# Patient Record
Sex: FEMALE | Race: WHITE | NOT HISPANIC OR LATINO | Employment: OTHER | URBAN - METROPOLITAN AREA
[De-identification: names, ages, dates, MRNs, and addresses within clinical notes are randomized per-mention and may not be internally consistent; named-entity substitution may affect disease eponyms.]

---

## 2017-02-20 ENCOUNTER — GENERIC CONVERSION - ENCOUNTER (OUTPATIENT)
Dept: OTHER | Facility: OTHER | Age: 77
End: 2017-02-20

## 2017-03-21 ENCOUNTER — ALLSCRIPTS OFFICE VISIT (OUTPATIENT)
Dept: OTHER | Facility: OTHER | Age: 77
End: 2017-03-21

## 2017-03-30 ENCOUNTER — GENERIC CONVERSION - ENCOUNTER (OUTPATIENT)
Dept: OTHER | Facility: OTHER | Age: 77
End: 2017-03-30

## 2018-01-11 NOTE — RESULT NOTES
Message   stress testing looks ok     Verified Results  NM MYOCARDIAL PERFUSION SPECT [facility] STRESS AND/OR REST) [id number] 07:58AM Catherine Aj Order Number: [id number]   Performing Comments: 541 [facility]   Dec 6 at 9:30am   - Patient Instructions: To schedule this appointment, please contact Central Scheduling at [phone]  Test Name Result Flag Reference   NM MYOCARDIAL PERFUSION SPECT (RX STRESS AND/OR REST) (Report)     [address] [facility] [address]  [place], [address]   [phone]     Rest/Stress Gated SPECT Myocardial Perfusion Imaging After Regadenoson     Patient: Sana Dee   MR number: [id number]   Account number: [de-identified]   : 1940   Age: 64 years   Gender: Female   Status: Outpatient   Location: [address] [place] [address] [facility] [place]   Height: 63 in   Weight: 178 lb   BP: 142/ 88 mmHg     Allergies: PENICILLINS     Diagnosis: R07 9 - Chest pain, unspecified     Primary Physician: Wendy Reeder DO   Technician: Aamir Heaton   RN: Rasta Cummins RN BSN   Referring Physician: Wendy Reeder DO   Group: [facility]   Report Prepared By[de-identified] Rasta Cummins RN BSN   Interpreting Physician: Sara Short DO     INDICATIONS: Detection of coronary artery disease  HISTORY: hx DVT/breast cancer left mastectomy/osteo The patient is a 64 year   old  female  Chest pain status: chest pain, consistent with atypical   angina  Other symptoms: decreased effort tolerance  Coronary artery disease   risk factors: dyslipidemia, hypertension, diabetes mellitus, and   post-menopausal state  Co-morbidity: obesity  Medications: a beta blocker  PHYSICAL EXAM: Baseline physical exam screening: no wheezes audible  REST ECG: Normal sinus rhythm  Normal baseline ECG  PROCEDURE: The study was performed at the [address] [place] [address]  A   regadenoson infusion pharmacologic stress test was performed  Gated SPECT   myocardial perfusion imaging was performed after stress  Systolic blood   pressure was 142 mmHg, at the start of the study  Diastolic blood pressure was   88 mmHg, at the start of the study  The heart rate was 60 bpm, at the start of   the study  IV double checked  Regadenoson protocol:   HR bpm SBP mmHg DBP mmHg Symptoms   Baseline 60 142 88 none   Immediate -- -- -- mild dyspnea   1 min -- -- -- same as above   2 min 113 138 80 subsiding   5 min 77 120 70 none   walked three minutes with Regadenoson The patient also performed low level   exercise  STRESS SUMMARY: Duration of pharmacologic stress was Maximal heart rate during   stress was 113 bpm  There was normal resting blood pressure with an appropriate   response to stress  The rate-pressure product for the peak heart rate and blood   pressure was 88672  There was no chest pain during stress  The stress test was   terminated due to protocol completion  Pre oxygen saturation: 96 %  Peak oxygen   saturation: 98 %  The stress ECG was negative for ischemia  There were no   stress arrhythmias or conduction abnormalities  ISOTOPE ADMINISTRATION:   Resting isotope administration Stress isotope administration   Agent Tetrofosmin Tetrofosmin   Dose 10 mCi 32 49 mCi   Date 12/07/2016 12/07/2016   Injection time 08:10 09:50   Imaging time 08:52 10:50   Injection-image interval -- 60 min     The radiopharmaceutical was injected at the peak effect of pharmacologic   stress  MYOCARDIAL PERFUSION IMAGING:   The image quality was fair  Rotating projection images reveal mild breast   attenuation  Left ventricular size was normal  The TID ratio was 1 1  PERFUSION DEFECTS:   - There was a small, mildly severe, paradoxical (reverse redistribution)   myocardial perfusion defect of the mid-apical anterior wall segments,   associated with normal function, suggestive of breast attenuation artifact       GATED SPECT:   The calculated left ventricular ejection fraction was 65 %  Left ventricular   ejection fraction was within normal limits by visual estimate  There was no   left ventricular regional abnormality  SUMMARY:   - Stress results: There was no chest pain during stress  - ECG conclusions: The stress ECG was negative for ischemia  - Perfusion imaging: There was a small, mildly severe, paradoxical (reverse   redistribution) myocardial perfusion defect of the mid-apical anterior wall   segments, associated with normal function, suggestive of breast attenuation   artifact  - Gated SPECT: The calculated left ventricular ejection fraction was   65 %  Left ventricular ejection fraction was within normal limits by visual   estimate  There was no left ventricular regional abnormality  IMPRESSIONS: Normal study after pharmacologic vasodilation  There was image   artifact, without diagnostic evidence for perfusion abnormality   Left   ventricular systolic function was normal      Prepared and signed by     Amy Busby DO   Signed 12/07/2016 13:22:19

## 2018-01-13 NOTE — RESULT NOTES
Message   Blood testing is consistent with diabetes  Recommend office evaluation to discuss treatment options  Verified Results  (1) HEMOGLOBIN A1C [id number] 09:46AM Beba Moffetter Order Number: [id number]     Test Name Result Flag Reference   HEMOGLOBIN A1C 6 7 % H 4 2-6 3   EST  AVG  GLUCOSE 146 mg/dl       (1) TSH WITH FT4 REFLEX [id number] 09:46AM Beba Curtis Order Number: [id number]     Test Name Result Flag Reference   TSH 2 270 uIU/mL  0 358-3 740   Patients undergoing fluorescein dye angiography may retain small amounts of fluorescein in the body for 48-72 hours post procedure  Samples containing fluorescein can produce falsely depressed TSH values  If the patient had this procedure,a specimen should be resubmitted post fluorescein clearance            The recommended reference ranges for TSH during pregnancy are as follows:  First trimester 0 1 to 2 5 uIU/mL  Second trimester  0 2 to 3 0 uIU/mL  Third trimester 0 3 to 3 0 uIU/m

## 2018-01-14 VITALS
BODY MASS INDEX: 30.3 KG/M2 | WEIGHT: 171 LBS | DIASTOLIC BLOOD PRESSURE: 60 MMHG | HEIGHT: 63 IN | RESPIRATION RATE: 20 BRPM | SYSTOLIC BLOOD PRESSURE: 108 MMHG | TEMPERATURE: 97.7 F | HEART RATE: 76 BPM

## 2018-01-17 NOTE — RESULT NOTES
Message   Blood work looks okay  Verified Results  (1) CBC/PLT/DIFF [id number] 09:00AM Alverna Layer Order Number: [id number]     Test Name Result Flag Reference   WBC COUNT 5 87 Thousand/uL  4 31-10 16   RBC COUNT 4 53 Million/uL  3 81-5 12   HEMOGLOBIN 13 7 g/dL  11 5-15 4   HEMATOCRIT 41 5 %  34 8-46  1   MCV 92 fL  82-98   MCH 30 2 pg  26 8-34 3   MCHC 33 0 g/dL  31 4-37 4   RDW 12 7 %  11 6-15 1   MPV 11 3 fL  8 9-12 7   PLATELET COUNT 474 Thousands/uL  149-390   nRBC AUTOMATED 0 /100 WBCs     NEUTROPHILS RELATIVE PERCENT 53 %  43-75   LYMPHOCYTES RELATIVE PERCENT 35 %  14-44   MONOCYTES RELATIVE PERCENT 10 %  4-12   EOSINOPHILS RELATIVE PERCENT 2 %  0-6   BASOPHILS RELATIVE PERCENT 0 %  0-1   NEUTROPHILS ABSOLUTE COUNT 3 08 Thousands/?L  1 85-7 62   LYMPHOCYTES ABSOLUTE COUNT 2 05 Thousands/?L  0 60-4 47   MONOCYTES ABSOLUTE COUNT 0 59 Thousand/?L  0 17-1 22   EOSINOPHILS ABSOLUTE COUNT 0 12 Thousand/?L  0 00-0 61   BASOPHILS ABSOLUTE COUNT 0 02 Thousands/?L  0 00-0 10   - Patient Instructions: This bloodwork is non-fasting  Please drink two glasses of water morning of bloodwork  (1) COMPREHENSIVE METABOLIC PANEL [id number] 27:19ZU Alverna Layer Order Number: [id number]     Test Name Result Flag Reference   GLUCOSE,RANDM 129 mg/dL     If the patient is fasting, the ADA then defines impaired fasting glucose as > 100 mg/dL and diabetes as > or equal to 123 mg/dL     SODIUM 144 mmol/L  136-145   POTASSIUM 4 1 mmol/L  3 5-5 3   CHLORIDE 107 mmol/L  100-108   CARBON DIOXIDE 28 mmol/L  21-32   ANION GAP (CALC) 9 mmol/L  4-13   BLOOD UREA NITROGEN 17 mg/dL  5-25   CREATININE 0 78 mg/dL  0 60-1 30   Standardized to IDMS reference method   CALCIUM 9 1 mg/dL  8 3-10 1   BILI, TOTAL 0 54 mg/dL  0 20-1 00   ALK PHOSPHATAS 65 U/L     ALT (SGPT) 28 U/L  12-78   AST(SGOT) 23 U/L  5-45   ALBUMIN 4 0 g/dL  3 5-5 0   TOTAL PROTEIN 7 3 g/dL  6 4-8 2   eGFR Non- >60 0 ml/min/1 73sq m   [facility] [employer] Disease Education Program recommendations are as follows:  GFR calculation is accurate only with a steady state creatinine  Chronic Kidney disease less than 60 ml/min/1 73 sq  meters  Kidney failure less than 15 ml/min/1 73 sq  meters  (1) HEMOGLOBIN A1C [id number] 09:00AM Alverna Layer Order Number: [id number]     Test Name Result Flag Reference   HEMOGLOBIN A1C 6 5 % H 4 2-6 3   EST  AVG  GLUCOSE 140 mg/dl       (1) LIPID PANEL, FASTING [id number] 09:00AM Alverna Layer Order Number: [id number]     Test Name Result Flag Reference   CHOLESTEROL 213 mg/dL H    HDL,DIRECT 50 mg/dL  40-60   Specimen collection should occur prior to Metamizole administration due to the potential for falsely depressed results  LDL CHOLESTEROL CALCULATED 125 mg/dL H 0-100   - Patient Instructions: This is a fasting blood test  Water,black tea or black  coffee only after 9:00pm the night before test   Drink 2 glasses of water the morning of test       Triglyceride:         Normal              <150 mg/dl       Borderline High    150-199 mg/dl       High               200-499 mg/dl       Very High          >499 mg/dl  Cholesterol:         Desirable        <200 mg/dl      Borderline High  200-239 mg/dl      High             >239 mg/dl  HDL Cholesterol:        High    >59 mg/dL      Low     <41 mg/dL  LDL CALCULATED:    This screening LDL is a calculated result  It does not have the accuracy of the Direct Measured LDL in the monitoring of patients with hyperlipidemia and/or statin therapy  Direct Measure LDL (EVN583) must be ordered separately in these patients  TRIGLYCERIDES 189 mg/dL H <=150   Specimen collection should occur prior to N-Acetylcysteine or Metamizole administration due to the potential for falsely depressed results       (1) MICROALBUMIN CREATININE RATIO, RANDOM URINE [id number] 09:00AM Alverna Layer Order Number: [id number]     Test Name Result Flag Reference   MICROALBUMIN/ CREAT R 24 mg/g creatinine  0-30   MICROALBUMIN,URINE 31 9 mg/L H 0 0-20 0   CREATININE URINE 131 0 mg/dL

## 2018-02-05 DIAGNOSIS — I48.91 ATRIAL FIBRILLATION, UNSPECIFIED TYPE (HCC): Primary | ICD-10-CM

## 2018-02-07 RX ORDER — APIXABAN 5 MG/1
TABLET, FILM COATED ORAL
Qty: 180 TABLET | Refills: 3 | Status: ON HOLD | OUTPATIENT
Start: 2018-02-07 | End: 2020-06-15

## 2020-03-30 ENCOUNTER — TRANSCRIBE ORDERS (OUTPATIENT)
Dept: ADMINISTRATIVE | Facility: HOSPITAL | Age: 80
End: 2020-03-30

## 2020-03-30 DIAGNOSIS — Z01.818 OTHER SPECIFIED PRE-OPERATIVE EXAMINATION: ICD-10-CM

## 2020-03-30 DIAGNOSIS — M48.061 SPINAL STENOSIS, LUMBAR REGION, WITHOUT NEUROGENIC CLAUDICATION: Primary | ICD-10-CM

## 2020-06-10 DIAGNOSIS — M48.062 SPINAL STENOSIS OF LUMBAR REGION WITH NEUROGENIC CLAUDICATION: ICD-10-CM

## 2020-06-10 PROCEDURE — U0003 INFECTIOUS AGENT DETECTION BY NUCLEIC ACID (DNA OR RNA); SEVERE ACUTE RESPIRATORY SYNDROME CORONAVIRUS 2 (SARS-COV-2) (CORONAVIRUS DISEASE [COVID-19]), AMPLIFIED PROBE TECHNIQUE, MAKING USE OF HIGH THROUGHPUT TECHNOLOGIES AS DESCRIBED BY CMS-2020-01-R: HCPCS

## 2020-06-11 LAB — SARS-COV-2 RNA SPEC QL NAA+PROBE: NOT DETECTED

## 2020-06-12 ENCOUNTER — APPOINTMENT (OUTPATIENT)
Dept: RADIOLOGY | Facility: MEDICAL CENTER | Age: 80
End: 2020-06-12
Payer: MEDICARE

## 2020-06-12 ENCOUNTER — ANESTHESIA EVENT (OUTPATIENT)
Dept: PERIOP | Facility: HOSPITAL | Age: 80
DRG: 460 | End: 2020-06-12
Payer: MEDICARE

## 2020-06-12 DIAGNOSIS — M48.061 SPINAL STENOSIS, LUMBAR REGION, WITHOUT NEUROGENIC CLAUDICATION: ICD-10-CM

## 2020-06-12 DIAGNOSIS — Z01.818 OTHER SPECIFIED PRE-OPERATIVE EXAMINATION: ICD-10-CM

## 2020-06-12 PROCEDURE — 71046 X-RAY EXAM CHEST 2 VIEWS: CPT

## 2020-06-15 ENCOUNTER — HOSPITAL ENCOUNTER (OUTPATIENT)
Dept: RADIOLOGY | Facility: HOSPITAL | Age: 80
Setting detail: OUTPATIENT SURGERY
Discharge: HOME | DRG: 460 | End: 2020-06-15
Payer: MEDICARE

## 2020-06-15 ENCOUNTER — HOSPITAL ENCOUNTER (INPATIENT)
Facility: HOSPITAL | Age: 80
LOS: 5 days | DRG: 460 | End: 2020-06-21
Attending: ORTHOPAEDIC SURGERY | Admitting: ORTHOPAEDIC SURGERY
Payer: MEDICARE

## 2020-06-15 ENCOUNTER — ANESTHESIA (OUTPATIENT)
Dept: PERIOP | Facility: HOSPITAL | Age: 80
DRG: 460 | End: 2020-06-15
Payer: MEDICARE

## 2020-06-15 DIAGNOSIS — M48.061 SPINAL STENOSIS, LUMBAR REGION WITHOUT NEUROGENIC CLAUDICATION: ICD-10-CM

## 2020-06-15 DIAGNOSIS — M48.061 SPINAL STENOSIS OF LUMBAR REGION, UNSPECIFIED WHETHER NEUROGENIC CLAUDICATION PRESENT: Primary | ICD-10-CM

## 2020-06-15 DIAGNOSIS — R91.8 ABNORMAL FINDING ON LUNG IMAGING: ICD-10-CM

## 2020-06-15 LAB
ABO GROUP BLD: NORMAL
ABO GROUP BLD: NORMAL
BLD GP AB SCN SERPL QL: NEGATIVE
GLUCOSE SERPL-MCNC: 158 MG/DL (ref 65–140)
GLUCOSE SERPL-MCNC: 96 MG/DL (ref 65–140)
RH BLD: POSITIVE
RH BLD: POSITIVE
SPECIMEN EXPIRATION DATE: NORMAL

## 2020-06-15 PROCEDURE — 01NB0ZZ RELEASE LUMBAR NERVE, OPEN APPROACH: ICD-10-PCS | Performed by: ORTHOPAEDIC SURGERY

## 2020-06-15 PROCEDURE — C1713 ANCHOR/SCREW BN/BN,TIS/BN: HCPCS | Performed by: ORTHOPAEDIC SURGERY

## 2020-06-15 PROCEDURE — 86901 BLOOD TYPING SEROLOGIC RH(D): CPT | Performed by: ORTHOPAEDIC SURGERY

## 2020-06-15 PROCEDURE — 86850 RBC ANTIBODY SCREEN: CPT | Performed by: ORTHOPAEDIC SURGERY

## 2020-06-15 PROCEDURE — 82948 REAGENT STRIP/BLOOD GLUCOSE: CPT

## 2020-06-15 PROCEDURE — 72100 X-RAY EXAM L-S SPINE 2/3 VWS: CPT

## 2020-06-15 PROCEDURE — 01NR0ZZ RELEASE SACRAL NERVE, OPEN APPROACH: ICD-10-PCS | Performed by: ORTHOPAEDIC SURGERY

## 2020-06-15 PROCEDURE — 0SG0071 FUSION OF LUMBAR VERTEBRAL JOINT WITH AUTOLOGOUS TISSUE SUBSTITUTE, POSTERIOR APPROACH, POSTERIOR COLUMN, OPEN APPROACH: ICD-10-PCS | Performed by: ORTHOPAEDIC SURGERY

## 2020-06-15 PROCEDURE — 86900 BLOOD TYPING SEROLOGIC ABO: CPT | Performed by: ORTHOPAEDIC SURGERY

## 2020-06-15 PROCEDURE — 0SB20ZZ EXCISION OF LUMBAR VERTEBRAL DISC, OPEN APPROACH: ICD-10-PCS | Performed by: ORTHOPAEDIC SURGERY

## 2020-06-15 DEVICE — SCREW 5.5MM SET SOLID: Type: IMPLANTABLE DEVICE | Site: SPINE LUMBAR | Status: FUNCTIONAL

## 2020-06-15 DEVICE — GRAFT BONE CANCELLOUS CHIPS 15ML: Type: IMPLANTABLE DEVICE | Site: SPINE LUMBAR | Status: FUNCTIONAL

## 2020-06-15 DEVICE — GRAFT BONE PUTTY 10ML: Type: IMPLANTABLE DEVICE | Site: SPINE LUMBAR | Status: FUNCTIONAL

## 2020-06-15 DEVICE — SCREW CORT CANC 5.5MM MDLR TULIP LOW-TOP: Type: IMPLANTABLE DEVICE | Site: SPINE LUMBAR | Status: FUNCTIONAL

## 2020-06-15 DEVICE — IMPLANTABLE DEVICE: Type: IMPLANTABLE DEVICE | Site: SPINE LUMBAR | Status: FUNCTIONAL

## 2020-06-15 DEVICE — TI OPEN CURVED ROD, 5.5 DIA X 35 MM
Type: IMPLANTABLE DEVICE | Site: SPINE LUMBAR | Status: FUNCTIONAL
Brand: WHISTLER MODULAR PEDICLE SCREW SYSTEM

## 2020-06-15 RX ORDER — METHOCARBAMOL 750 MG/1
750 TABLET, FILM COATED ORAL 4 TIMES DAILY PRN
Status: DISCONTINUED | OUTPATIENT
Start: 2020-06-15 | End: 2020-06-21 | Stop reason: HOSPADM

## 2020-06-15 RX ORDER — HYDROCODONE BITARTRATE AND ACETAMINOPHEN 5; 325 MG/1; MG/1
2 TABLET ORAL EVERY 4 HOURS PRN
Status: DISCONTINUED | OUTPATIENT
Start: 2020-06-15 | End: 2020-06-21 | Stop reason: HOSPADM

## 2020-06-15 RX ORDER — LISINOPRIL 5 MG/1
2.5 TABLET ORAL DAILY
Status: DISCONTINUED | OUTPATIENT
Start: 2020-06-16 | End: 2020-06-21 | Stop reason: HOSPADM

## 2020-06-15 RX ORDER — ONDANSETRON 2 MG/ML
4 INJECTION INTRAMUSCULAR; INTRAVENOUS ONCE AS NEEDED
Status: DISCONTINUED | OUTPATIENT
Start: 2020-06-15 | End: 2020-06-15 | Stop reason: HOSPADM

## 2020-06-15 RX ORDER — CELECOXIB 200 MG/1
200 CAPSULE ORAL ONCE
Status: COMPLETED | OUTPATIENT
Start: 2020-06-15 | End: 2020-06-15

## 2020-06-15 RX ORDER — ACETAMINOPHEN 325 MG/1
975 TABLET ORAL ONCE
Status: COMPLETED | OUTPATIENT
Start: 2020-06-15 | End: 2020-06-15

## 2020-06-15 RX ORDER — SODIUM CHLORIDE, SODIUM LACTATE, POTASSIUM CHLORIDE, CALCIUM CHLORIDE 600; 310; 30; 20 MG/100ML; MG/100ML; MG/100ML; MG/100ML
100 INJECTION, SOLUTION INTRAVENOUS CONTINUOUS
Status: DISCONTINUED | OUTPATIENT
Start: 2020-06-15 | End: 2020-06-15

## 2020-06-15 RX ORDER — VANCOMYCIN HYDROCHLORIDE 1 G/200ML
15 INJECTION, SOLUTION INTRAVENOUS ONCE
Status: COMPLETED | OUTPATIENT
Start: 2020-06-15 | End: 2020-06-15

## 2020-06-15 RX ORDER — DIPHENHYDRAMINE HYDROCHLORIDE 50 MG/ML
25 INJECTION INTRAMUSCULAR; INTRAVENOUS EVERY 6 HOURS PRN
Status: DISCONTINUED | OUTPATIENT
Start: 2020-06-15 | End: 2020-06-17

## 2020-06-15 RX ORDER — ACETAMINOPHEN 325 MG/1
975 TABLET ORAL EVERY 6 HOURS PRN
Status: DISCONTINUED | OUTPATIENT
Start: 2020-06-15 | End: 2020-06-18

## 2020-06-15 RX ORDER — HYDROMORPHONE HCL/PF 1 MG/ML
SYRINGE (ML) INJECTION AS NEEDED
Status: DISCONTINUED | OUTPATIENT
Start: 2020-06-15 | End: 2020-06-15 | Stop reason: SURG

## 2020-06-15 RX ORDER — EPHEDRINE SULFATE 50 MG/ML
INJECTION INTRAVENOUS AS NEEDED
Status: DISCONTINUED | OUTPATIENT
Start: 2020-06-15 | End: 2020-06-15 | Stop reason: SURG

## 2020-06-15 RX ORDER — MIDAZOLAM HYDROCHLORIDE 2 MG/2ML
INJECTION, SOLUTION INTRAMUSCULAR; INTRAVENOUS AS NEEDED
Status: DISCONTINUED | OUTPATIENT
Start: 2020-06-15 | End: 2020-06-15 | Stop reason: SURG

## 2020-06-15 RX ORDER — SIMETHICONE 80 MG
80 TABLET,CHEWABLE ORAL 4 TIMES DAILY PRN
Status: DISCONTINUED | OUTPATIENT
Start: 2020-06-15 | End: 2020-06-21 | Stop reason: HOSPADM

## 2020-06-15 RX ORDER — SODIUM CHLORIDE 9 MG/ML
INJECTION, SOLUTION INTRAVENOUS CONTINUOUS PRN
Status: DISCONTINUED | OUTPATIENT
Start: 2020-06-15 | End: 2020-06-15 | Stop reason: SURG

## 2020-06-15 RX ORDER — DICYCLOMINE HCL 20 MG
20 TABLET ORAL EVERY 6 HOURS
Status: DISCONTINUED | OUTPATIENT
Start: 2020-06-15 | End: 2020-06-21 | Stop reason: HOSPADM

## 2020-06-15 RX ORDER — SODIUM CHLORIDE 9 MG/ML
125 INJECTION, SOLUTION INTRAVENOUS CONTINUOUS
Status: DISCONTINUED | OUTPATIENT
Start: 2020-06-15 | End: 2020-06-17

## 2020-06-15 RX ORDER — ONDANSETRON 2 MG/ML
INJECTION INTRAMUSCULAR; INTRAVENOUS AS NEEDED
Status: DISCONTINUED | OUTPATIENT
Start: 2020-06-15 | End: 2020-06-15 | Stop reason: SURG

## 2020-06-15 RX ORDER — VANCOMYCIN HYDROCHLORIDE 1 G/200ML
1000 INJECTION, SOLUTION INTRAVENOUS EVERY 12 HOURS
Status: COMPLETED | OUTPATIENT
Start: 2020-06-16 | End: 2020-06-16

## 2020-06-15 RX ORDER — CALCIUM CARBONATE 200(500)MG
1000 TABLET,CHEWABLE ORAL DAILY PRN
Status: DISCONTINUED | OUTPATIENT
Start: 2020-06-15 | End: 2020-06-21 | Stop reason: HOSPADM

## 2020-06-15 RX ORDER — MAGNESIUM HYDROXIDE/ALUMINUM HYDROXICE/SIMETHICONE 120; 1200; 1200 MG/30ML; MG/30ML; MG/30ML
30 SUSPENSION ORAL EVERY 6 HOURS PRN
Status: DISCONTINUED | OUTPATIENT
Start: 2020-06-15 | End: 2020-06-19

## 2020-06-15 RX ORDER — FENTANYL CITRATE/PF 50 MCG/ML
25 SYRINGE (ML) INJECTION
Status: COMPLETED | OUTPATIENT
Start: 2020-06-15 | End: 2020-06-15

## 2020-06-15 RX ORDER — LIDOCAINE HYDROCHLORIDE 20 MG/ML
INJECTION, SOLUTION EPIDURAL; INFILTRATION; INTRACAUDAL; PERINEURAL AS NEEDED
Status: DISCONTINUED | OUTPATIENT
Start: 2020-06-15 | End: 2020-06-15 | Stop reason: SURG

## 2020-06-15 RX ORDER — FENTANYL CITRATE 50 UG/ML
INJECTION, SOLUTION INTRAMUSCULAR; INTRAVENOUS AS NEEDED
Status: DISCONTINUED | OUTPATIENT
Start: 2020-06-15 | End: 2020-06-15 | Stop reason: SURG

## 2020-06-15 RX ORDER — ATORVASTATIN CALCIUM 40 MG/1
40 TABLET, FILM COATED ORAL
Status: DISCONTINUED | OUTPATIENT
Start: 2020-06-16 | End: 2020-06-21 | Stop reason: HOSPADM

## 2020-06-15 RX ORDER — DOCUSATE SODIUM 100 MG/1
100 CAPSULE, LIQUID FILLED ORAL 2 TIMES DAILY
Status: DISCONTINUED | OUTPATIENT
Start: 2020-06-15 | End: 2020-06-21 | Stop reason: HOSPADM

## 2020-06-15 RX ORDER — PROPOFOL 10 MG/ML
INJECTION, EMULSION INTRAVENOUS AS NEEDED
Status: DISCONTINUED | OUTPATIENT
Start: 2020-06-15 | End: 2020-06-15 | Stop reason: SURG

## 2020-06-15 RX ORDER — AMLODIPINE BESYLATE 5 MG/1
5 TABLET ORAL DAILY
Status: DISCONTINUED | OUTPATIENT
Start: 2020-06-16 | End: 2020-06-21 | Stop reason: HOSPADM

## 2020-06-15 RX ORDER — ONDANSETRON 2 MG/ML
4 INJECTION INTRAMUSCULAR; INTRAVENOUS EVERY 6 HOURS PRN
Status: DISCONTINUED | OUTPATIENT
Start: 2020-06-15 | End: 2020-06-19

## 2020-06-15 RX ORDER — PROPOFOL 10 MG/ML
INJECTION, EMULSION INTRAVENOUS CONTINUOUS PRN
Status: DISCONTINUED | OUTPATIENT
Start: 2020-06-15 | End: 2020-06-15 | Stop reason: SURG

## 2020-06-15 RX ORDER — SENNOSIDES 8.6 MG
1 TABLET ORAL DAILY
Status: DISCONTINUED | OUTPATIENT
Start: 2020-06-16 | End: 2020-06-21 | Stop reason: HOSPADM

## 2020-06-15 RX ORDER — BUPIVACAINE HYDROCHLORIDE AND EPINEPHRINE 5; 5 MG/ML; UG/ML
INJECTION, SOLUTION EPIDURAL; INTRACAUDAL; PERINEURAL AS NEEDED
Status: DISCONTINUED | OUTPATIENT
Start: 2020-06-15 | End: 2020-06-15 | Stop reason: HOSPADM

## 2020-06-15 RX ORDER — DEXAMETHASONE SODIUM PHOSPHATE 4 MG/ML
INJECTION, SOLUTION INTRA-ARTICULAR; INTRALESIONAL; INTRAMUSCULAR; INTRAVENOUS; SOFT TISSUE AS NEEDED
Status: DISCONTINUED | OUTPATIENT
Start: 2020-06-15 | End: 2020-06-15 | Stop reason: SURG

## 2020-06-15 RX ORDER — CHLORHEXIDINE GLUCONATE 0.12 MG/ML
15 RINSE ORAL ONCE
Status: COMPLETED | OUTPATIENT
Start: 2020-06-15 | End: 2020-06-15

## 2020-06-15 RX ORDER — GABAPENTIN 100 MG/1
100 CAPSULE ORAL 3 TIMES DAILY
Status: DISCONTINUED | OUTPATIENT
Start: 2020-06-15 | End: 2020-06-21 | Stop reason: HOSPADM

## 2020-06-15 RX ADMIN — HYDROMORPHONE HYDROCHLORIDE 1 MG: 1 INJECTION, SOLUTION INTRAMUSCULAR; INTRAVENOUS; SUBCUTANEOUS at 14:08

## 2020-06-15 RX ADMIN — DOCUSATE SODIUM 100 MG: 100 CAPSULE, LIQUID FILLED ORAL at 18:49

## 2020-06-15 RX ADMIN — DICYCLOMINE HYDROCHLORIDE 20 MG: 20 TABLET ORAL at 20:39

## 2020-06-15 RX ADMIN — FENTANYL CITRATE 25 MCG: 50 INJECTION, SOLUTION INTRAMUSCULAR; INTRAVENOUS at 16:32

## 2020-06-15 RX ADMIN — SODIUM CHLORIDE 125 ML/HR: 0.9 INJECTION, SOLUTION INTRAVENOUS at 12:46

## 2020-06-15 RX ADMIN — SODIUM CHLORIDE 125 ML/HR: 0.9 INJECTION, SOLUTION INTRAVENOUS at 17:23

## 2020-06-15 RX ADMIN — VANCOMYCIN HYDROCHLORIDE 1000 MG: 1 INJECTION, SOLUTION INTRAVENOUS at 12:35

## 2020-06-15 RX ADMIN — PHENYLEPHRINE HYDROCHLORIDE 100 MCG: 10 INJECTION INTRAVENOUS at 15:13

## 2020-06-15 RX ADMIN — GABAPENTIN 100 MG: 100 CAPSULE ORAL at 20:39

## 2020-06-15 RX ADMIN — SODIUM CHLORIDE, SODIUM LACTATE, POTASSIUM CHLORIDE, AND CALCIUM CHLORIDE 100 ML/HR: .6; .31; .03; .02 INJECTION, SOLUTION INTRAVENOUS at 10:31

## 2020-06-15 RX ADMIN — CHLORHEXIDINE GLUCONATE 0.12% ORAL RINSE 15 ML: 1.2 LIQUID ORAL at 10:30

## 2020-06-15 RX ADMIN — EPHEDRINE SULFATE 5 MG: 50 INJECTION, SOLUTION INTRAVENOUS at 13:37

## 2020-06-15 RX ADMIN — FENTANYL CITRATE 100 MCG: 50 INJECTION, SOLUTION INTRAMUSCULAR; INTRAVENOUS at 13:07

## 2020-06-15 RX ADMIN — PHENYLEPHRINE HYDROCHLORIDE 100 MCG: 10 INJECTION INTRAVENOUS at 13:37

## 2020-06-15 RX ADMIN — FENTANYL CITRATE 25 MCG: 50 INJECTION, SOLUTION INTRAMUSCULAR; INTRAVENOUS at 16:26

## 2020-06-15 RX ADMIN — ACETAMINOPHEN 975 MG: 325 TABLET ORAL at 10:28

## 2020-06-15 RX ADMIN — PHENYLEPHRINE HYDROCHLORIDE 100 MCG: 10 INJECTION INTRAVENOUS at 13:45

## 2020-06-15 RX ADMIN — MIDAZOLAM 2 MG: 1 INJECTION INTRAMUSCULAR; INTRAVENOUS at 13:04

## 2020-06-15 RX ADMIN — PROPOFOL 80 MCG/KG/MIN: 10 INJECTION, EMULSION INTRAVENOUS at 13:10

## 2020-06-15 RX ADMIN — Medication: at 16:45

## 2020-06-15 RX ADMIN — DEXAMETHASONE SODIUM PHOSPHATE 4 MG: 4 INJECTION, SOLUTION INTRAMUSCULAR; INTRAVENOUS at 13:10

## 2020-06-15 RX ADMIN — PHENYLEPHRINE HYDROCHLORIDE 100 MCG: 10 INJECTION INTRAVENOUS at 14:34

## 2020-06-15 RX ADMIN — PROPOFOL 150 MG: 10 INJECTION, EMULSION INTRAVENOUS at 13:07

## 2020-06-15 RX ADMIN — PHENYLEPHRINE HYDROCHLORIDE 100 MCG: 10 INJECTION INTRAVENOUS at 15:32

## 2020-06-15 RX ADMIN — PHENYLEPHRINE HYDROCHLORIDE 100 MCG: 10 INJECTION INTRAVENOUS at 15:08

## 2020-06-15 RX ADMIN — ONDANSETRON 4 MG: 2 INJECTION INTRAMUSCULAR; INTRAVENOUS at 13:10

## 2020-06-15 RX ADMIN — FENTANYL CITRATE 100 MCG: 50 INJECTION, SOLUTION INTRAMUSCULAR; INTRAVENOUS at 13:20

## 2020-06-15 RX ADMIN — SODIUM CHLORIDE: 0.9 INJECTION, SOLUTION INTRAVENOUS at 15:47

## 2020-06-15 RX ADMIN — HYDROMORPHONE HYDROCHLORIDE 1 MG: 1 INJECTION, SOLUTION INTRAMUSCULAR; INTRAVENOUS; SUBCUTANEOUS at 14:59

## 2020-06-15 RX ADMIN — VANCOMYCIN HYDROCHLORIDE 1000 MG: 1 INJECTION, SOLUTION INTRAVENOUS at 23:50

## 2020-06-15 RX ADMIN — CELECOXIB 200 MG: 200 CAPSULE ORAL at 10:28

## 2020-06-15 RX ADMIN — LIDOCAINE HYDROCHLORIDE 100 MG: 20 INJECTION, SOLUTION EPIDURAL; INFILTRATION; INTRACAUDAL; PERINEURAL at 13:07

## 2020-06-15 RX ADMIN — PHENYLEPHRINE HYDROCHLORIDE 200 MCG: 10 INJECTION INTRAVENOUS at 14:02

## 2020-06-15 RX ADMIN — PHENYLEPHRINE HYDROCHLORIDE 100 MCG: 10 INJECTION INTRAVENOUS at 13:58

## 2020-06-15 RX ADMIN — FENTANYL CITRATE 25 MCG: 50 INJECTION, SOLUTION INTRAMUSCULAR; INTRAVENOUS at 16:37

## 2020-06-15 RX ADMIN — REMIFENTANIL HYDROCHLORIDE 0.1 MCG/KG/MIN: 1 INJECTION, POWDER, LYOPHILIZED, FOR SOLUTION INTRAVENOUS at 13:10

## 2020-06-15 RX ADMIN — PHENYLEPHRINE HYDROCHLORIDE 200 MCG: 10 INJECTION INTRAVENOUS at 14:03

## 2020-06-15 RX ADMIN — METFORMIN HYDROCHLORIDE 500 MG: 500 TABLET ORAL at 18:48

## 2020-06-15 RX ADMIN — SODIUM CHLORIDE: 0.9 INJECTION, SOLUTION INTRAVENOUS at 13:10

## 2020-06-15 RX ADMIN — SODIUM CHLORIDE: 0.9 INJECTION, SOLUTION INTRAVENOUS at 15:30

## 2020-06-15 RX ADMIN — FENTANYL CITRATE 25 MCG: 50 INJECTION, SOLUTION INTRAMUSCULAR; INTRAVENOUS at 16:54

## 2020-06-16 ENCOUNTER — APPOINTMENT (OUTPATIENT)
Dept: RADIOLOGY | Facility: HOSPITAL | Age: 80
DRG: 460 | End: 2020-06-16
Payer: MEDICARE

## 2020-06-16 ENCOUNTER — APPOINTMENT (INPATIENT)
Dept: NON INVASIVE DIAGNOSTICS | Facility: HOSPITAL | Age: 80
DRG: 460 | End: 2020-06-16
Payer: MEDICARE

## 2020-06-16 PROBLEM — M48.061 SPINAL STENOSIS OF LUMBAR REGION: Status: ACTIVE | Noted: 2019-10-01

## 2020-06-16 LAB
ANION GAP SERPL CALCULATED.3IONS-SCNC: 6 MMOL/L (ref 4–13)
BUN SERPL-MCNC: 13 MG/DL (ref 5–25)
CALCIUM SERPL-MCNC: 8 MG/DL (ref 8.3–10.1)
CHLORIDE SERPL-SCNC: 107 MMOL/L (ref 100–108)
CO2 SERPL-SCNC: 28 MMOL/L (ref 21–32)
CREAT SERPL-MCNC: 0.82 MG/DL (ref 0.6–1.3)
ERYTHROCYTE [DISTWIDTH] IN BLOOD BY AUTOMATED COUNT: 13.3 % (ref 11.6–15.1)
GFR SERPL CREATININE-BSD FRML MDRD: 67 ML/MIN/1.73SQ M
GLUCOSE SERPL-MCNC: 192 MG/DL (ref 65–140)
HCT VFR BLD AUTO: 36.5 % (ref 34.8–46.1)
HGB BLD-MCNC: 11.5 G/DL (ref 11.5–15.4)
MCH RBC QN AUTO: 30.7 PG (ref 26.8–34.3)
MCHC RBC AUTO-ENTMCNC: 31.5 G/DL (ref 31.4–37.4)
MCV RBC AUTO: 97 FL (ref 82–98)
PLATELET # BLD AUTO: 210 THOUSANDS/UL (ref 149–390)
PMV BLD AUTO: 11.6 FL (ref 8.9–12.7)
POTASSIUM SERPL-SCNC: 4.2 MMOL/L (ref 3.5–5.3)
RBC # BLD AUTO: 3.75 MILLION/UL (ref 3.81–5.12)
SODIUM SERPL-SCNC: 141 MMOL/L (ref 136–145)
WBC # BLD AUTO: 10.97 THOUSAND/UL (ref 4.31–10.16)

## 2020-06-16 PROCEDURE — 93970 EXTREMITY STUDY: CPT | Performed by: SURGERY

## 2020-06-16 PROCEDURE — 93970 EXTREMITY STUDY: CPT

## 2020-06-16 PROCEDURE — 97167 OT EVAL HIGH COMPLEX 60 MIN: CPT

## 2020-06-16 PROCEDURE — 85027 COMPLETE CBC AUTOMATED: CPT | Performed by: PHYSICIAN ASSISTANT

## 2020-06-16 PROCEDURE — 80048 BASIC METABOLIC PNL TOTAL CA: CPT | Performed by: PHYSICIAN ASSISTANT

## 2020-06-16 PROCEDURE — 97760 ORTHOTIC MGMT&TRAING 1ST ENC: CPT

## 2020-06-16 PROCEDURE — 97163 PT EVAL HIGH COMPLEX 45 MIN: CPT

## 2020-06-16 RX ORDER — ASPIRIN 325 MG
325 TABLET ORAL DAILY
Status: DISCONTINUED | OUTPATIENT
Start: 2020-06-16 | End: 2020-06-20

## 2020-06-16 RX ADMIN — METFORMIN HYDROCHLORIDE 500 MG: 500 TABLET ORAL at 09:56

## 2020-06-16 RX ADMIN — GABAPENTIN 100 MG: 100 CAPSULE ORAL at 09:58

## 2020-06-16 RX ADMIN — GABAPENTIN 100 MG: 100 CAPSULE ORAL at 16:38

## 2020-06-16 RX ADMIN — LEVOTHYROXINE SODIUM 125 MCG: 100 TABLET ORAL at 05:27

## 2020-06-16 RX ADMIN — AMLODIPINE BESYLATE 5 MG: 5 TABLET ORAL at 09:57

## 2020-06-16 RX ADMIN — METFORMIN HYDROCHLORIDE 500 MG: 500 TABLET ORAL at 16:38

## 2020-06-16 RX ADMIN — HYDROCODONE BITARTRATE AND ACETAMINOPHEN 2 TABLET: 5; 325 TABLET ORAL at 20:43

## 2020-06-16 RX ADMIN — SODIUM CHLORIDE 125 ML/HR: 0.9 INJECTION, SOLUTION INTRAVENOUS at 18:08

## 2020-06-16 RX ADMIN — SODIUM CHLORIDE 125 ML/HR: 0.9 INJECTION, SOLUTION INTRAVENOUS at 09:47

## 2020-06-16 RX ADMIN — GABAPENTIN 100 MG: 100 CAPSULE ORAL at 20:38

## 2020-06-16 RX ADMIN — DICYCLOMINE HYDROCHLORIDE 20 MG: 20 TABLET ORAL at 23:08

## 2020-06-16 RX ADMIN — METHOCARBAMOL 750 MG: 750 TABLET, FILM COATED ORAL at 12:32

## 2020-06-16 RX ADMIN — ATORVASTATIN CALCIUM 40 MG: 40 TABLET, FILM COATED ORAL at 16:38

## 2020-06-16 RX ADMIN — DOCUSATE SODIUM 100 MG: 100 CAPSULE, LIQUID FILLED ORAL at 09:58

## 2020-06-16 RX ADMIN — DICYCLOMINE HYDROCHLORIDE 20 MG: 20 TABLET ORAL at 05:28

## 2020-06-16 RX ADMIN — HYDROCODONE BITARTRATE AND ACETAMINOPHEN 2 TABLET: 5; 325 TABLET ORAL at 12:32

## 2020-06-16 RX ADMIN — SERTRALINE HYDROCHLORIDE 25 MG: 50 TABLET ORAL at 09:57

## 2020-06-16 RX ADMIN — ASPIRIN 325 MG ORAL TABLET 325 MG: 325 PILL ORAL at 16:38

## 2020-06-16 RX ADMIN — SENNOSIDES 8.6 MG: 8.6 TABLET ORAL at 09:56

## 2020-06-16 RX ADMIN — SODIUM CHLORIDE 125 ML/HR: 0.9 INJECTION, SOLUTION INTRAVENOUS at 01:22

## 2020-06-16 RX ADMIN — DICYCLOMINE HYDROCHLORIDE 20 MG: 20 TABLET ORAL at 12:27

## 2020-06-16 RX ADMIN — DOCUSATE SODIUM 100 MG: 100 CAPSULE, LIQUID FILLED ORAL at 19:30

## 2020-06-16 RX ADMIN — DICYCLOMINE HYDROCHLORIDE 20 MG: 20 TABLET ORAL at 19:30

## 2020-06-16 RX ADMIN — LISINOPRIL 2.5 MG: 5 TABLET ORAL at 09:58

## 2020-06-17 PROCEDURE — 97110 THERAPEUTIC EXERCISES: CPT

## 2020-06-17 PROCEDURE — 97116 GAIT TRAINING THERAPY: CPT

## 2020-06-17 PROCEDURE — 97530 THERAPEUTIC ACTIVITIES: CPT

## 2020-06-17 RX ORDER — DIPHENHYDRAMINE HCL 25 MG
25 TABLET ORAL EVERY 6 HOURS PRN
Status: DISCONTINUED | OUTPATIENT
Start: 2020-06-17 | End: 2020-06-17

## 2020-06-17 RX ADMIN — HYDROCODONE BITARTRATE AND ACETAMINOPHEN 2 TABLET: 5; 325 TABLET ORAL at 20:29

## 2020-06-17 RX ADMIN — DOCUSATE SODIUM 100 MG: 100 CAPSULE, LIQUID FILLED ORAL at 09:27

## 2020-06-17 RX ADMIN — GABAPENTIN 100 MG: 100 CAPSULE ORAL at 09:57

## 2020-06-17 RX ADMIN — SENNOSIDES 8.6 MG: 8.6 TABLET ORAL at 09:28

## 2020-06-17 RX ADMIN — HYDROCODONE BITARTRATE AND ACETAMINOPHEN 2 TABLET: 5; 325 TABLET ORAL at 07:44

## 2020-06-17 RX ADMIN — DICYCLOMINE HYDROCHLORIDE 20 MG: 20 TABLET ORAL at 12:05

## 2020-06-17 RX ADMIN — AMLODIPINE BESYLATE 5 MG: 5 TABLET ORAL at 09:58

## 2020-06-17 RX ADMIN — DICYCLOMINE HYDROCHLORIDE 20 MG: 20 TABLET ORAL at 05:05

## 2020-06-17 RX ADMIN — ASPIRIN 325 MG ORAL TABLET 325 MG: 325 PILL ORAL at 09:27

## 2020-06-17 RX ADMIN — SODIUM CHLORIDE 125 ML/HR: 0.9 INJECTION, SOLUTION INTRAVENOUS at 01:43

## 2020-06-17 RX ADMIN — GABAPENTIN 100 MG: 100 CAPSULE ORAL at 16:54

## 2020-06-17 RX ADMIN — LEVOTHYROXINE SODIUM 125 MCG: 100 TABLET ORAL at 05:03

## 2020-06-17 RX ADMIN — HYDROCODONE BITARTRATE AND ACETAMINOPHEN 2 TABLET: 5; 325 TABLET ORAL at 03:02

## 2020-06-17 RX ADMIN — METFORMIN HYDROCHLORIDE 500 MG: 500 TABLET ORAL at 16:55

## 2020-06-17 RX ADMIN — GABAPENTIN 100 MG: 100 CAPSULE ORAL at 20:29

## 2020-06-17 RX ADMIN — DOCUSATE SODIUM 100 MG: 100 CAPSULE, LIQUID FILLED ORAL at 16:55

## 2020-06-17 RX ADMIN — METFORMIN HYDROCHLORIDE 500 MG: 500 TABLET ORAL at 07:44

## 2020-06-17 RX ADMIN — HYDROCODONE BITARTRATE AND ACETAMINOPHEN 2 TABLET: 5; 325 TABLET ORAL at 15:04

## 2020-06-17 RX ADMIN — DICYCLOMINE HYDROCHLORIDE 20 MG: 20 TABLET ORAL at 18:00

## 2020-06-17 RX ADMIN — ONDANSETRON 4 MG: 2 INJECTION INTRAMUSCULAR; INTRAVENOUS at 10:22

## 2020-06-17 RX ADMIN — SERTRALINE HYDROCHLORIDE 25 MG: 50 TABLET ORAL at 09:27

## 2020-06-17 RX ADMIN — SODIUM CHLORIDE 125 ML/HR: 0.9 INJECTION, SOLUTION INTRAVENOUS at 10:02

## 2020-06-17 RX ADMIN — ATORVASTATIN CALCIUM 40 MG: 40 TABLET, FILM COATED ORAL at 16:54

## 2020-06-17 RX ADMIN — LISINOPRIL 2.5 MG: 5 TABLET ORAL at 09:27

## 2020-06-18 ENCOUNTER — APPOINTMENT (INPATIENT)
Dept: CT IMAGING | Facility: HOSPITAL | Age: 80
DRG: 460 | End: 2020-06-18
Payer: MEDICARE

## 2020-06-18 LAB
ALBUMIN SERPL BCP-MCNC: 2.8 G/DL (ref 3.5–5)
ALP SERPL-CCNC: 62 U/L (ref 46–116)
ALT SERPL W P-5'-P-CCNC: 30 U/L (ref 12–78)
ANION GAP SERPL CALCULATED.3IONS-SCNC: 10 MMOL/L (ref 4–13)
AST SERPL W P-5'-P-CCNC: 28 U/L (ref 5–45)
BASOPHILS # BLD AUTO: 0.02 THOUSANDS/ΜL (ref 0–0.1)
BASOPHILS NFR BLD AUTO: 0 % (ref 0–1)
BILIRUB SERPL-MCNC: 0.52 MG/DL (ref 0.2–1)
BUN SERPL-MCNC: 9 MG/DL (ref 5–25)
CALCIUM SERPL-MCNC: 9.2 MG/DL (ref 8.3–10.1)
CHLORIDE SERPL-SCNC: 101 MMOL/L (ref 100–108)
CO2 SERPL-SCNC: 28 MMOL/L (ref 21–32)
CREAT SERPL-MCNC: 0.73 MG/DL (ref 0.6–1.3)
EOSINOPHIL # BLD AUTO: 0.03 THOUSAND/ΜL (ref 0–0.61)
EOSINOPHIL NFR BLD AUTO: 0 % (ref 0–6)
ERYTHROCYTE [DISTWIDTH] IN BLOOD BY AUTOMATED COUNT: 13 % (ref 11.6–15.1)
GFR SERPL CREATININE-BSD FRML MDRD: 77 ML/MIN/1.73SQ M
GLUCOSE SERPL-MCNC: 132 MG/DL (ref 65–140)
GLUCOSE SERPL-MCNC: 153 MG/DL (ref 65–140)
GLUCOSE SERPL-MCNC: 157 MG/DL (ref 65–140)
GLUCOSE SERPL-MCNC: 169 MG/DL (ref 65–140)
GLUCOSE SERPL-MCNC: 240 MG/DL (ref 65–140)
HCT VFR BLD AUTO: 36.3 % (ref 34.8–46.1)
HGB BLD-MCNC: 11.9 G/DL (ref 11.5–15.4)
IMM GRANULOCYTES # BLD AUTO: 0.13 THOUSAND/UL (ref 0–0.2)
IMM GRANULOCYTES NFR BLD AUTO: 1 % (ref 0–2)
LYMPHOCYTES # BLD AUTO: 1.08 THOUSANDS/ΜL (ref 0.6–4.47)
LYMPHOCYTES NFR BLD AUTO: 7 % (ref 14–44)
MCH RBC QN AUTO: 31.1 PG (ref 26.8–34.3)
MCHC RBC AUTO-ENTMCNC: 32.8 G/DL (ref 31.4–37.4)
MCV RBC AUTO: 95 FL (ref 82–98)
MONOCYTES # BLD AUTO: 1.22 THOUSAND/ΜL (ref 0.17–1.22)
MONOCYTES NFR BLD AUTO: 8 % (ref 4–12)
NEUTROPHILS # BLD AUTO: 12.19 THOUSANDS/ΜL (ref 1.85–7.62)
NEUTS SEG NFR BLD AUTO: 84 % (ref 43–75)
NRBC BLD AUTO-RTO: 0 /100 WBCS
PLATELET # BLD AUTO: 195 THOUSANDS/UL (ref 149–390)
PMV BLD AUTO: 11 FL (ref 8.9–12.7)
POTASSIUM SERPL-SCNC: 2.7 MMOL/L (ref 3.5–5.3)
PROT SERPL-MCNC: 7.4 G/DL (ref 6.4–8.2)
RBC # BLD AUTO: 3.83 MILLION/UL (ref 3.81–5.12)
SARS-COV-2 RNA RESP QL NAA+PROBE: NEGATIVE
SODIUM SERPL-SCNC: 139 MMOL/L (ref 136–145)
WBC # BLD AUTO: 14.67 THOUSAND/UL (ref 4.31–10.16)

## 2020-06-18 PROCEDURE — 80053 COMPREHEN METABOLIC PANEL: CPT | Performed by: PHYSICIAN ASSISTANT

## 2020-06-18 PROCEDURE — 82948 REAGENT STRIP/BLOOD GLUCOSE: CPT

## 2020-06-18 PROCEDURE — 97110 THERAPEUTIC EXERCISES: CPT

## 2020-06-18 PROCEDURE — 97530 THERAPEUTIC ACTIVITIES: CPT

## 2020-06-18 PROCEDURE — 97116 GAIT TRAINING THERAPY: CPT

## 2020-06-18 PROCEDURE — 71275 CT ANGIOGRAPHY CHEST: CPT

## 2020-06-18 PROCEDURE — 87635 SARS-COV-2 COVID-19 AMP PRB: CPT | Performed by: PHYSICIAN ASSISTANT

## 2020-06-18 PROCEDURE — 97535 SELF CARE MNGMENT TRAINING: CPT

## 2020-06-18 PROCEDURE — 85025 COMPLETE CBC W/AUTO DIFF WBC: CPT | Performed by: PHYSICIAN ASSISTANT

## 2020-06-18 RX ORDER — HEPARIN SODIUM 5000 [USP'U]/ML
5000 INJECTION, SOLUTION INTRAVENOUS; SUBCUTANEOUS EVERY 8 HOURS SCHEDULED
Status: DISCONTINUED | OUTPATIENT
Start: 2020-06-18 | End: 2020-06-21 | Stop reason: HOSPADM

## 2020-06-18 RX ORDER — POTASSIUM CHLORIDE 20 MEQ/1
40 TABLET, EXTENDED RELEASE ORAL ONCE
Status: COMPLETED | OUTPATIENT
Start: 2020-06-18 | End: 2020-06-18

## 2020-06-18 RX ORDER — ACETAMINOPHEN 325 MG/1
650 TABLET ORAL EVERY 8 HOURS SCHEDULED
Status: DISCONTINUED | OUTPATIENT
Start: 2020-06-18 | End: 2020-06-21 | Stop reason: HOSPADM

## 2020-06-18 RX ADMIN — IOHEXOL 85 ML: 350 INJECTION, SOLUTION INTRAVENOUS at 09:17

## 2020-06-18 RX ADMIN — HYDROCODONE BITARTRATE AND ACETAMINOPHEN 1 TABLET: 5; 325 TABLET ORAL at 12:15

## 2020-06-18 RX ADMIN — HYDROCODONE BITARTRATE AND ACETAMINOPHEN 2 TABLET: 5; 325 TABLET ORAL at 01:35

## 2020-06-18 RX ADMIN — METFORMIN HYDROCHLORIDE 500 MG: 500 TABLET ORAL at 07:51

## 2020-06-18 RX ADMIN — INSULIN LISPRO 1 UNITS: 100 INJECTION, SOLUTION INTRAVENOUS; SUBCUTANEOUS at 16:24

## 2020-06-18 RX ADMIN — DICYCLOMINE HYDROCHLORIDE 20 MG: 20 TABLET ORAL at 05:36

## 2020-06-18 RX ADMIN — SENNOSIDES 8.6 MG: 8.6 TABLET ORAL at 08:26

## 2020-06-18 RX ADMIN — ACETAMINOPHEN 650 MG: 325 TABLET ORAL at 22:12

## 2020-06-18 RX ADMIN — ATORVASTATIN CALCIUM 40 MG: 40 TABLET, FILM COATED ORAL at 16:14

## 2020-06-18 RX ADMIN — DOCUSATE SODIUM 100 MG: 100 CAPSULE, LIQUID FILLED ORAL at 08:30

## 2020-06-18 RX ADMIN — LISINOPRIL 2.5 MG: 5 TABLET ORAL at 08:27

## 2020-06-18 RX ADMIN — GABAPENTIN 100 MG: 100 CAPSULE ORAL at 22:11

## 2020-06-18 RX ADMIN — HEPARIN SODIUM 5000 UNITS: 5000 INJECTION INTRAVENOUS; SUBCUTANEOUS at 14:23

## 2020-06-18 RX ADMIN — AMLODIPINE BESYLATE 5 MG: 5 TABLET ORAL at 08:27

## 2020-06-18 RX ADMIN — DICYCLOMINE HYDROCHLORIDE 20 MG: 20 TABLET ORAL at 17:10

## 2020-06-18 RX ADMIN — INSULIN LISPRO 2 UNITS: 100 INJECTION, SOLUTION INTRAVENOUS; SUBCUTANEOUS at 07:55

## 2020-06-18 RX ADMIN — METHOCARBAMOL 750 MG: 750 TABLET, FILM COATED ORAL at 12:18

## 2020-06-18 RX ADMIN — ASPIRIN 325 MG ORAL TABLET 325 MG: 325 PILL ORAL at 08:26

## 2020-06-18 RX ADMIN — GABAPENTIN 100 MG: 100 CAPSULE ORAL at 08:27

## 2020-06-18 RX ADMIN — DOCUSATE SODIUM 100 MG: 100 CAPSULE, LIQUID FILLED ORAL at 17:10

## 2020-06-18 RX ADMIN — SERTRALINE HYDROCHLORIDE 25 MG: 50 TABLET ORAL at 08:26

## 2020-06-18 RX ADMIN — INSULIN LISPRO 1 UNITS: 100 INJECTION, SOLUTION INTRAVENOUS; SUBCUTANEOUS at 11:14

## 2020-06-18 RX ADMIN — HEPARIN SODIUM 5000 UNITS: 5000 INJECTION INTRAVENOUS; SUBCUTANEOUS at 22:12

## 2020-06-18 RX ADMIN — ACETAMINOPHEN 975 MG: 325 TABLET ORAL at 16:14

## 2020-06-18 RX ADMIN — DICYCLOMINE HYDROCHLORIDE 20 MG: 20 TABLET ORAL at 00:10

## 2020-06-18 RX ADMIN — POTASSIUM CHLORIDE 40 MEQ: 1500 TABLET, EXTENDED RELEASE ORAL at 16:14

## 2020-06-18 RX ADMIN — METFORMIN HYDROCHLORIDE 500 MG: 500 TABLET ORAL at 16:14

## 2020-06-18 RX ADMIN — GABAPENTIN 100 MG: 100 CAPSULE ORAL at 16:14

## 2020-06-18 RX ADMIN — METHOCARBAMOL 750 MG: 750 TABLET, FILM COATED ORAL at 01:35

## 2020-06-18 RX ADMIN — DICYCLOMINE HYDROCHLORIDE 20 MG: 20 TABLET ORAL at 11:14

## 2020-06-18 RX ADMIN — LEVOTHYROXINE SODIUM 125 MCG: 100 TABLET ORAL at 05:34

## 2020-06-18 RX ADMIN — ONDANSETRON 4 MG: 2 INJECTION INTRAMUSCULAR; INTRAVENOUS at 04:15

## 2020-06-19 ENCOUNTER — APPOINTMENT (INPATIENT)
Dept: NON INVASIVE DIAGNOSTICS | Facility: HOSPITAL | Age: 80
DRG: 460 | End: 2020-06-19
Payer: MEDICARE

## 2020-06-19 ENCOUNTER — APPOINTMENT (INPATIENT)
Dept: RADIOLOGY | Facility: HOSPITAL | Age: 80
DRG: 460 | End: 2020-06-19
Payer: MEDICARE

## 2020-06-19 LAB
ANION GAP SERPL CALCULATED.3IONS-SCNC: 10 MMOL/L (ref 4–13)
BASOPHILS # BLD AUTO: 0.01 THOUSANDS/ΜL (ref 0–0.1)
BASOPHILS NFR BLD AUTO: 0 % (ref 0–1)
BUN SERPL-MCNC: 16 MG/DL (ref 5–25)
CALCIUM SERPL-MCNC: 9.4 MG/DL (ref 8.3–10.1)
CHLORIDE SERPL-SCNC: 97 MMOL/L (ref 100–108)
CO2 SERPL-SCNC: 30 MMOL/L (ref 21–32)
CREAT SERPL-MCNC: 1.04 MG/DL (ref 0.6–1.3)
EOSINOPHIL # BLD AUTO: 0.11 THOUSAND/ΜL (ref 0–0.61)
EOSINOPHIL NFR BLD AUTO: 1 % (ref 0–6)
ERYTHROCYTE [DISTWIDTH] IN BLOOD BY AUTOMATED COUNT: 12.8 % (ref 11.6–15.1)
GFR SERPL CREATININE-BSD FRML MDRD: 51 ML/MIN/1.73SQ M
GLUCOSE SERPL-MCNC: 126 MG/DL (ref 65–140)
GLUCOSE SERPL-MCNC: 155 MG/DL (ref 65–140)
GLUCOSE SERPL-MCNC: 160 MG/DL (ref 65–140)
GLUCOSE SERPL-MCNC: 182 MG/DL (ref 65–140)
GLUCOSE SERPL-MCNC: 194 MG/DL (ref 65–140)
HCT VFR BLD AUTO: 38.2 % (ref 34.8–46.1)
HGB BLD-MCNC: 12.6 G/DL (ref 11.5–15.4)
IMM GRANULOCYTES # BLD AUTO: 0.07 THOUSAND/UL (ref 0–0.2)
IMM GRANULOCYTES NFR BLD AUTO: 1 % (ref 0–2)
LYMPHOCYTES # BLD AUTO: 1.41 THOUSANDS/ΜL (ref 0.6–4.47)
LYMPHOCYTES NFR BLD AUTO: 13 % (ref 14–44)
MCH RBC QN AUTO: 30.5 PG (ref 26.8–34.3)
MCHC RBC AUTO-ENTMCNC: 33 G/DL (ref 31.4–37.4)
MCV RBC AUTO: 93 FL (ref 82–98)
MONOCYTES # BLD AUTO: 0.96 THOUSAND/ΜL (ref 0.17–1.22)
MONOCYTES NFR BLD AUTO: 9 % (ref 4–12)
NEUTROPHILS # BLD AUTO: 8.01 THOUSANDS/ΜL (ref 1.85–7.62)
NEUTS SEG NFR BLD AUTO: 76 % (ref 43–75)
NRBC BLD AUTO-RTO: 0 /100 WBCS
PLATELET # BLD AUTO: 272 THOUSANDS/UL (ref 149–390)
PMV BLD AUTO: 10.5 FL (ref 8.9–12.7)
POTASSIUM SERPL-SCNC: 2.7 MMOL/L (ref 3.5–5.3)
POTASSIUM SERPL-SCNC: 3.2 MMOL/L (ref 3.5–5.3)
RBC # BLD AUTO: 4.13 MILLION/UL (ref 3.81–5.12)
SODIUM SERPL-SCNC: 137 MMOL/L (ref 136–145)
WBC # BLD AUTO: 10.57 THOUSAND/UL (ref 4.31–10.16)

## 2020-06-19 PROCEDURE — 97530 THERAPEUTIC ACTIVITIES: CPT

## 2020-06-19 PROCEDURE — 97535 SELF CARE MNGMENT TRAINING: CPT

## 2020-06-19 PROCEDURE — 82948 REAGENT STRIP/BLOOD GLUCOSE: CPT

## 2020-06-19 PROCEDURE — NC001 PR NO CHARGE

## 2020-06-19 PROCEDURE — 84132 ASSAY OF SERUM POTASSIUM: CPT | Performed by: INTERNAL MEDICINE

## 2020-06-19 PROCEDURE — 85025 COMPLETE CBC W/AUTO DIFF WBC: CPT | Performed by: PHYSICIAN ASSISTANT

## 2020-06-19 PROCEDURE — 97116 GAIT TRAINING THERAPY: CPT

## 2020-06-19 PROCEDURE — 97110 THERAPEUTIC EXERCISES: CPT

## 2020-06-19 PROCEDURE — 99222 1ST HOSP IP/OBS MODERATE 55: CPT | Performed by: SURGERY

## 2020-06-19 PROCEDURE — 72100 X-RAY EXAM L-S SPINE 2/3 VWS: CPT

## 2020-06-19 PROCEDURE — 80048 BASIC METABOLIC PNL TOTAL CA: CPT | Performed by: PHYSICIAN ASSISTANT

## 2020-06-19 RX ORDER — METHOCARBAMOL 750 MG/1
750 TABLET, FILM COATED ORAL 4 TIMES DAILY PRN
Qty: 60 TABLET | Refills: 0 | Status: SHIPPED | OUTPATIENT
Start: 2020-06-19 | End: 2021-09-10

## 2020-06-19 RX ORDER — POTASSIUM CHLORIDE 20 MEQ/1
40 TABLET, EXTENDED RELEASE ORAL ONCE
Status: COMPLETED | OUTPATIENT
Start: 2020-06-19 | End: 2020-06-19

## 2020-06-19 RX ORDER — POTASSIUM CHLORIDE 20 MEQ/1
20 TABLET, EXTENDED RELEASE ORAL ONCE
Status: COMPLETED | OUTPATIENT
Start: 2020-06-19 | End: 2020-06-20

## 2020-06-19 RX ORDER — HEPARIN SODIUM 5000 [USP'U]/ML
5000 INJECTION, SOLUTION INTRAVENOUS; SUBCUTANEOUS EVERY 8 HOURS SCHEDULED
Qty: 1 ML | Refills: 0 | Status: SHIPPED | OUTPATIENT
Start: 2020-06-19 | End: 2020-07-06 | Stop reason: HOSPADM

## 2020-06-19 RX ORDER — HYDROCODONE BITARTRATE AND ACETAMINOPHEN 5; 325 MG/1; MG/1
1 TABLET ORAL EVERY 6 HOURS PRN
Qty: 60 TABLET | Refills: 0 | Status: SHIPPED | OUTPATIENT
Start: 2020-06-19 | End: 2020-07-06 | Stop reason: HOSPADM

## 2020-06-19 RX ADMIN — METFORMIN HYDROCHLORIDE 500 MG: 500 TABLET ORAL at 16:30

## 2020-06-19 RX ADMIN — ACETAMINOPHEN 650 MG: 325 TABLET ORAL at 14:38

## 2020-06-19 RX ADMIN — POTASSIUM CHLORIDE 40 MEQ: 1500 TABLET, EXTENDED RELEASE ORAL at 14:47

## 2020-06-19 RX ADMIN — DICYCLOMINE HYDROCHLORIDE 20 MG: 20 TABLET ORAL at 01:17

## 2020-06-19 RX ADMIN — ATORVASTATIN CALCIUM 40 MG: 40 TABLET, FILM COATED ORAL at 17:25

## 2020-06-19 RX ADMIN — SENNOSIDES 8.6 MG: 8.6 TABLET ORAL at 09:12

## 2020-06-19 RX ADMIN — GABAPENTIN 100 MG: 100 CAPSULE ORAL at 09:12

## 2020-06-19 RX ADMIN — GABAPENTIN 100 MG: 100 CAPSULE ORAL at 16:00

## 2020-06-19 RX ADMIN — HYDROCODONE BITARTRATE AND ACETAMINOPHEN 1 TABLET: 5; 325 TABLET ORAL at 14:37

## 2020-06-19 RX ADMIN — METFORMIN HYDROCHLORIDE 500 MG: 500 TABLET ORAL at 07:35

## 2020-06-19 RX ADMIN — HEPARIN SODIUM 5000 UNITS: 5000 INJECTION INTRAVENOUS; SUBCUTANEOUS at 06:29

## 2020-06-19 RX ADMIN — LEVOTHYROXINE SODIUM 125 MCG: 100 TABLET ORAL at 06:28

## 2020-06-19 RX ADMIN — HEPARIN SODIUM 5000 UNITS: 5000 INJECTION INTRAVENOUS; SUBCUTANEOUS at 14:37

## 2020-06-19 RX ADMIN — INSULIN LISPRO 1 UNITS: 100 INJECTION, SOLUTION INTRAVENOUS; SUBCUTANEOUS at 17:26

## 2020-06-19 RX ADMIN — DICYCLOMINE HYDROCHLORIDE 20 MG: 20 TABLET ORAL at 06:28

## 2020-06-19 RX ADMIN — INSULIN LISPRO 1 UNITS: 100 INJECTION, SOLUTION INTRAVENOUS; SUBCUTANEOUS at 07:35

## 2020-06-19 RX ADMIN — LISINOPRIL 2.5 MG: 5 TABLET ORAL at 09:13

## 2020-06-19 RX ADMIN — DOCUSATE SODIUM 100 MG: 100 CAPSULE, LIQUID FILLED ORAL at 17:26

## 2020-06-19 RX ADMIN — DICYCLOMINE HYDROCHLORIDE 20 MG: 20 TABLET ORAL at 17:27

## 2020-06-19 RX ADMIN — DOCUSATE SODIUM 100 MG: 100 CAPSULE, LIQUID FILLED ORAL at 09:15

## 2020-06-19 RX ADMIN — HYDROCODONE BITARTRATE AND ACETAMINOPHEN 1 TABLET: 5; 325 TABLET ORAL at 01:16

## 2020-06-19 RX ADMIN — ASPIRIN 325 MG ORAL TABLET 325 MG: 325 PILL ORAL at 09:12

## 2020-06-19 RX ADMIN — DICYCLOMINE HYDROCHLORIDE 20 MG: 20 TABLET ORAL at 12:08

## 2020-06-19 RX ADMIN — HYDROCODONE BITARTRATE AND ACETAMINOPHEN 1 TABLET: 5; 325 TABLET ORAL at 06:28

## 2020-06-19 RX ADMIN — METHOCARBAMOL 750 MG: 750 TABLET, FILM COATED ORAL at 14:37

## 2020-06-19 RX ADMIN — AMLODIPINE BESYLATE 5 MG: 5 TABLET ORAL at 09:14

## 2020-06-19 RX ADMIN — SERTRALINE HYDROCHLORIDE 25 MG: 50 TABLET ORAL at 09:14

## 2020-06-19 RX ADMIN — ACETAMINOPHEN 650 MG: 325 TABLET ORAL at 06:28

## 2020-06-20 LAB
ANION GAP SERPL CALCULATED.3IONS-SCNC: 7 MMOL/L (ref 4–13)
BACTERIA UR QL AUTO: ABNORMAL /HPF
BASOPHILS # BLD AUTO: 0.02 THOUSANDS/ΜL (ref 0–0.1)
BASOPHILS NFR BLD AUTO: 0 % (ref 0–1)
BILIRUB UR QL STRIP: NEGATIVE
BUN SERPL-MCNC: 27 MG/DL (ref 5–25)
CALCIUM SERPL-MCNC: 8.6 MG/DL (ref 8.3–10.1)
CHLORIDE SERPL-SCNC: 101 MMOL/L (ref 100–108)
CLARITY UR: CLEAR
CO2 SERPL-SCNC: 30 MMOL/L (ref 21–32)
COLOR UR: YELLOW
CREAT SERPL-MCNC: 1.01 MG/DL (ref 0.6–1.3)
EOSINOPHIL # BLD AUTO: 0.12 THOUSAND/ΜL (ref 0–0.61)
EOSINOPHIL NFR BLD AUTO: 1 % (ref 0–6)
ERYTHROCYTE [DISTWIDTH] IN BLOOD BY AUTOMATED COUNT: 13.1 % (ref 11.6–15.1)
GFR SERPL CREATININE-BSD FRML MDRD: 52 ML/MIN/1.73SQ M
GLUCOSE SERPL-MCNC: 112 MG/DL (ref 65–140)
GLUCOSE SERPL-MCNC: 132 MG/DL (ref 65–140)
GLUCOSE SERPL-MCNC: 133 MG/DL (ref 65–140)
GLUCOSE SERPL-MCNC: 134 MG/DL (ref 65–140)
GLUCOSE SERPL-MCNC: 138 MG/DL (ref 65–140)
GLUCOSE SERPL-MCNC: 181 MG/DL (ref 65–140)
GLUCOSE UR STRIP-MCNC: NEGATIVE MG/DL
HCT VFR BLD AUTO: 36.3 % (ref 34.8–46.1)
HGB BLD-MCNC: 11.8 G/DL (ref 11.5–15.4)
HGB UR QL STRIP.AUTO: ABNORMAL
IMM GRANULOCYTES # BLD AUTO: 0.08 THOUSAND/UL (ref 0–0.2)
IMM GRANULOCYTES NFR BLD AUTO: 1 % (ref 0–2)
INR PPP: 0.95 (ref 0.84–1.19)
KETONES UR STRIP-MCNC: NEGATIVE MG/DL
LEUKOCYTE ESTERASE UR QL STRIP: NEGATIVE
LYMPHOCYTES # BLD AUTO: 1.83 THOUSANDS/ΜL (ref 0.6–4.47)
LYMPHOCYTES NFR BLD AUTO: 19 % (ref 14–44)
MCH RBC QN AUTO: 30.6 PG (ref 26.8–34.3)
MCHC RBC AUTO-ENTMCNC: 32.5 G/DL (ref 31.4–37.4)
MCV RBC AUTO: 94 FL (ref 82–98)
MONOCYTES # BLD AUTO: 1.33 THOUSAND/ΜL (ref 0.17–1.22)
MONOCYTES NFR BLD AUTO: 14 % (ref 4–12)
NEUTROPHILS # BLD AUTO: 6.26 THOUSANDS/ΜL (ref 1.85–7.62)
NEUTS SEG NFR BLD AUTO: 65 % (ref 43–75)
NITRITE UR QL STRIP: NEGATIVE
NON-SQ EPI CELLS URNS QL MICRO: ABNORMAL /HPF
NRBC BLD AUTO-RTO: 0 /100 WBCS
PH UR STRIP.AUTO: 6 [PH]
PLATELET # BLD AUTO: 258 THOUSANDS/UL (ref 149–390)
PMV BLD AUTO: 10.1 FL (ref 8.9–12.7)
POTASSIUM SERPL-SCNC: 3.5 MMOL/L (ref 3.5–5.3)
PROT UR STRIP-MCNC: NEGATIVE MG/DL
PROTHROMBIN TIME: 12.8 SECONDS (ref 11.6–14.5)
RBC # BLD AUTO: 3.86 MILLION/UL (ref 3.81–5.12)
RBC #/AREA URNS AUTO: ABNORMAL /HPF
SARS-COV-2 RNA RESP QL NAA+PROBE: NEGATIVE
SODIUM SERPL-SCNC: 138 MMOL/L (ref 136–145)
SP GR UR STRIP.AUTO: <=1.005 (ref 1–1.03)
UROBILINOGEN UR QL STRIP.AUTO: 0.2 E.U./DL
WBC # BLD AUTO: 9.64 THOUSAND/UL (ref 4.31–10.16)
WBC #/AREA URNS AUTO: ABNORMAL /HPF

## 2020-06-20 PROCEDURE — 81001 URINALYSIS AUTO W/SCOPE: CPT | Performed by: PHYSICIAN ASSISTANT

## 2020-06-20 PROCEDURE — 85610 PROTHROMBIN TIME: CPT | Performed by: INTERNAL MEDICINE

## 2020-06-20 PROCEDURE — 87635 SARS-COV-2 COVID-19 AMP PRB: CPT | Performed by: INTERNAL MEDICINE

## 2020-06-20 PROCEDURE — 82948 REAGENT STRIP/BLOOD GLUCOSE: CPT

## 2020-06-20 PROCEDURE — 85025 COMPLETE CBC W/AUTO DIFF WBC: CPT | Performed by: INTERNAL MEDICINE

## 2020-06-20 PROCEDURE — 80048 BASIC METABOLIC PNL TOTAL CA: CPT | Performed by: INTERNAL MEDICINE

## 2020-06-20 RX ORDER — POTASSIUM CHLORIDE 20 MEQ/1
20 TABLET, EXTENDED RELEASE ORAL ONCE
Status: COMPLETED | OUTPATIENT
Start: 2020-06-20 | End: 2020-06-20

## 2020-06-20 RX ORDER — WARFARIN SODIUM 5 MG/1
5 TABLET ORAL
Status: COMPLETED | OUTPATIENT
Start: 2020-06-20 | End: 2020-06-20

## 2020-06-20 RX ADMIN — METFORMIN HYDROCHLORIDE 500 MG: 500 TABLET ORAL at 09:12

## 2020-06-20 RX ADMIN — METFORMIN HYDROCHLORIDE 500 MG: 500 TABLET ORAL at 16:20

## 2020-06-20 RX ADMIN — GABAPENTIN 100 MG: 100 CAPSULE ORAL at 16:20

## 2020-06-20 RX ADMIN — GABAPENTIN 100 MG: 100 CAPSULE ORAL at 00:14

## 2020-06-20 RX ADMIN — ACETAMINOPHEN 650 MG: 325 TABLET ORAL at 06:09

## 2020-06-20 RX ADMIN — HEPARIN SODIUM 5000 UNITS: 5000 INJECTION INTRAVENOUS; SUBCUTANEOUS at 21:06

## 2020-06-20 RX ADMIN — GABAPENTIN 100 MG: 100 CAPSULE ORAL at 09:12

## 2020-06-20 RX ADMIN — ACETAMINOPHEN 650 MG: 325 TABLET ORAL at 00:14

## 2020-06-20 RX ADMIN — POTASSIUM CHLORIDE 20 MEQ: 1500 TABLET, EXTENDED RELEASE ORAL at 00:16

## 2020-06-20 RX ADMIN — LISINOPRIL 2.5 MG: 5 TABLET ORAL at 09:12

## 2020-06-20 RX ADMIN — DICYCLOMINE HYDROCHLORIDE 20 MG: 20 TABLET ORAL at 11:29

## 2020-06-20 RX ADMIN — DICYCLOMINE HYDROCHLORIDE 20 MG: 20 TABLET ORAL at 17:14

## 2020-06-20 RX ADMIN — DICYCLOMINE HYDROCHLORIDE 20 MG: 20 TABLET ORAL at 06:10

## 2020-06-20 RX ADMIN — SENNOSIDES 8.6 MG: 8.6 TABLET ORAL at 09:11

## 2020-06-20 RX ADMIN — GABAPENTIN 100 MG: 100 CAPSULE ORAL at 21:06

## 2020-06-20 RX ADMIN — DICYCLOMINE HYDROCHLORIDE 20 MG: 20 TABLET ORAL at 00:16

## 2020-06-20 RX ADMIN — ACETAMINOPHEN 650 MG: 325 TABLET ORAL at 21:06

## 2020-06-20 RX ADMIN — SERTRALINE HYDROCHLORIDE 25 MG: 50 TABLET ORAL at 09:12

## 2020-06-20 RX ADMIN — ATORVASTATIN CALCIUM 40 MG: 40 TABLET, FILM COATED ORAL at 16:20

## 2020-06-20 RX ADMIN — HEPARIN SODIUM 5000 UNITS: 5000 INJECTION INTRAVENOUS; SUBCUTANEOUS at 00:14

## 2020-06-20 RX ADMIN — WARFARIN SODIUM 5 MG: 5 TABLET ORAL at 17:14

## 2020-06-20 RX ADMIN — DOCUSATE SODIUM 100 MG: 100 CAPSULE, LIQUID FILLED ORAL at 09:11

## 2020-06-20 RX ADMIN — ASPIRIN 325 MG ORAL TABLET 325 MG: 325 PILL ORAL at 09:11

## 2020-06-20 RX ADMIN — INSULIN LISPRO 1 UNITS: 100 INJECTION, SOLUTION INTRAVENOUS; SUBCUTANEOUS at 11:29

## 2020-06-20 RX ADMIN — POTASSIUM CHLORIDE 20 MEQ: 1500 TABLET, EXTENDED RELEASE ORAL at 13:07

## 2020-06-20 RX ADMIN — HEPARIN SODIUM 5000 UNITS: 5000 INJECTION INTRAVENOUS; SUBCUTANEOUS at 13:08

## 2020-06-20 RX ADMIN — HEPARIN SODIUM 5000 UNITS: 5000 INJECTION INTRAVENOUS; SUBCUTANEOUS at 06:09

## 2020-06-20 RX ADMIN — AMLODIPINE BESYLATE 5 MG: 5 TABLET ORAL at 09:12

## 2020-06-20 RX ADMIN — HYDROCODONE BITARTRATE AND ACETAMINOPHEN 2 TABLET: 5; 325 TABLET ORAL at 19:28

## 2020-06-20 RX ADMIN — LEVOTHYROXINE SODIUM 125 MCG: 100 TABLET ORAL at 06:09

## 2020-06-20 RX ADMIN — ACETAMINOPHEN 650 MG: 325 TABLET ORAL at 13:07

## 2020-06-21 ENCOUNTER — HOSPITAL ENCOUNTER (INPATIENT)
Facility: HOSPITAL | Age: 80
LOS: 15 days | Discharge: HOME HEALTH | DRG: 561 | End: 2020-07-06
Attending: PHYSICAL MEDICINE & REHABILITATION
Payer: MEDICARE

## 2020-06-21 VITALS
BODY MASS INDEX: 33.59 KG/M2 | DIASTOLIC BLOOD PRESSURE: 58 MMHG | OXYGEN SATURATION: 95 % | HEIGHT: 61 IN | TEMPERATURE: 98.2 F | HEART RATE: 74 BPM | RESPIRATION RATE: 18 BRPM | WEIGHT: 177.91 LBS | SYSTOLIC BLOOD PRESSURE: 132 MMHG

## 2020-06-21 DIAGNOSIS — I10 HTN (HYPERTENSION): ICD-10-CM

## 2020-06-21 DIAGNOSIS — Z79.01 ANTICOAGULATED ON WARFARIN: ICD-10-CM

## 2020-06-21 DIAGNOSIS — E87.6 HYPOKALEMIA: Primary | ICD-10-CM

## 2020-06-21 DIAGNOSIS — R52 PAIN: ICD-10-CM

## 2020-06-21 DIAGNOSIS — Z98.1 S/P LUMBAR SPINAL FUSION: ICD-10-CM

## 2020-06-21 DIAGNOSIS — E11.9 DM (DIABETES MELLITUS) (HCC): ICD-10-CM

## 2020-06-21 PROBLEM — E03.9 ACQUIRED HYPOTHYROIDISM: Status: ACTIVE | Noted: 2020-06-21

## 2020-06-21 PROBLEM — E78.5 HYPERLIPIDEMIA: Status: ACTIVE | Noted: 2020-06-21

## 2020-06-21 PROBLEM — E11.29 TYPE 2 DIABETES MELLITUS WITH KIDNEY COMPLICATION, WITHOUT LONG-TERM CURRENT USE OF INSULIN (HCC): Status: ACTIVE | Noted: 2020-06-21

## 2020-06-21 PROBLEM — N18.30 CKD STAGE 3 DUE TO TYPE 2 DIABETES MELLITUS (HCC): Status: ACTIVE | Noted: 2020-06-21

## 2020-06-21 PROBLEM — Z85.3 HISTORY OF BREAST CANCER: Status: ACTIVE | Noted: 2020-06-21

## 2020-06-21 PROBLEM — I49.9 ARRHYTHMIA: Status: ACTIVE | Noted: 2020-06-21

## 2020-06-21 PROBLEM — I82.532 CHRONIC DEEP VEIN THROMBOSIS (DVT) OF LEFT POPLITEAL VEIN (HCC): Status: ACTIVE | Noted: 2020-06-21

## 2020-06-21 PROBLEM — Z86.79 PERSONAL HISTORY OF SUPRAVENTRICULAR TACHYCARDIA: Status: ACTIVE | Noted: 2020-06-21

## 2020-06-21 PROBLEM — K58.9 IBS (IRRITABLE BOWEL SYNDROME): Status: ACTIVE | Noted: 2020-06-21

## 2020-06-21 PROBLEM — K21.9 HIATAL HERNIA WITH GASTROESOPHAGEAL REFLUX: Status: ACTIVE | Noted: 2020-06-21

## 2020-06-21 PROBLEM — N18.9 CKD (CHRONIC KIDNEY DISEASE): Status: ACTIVE | Noted: 2020-06-21

## 2020-06-21 PROBLEM — F39 MOOD DISORDER (HCC): Status: ACTIVE | Noted: 2020-06-21

## 2020-06-21 PROBLEM — M79.2 NEUROPATHIC PAIN: Status: ACTIVE | Noted: 2020-06-21

## 2020-06-21 PROBLEM — R93.89 ABNORMAL CAT SCAN: Status: ACTIVE | Noted: 2020-06-21

## 2020-06-21 PROBLEM — E11.22 CKD STAGE 3 DUE TO TYPE 2 DIABETES MELLITUS (HCC): Status: ACTIVE | Noted: 2020-06-21

## 2020-06-21 PROBLEM — K44.9 HIATAL HERNIA WITH GASTROESOPHAGEAL REFLUX: Status: ACTIVE | Noted: 2020-06-21

## 2020-06-21 PROBLEM — I89.9 LYMPH NODE DISORDER: Status: ACTIVE | Noted: 2020-06-21

## 2020-06-21 LAB
GLUCOSE SERPL-MCNC: 146 MG/DL (ref 65–140)
GLUCOSE SERPL-MCNC: 147 MG/DL (ref 65–140)
GLUCOSE SERPL-MCNC: 153 MG/DL (ref 65–140)
INR PPP: 0.95 (ref 0.84–1.19)
PROTHROMBIN TIME: 12.8 SECONDS (ref 11.6–14.5)

## 2020-06-21 PROCEDURE — 99223 1ST HOSP IP/OBS HIGH 75: CPT | Performed by: PHYSICAL MEDICINE & REHABILITATION

## 2020-06-21 PROCEDURE — 82948 REAGENT STRIP/BLOOD GLUCOSE: CPT

## 2020-06-21 PROCEDURE — 85610 PROTHROMBIN TIME: CPT | Performed by: INTERNAL MEDICINE

## 2020-06-21 PROCEDURE — 94760 N-INVAS EAR/PLS OXIMETRY 1: CPT

## 2020-06-21 RX ORDER — AMLODIPINE BESYLATE 5 MG/1
5 TABLET ORAL DAILY
Status: DISCONTINUED | OUTPATIENT
Start: 2020-06-22 | End: 2020-06-26

## 2020-06-21 RX ORDER — DICYCLOMINE HCL 20 MG
20 TABLET ORAL EVERY 6 HOURS
Status: DISCONTINUED | OUTPATIENT
Start: 2020-06-21 | End: 2020-07-06 | Stop reason: HOSPADM

## 2020-06-21 RX ORDER — POLYETHYLENE GLYCOL 3350 17 G/17G
17 POWDER, FOR SOLUTION ORAL DAILY PRN
Status: DISCONTINUED | OUTPATIENT
Start: 2020-06-21 | End: 2020-07-06 | Stop reason: HOSPADM

## 2020-06-21 RX ORDER — LEVOTHYROXINE SODIUM 0.12 MG/1
125 TABLET ORAL
Status: DISCONTINUED | OUTPATIENT
Start: 2020-06-22 | End: 2020-07-06 | Stop reason: HOSPADM

## 2020-06-21 RX ORDER — OXYCODONE HYDROCHLORIDE 5 MG/1
2.5 TABLET ORAL EVERY 4 HOURS PRN
Status: DISCONTINUED | OUTPATIENT
Start: 2020-06-21 | End: 2020-07-06 | Stop reason: HOSPADM

## 2020-06-21 RX ORDER — SERTRALINE HYDROCHLORIDE 25 MG/1
25 TABLET, FILM COATED ORAL DAILY
Status: DISCONTINUED | OUTPATIENT
Start: 2020-06-22 | End: 2020-07-06 | Stop reason: HOSPADM

## 2020-06-21 RX ORDER — PANTOPRAZOLE SODIUM 40 MG/1
40 TABLET, DELAYED RELEASE ORAL
Status: DISCONTINUED | OUTPATIENT
Start: 2020-06-22 | End: 2020-07-06 | Stop reason: HOSPADM

## 2020-06-21 RX ORDER — ACETAMINOPHEN 325 MG/1
975 TABLET ORAL EVERY 8 HOURS SCHEDULED
Status: DISCONTINUED | OUTPATIENT
Start: 2020-06-21 | End: 2020-06-25

## 2020-06-21 RX ORDER — LEVOTHYROXINE SODIUM 0.12 MG/1
125 TABLET ORAL
Status: DISCONTINUED | OUTPATIENT
Start: 2020-06-21 | End: 2020-06-21

## 2020-06-21 RX ORDER — LISINOPRIL 5 MG/1
2.5 TABLET ORAL DAILY
Status: DISCONTINUED | OUTPATIENT
Start: 2020-06-22 | End: 2020-06-23

## 2020-06-21 RX ORDER — GABAPENTIN 100 MG/1
100 CAPSULE ORAL 3 TIMES DAILY
Status: DISCONTINUED | OUTPATIENT
Start: 2020-06-21 | End: 2020-06-25

## 2020-06-21 RX ORDER — WARFARIN SODIUM 5 MG/1
5 TABLET ORAL
Status: COMPLETED | OUTPATIENT
Start: 2020-06-21 | End: 2020-06-21

## 2020-06-21 RX ORDER — ATORVASTATIN CALCIUM 40 MG/1
40 TABLET, FILM COATED ORAL
Status: DISCONTINUED | OUTPATIENT
Start: 2020-06-21 | End: 2020-07-06 | Stop reason: HOSPADM

## 2020-06-21 RX ORDER — OXYCODONE HYDROCHLORIDE 5 MG/1
5 TABLET ORAL EVERY 4 HOURS PRN
Status: DISCONTINUED | OUTPATIENT
Start: 2020-06-21 | End: 2020-07-06 | Stop reason: HOSPADM

## 2020-06-21 RX ORDER — METHOCARBAMOL 750 MG/1
750 TABLET, FILM COATED ORAL EVERY 6 HOURS PRN
Status: DISCONTINUED | OUTPATIENT
Start: 2020-06-21 | End: 2020-07-06 | Stop reason: HOSPADM

## 2020-06-21 RX ORDER — HEPARIN SODIUM 5000 [USP'U]/ML
5000 INJECTION, SOLUTION INTRAVENOUS; SUBCUTANEOUS EVERY 8 HOURS SCHEDULED
Status: DISCONTINUED | OUTPATIENT
Start: 2020-06-21 | End: 2020-06-28

## 2020-06-21 RX ORDER — WARFARIN SODIUM 2 MG/1
TABLET ORAL
Qty: 2 TABLET | Refills: 0 | Status: SHIPPED | OUTPATIENT
Start: 2020-06-21 | End: 2020-07-06 | Stop reason: HOSPADM

## 2020-06-21 RX ADMIN — GABAPENTIN 100 MG: 100 CAPSULE ORAL at 17:09

## 2020-06-21 RX ADMIN — HEPARIN SODIUM 5000 UNITS: 5000 INJECTION INTRAVENOUS; SUBCUTANEOUS at 21:35

## 2020-06-21 RX ADMIN — METFORMIN HYDROCHLORIDE 500 MG: 500 TABLET ORAL at 17:09

## 2020-06-21 RX ADMIN — METHOCARBAMOL 750 MG: 750 TABLET, FILM COATED ORAL at 13:19

## 2020-06-21 RX ADMIN — HYDROCODONE BITARTRATE AND ACETAMINOPHEN 2 TABLET: 5; 325 TABLET ORAL at 03:07

## 2020-06-21 RX ADMIN — WARFARIN SODIUM 5 MG: 5 TABLET ORAL at 17:09

## 2020-06-21 RX ADMIN — AMLODIPINE BESYLATE 5 MG: 5 TABLET ORAL at 08:20

## 2020-06-21 RX ADMIN — ACETAMINOPHEN 650 MG: 325 TABLET ORAL at 05:36

## 2020-06-21 RX ADMIN — DICYCLOMINE HYDROCHLORIDE 20 MG: 20 TABLET ORAL at 05:36

## 2020-06-21 RX ADMIN — METFORMIN HYDROCHLORIDE 500 MG: 500 TABLET ORAL at 08:20

## 2020-06-21 RX ADMIN — DICYCLOMINE HYDROCHLORIDE 20 MG: 20 TABLET ORAL at 13:19

## 2020-06-21 RX ADMIN — INSULIN LISPRO 1 UNITS: 100 INJECTION, SOLUTION INTRAVENOUS; SUBCUTANEOUS at 17:08

## 2020-06-21 RX ADMIN — SERTRALINE HYDROCHLORIDE 25 MG: 50 TABLET ORAL at 08:20

## 2020-06-21 RX ADMIN — ACETAMINOPHEN 975 MG: 325 TABLET ORAL at 13:18

## 2020-06-21 RX ADMIN — ATORVASTATIN CALCIUM 40 MG: 40 TABLET, FILM COATED ORAL at 17:09

## 2020-06-21 RX ADMIN — DOCUSATE SODIUM 100 MG: 100 CAPSULE, LIQUID FILLED ORAL at 08:20

## 2020-06-21 RX ADMIN — GABAPENTIN 100 MG: 100 CAPSULE ORAL at 08:20

## 2020-06-21 RX ADMIN — OXYCODONE HYDROCHLORIDE 2.5 MG: 5 TABLET ORAL at 21:37

## 2020-06-21 RX ADMIN — LISINOPRIL 2.5 MG: 5 TABLET ORAL at 08:21

## 2020-06-21 RX ADMIN — METHOCARBAMOL 750 MG: 750 TABLET, FILM COATED ORAL at 06:28

## 2020-06-21 RX ADMIN — SENNOSIDES 8.6 MG: 8.6 TABLET ORAL at 08:20

## 2020-06-21 RX ADMIN — HEPARIN SODIUM 5000 UNITS: 5000 INJECTION INTRAVENOUS; SUBCUTANEOUS at 13:19

## 2020-06-21 RX ADMIN — ACETAMINOPHEN 975 MG: 325 TABLET ORAL at 21:36

## 2020-06-21 RX ADMIN — GABAPENTIN 100 MG: 100 CAPSULE ORAL at 21:36

## 2020-06-21 RX ADMIN — DICYCLOMINE HYDROCHLORIDE 20 MG: 20 TABLET ORAL at 21:36

## 2020-06-21 RX ADMIN — HEPARIN SODIUM 5000 UNITS: 5000 INJECTION INTRAVENOUS; SUBCUTANEOUS at 05:36

## 2020-06-21 RX ADMIN — LEVOTHYROXINE SODIUM 125 MCG: 100 TABLET ORAL at 05:36

## 2020-06-21 NOTE — ASSESSMENT & PLAN NOTE
Lab Results   Component Value Date    HGBA1C 6 4 (H) 05/12/2020       Recent Labs     06/20/20  1114 06/20/20  1626 06/20/20 2035 06/21/20  0711   POCGLU 181* 112 133 147*       Blood Sugar Average: Last 72 hrs:    Continue Metformin 500 mg BID with SSI  Monitor kidney function   BMP tomorrow

## 2020-06-21 NOTE — ASSESSMENT & PLAN NOTE
Non-occlusive LLE DVT (popliteal vein) on 6/16 imaging  Patient had been on Eliquis prior to recent spinal surgery  Per surgery, patient not cleared to resume Eliquis  Dr Austin Vásquez does not want patient on Eliquis for at least 3 weeks post-op  Decision made prior to recent hospital discharge to start Coumadin with INR goal of 2 0-2 5  (when goal reached, heparin may be discontinued  INR 6/22) 1 35 Will give Coumadin 5 mg this evening with INR tomorrow  Patient will need clearance by spine surgery to be switched back to Eliquis

## 2020-06-21 NOTE — ASSESSMENT & PLAN NOTE
Abnormalities on CT of 11/2019 including but not limited to:    - diverticulosis  - L renal cyst  - B/L non-obstructing renal stones     - OP FU with PCP - discussed with pt/pt's family

## 2020-06-21 NOTE — PLAN OF CARE
Problem: Prexisting or High Potential for Compromised Skin Integrity  Goal: Skin integrity is maintained or improved  Description  INTERVENTIONS:  - Identify patients at risk for skin breakdown  - Assess and monitor skin integrity  - Assess and monitor nutrition and hydration status  - Monitor labs   - Assess for incontinence   - Turn and reposition patient  - Assist with mobility/ambulation  - Relieve pressure over bony prominences  - Avoid friction and shearing  - Provide appropriate hygiene as needed including keeping skin clean and dry  - Evaluate need for skin moisturizer/barrier cream  - Collaborate with interdisciplinary team   - Patient/family teaching  - Consider wound care consult   Outcome: Progressing     Problem: Potential for Falls  Goal: Patient will remain free of falls  Description  INTERVENTIONS:  - Assess patient frequently for physical needs  -  Identify cognitive and physical deficits and behaviors that affect risk of falls    -  [place] fall precautions as indicated by assessment   - Educate patient/family on patient safety including physical limitations  - Instruct patient to call for assistance with activity based on assessment  - Modify environment to reduce risk of injury  - Consider OT/PT consult to assist with strengthening/mobility  Outcome: Progressing     Problem: PAIN - ADULT  Goal: Verbalizes/displays adequate comfort level or baseline comfort level  Description  Interventions:  - Encourage patient to monitor pain and request assistance  - Assess pain using appropriate pain scale  - Administer analgesics based on type and severity of pain and evaluate response  - Implement non-pharmacological measures as appropriate and evaluate response  - Consider cultural and social influences on pain and pain management  - Notify physician/advanced practitioner if interventions unsuccessful or patient reports new pain  Outcome: Progressing     Problem: INFECTION - ADULT  Goal: Absence or prevention of progression during hospitalization  Description  INTERVENTIONS:  - Assess and monitor for signs and symptoms of infection  - Monitor lab/diagnostic results  - Monitor all insertion sites, i e  indwelling lines, tubes, and drains  - Monitor endotracheal if appropriate and nasal secretions for changes in amount and color  - [place] appropriate cooling/warming therapies per order  - Administer medications as ordered  - Instruct and encourage patient and family to use good hand hygiene technique  - Identify and instruct in appropriate isolation precautions for identified infection/condition  Outcome: Progressing     Problem: SAFETY ADULT  Goal: Maintain or return to baseline ADL function  Description  INTERVENTIONS:  -  Assess patient's ability to carry out ADLs; assess patient's baseline for ADL function and identify physical deficits which impact ability to perform ADLs (bathing, care of mouth/teeth, toileting, grooming, dressing, etc )  - Assess/evaluate cause of self-care deficits   - Assess range of motion  - Assess patient's mobility; develop plan if impaired  - Assess patient's need for assistive devices and provide as appropriate  - Encourage maximum independence but intervene and supervise when necessary  - Involve family in performance of ADLs  - Assess for home care needs following discharge   - Consider OT consult to assist with ADL evaluation and planning for discharge  - Provide patient education as appropriate  Outcome: Progressing  Goal: Maintain or return mobility status to optimal level  Description  INTERVENTIONS:  - Assess patient's baseline mobility status (ambulation, transfers, stairs, etc )    - Identify cognitive and physical deficits and behaviors that affect mobility  - Identify mobility aids required to assist with transfers and/or ambulation (gait belt, sit-to-stand, lift, walker, cane, etc )  - [place] fall precautions as indicated by assessment  - Record patient progress and toleration of activity level on Mobility SBAR; progress patient to next Phase/Stage  - Instruct patient to call for assistance with activity based on assessment  - Consider rehabilitation consult to assist with strengthening/weightbearing, etc   Outcome: Progressing     Problem: DISCHARGE PLANNING  Goal: Discharge to home or other facility with appropriate resources  Description  INTERVENTIONS:  - Identify barriers to discharge w/patient and caregiver  - Arrange for needed discharge resources and transportation as appropriate  - Identify discharge learning needs (meds, wound care, etc )  - Arrange for interpretive services to assist at discharge as needed  - Refer to Case Management Department for coordinating discharge planning if the patient needs post-hospital services based on physician/advanced practitioner order or complex needs related to functional status, cognitive ability, or social support system  Outcome: Progressing     Problem: SKIN/TISSUE INTEGRITY - ADULT  Goal: Skin integrity remains intact  Description  INTERVENTIONS  - Identify patients at risk for skin breakdown  - Assess and monitor skin integrity  - Assess and monitor nutrition and hydration status  - Monitor labs (i e  albumin)  - Assess for incontinence   - Turn and reposition patient  - Assist with mobility/ambulation  - Relieve pressure over bony prominences  - Avoid friction and shearing  - Provide appropriate hygiene as needed including keeping skin clean and dry  - Evaluate need for skin moisturizer/barrier cream  - Collaborate with interdisciplinary team (i e  Nutrition, Rehabilitation, etc )   - Patient/family teaching  Outcome: Progressing  Goal: Incision(s), wounds(s) or drain site(s) healing without S/S of infection  Description  INTERVENTIONS  - Assess and document risk factors for skin impairment   - Assess and document dressing, incision, wound bed, drain sites and surrounding tissue  - Consider nutrition services referral as needed  - Oral mucous membranes remain intact  - Provide patient/ family education  Outcome: Progressing  Goal: Oral mucous membranes remain intact  Description  INTERVENTIONS  - Assess oral mucosa and hygiene practices  - Implement preventative oral hygiene regimen  - Implement oral medicated treatments as ordered  - Initiate Nutrition services referral as needed  Outcome: Progressing     Problem: MUSCULOSKELETAL - ADULT  Goal: Maintain or return mobility to safest level of function  Description  INTERVENTIONS:  - Assess patient's ability to carry out ADLs; assess patient's baseline for ADL function and identify physical deficits which impact ability to perform ADLs (bathing, care of mouth/teeth, toileting, grooming, dressing, etc )  - Assess/evaluate cause of self-care deficits   - Assess range of motion  - Assess patient's mobility  - Assess patient's need for assistive devices and provide as appropriate  - Encourage maximum independence but intervene and supervise when necessary  - Involve family in performance of ADLs  - Assess for home care needs following discharge   - Consider OT consult to assist with ADL evaluation and planning for discharge  - Provide patient education as appropriate  Outcome: Progressing  Goal: Maintain proper alignment of affected body part  Description  INTERVENTIONS:  - Support, maintain and protect limb and body alignment  - Provide patient/ family with appropriate education  Outcome: Progressing

## 2020-06-21 NOTE — ASSESSMENT & PLAN NOTE
- non-occlusive LLE DVT of the popliteal vein per imaging on 6/16/20, however similar finding on imaging going back to at least 6/2007  - patient was previously on eliquis, but per Dr Lex Al would hold eliquis for 3 weeks post-op > now at 3 weeks with patient recently on warfarin per medicine dosing  - INR supratx at 3 42 on day of discharge - Monday   - Medicine recommends holding anticoagulation and repeating INR Wednesday - order placed for [facility] RN to draw  - If INR <2 00 pt ok to resume Eliquis Wednesday otherwise keep monitoring INR thru PCP and resume Eliquis when INR<2  - Spoke with PCP office nurse to follow-up prior to d/c   - Spoke with patient/family by phone and they understand plan

## 2020-06-21 NOTE — ASSESSMENT & PLAN NOTE
Most recent lipid profile (5/12/2020) - total cholesterol 209, triglycerides 145, HDL 64,   Continue atorvastatin 40 mg daily

## 2020-06-21 NOTE — ASSESSMENT & PLAN NOTE
- noted to have subcentimeter lymph node in the rt axilla with fat stranding on imaging in the setting of h/o breast CA therefore per radiology report clinical correlation was recommended and patient was evaluated by Dr Mervat Thompson in acute care who noted this to be an asymptomatic finding and recommended no further SMITH at this time other than routine FU with PCP & continued regular mammograms for her h/o breast CA

## 2020-06-21 NOTE — ASSESSMENT & PLAN NOTE
- h/o AVNRT s/p ablation in 11/2019  - h/o a-flutter (chronically on [facility] due to h/o DVT)  - follows with Dr Delbert Huerta of [facility] cards

## 2020-06-21 NOTE — ASSESSMENT & PLAN NOTE
- after review of old labs from [facility] baseline Cr appears to be between 0 8-1 0  - periodic BMP  - IM following

## 2020-06-21 NOTE — H&P
PHYSICAL MEDICINE AND REHABILITATION H&P/ADMISSION NOTE  Pedro Alford 78 y o  female MRN: [id number]  Unit/Bed#: [facility] 226-83 Encounter: [id number]     Rehab Diagnosis: 08 9    CC: lumbar stenosis     History of Present Illness:   Pedro Alford is a 78 y o  female who presented to the [address] with lumbar stenosis s/p L4-S1 lami-fusion by Dr Salazar Rangel on 6/15  Please see below for further details  Subjective: patient without complaint currently     Review of Systems: A 10-point review of systems was performed  Negative except as listed above      Plan:     Spinal stenosis of lumbar region  Assessment & Plan  - s/p L4-S1 lami-fusion by Dr Salazar Rangel on 6/15  - spine precautions/LSO brace  - OP FU with Dr Salazar Rangel     Abnormal CAT scan  Assessment & Plan  Abnormalities on CT of 11/2019 including but not limited to:    - diverticulosis  - L renal cyst  - B/L non-obstructing renal stones     - OP FU     Lymph node disorder  Assessment & Plan  - noted to have subcentimeter lymph node in the rt axilla with fat stranding on imaging in the setting of h/o breast CA therefore per radiology report clinical correlation was recommended and patient was evaluated by Dr Tyshawn Garza in acute care who noted this to be an asymptomatic finding and recommended no further SMITH at this time other than routine FU with PCP & continued regular mammograms for her h/o breast CA     IBS (irritable bowel syndrome)  Assessment & Plan  - on home bentyl 20 mg q6     Mood disorder (HCC)  Assessment & Plan  - on home zoloft 25 mg qd     Neuropathic pain  Assessment & Plan  - h/o DM  - on home neurontin 100 mg TID     HTN (hypertension)  Assessment & Plan  - at home on lopressor 25 mg qd, norvasc 5 mg qd, & lisinopril 2 5 mg qd  - IM managing     DM (diabetes mellitus) ([facility] [address] )  Assessment & Plan  - on home metformin 500 mg BID with meals & ISS  - IM managing       CKD (chronic kidney disease)  Assessment & Plan  - after review of old labs from [facility] baseline Cr appears to be between 0 8-1 0  - Cr currently 1 01  - IM monitoring     Hypokalemia  Assessment & Plan  - received several doses of K+ supplementation while in acute care and is currently low normal at 3 5  - recheck in AM   - IM managing     Chronic deep vein thrombosis (DVT) of left popliteal vein (HCC)  Assessment & Plan  - non-occlusive LLE DVT of the popliteal vein per imaging of 6/16/20 however similar finding on imaging going back to at least 6/2007 and per patient was been on coumadin for over 20 years therefore even older than that, per patient 2-3 years ago was switched from coumadin to eliquis for convenience of use however after recent lumbar surgery on 6/15 the surgeon Dr Gela Hernandez did not want patient to be on eliquis for at least 3 weeks post-op and it was decided by Dr Gela Hernandez and Dr Joseph Glaser that patient should be on coumadin with HSQ 5000 q8 until INR is between 2-2 5 due to high DVT risk, will continue this for now however will defer to IM consultant to rehab unit to adjust this plan if they deem that appropriate (INR currently 0 95, recheck in AM)     Arrhythmia  Assessment & Plan  - h/o AVNRT s/p ablation in 11/2019  - h/o a-flutter (chronically on [facility] due to h/o DVT)  - follows with Dr Daniele Rodriguez of [facility] cards     Hiatal hernia with gastroesophageal reflux  Assessment & Plan  - therapeutic substitution for home omeprazole 40 mg qd     Hyperlipidemia  Assessment & Plan  - on home lipitor 40 mg qpm     Hypothyroidism  Assessment & Plan  - on home levothyroxine 125 mcg qd   - TSH on 5/12/20 WNL        Drug regimen reviewed, all potential adverse effects identified and addressed:    Scheduled Meds:  Current Facility-Administered Medications:  acetaminophen 975 mg Oral Q8H MD Citlalli Covington ON 6/22/2020] amLODIPine 5 mg Oral Daily Bony Julian MD   atorvastatin 40 mg Oral Daily With Valeria Peña MD   dicyclomine 20 mg Oral Q6H Bony Julian MD   gabapentin 100 mg Oral TID Sylvester Aschoff, MD   heparin (porcine) 5,000 Units Subcutaneous [facility] Sylvester Aschoff, MD   insulin lispro 1-5 Units Subcutaneous TID AC Sylvester Aschoff, MD   insulin lispro 1-5 Units Subcutaneous HS Sylvester Aschoff, MD Jenni Grumet ON 6/22/2020] levothyroxine 125 mcg Oral Early Morning Sylvester Aschoff, MD Jenni Grumet ON 6/22/2020] lisinopril 2 5 mg Oral Daily Sylvester Aschoff, MD   metFORMIN 500 mg Oral BID With Meals Sylvester Aschoff, MD   methocarbamol 750 mg Oral Q6H PRN Sylvester Aschoff, MD   oxyCODONE 2 5 mg Oral Q4H PRN Sylvester Aschoff, MD   oxyCODONE 5 mg Oral Q4H PRN Sylvester Aschoff, MD Jenni Grumet ON 6/22/2020] pantoprazole 40 mg Oral Early Morning Sylvester Aschoff, MD   polyethylene glycol 17 g Oral Daily PRN Sylvester Aschoff, MD Jenni Grumet ON 6/22/2020] sertraline 25 mg Oral Daily Sylvester Aschoff, MD   warfarin 5 mg Oral Once (warfarin) Sylvester Aschoff, MD          Code: d/w patient and confirmed with patient that she wishes to be full code and patient verbalized her understanding of what this means      Functional History - Prior to Admission:      I PTA     Functional Status Upon Admission to [facility]C:  Mobility: mod  Transfers: mod   ADLs: mod-max      Physical Exam:  Vitals:    06/21/20 1405   BP: 106/57   Pulse: 62   Resp: 18   Temp: 97 8 °F (36 6 °C)   SpO2: 100%         General: alert, no apparent distress, cooperative and comfortable  HEENT:  Head: Normal, normocephalic, atraumatic    CARDIAC:  +S1/2  LUNGS:  no abnormal respiratory pattern, no retractions noted, non-labored breathing   ABDOMEN:  soft NT   EXTREMITIES:  volume status currently stable   NEURO:  AO X 4, CN 2-12 grossly intact, 4/5 throughout on MMT, lt touch grossly intact, no gross dysmetria on F-N testing B/L   PSYCH:  mood/affect currently stable       Laboratory:    Results from last 7 days   Lab Units 06/20/20  0607 06/19/20  1244 06/18/20  1422   HEMOGLOBIN g/dL 11 8 12 6 11 9   HEMATOCRIT % 36 3 38 2 36 3   WBC Thousand/uL 9 64 10 57* 14 67*     Results from last 7 days   Lab Units 06/20/20  0607 06/19/20  2108 06/19/20  1244 06/18/20  1422   BUN mg/dL 27*  --  16 9   SODIUM mmol/L 138  --  137 139   POTASSIUM mmol/L 3 5 3 2* 2 7* 2 7*   CHLORIDE mmol/L 101  --  97* 101   CREATININE mg/dL 1 01  --  1 04 0 73   AST U/L  --   --   --  28   ALT U/L  --   --   --  30     Results from last 7 days   Lab Units 06/21/20  0427 06/20/20  1315   PROTIME seconds 12 8 12 8   INR  0 95 0 95              Past Medical History:   Past Surgical History:   Family History:   Social history:   Past Medical History:   Diagnosis Date    Ambulates with cane     Anxiety     Arthritis     Back pain     Bowel trouble     Cancer ([address] )     left breast- left mastectomy- chemo    Chronic pain disorder     Coronary artery disease     2 stents    Depression     Diabetes mellitus (HCC)     NIDDM    Disease of thyroid gland     hypo    DVT (deep venous thrombosis) (HCC)     left leg    Full dentures     H/O breast implant     left    Headache     History of transfusion     no adverse reaction    Hyperlipidemia     Hypertension     Irregular heart beat     Afib    Localized swelling of both lower legs     Multiple falls     Neuropathy     Right knee pain     Shortness of breath     on exertion    Spinal stenosis, lumbar     Uses walker     Wears glasses     Wears glasses     Past Surgical History:   Procedure Laterality Date    APPENDECTOMY      BREAST IMPLANT Left     CATARACT EXTRACTION Bilateral     CHOLECYSTECTOMY      COLONOSCOPY      CORONARY ANGIOPLASTY WITH STENT PLACEMENT      HYSTERECTOMY      JOINT REPLACEMENT Right     partial    LUMBAR FUSION N/A 6/15/2020    Procedure: L4/5, L5/S1 LAMINECTOMY; L4/5 PSF; Surgeon: Ruben Wilson MD;  Location: [facility];  Service: Orthopedics    MASTECTOMY Left     ROTATOR CUFF REPAIR Left     TONSILLECTOMY       History reviewed  No pertinent family history     Social History     Socioeconomic History    Marital status:      Spouse name: None    Number of children: None    Years of education: None    Highest education level: None   Occupational History    None   Social Needs    Financial resource strain: None    Food insecurity:     Worry: None     Inability: None    Transportation needs:     Medical: None     Non-medical: None   Tobacco Use    Smoking status: Never Smoker    Smokeless tobacco: Never Used   Substance and Sexual Activity    Alcohol use: Yes     Frequency: Monthly or less     Drinks per session: 1 or 2     Binge frequency: Never     Comment: wine    Drug use: Never    Sexual activity: Not Currently   Lifestyle    Physical activity:     Days per week: None     Minutes per session: None    Stress: None   Relationships    Social connections:     Talks on phone: None     Gets together: None     Attends Yarsanism service: None     Active member of club or organization: None     Attends meetings of clubs or organizations: None     Relationship status: None    Intimate partner violence:     Fear of current or ex partner: None     Emotionally abused: None     Physically abused: None     Forced sexual activity: None   Other Topics Concern    None   Social History Narrative    None          Current Medical Diagnosis Allergies   Patient Active Problem List   Diagnosis    Spinal stenosis of lumbar region    Hypothyroidism    Hyperlipidemia    Hiatal hernia with gastroesophageal reflux    Arrhythmia    Chronic deep vein thrombosis (DVT) of left popliteal vein (HCC)    Hypokalemia    CKD (chronic kidney disease)    [facility]    HTN (hypertension)    Neuropathic pain    Mood disorder (HCC)    IBS (irritable bowel syndrome)    Lymph node disorder    Abnormal CAT scan    Allergies   Allergen Reactions    Penicillins Hives           Medical Necessity Criteria for [facility] Admission: CKD   In addition, the preadmission screen, post-admission physical evaluation, overall plan of care and admissions order demonstrate a reasonable expectation that the following criteria were met at the time of admission to the [facility]  1  The patient requires active and ongoing therapeutic intervention of multiple therapy disciplines (physical therapy, occupational therapy, speech-language pathology, or prosthetics/orthotics), one of which is physical or occupational therapy  2  Patient requires an intensive rehabilitation therapy program, as defined in Chapter 1, section 110 2 2 of the CMS Medicare Policy Manual  This intensive rehabilitation therapy program will consist of at least 3 hours of therapy per day at least 5 days per week or at least 15 hours of intensive rehabilitation therapy within a 7 consecutive day period, beginning with the date of admission to the [facility]  3  The patient is reasonably expected to actively participate in, and benefit significantly from, the intensive rehabilitation therapy program as defined in Chapter 1, section 110 2 2 of the CMS Medicare Policy Manual at this time of admission to the [facility]  She can reasonably be expected to make measurable improvement (that will be of practical value to improve the patients functional capacity or adaptation to impairments) as a result of the rehabilitation treatment, as defined in section 110 3, and such improvement can be expected to be made within the prescribed period of time  As noted in the CMS Medicare Policy Manual, the patient need not be expected to achieve complete independence in the domain of self-care nor be expected to return to his or her prior level of functioning in order to meet this standard  4  The patient must require physician supervision by a rehabilitation physician   As such, a rehabilitation physician will conduct face-to-face visits with the patient at least 3 days per week throughout the patients stay in the [facility] to assess the patient both medically and functionally, as well as to modify the course of treatment as needed to maximize the patients capacity to benefit from the rehabilitation process  5  The patient requires an intensive and coordinated interdisciplinary approach to providing rehabilitation, as defined in Chapter 1, section 110 2 5 of the CMS Medicare Policy Manual  This will be achieved through periodic team conferences, conducted at least once in a 7-day period, and comprising of an interdisciplinary team of medical professionals consisting of: a rehabilitation physician, registered nurse,  and/or , and a licensed/certified therapist from each therapy discipline involved in treating the patient  Changes Since Pre-admission Assessment: None -This patient's participation in rehab continues to be reasonable, necessary and appropriate  CMS Required Post-Admission Physician Evaluation Elements  History and Physical, including medical history, functional history and active comorbidities as in above text  PostAdmission Physician Evaluation:  The patient has the potential to make improvement and is in need of physical, occupational, and/or therapy services  The patient may also need nutritional services  Given the patient's complex medical condition and risk of further medical complications, rehabilitative services cannot be safely provided at a lower level of care, such as a skilled nursing facility  I have reviewed the patient's functional and medical status at the time of the preadmission screening and they are the same as on the day of this admission  I acknowledge that I have personally performed a full physical examination on this patient within 24 hours of admission  The patient and/or family demonstrated understanding the rehabilitation program and the discharge process after we discussed them       Agree in entirety: yes  Minor adaptions: none    Major changes: none     Wojciech Matta MD  Physical Medicine and Rehabilitation

## 2020-06-21 NOTE — ASSESSMENT & PLAN NOTE
Per most recent hospitalization, K+ as low as 2 2 (felt due to poor p o  Intake)  6/22 K+ 3 5  Will give K+ 40 meQ today  Encouraged potassium rich diet  Patient may need daily low dose potassium should her levels continue to be low  BMP on tomorrow

## 2020-06-21 NOTE — ASSESSMENT & PLAN NOTE
- s/p L4-S1 lami-fusion by Dr Elias Hanson on 6/15  - spine precautions/LSO brace  - OP FU with Dr Elias Hanson   - Pain adequately controlled  - [place] PDMP website checked and no concerning Rx's or Rx patterns noted    - D/C on:  - APAP TID PRN  - Oxy 5mg TID PRN > limited Rx provided   - VTE prophylaxis - was warfarin for 3 weeks > transition back to home Eliquis once INR<2

## 2020-06-21 NOTE — ASSESSMENT & PLAN NOTE
Found on CTA during most recent admission  General surgery saw patient in consultation and recommend outpatient follow up   Continue Bentyl 20 mg q6h and Protonix 40 mg daily

## 2020-06-21 NOTE — ASSESSMENT & PLAN NOTE
- BP med dosing adjusted per medicine during course   - D/C on:  Amlodipine 5mg qday  Lisinopril 10mg qday  MTP XL 25mg qday   - Follow-up with PCP

## 2020-06-21 NOTE — ASSESSMENT & PLAN NOTE
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

## 2020-06-21 NOTE — RESPIRATORY THERAPY NOTE
RT Protocol Note  Luisa Paniagua 78 y o  female MRN: [id number]  Unit/Bed#: -01 Encounter: [id number]    Assessment    Active Problems:    Spinal stenosis of lumbar region    Hypothyroidism    Hyperlipidemia    Hiatal hernia with gastroesophageal reflux    Arrhythmia    Chronic deep vein thrombosis (DVT) of left popliteal vein (HCC)    Hypokalemia    CKD (chronic kidney disease)    DM (diabetes mellitus) ([facility])    HTN (hypertension)    Neuropathic pain    Mood disorder ([facility])    IBS (irritable bowel syndrome)    Lymph node disorder    Abnormal CAT scan      Home Pulmonary Medications:    Home Devices/Therapy: (no mesd)    Past Medical History:   Diagnosis Date    Ambulates with cane     Anxiety     Arthritis     Back pain     Bowel trouble     Cancer ([facility] [address] )     left breast- left mastectomy- chemo    Chronic pain disorder     Coronary artery disease     2 stents    Depression     Diabetes mellitus ([facility] [address] )     NIDDM    Disease of thyroid gland     hypo    DVT (deep venous thrombosis) ([facility])     left leg    Full dentures     H/O breast implant     left    Headache     History of transfusion     no adverse reaction    Hyperlipidemia     Hypertension     Irregular heart beat     Afib    Localized swelling of both lower legs     Multiple falls     Neuropathy     Right knee pain     Shortness of breath     on exertion    Spinal stenosis, lumbar     Uses walker     Wears glasses     Wears glasses      Social History     Socioeconomic History    Marital status:       Spouse name: None    Number of children: None    Years of education: None    Highest education level: None   Occupational History    None   Social Needs    Financial resource strain: None    Food insecurity:     Worry: None     Inability: None    Transportation needs:     Medical: None     Non-medical: None   Tobacco Use    Smoking status: Never Smoker    Smokeless tobacco: Never Used   Substance and Sexual Activity  Alcohol use: Yes     Frequency: Monthly or less     Drinks per session: 1 or 2     Binge frequency: Never     Comment: wine    Drug use: Never    Sexual activity: Not Currently   Lifestyle    Physical activity:     Days per week: None     Minutes per session: None    Stress: None   Relationships    Social connections:     Talks on phone: None     Gets together: None     Attends Baptism service: None     Active member of club or organization: None     Attends meetings of clubs or organizations: None     Relationship status: None    Intimate partner violence:     Fear of current or ex partner: None     Emotionally abused: None     Physically abused: None     Forced sexual activity: None   Other Topics Concern    None   Social History Narrative    None       Subjective    Subjective Data: PRotocol    Objective    Physical Exam:   Assessment Type: Assess only  General Appearance: Alert, Awake  Respiratory Pattern: Normal  Chest Assessment: Chest expansion symmetrical, Trachea midline  Bilateral Breath Sounds: Clear, Diminished  Cough: None  O2 Device: RA    Vitals:  Blood pressure 106/57, pulse 68, temperature 97 8 °F (36 6 °C), temperature source Tympanic, resp  rate 20, height 5' 1" (1 549 m), weight 78 5 kg (173 lb), SpO2 98 %, not currently breastfeeding  Imaging and other studies: I have personally reviewed pertinent reports        O2 Device: RA     Plan    Respiratory Plan: No distress/Pulmonary history        Resp Comments: Pt stated she does not use any respiratory

## 2020-06-22 ENCOUNTER — EPISODE CHANGES (OUTPATIENT)
Dept: CASE MANAGEMENT | Facility: OTHER | Age: 80
End: 2020-06-22

## 2020-06-22 ENCOUNTER — PATIENT OUTREACH (OUTPATIENT)
Dept: CASE MANAGEMENT | Facility: OTHER | Age: 80
End: 2020-06-22

## 2020-06-22 LAB
GLUCOSE SERPL-MCNC: 141 MG/DL (ref 65–140)
GLUCOSE SERPL-MCNC: 152 MG/DL (ref 65–140)
GLUCOSE SERPL-MCNC: 157 MG/DL (ref 65–140)
GLUCOSE SERPL-MCNC: 197 MG/DL (ref 65–140)
INR PPP: 1.35 (ref 0.84–1.19)
POTASSIUM SERPL-SCNC: 3.5 MMOL/L (ref 3.6–5)
PROTHROMBIN TIME: 16 SECONDS (ref 11.6–14.5)

## 2020-06-22 PROCEDURE — 97530 THERAPEUTIC ACTIVITIES: CPT

## 2020-06-22 PROCEDURE — 99223 1ST HOSP IP/OBS HIGH 75: CPT | Performed by: NURSE PRACTITIONER

## 2020-06-22 PROCEDURE — 82948 REAGENT STRIP/BLOOD GLUCOSE: CPT

## 2020-06-22 PROCEDURE — 97166 OT EVAL MOD COMPLEX 45 MIN: CPT

## 2020-06-22 PROCEDURE — 97110 THERAPEUTIC EXERCISES: CPT

## 2020-06-22 PROCEDURE — 97163 PT EVAL HIGH COMPLEX 45 MIN: CPT

## 2020-06-22 PROCEDURE — 97535 SELF CARE MNGMENT TRAINING: CPT

## 2020-06-22 PROCEDURE — 97116 GAIT TRAINING THERAPY: CPT

## 2020-06-22 PROCEDURE — 99232 SBSQ HOSP IP/OBS MODERATE 35: CPT | Performed by: PHYSICAL MEDICINE & REHABILITATION

## 2020-06-22 PROCEDURE — 85610 PROTHROMBIN TIME: CPT | Performed by: PHYSICAL MEDICINE & REHABILITATION

## 2020-06-22 PROCEDURE — 84132 ASSAY OF SERUM POTASSIUM: CPT | Performed by: PHYSICAL MEDICINE & REHABILITATION

## 2020-06-22 RX ORDER — POTASSIUM CHLORIDE 20 MEQ/1
40 TABLET, EXTENDED RELEASE ORAL ONCE
Status: COMPLETED | OUTPATIENT
Start: 2020-06-22 | End: 2020-06-22

## 2020-06-22 RX ORDER — AMOXICILLIN 250 MG
1 CAPSULE ORAL
Status: DISCONTINUED | OUTPATIENT
Start: 2020-06-22 | End: 2020-06-23

## 2020-06-22 RX ORDER — WARFARIN SODIUM 5 MG/1
5 TABLET ORAL
Status: COMPLETED | OUTPATIENT
Start: 2020-06-22 | End: 2020-06-22

## 2020-06-22 RX ADMIN — INSULIN LISPRO 1 UNITS: 100 INJECTION, SOLUTION INTRAVENOUS; SUBCUTANEOUS at 08:04

## 2020-06-22 RX ADMIN — INSULIN LISPRO 1 UNITS: 100 INJECTION, SOLUTION INTRAVENOUS; SUBCUTANEOUS at 21:11

## 2020-06-22 RX ADMIN — ACETAMINOPHEN 975 MG: 325 TABLET ORAL at 05:24

## 2020-06-22 RX ADMIN — METFORMIN HYDROCHLORIDE 500 MG: 500 TABLET ORAL at 08:04

## 2020-06-22 RX ADMIN — AMLODIPINE BESYLATE 5 MG: 5 TABLET ORAL at 08:03

## 2020-06-22 RX ADMIN — OXYCODONE HYDROCHLORIDE 5 MG: 5 TABLET ORAL at 13:58

## 2020-06-22 RX ADMIN — DICYCLOMINE HYDROCHLORIDE 20 MG: 20 TABLET ORAL at 17:11

## 2020-06-22 RX ADMIN — OXYCODONE HYDROCHLORIDE 5 MG: 5 TABLET ORAL at 18:03

## 2020-06-22 RX ADMIN — GABAPENTIN 100 MG: 100 CAPSULE ORAL at 08:06

## 2020-06-22 RX ADMIN — WARFARIN SODIUM 5 MG: 5 TABLET ORAL at 17:11

## 2020-06-22 RX ADMIN — ACETAMINOPHEN 975 MG: 325 TABLET ORAL at 21:10

## 2020-06-22 RX ADMIN — GABAPENTIN 100 MG: 100 CAPSULE ORAL at 17:12

## 2020-06-22 RX ADMIN — OXYCODONE HYDROCHLORIDE 5 MG: 5 TABLET ORAL at 09:57

## 2020-06-22 RX ADMIN — SERTRALINE HYDROCHLORIDE 25 MG: 25 TABLET ORAL at 08:03

## 2020-06-22 RX ADMIN — METOPROLOL TARTRATE 12.5 MG: 25 TABLET ORAL at 21:12

## 2020-06-22 RX ADMIN — OXYCODONE HYDROCHLORIDE 5 MG: 5 TABLET ORAL at 05:42

## 2020-06-22 RX ADMIN — HEPARIN SODIUM 5000 UNITS: 5000 INJECTION INTRAVENOUS; SUBCUTANEOUS at 05:24

## 2020-06-22 RX ADMIN — GABAPENTIN 100 MG: 100 CAPSULE ORAL at 21:10

## 2020-06-22 RX ADMIN — POTASSIUM CHLORIDE 40 MEQ: 20 TABLET, EXTENDED RELEASE ORAL at 17:12

## 2020-06-22 RX ADMIN — PANTOPRAZOLE SODIUM 40 MG: 40 TABLET, DELAYED RELEASE ORAL at 05:24

## 2020-06-22 RX ADMIN — LISINOPRIL 2.5 MG: 5 TABLET ORAL at 08:03

## 2020-06-22 RX ADMIN — HEPARIN SODIUM 5000 UNITS: 5000 INJECTION INTRAVENOUS; SUBCUTANEOUS at 21:10

## 2020-06-22 RX ADMIN — SENNOSIDES AND DOCUSATE SODIUM 1 TABLET: 8.6; 5 TABLET ORAL at 21:10

## 2020-06-22 RX ADMIN — METFORMIN HYDROCHLORIDE 500 MG: 500 TABLET ORAL at 17:11

## 2020-06-22 RX ADMIN — LEVOTHYROXINE SODIUM 125 MCG: 125 TABLET ORAL at 05:24

## 2020-06-22 RX ADMIN — HEPARIN SODIUM 5000 UNITS: 5000 INJECTION INTRAVENOUS; SUBCUTANEOUS at 13:58

## 2020-06-22 RX ADMIN — DICYCLOMINE HYDROCHLORIDE 20 MG: 20 TABLET ORAL at 05:24

## 2020-06-22 RX ADMIN — ACETAMINOPHEN 975 MG: 325 TABLET ORAL at 13:59

## 2020-06-22 RX ADMIN — DICYCLOMINE HYDROCHLORIDE 20 MG: 20 TABLET ORAL at 12:17

## 2020-06-22 RX ADMIN — OXYCODONE HYDROCHLORIDE 5 MG: 5 TABLET ORAL at 23:01

## 2020-06-22 RX ADMIN — INSULIN LISPRO 1 UNITS: 100 INJECTION, SOLUTION INTRAVENOUS; SUBCUTANEOUS at 12:17

## 2020-06-22 RX ADMIN — ATORVASTATIN CALCIUM 40 MG: 40 TABLET, FILM COATED ORAL at 17:11

## 2020-06-22 RX ADMIN — DICYCLOMINE HYDROCHLORIDE 20 MG: 20 TABLET ORAL at 23:01

## 2020-06-22 NOTE — ASSESSMENT & PLAN NOTE
Continue home dose of Bentyl  Patient with constipation at this time  Senna S 1 q h s  Was added by primary team today     Monitor

## 2020-06-22 NOTE — PROGRESS NOTES
06/22/20 1000   Rehab Team Goals   ADL Team Goal Patient will be independent with ADLs with least restrictive device upon completion of rehab program   Rehab Team Interventions   OT Interventions Self Care;Home Management; Therapeutic Exercise; Energy Conservation;Patient/Family Education;Group Therapy   Eating Goal   Eating Goal 06  Independent - Patient completes the activity by him/herself with no assistance from a helper  Meal Complete All meals   Status Ongoing; Target goal - two weeks   Interventions Optimal Position   Grooming Goal   Oral Hygiene Goal 06  Independent - Patient completes the activity by him/herself with no assistance from a helper  Task Wash/Dry Face;Wash/Dry Hands;Brush Teeth;Comb Hair;Acquire Items; Initiate Task;Complete Groom   Environment Stand at [employer]; Target goal - two weeks   Intervention Balance Work; Therapeutic Exercise; Tolerance Work   Tub/Shower Transfer Goal   Method Shower Stall  (supervision)   Assist Device Seat with out Back;Seat with [place]   Status Ongoing; Target goal - two weeks   Interventions ADL Training;Assistive Device   Bathing Goal   Shower/bathe self Goal 04  Supervision or touching assistance- [place] provides VERBAL CUES or supervision throughout activity  Environment Seated;Standing; Shower   Adaptive Equipment Long Handle Sponge;Seat with back; Seat without back;Grab Bar;Hand Held Shower   Safety Precautions Other  (spinal precautions)   Status Ongoing; Target goal - two weeks   Intervention ADL Training;Assistive Device; Therapeutic Exercise   Upper Body Dressing Goal   Upper body dressing Goal 06  Independent - Patient completes the activity by him/herself with no assistance from a helper  Task Upper Body;Arms in/out; Over Head;Other  (LSO brace)   Environment Seated   Safety Precautions Other  (spinal precautions)   Status Ongoing; Target goal - two weeks   Intervention Therapeutic Exercise; Tolerance Work   Lower [place] Global Dressing Goal   Lower body dressing Goal 06  Independent - Patient completes the activity by him/herself with no assistance from a helper  Putting on/taking off footwear Goal 06  Independent - Patient completes the activity by him/herself with no assistance from a helper  Task Lower Body;Shoe/Slipper;Socks;Pants; Undergarment   Adaptive Equipment Sock Aide; Shoe Horn;Dressing Stick; Reacher   Environment Seated;Standing   Safety Precautions Other  (spinal precautions)   Status Ongoing; Target goal - two weeks   Intervention Balance Work; Therapeutic Exercise; Tolerance Work   Toileting Transfer Goal   Toilet transfer Goal 06  Independent - Patient completes the activity by him/herself with no assistance from a helper  Assistive Device C9 Media; Bedside Commode;Raised Toilet Seat   Safety Other  (spinal precautions)   Status Ongoing; Target goal - two weeks   Intervention ADL Training;Balance Work;Assistive Device   Toileting Goal   Toileting hygiene Goal 06  Independent - Patient completes the activity by him/herself with no assistance from a helper  Task Pants Up;Pants Down;Hygiene   Safety Balance   Status Ongoing; Target goal - two weeks   Intervention ADL Training;Assistive Device;Balance Work   Meal Prep and Kitchen Mobility   Assist Level Independent   Status Ongoing; Target goal - two weeks   Medication Management   Assist Level Independent   Status Ongoing; Target goal - two weeks   Laundry   Assist Level Independent   Status Ongoing; Target goal - two weeks   Additional Goal (other)   Goal Type (other) Pt will demosntrate increased independence in ADL tasks as evidnece by an increase in Barthel index score to approx 85/90   Status Ongoing; Target goal - two weeks

## 2020-06-22 NOTE — PLAN OF CARE
Problem: Prexisting or High Potential for Compromised Skin Integrity  Goal: Skin integrity is maintained or improved  Description  INTERVENTIONS:  - Identify patients at risk for skin breakdown  - Assess and monitor skin integrity  - Assess and monitor nutrition and hydration status  - Monitor labs   - Assess for incontinence   - Turn and reposition patient  - Assist with mobility/ambulation  - Relieve pressure over bony prominences  - Avoid friction and shearing  - Provide appropriate hygiene as needed including keeping skin clean and dry  - Evaluate need for skin moisturizer/barrier cream  - Collaborate with interdisciplinary team   - Patient/family teaching  - Consider wound care consult   Outcome: Progressing     Problem: Potential for Falls  Goal: Patient will remain free of falls  Description  INTERVENTIONS:  - Assess patient frequently for physical needs  -  Identify cognitive and physical deficits and behaviors that affect risk of falls    -  [place] fall precautions as indicated by assessment   - Educate patient/family on patient safety including physical limitations  - Instruct patient to call for assistance with activity based on assessment  - Modify environment to reduce risk of injury  - Consider OT/PT consult to assist with strengthening/mobility  Outcome: Progressing     Problem: PAIN - ADULT  Goal: Verbalizes/displays adequate comfort level or baseline comfort level  Description  Interventions:  - Encourage patient to monitor pain and request assistance  - Assess pain using appropriate pain scale  - Administer analgesics based on type and severity of pain and evaluate response  - Implement non-pharmacological measures as appropriate and evaluate response  - Consider cultural and social influences on pain and pain management  - Notify physician/advanced practitioner if interventions unsuccessful or patient reports new pain  Outcome: Progressing     Problem: INFECTION - ADULT  Goal: Absence or prevention of progression during hospitalization  Description  INTERVENTIONS:  - Assess and monitor for signs and symptoms of infection  - Monitor lab/diagnostic results  - Monitor all insertion sites, i e  indwelling lines, tubes, and drains  - Monitor endotracheal if appropriate and nasal secretions for changes in amount and color  - [place] appropriate cooling/warming therapies per order  - Administer medications as ordered  - Instruct and encourage patient and family to use good hand hygiene technique  - Identify and instruct in appropriate isolation precautions for identified infection/condition  Outcome: Progressing     Problem: SAFETY ADULT  Goal: Maintain or return to baseline ADL function  Description  INTERVENTIONS:  -  Assess patient's ability to carry out ADLs; assess patient's baseline for ADL function and identify physical deficits which impact ability to perform ADLs (bathing, care of mouth/teeth, toileting, grooming, dressing, etc )  - Assess/evaluate cause of self-care deficits   - Assess range of motion  - Assess patient's mobility; develop plan if impaired  - Assess patient's need for assistive devices and provide as appropriate  - Encourage maximum independence but intervene and supervise when necessary  - Involve family in performance of ADLs  - Assess for home care needs following discharge   - Consider OT consult to assist with ADL evaluation and planning for discharge  - Provide patient education as appropriate  Outcome: Progressing  Goal: Maintain or return mobility status to optimal level  Description  INTERVENTIONS:  - Assess patient's baseline mobility status (ambulation, transfers, stairs, etc )    - Identify cognitive and physical deficits and behaviors that affect mobility  - Identify mobility aids required to assist with transfers and/or ambulation (gait belt, sit-to-stand, lift, walker, cane, etc )  - [place] fall precautions as indicated by assessment  - Record patient progress and toleration of activity level on Mobility SBAR; progress patient to next Phase/Stage  - Instruct patient to call for assistance with activity based on assessment  - Consider rehabilitation consult to assist with strengthening/weightbearing, etc   Outcome: Progressing     Problem: DISCHARGE PLANNING  Goal: Discharge to home or other facility with appropriate resources  Description  INTERVENTIONS:  - Identify barriers to discharge w/patient and caregiver  - Arrange for needed discharge resources and transportation as appropriate  - Identify discharge learning needs (meds, wound care, etc )  - Arrange for interpretive services to assist at discharge as needed  - Refer to Case Management Department for coordinating discharge planning if the patient needs post-hospital services based on physician/advanced practitioner order or complex needs related to functional status, cognitive ability, or social support system  Outcome: Progressing     Problem: SKIN/TISSUE INTEGRITY - ADULT  Goal: Skin integrity remains intact  Description  INTERVENTIONS  - Identify patients at risk for skin breakdown  - Assess and monitor skin integrity  - Assess and monitor nutrition and hydration status  - Monitor labs (i e  albumin)  - Assess for incontinence   - Turn and reposition patient  - Assist with mobility/ambulation  - Relieve pressure over bony prominences  - Avoid friction and shearing  - Provide appropriate hygiene as needed including keeping skin clean and dry  - Evaluate need for skin moisturizer/barrier cream  - Collaborate with interdisciplinary team (i e  Nutrition, Rehabilitation, etc )   - Patient/family teaching  Outcome: Progressing  Goal: Incision(s), wounds(s) or drain site(s) healing without S/S of infection  Description  INTERVENTIONS  - Assess and document risk factors for skin impairment   - Assess and document dressing, incision, wound bed, drain sites and surrounding tissue  - Consider nutrition services referral as needed  - Oral mucous membranes remain intact  - Provide patient/ family education  Outcome: Progressing  Goal: Oral mucous membranes remain intact  Description  INTERVENTIONS  - Assess oral mucosa and hygiene practices  - Implement preventative oral hygiene regimen  - Implement oral medicated treatments as ordered  - Initiate Nutrition services referral as needed  Outcome: Progressing     Problem: MUSCULOSKELETAL - ADULT  Goal: Maintain or return mobility to safest level of function  Description  INTERVENTIONS:  - Assess patient's ability to carry out ADLs; assess patient's baseline for ADL function and identify physical deficits which impact ability to perform ADLs (bathing, care of mouth/teeth, toileting, grooming, dressing, etc )  - Assess/evaluate cause of self-care deficits   - Assess range of motion  - Assess patient's mobility  - Assess patient's need for assistive devices and provide as appropriate  - Encourage maximum independence but intervene and supervise when necessary  - Involve family in performance of ADLs  - Assess for home care needs following discharge   - Consider OT consult to assist with ADL evaluation and planning for discharge  - Provide patient education as appropriate  Outcome: Progressing  Goal: Maintain proper alignment of affected body part  Description  INTERVENTIONS:  - Support, maintain and protect limb and body alignment  - Provide patient/ family with appropriate education  Outcome: Progressing

## 2020-06-22 NOTE — ASSESSMENT & PLAN NOTE
Per hospital records, patient noted to have subcentimeter lymph node in the rt axilla with fat stranding on imaging  Due to history of breast CA, radiology report clinical correlation was recommended  Patient was evaluated by Dr Gabriel Segura in acute care who noted this to be an asymptomatic finding and recommended no further SMITH at this time other than routine FU with PCP & continued regular mammograms for her h/o breast CA  Follow up with PCP on discharge

## 2020-06-22 NOTE — SOCIAL WORK
Met with Pt to review rehab routine, and CM role  Pt shared that her daughters are "not seeing eye to eye", and this makes things difficult for her  As CM was beginning to learn a little about Pts supports, she requested to use the restroom  CM will finish meeting with Pt when she is done with PT at 3:30pm       Met with Pt to continue reviewing rehab routine, and CM role  Pt has 3STE and her daughter, Domonique Bliss, Bryan Stephens reside with her  Pts other daughter, Esha, lives nearby, and is a great support  Pt shared that her daughter, Loraine Hernandez, is not supportive  Pts son, Cameron Casey, is also helpful  Pt confirmed she has the following DME: rollator, cane grab bars, and shower chair  Pt previously worked with [facility] and [facility] outpt services  Pts home pharmacy is [facility], [address], [place]  CM explained the team meeting process, as well as potential LOS  Following to assist with d/c planning needs

## 2020-06-22 NOTE — PROGRESS NOTES
Physical Medicine and Rehabilitation Progress Note  Hari Traore 78 y o  female MRN: [id number]  Unit/Bed#: -01 Encounter: [id number]    HPI: 78 y o  female who is admitted after L4-S1 laminectomy/fusion for lumbar stenosis  CC: f/u lumbar laminectomy/fusion    Subjective: patient reports minimal pain in back currently, did take pain medication PRN this AM  Reports constipation, no BM x2 days  No fever/chills, chest pain, SOB, cough, n/v      Nursing staff reporting: no issues     ROS: No fever, chills, chest pain, SOB, cough, nausea, vomiting, diarrhea, constipation, abdominal pain, dysuria, bowel/bladder incontinence, new weakness or changes in sensation  Complete ROS obtained and otherwise negative        Assessment/Plan:  Abnormal CAT scan  Assessment & Plan  Abnormalities on CT of 11/2019 including but not limited to:    - diverticulosis  - L renal cyst  - B/L non-obstructing renal stones     - OP FU     Lymph node disorder  Assessment & Plan  - noted to have subcentimeter lymph node in the rt axilla with fat stranding on imaging in the setting of h/o breast CA therefore per radiology report clinical correlation was recommended and patient was evaluated by Dr Crow Louie in acute care who noted this to be an asymptomatic finding and recommended no further SMITH at this time other than routine FU with PCP & continued regular mammograms for her h/o breast CA     IBS (irritable bowel syndrome)  Assessment & Plan  - on home bentyl 20 mg q6     Mood disorder (HCC)  Assessment & Plan  - on home zoloft 25 mg qd     Neuropathic pain  Assessment & Plan  - h/o DM  - on home neurontin 100 mg TID     HTN (hypertension)  Assessment & Plan  - at home on lopressor 25 mg qd, norvasc 5 mg qd, & lisinopril 2 5 mg qd  - IM managing     DM (diabetes mellitus) ([facility] [address] )  Assessment & Plan  - on home metformin 500 mg BID with meals & ISS  - IM managing       CKD (chronic kidney disease)  Assessment & Plan  - after review of old labs from [facility] baseline Cr appears to be between 0 8-1 0  - periodic BMP  - IM following     Hypokalemia  Assessment & Plan  - received several doses of K+ supplementation while in acute care and is currently low normal at 3 5  - periodic BMP  - IM managing     Chronic deep vein thrombosis (DVT) of left popliteal vein (HCC)  Assessment & Plan  - non-occlusive LLE DVT of the popliteal vein per imaging on 6/16/20, however similar finding on imaging going back to at least 6/2007  - patient was previously on eliquis, but per Dr Caroline Jeffrey would hold eliquis for 3 weeks post-op   - currently on coumadin, as well as heparin 5000mg q8h until INR is 2-2 5  - trend INR    Arrhythmia  Assessment & Plan  - h/o AVNRT s/p ablation in 11/2019  - h/o a-flutter (chronically on [facility] due to h/o DVT)  - follows with Dr Epifanio Emerson of [facility] cards     Hiatal hernia with gastroesophageal reflux  Assessment & Plan  - therapeutic substitution for home omeprazole 40 mg qd     Hyperlipidemia  Assessment & Plan  - on home lipitor 40 mg qpm     Hypothyroidism  Assessment & Plan  - on home levothyroxine 125 mcg qd   - TSH on 5/12/20 WNL     * Spinal stenosis of lumbar region  Assessment & Plan  - s/p L4-S1 lami-fusion by Dr Caroline Jeffrey on 6/15  - spine precautions/LSO brace  - OP FU with Dr Caroline Jeffrey            Scheduled Meds:  Current Facility-Administered Medications:  acetaminophen 975 mg Oral Q8H Gilma Irizarry MD   amLODIPine 5 mg Oral Daily Jesse Rico MD   atorvastatin 40 mg Oral Daily With Lucas Dyson MD   dicyclomine 20 mg Oral Gita Lloyd MD   gabapentin 100 mg Oral TID Jesse Rico MD   heparin (porcine) 5,000 Units Subcutaneous Q8H Gilma Irizarry MD   insulin lispro 1-5 Units Subcutaneous TID Erika Hernandez MD   insulin lispro 1-5 Units Subcutaneous HS Jesse Rico MD   levothyroxine 125 mcg Oral Early Morning Jesse Rico MD   lisinopril 2 5 mg Oral Daily Jesse Rico MD   metFORMIN 500 mg Oral BID With Meals Fahad Staley, MD   methocarbamol 750 mg Oral Q6H PRN Birdie Cancel, MD   oxyCODONE 2 5 mg Oral Q4H PRN Birdie Cancel, MD   oxyCODONE 5 mg Oral Q4H PRN Birdie Cancel, MD   pantoprazole 40 mg Oral Early Morning Birdie Cancel, MD   polyethylene glycol 17 g Oral Daily PRN Birdie Cancel, MD   sertraline 25 mg Oral Daily Birdie Cancel, MD       Allergies   Allergen Reactions    Penicillins Hives        Objective:      Physical Exam:  Temp:  [96 7 °F (35 9 °C)-98 4 °F (36 9 °C)] 96 7 °F (35 9 °C)  HR:  [62-89] 85  Resp:  [18-20] 20  BP: (106-145)/(57-75) 145/75  Oxygen Therapy  SpO2: 94 %    GEN: NAD, sitting comfortably in chair  Head: NCAT, no gross lesions  Eyes: PERRL, EOMI  Throat: clear, no thrush, MMM  Pulm: CTAB, no rales/wheezes  CV: RRR, normal s1/s2  Abd: soft, NTND  Ext: no pedal edema bilaterally, distal extremities warm and well perfused  Psych: normal affect, no agitation  Skin: no observable rashes; lumbar incision well approximated with steristrips   Neuro: A+Ox3, fluent speech, follows commands   Moving bilateral LE spontaneously       Diagnostic Studies: reviewed, no new imaging  No results found      Laboratory:    Results from last 7 days   Lab Units 06/20/20  0607 06/19/20  1244 06/18/20  1422   HEMOGLOBIN g/dL 11 8 12 6 11 9   HEMATOCRIT % 36 3 38 2 36 3   WBC Thousand/uL 9 64 10 57* 14 67*     Results from last 7 days   Lab Units 06/22/20  0520 06/20/20  0607 06/19/20  2108 06/19/20  1244 06/18/20  1422   BUN mg/dL  --  27*  --  16 9   POTASSIUM mmol/L 3 5* 3 5 3 2* 2 7* 2 7*   CHLORIDE mmol/L  --  101  --  97* 101   CREATININE mg/dL  --  1 01  --  1 04 0 73   AST U/L  --   --   --   --  28   ALT U/L  --   --   --   --  30     Results from last 7 days   Lab Units 06/22/20  0520 06/21/20  0427 06/20/20  1315   PROTIME seconds 16 0* 12 8 12 8   INR  1 35* 0 95 0 95        Patient Active Problem List   Diagnosis    Spinal stenosis of lumbar region    Hypothyroidism    Hyperlipidemia    Hiatal hernia with gastroesophageal reflux    Arrhythmia    Chronic deep vein thrombosis (DVT) of left popliteal vein (HCC)    Hypokalemia    CKD (chronic kidney disease)    DM (diabetes mellitus) (HCC)    HTN (hypertension)    Neuropathic pain    Mood disorder (HCC)    IBS (irritable bowel syndrome)    Lymph node disorder    Abnormal CAT scan       ** Please Note: Fluency Direct voice to text software may have been used in the creation of this document   **

## 2020-06-22 NOTE — CONSULTS
Consult- Olu Márquez 1940, 78 y o  female MRN: [id number]    Unit/Bed#: -01 Encounter: [id number]    Primary Care Provider: Brianne Bernard DO   Date and time admitted to hospital: 6/21/2020 11:39 AM      Inpatient consult to Internal Medicine  Consult performed by: HUNTER Kwon  Consult ordered by: Sander Jimenez MD            Abnormal CAT scan  Assessment & Plan  CTA performed on 6/18 revealed "fat stranding noted in the right axillary/chest wall region, of uncertain significance, recommend clinical correlation"  Follow up with PCP on discharge         Lymph node disorder  Assessment & Plan  Per hospital records, patient noted to have subcentimeter lymph node in the rt axilla with fat stranding on imaging  Due to history of breast CA, radiology report clinical correlation was recommended  Patient was evaluated by Dr Ezequiel Hidalgo in acute care who noted this to be an asymptomatic finding and recommended no further SMITH at this time other than routine FU with PCP & continued regular mammograms for her h/o breast CA  Follow up with PCP on discharge  IBS (irritable bowel syndrome)  Assessment & Plan  Continue home dose of Bentyl  Patient with constipation at this time  Senna S 1 q h s  Was added by primary team today  Monitor    Mood disorder [facility])  Assessment & Plan  Continue sertraline 25 mg daily  Neuropathic pain  Assessment & Plan  Patient with DM  She was on neurontin 100 mg TID at home  Continue same  HTN (hypertension)  Assessment & Plan  Continue amlodipine 5 mg daily and lisinopril 2 5 mg daily  Patient was on lopressor 25 mg daily   Will add lopressor 12 5 mg BID   Monitor and adjust as needed     DM (diabetes mellitus) [facility])  Assessment & Plan  Lab Results   Component Value Date    HGBA1C 6 4 (H) 05/12/2020       Recent Labs     06/21/20  1634 06/21/20  2131 06/22/20  0635 06/22/20  1129   POCGLU 153* 146* 152* 157*       Blood Sugar Average: Last 72 hrs:  (P) 152    CKD (chronic kidney disease)  Assessment & Plan  Lab Results   Component Value Date    HGBA1C 6 4 (H) 05/12/2020       Recent Labs     06/20/20  1114 06/20/20  1626 06/20/20 2035 06/21/20  0711   POCGLU 181* 112 133 147*       Blood Sugar Average: Last 72 hrs:    Continue Metformin 500 mg BID with SSI  Monitor kidney function   BMP tomorrow  Hypokalemia  Assessment & Plan  Per most recent hospitalization, K+ as low as 2 2 (felt due to poor p o  Intake)  6/22 K+ 3 5  Will give K+ 40 meQ today  Encouraged potassium rich diet  Patient may need daily low dose potassium should her levels continue to be low  BMP on tomorrow  Chronic deep vein thrombosis (DVT) of left popliteal vein (HCC)  Assessment & Plan  Non-occlusive LLE DVT (popliteal vein) on 6/16 imaging  Patient had been on Eliquis prior to recent spinal surgery  Per surgery, patient not cleared to resume Eliquis  Dr Saad Parada does not want patient on Eliquis for at least 3 weeks post-op  Decision made prior to recent hospital discharge to start Coumadin with INR goal of 2 0-2 5  (when goal reached, heparin may be discontinued  INR 6/22) 1 35 Will give Coumadin 5 mg this evening with INR tomorrow  Patient will need clearance by spine surgery to be switched back to Eliquis  Arrhythmia  Assessment & Plan  Follows with [facility]  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 (Dr Quique De Souza)  H/o a-flutter (patient does not remember this diagnosis) - patient on chronic AC due to DVT)    Hiatal hernia with gastroesophageal reflux  Assessment & Plan  Found on CTA during most recent admission  General surgery saw patient in consultation and recommend outpatient follow up   Continue Bentyl 20 mg q6h and Protonix 40 mg daily      Hyperlipidemia  Assessment & Plan  Most recent lipid profile (5/12/2020) - total cholesterol 209, triglycerides 145, HDL 64,   Continue atorvastatin 40 mg daily    Hypothyroidism  Assessment & Plan  Most recent TSH (5/12) was 2 23  Continue levothyroxine (home dose was 125 mcg daily)    * Spinal stenosis of lumbar region  Assessment & Plan  PT/OT per primary team   Pain management per primary team    S/p L4-S1 laminectomy on 6/15  Spine precautions/LSO brace  Follow up with Dr Rafita Welch on discharge  VTE Prophylaxis: Heparin  / sequential compression device     Recommendations for Discharge:  Per primary team    History of Present Illness:    Hope Scott is a 78 y o  female who is originally admitted to the [facility] on 6/21/2020 due to impairment of mobility, safety, and ADLs due to lumbar spinal stenosis  She underwent L4/5, L5/S1 laminectomy, L4/5 SPF and L4-5 instrumentation on 6/15/2020  We are consulted for medical management  Patient presented to [facility]  on 6/15/2020 to undergo surgical intervention for lumar stenosis  Patient failed conservative measures  Dr Rafita Welch from [facility] orthopedics performed L4/5, L5/S1 laminectomy and L4-5 instrumentation  Post-op pain was managed with PCA pump on dilaudid and has since transitioned to p o  Tylenol and Norco   She does have a LSO brace  Post-operatively she complained of right calf pain  Bilateral lower extremity venous duplex performed on 6/16 was negative for acute CVT but did show non-occlusive chronic DVT in the left popliteal vein  She was continued on  mg daily and started on heparin for DVT prophylaxis  On 6/18, patient complained of dyspnea with low O2 sats  CTA of chest was negative for PE but did show hiatal hernia with esophageal reflux and fat stranding in the right axillary/chest wall region  General surgery was consulted and recommended outpatient follow up  Patient did have hypokalemia (down to 2 7 thought to be caused by poor p o  intake) during recent hospitalization with repletion  Most recent potassium level on 6/21 was 3 5     Prior to transfer to rehab, patients anticoagulation was reviewed with ortho spine surgery  Dr Marcelo Black indicated hold Eliuqis in light of patients high risk for bleeding at surgical site with factor Xa inhibitors  Risks and benefits were reviewed with patient and her daughter, Esha, by hospital medicine  Decision was to start Warfarin with target INR between 2 0-2 5  Once cleared by surgery, patient may be switched to Eliquis  Patients past medical history is significant for depression/anxiety (since   in ), breast cancer (left s/p mastectomy), recurrent DVT (left), hyperlipidemia, hypertension, hypothyroidism, paroxysmal SVT (s/p ablation/slow pathway modification using RFA on 2019), atrial flutter on chronic Eliquis (prior to recent surgery  currently on warfarin), DM type 2, obesity, recurrent falls/near syncope  On exam, patient denies fever, chills, shortness of breath, dizziness, headache, dysuria, N/V/D  She does c/o constipation  She does have lower back pain but denies pain, paresthesias  Review of Systems:    Review of Systems   Constitutional: Negative for chills, fatigue and fever  HENT: Negative for congestion, postnasal drip, rhinorrhea, sneezing, sore throat, trouble swallowing and voice change  Eyes: Negative  Respiratory: Negative for cough, chest tightness and shortness of breath  Cardiovascular: Negative for chest pain, palpitations and leg swelling  Gastrointestinal: Positive for constipation  Negative for abdominal distention, abdominal pain, diarrhea and nausea  Genitourinary: Negative for difficulty urinating, dysuria, frequency and urgency  Musculoskeletal: Positive for back pain and gait problem  Negative for joint swelling and myalgias  Skin: Positive for wound  Neurological: Positive for weakness  Negative for dizziness, syncope, speech difficulty, light-headedness and headaches  Hematological: Negative for adenopathy   Does not bruise/bleed easily  Psychiatric/Behavioral: Negative for confusion and dysphoric mood  The patient is not nervous/anxious  Past Medical and Surgical History:     Past Medical History:   Diagnosis Date    Ambulates with cane     Anxiety     Arthritis     Back pain     Bowel trouble     Cancer ([address] )     left breast- left mastectomy- chemo    Chronic pain disorder     Coronary artery disease     2 stents    Depression     Diabetes mellitus (HCC)     NIDDM    Disease of thyroid gland     hypo    DVT (deep venous thrombosis) (HCC)     left leg    Full dentures     H/O breast implant     left    Headache     History of transfusion     no adverse reaction    Hyperlipidemia     Hypertension     Irregular heart beat     Afib    Localized swelling of both lower legs     Multiple falls     Neuropathy     Right knee pain     Shortness of breath     on exertion    Spinal stenosis, lumbar     Uses walker     Wears glasses     Wears glasses        Past Surgical History:   Procedure Laterality Date    APPENDECTOMY      BREAST IMPLANT Left     CATARACT EXTRACTION Bilateral     CHOLECYSTECTOMY      COLONOSCOPY      CORONARY ANGIOPLASTY WITH STENT PLACEMENT      HYSTERECTOMY      JOINT REPLACEMENT Right     partial    LUMBAR FUSION N/A 6/15/2020    Procedure: L4/5, L5/S1 LAMINECTOMY; L4/5 PSF; Surgeon: Jeffry Savage MD;  Location: [facility] OR;  Service: Orthopedics    MASTECTOMY Left     ROTATOR CUFF REPAIR Left     TONSILLECTOMY         Meds/Allergies:    all medications and allergies reviewed    Allergies: Allergies   Allergen Reactions    Penicillins Hives       Social History:     Marital Status:      Substance Use History:   Social History     Substance and Sexual Activity   Alcohol Use Yes    Frequency: Monthly or less    Drinks per session: 1 or 2    Binge frequency: Never    Comment: wine     Social History     Tobacco Use   Smoking Status Never Smoker   Smokeless Tobacco Never Used     Social History     Substance and Sexual Activity   Drug Use Never       Family History:    non-contributory    Physical Exam:     Vitals:   Blood Pressure: 145/75 (06/22/20 0739)  Pulse: 85 (06/22/20 0739)  Temperature: (!) 96 7 °F (35 9 °C) (06/22/20 0739)  Temp Source: Tympanic (06/22/20 0739)  Respirations: 20 (06/22/20 0739)  Height: 5' 1" (154 9 cm) (06/21/20 1318)  Weight - Scale: 78 5 kg (173 lb) (06/21/20 1318)  SpO2: 94 % (06/22/20 0739)    Physical Exam   Constitutional: She is oriented to person, place, and time  She appears well-developed and well-nourished  HENT:   Head: Normocephalic and atraumatic  Right Ear: External ear normal    Left Ear: External ear normal    Nose: Nose normal    Mouth/Throat: Oropharynx is clear and moist    Eyes: Conjunctivae and EOM are normal  Right eye exhibits no discharge  Left eye exhibits no discharge  Neck: Normal range of motion  Neck supple  No thyromegaly present  Cardiovascular: Normal rate, regular rhythm, normal heart sounds and intact distal pulses  Pulmonary/Chest: Effort normal and breath sounds normal    Abdominal: Soft  Bowel sounds are normal  She exhibits no distension  There is no tenderness  Musculoskeletal: She exhibits edema (1+ bilateral edema)  She exhibits no tenderness or deformity  Lymphadenopathy:     She has no cervical adenopathy  Neurological: She is alert and oriented to person, place, and time  No cranial nerve deficit  Skin: Skin is warm and dry  Lower lumbar spine incision  Steri-strips  Clean, intact  Psychiatric: She has a normal mood and affect  Her behavior is normal        Additional Data:     Lab Results: I have personally reviewed pertinent reports        Results from last 7 days   Lab Units 06/20/20  0607   WBC Thousand/uL 9 64   HEMOGLOBIN g/dL 11 8   HEMATOCRIT % 36 3   PLATELETS Thousands/uL 258   NEUTROS PCT % 65   LYMPHS PCT % 19   MONOS PCT % 14*   EOS PCT % 1     Results from last 7 days   Lab Units 06/22/20  0520 06/20/20  0607  06/18/20  1422   POTASSIUM mmol/L 3 5* 3 5   < > 2 7*   CHLORIDE mmol/L  --  101   < > 101   CO2 mmol/L  --  30   < > 28   BUN mg/dL  --  27*   < > 9   CREATININE mg/dL  --  1 01   < > 0 73   CALCIUM mg/dL  --  8 6   < > 9 2   ALK PHOS U/L  --   --   --  62   ALT U/L  --   --   --  30   AST U/L  --   --   --  28    < > = values in this interval not displayed  Results from last 7 days   Lab Units 06/22/20  0520   INR  1 35*       Imaging: I have personally reviewed pertinent reports  Xr Chest Pa & Lateral    Result Date: 6/12/2020  Narrative: CHEST INDICATION:   M48 061: Spinal stenosis, lumbar region without neurogenic claudication Z01 [id number]: Encounter for other preprocedural examination  Left mastectomy  COMPARISON:  Chest CT from 12/18/2008  EXAM PERFORMED/VIEWS:  XR CHEST PA & LATERAL  > FINDINGS: Cardiomediastinal silhouette appears unremarkable  The lungs are clear  No pneumothorax or pleural effusion  Osseous structures appear within normal limits for patient age  Small hiatal hernia  Left breast implant  Impression: No acute cardiopulmonary disease  Workstation performed: [id number]     Xr Lumbar Spine 2 Or 3 Views    Result Date: 6/19/2020  Narrative: LUMBAR SPINE INDICATION:   post-op lumbar surgery  COMPARISON:  6/15/2020 VIEWS:  XR SPINE LUMBAR 2 OR 3 VIEWS INJURY Images: 2 FINDINGS: There are 5 non rib bearing lumbar vertebral bodies  There is no evidence of acute fracture or destructive osseous lesion  Prior posterior fusion at L4-5 with bilateral pedicle screws and short spinal rods  Minimal grade 1 anterior spondylolisthesis of L4 upon L5  Diffuse osteopenia of the bony structures  There is loss of disc height at L4-5 and L5-S1 with mild endplate and facet degenerative change  Posterior decompression of L5 bilaterally  The pedicles appear intact  Soft tissues are unremarkable  Impression: No acute osseous abnormality   L4-5 and L5-S1 degenerative disc disease  Prior surgery at L4-5 with posterior fusion and decompression  Workstation performed: [id number]     Xr Spine Lumbar 2 Or 3 Views Injury    Result Date: 6/15/2020  Narrative: C-ARM - lumbar spine INDICATION: M48 061: Spinal stenosis, lumbar region without neurogenic claudication  Procedure guidance  COMPARISON:  None TECHNIQUE: FLUOROSCOPY TIME:   20 seconds 4 FLUOROSCOPIC IMAGES FINDINGS: Fluoroscopic guidance provided for intraoperative localization during lumbar fusion procedure  The initial instrument localizes the L5 vertebral body based on the lumbosacral sacral level  Subsequent images demonstrate a surgical device in the L5-S1 disc space and fusion of the L4-L5 levels  Osseous and soft tissue detail limited by technique  Impression: Fluoroscopic guidance provided for intraoperative localization during lumbar fusion procedure  Please refer to the separate procedure notes for additional details  Workstation performed: [id number]     Cta Chest Pe Study    Result Date: 6/18/2020  Narrative: CTA - CHEST WITH IV CONTRAST - PULMONARY ANGIOGRAM INDICATION:   PE suspected, high pretest prob  Lilia Brendon note reviewed, desaturation with  back pain COMPARISON: Chest CT December 18/08 TECHNIQUE: CTA examination of the chest was performed using angiographic technique according to a protocol specifically tailored to evaluate for pulmonary embolism  Axial, sagittal, and coronal 2D reformatted images were created from the source data and  submitted for interpretation  In addition, coronal 3D MIP postprocessing was performed on the acquisition scanner  Radiation dose length product (DLP) for this visit:  330 mGy-cm   This examination, like all CT scans performed in the [facility], was performed utilizing techniques to minimize radiation dose exposure, including the use of iterative reconstruction and automated exposure control   IV Contrast:  85 mL of iohexol (OMNIPAQUE) FINDINGS: PULMONARY ARTERIAL TREE:  No pulmonary embolus is seen  LUNGS:  Streaky bibasal dependent atelectasis  PLEURA:  Unremarkable  HEART/GREAT VESSELS:  Unremarkable for patient's age  MEDIASTINUM AND MARIOLA:  Hiatal hernia with material in the mid to distal esophagus CHEST WALL AND LOWER NECK:   Left breast implant  There is edema and subcentimeter lymph nodes noted in the right axillary/chest wall region as noted on series 2/76 VISUALIZED STRUCTURES IN THE UPPER ABDOMEN:  Unremarkable  OSSEOUS STRUCTURES:  No acute fracture or destructive osseous lesion  Impression: 1  No pulmonary embolism 2  Hiatal hernia with esophageal reflux 3  Fat stranding noted in the right axillary/chest wall region, of uncertain significance, recommend clinical correlation The study was marked in EPIC for significant notification  Workstation performed: [id number]     Vas Lower Limb Venous Duplex Study, Complete Bilateral    Result Date: 6/17/2020  Narrative:  [facility] REPORT CLINICAL: Indications: Patient presents with bilateral lower extremity pain s/p back surgery  She has h/o DVT in the left leg  Operative History: 2 coronary stents Risk Factors The patient has history of HTN, Diabetes, HLD, CAD and DVT  FINDINGS:  Segment    Right            Left                  Impression       Impression                           CFV        Normal (Patent)  Normal (Patent)                      Popliteal                   E1 Non Occlusive Thrombus (Chronic)     CONCLUSION:  Impression: RIGHT LOWER LIMB: No evidence of acute or chronic deep vein thrombosis  No evidence of superficial thrombophlebitis noted  Doppler evaluation shows a normal response to augmentation maneuvers  Popliteal, posterior tibial and anterior tibial arterial Doppler waveforms are triphasic  LEFT LOWER LIMB: Evidence of non-occlusive chronic deep vein thrombosis in the popliteal vein  No evidence of superficial thrombophlebitis noted   Doppler evaluation shows a normal response to augmentation maneuvers  Popliteal, posterior tibial and anterior tibial arterial Doppler waveforms are triphasic  Technical findings were faxed to chart  SIGNATURE: Electronically Signed by: Rosemary Elliott MD, RPVI on 2020-06-17 02:13:26 PM    M*Webalo software was used to dictate this note  It may contain errors with dictating incorrect words or incorrect spelling  Please contact the provider directly with any questions  Karrie Nissen

## 2020-06-22 NOTE — ASSESSMENT & PLAN NOTE
Lab Results   Component Value Date    HGBA1C 6 4 (H) 05/12/2020       Recent Labs     06/21/20  1634 06/21/20  2131 06/22/20  0635 06/22/20  1129   POCGLU 153* 146* 152* 157*       Blood Sugar Average: Last 72 hrs:  (P) 152

## 2020-06-22 NOTE — PROGRESS NOTES
[facility]     06/22/20 1400   Rehab Team Goals   Transfer Team Goal Patient will be independent with transfers with least restrictive device upon completion of rehab program   Locomotion Team Goal Patient will be independent with locomotion with least restrictive device upon completion of rehab program   Rehab Team Interventions   PT Interventions Gait Training; Therapeutic Exercise;Neuromuscualr Reeducation;Transfer Training;Community Reintegration;Bed Mobility;Modalities; Patient/Family Education;Group Therapy   PT Transfer Goal   Roll left and right Goal 06  Independent - Patient completes the activity by him/herself with no assistance from a helper  Sit to lying Goal 06  Independent - Patient completes the activity by him/herself with no assistance from a helper  Lying to sitting on side of bed Goal 06  Independent - Patient completes the activity by him/herself with no assistance from a helper  Sit to stand Goal 06  Independent - Patient completes the activity by him/herself with no assistance from a helper  Chair/bed-to-chair transfer Goal 06  Independent - Patient completes the activity by him/herself with no assistance from a helper  Car Transfer Goal 04  Supervision or touching assistance- [place] provides VERBAL CUES or supervision throughout activity  Assistive Device Roller Walker  (or LRAD)   Safety Precautions   (SPINAL PRECAUTIONS)   Environment Level Surface; Well Lit   Status Ongoing; Target goal - two weeks   Locomotion Goal   Primary discharge mode of locomotion Walking   Target Walk Distance 350 ft   Assist Device Roller Walker  (or LRAD)   Gait Pattern Improvement Inconsistant [place]; Excess Slow;Improper weight shift   Environment Level Surface; Well Lit   Walk 10 feet Goal 06  Independent - Patient completes the activity by him/herself with no assistance from a helper  Walk 50 feet with 2 turns Goal 06   Independent - Patient completes the activity by him/herself with no assistance from a helper  Walk 150 feet Goal 06  Independent - Patient completes the activity by him/herself with no assistance from a helper  Walking 10 feet on uneven surface 06  Independent - Patient completes the activity by him/herself with no assistance from a helper  Walking Goal Status Ongoing; Target goal - two weeks   Wheel 50 feet with 2 turns Goal 09  Not applicable   Wheel 092 feet Goal 09  Not applicable   Stairs Goal   1 step or curb goal 04  Supervision or touching assistance- [place] provides VERBAL CUES or supervision throughout activity  4 steps Goal 09  Not applicable   12 steps Goal 09  Not applicable   Assist Level Supervision   Number of Stairs 1   Technique Curb Step   Status Ongoing; Target goal - two weeks   Object Retrieval Goal   Picking up object Goal 06  Independent - Patient completes the activity by him/herself with no assistance from a helper     Assistive Device  Reacher   Small Object Picked Up PEN

## 2020-06-22 NOTE — PROGRESS NOTES
06/22/20 1000   Patient Data   Rehab Impairment Impairment of mobility, safety and Activities of Daily Living (ADLs) due to Orthopedic Disorders:  08 9  Other Orthopedic    Etiologic Diagnosis Lumbar Spinal Stenosis   Date of Onset 06/15/20   Home Setup   Type of Home Single Level   Method of Entry Ramp   Number of Stairs in Home   (FF to basement)   In Home Hand Rail Left  (to basement)   First Floor Bathroom Full; Shower;Grab Bars   First Floor Bathroom Accessibility Shower chair;Grab bars in tub/shower   Second Floor Bathroom None   First Floor Setup Available Yes   Home Modifications Necessary?   (pending progress)   Home Modification Comment   (pending progress)   Available Equipment Rollator;Single [employer]; Shower Chair   Prior IADL Participation   Money Management Identify Money;Estimate Costs;Estimate Change;Combine Bills;Manage Checkbook   Meal Preparation Partial Participation  (daughter does most of cooking, pt reporting she'd like to)   Laundry Full Participation;Transport/store; Wash;Dry;Fold   Home Cleaning Full Participation;Vacuum;Dusting;Sweeping   Prior Level of Function   Self-Care 3  Independent - Patient completed the activities by him/herself, with or without an assistive device, with no assistance from a helper  Indoor-Mobility (Ambulation) 3  Independent - Patient completed the activities by him/herself, with or without an assistive device, with no assistance from a helper  Stairs 3  Independent - Patient completed the activities by him/herself, with or without an assistive device, with no assistance from a helper  Functional Cognition 2  Needed Some Help - Patient needed a partial assistance from another person to complete activities  (22/30 MOCA in acute (mild cog deficits))   Prior Assistance Needed for Driving;Meal Preparation; Shopping   Falls in the Last Year   Number of falls in the past 12 months 5   Patient Preference   Nickname (Patient Preference) Mio Coleman   [facility] Psychosocial (WDL) WDL   Length of Time/Family Visitation 2-4 hrs   Restrictions/Precautions   Precautions Fall Risk;Bed/chair alarms;Spinal precautions;Supervision on toilet/commode   ROM Restrictions Yes  (lumbar spinal precautions)   Braces or Orthoses LSO   Pain Assessment   Pain Assessment Tool 0-10   Pain Score 7   Pain Location/Orientation Orientation: Lower; Location: Back   Pain Onset/Description Onset: Ongoing; Descriptor: Aching   Effect of Pain on Daily Activities Pt req extra time in sit to stand transfer   Patient's Stated Pain Goal No pain   Hospital Pain Intervention(s) Rest;Repositioned   Multiple Pain Sites No   Eating Assessment   Type of Assistance Needed Independent   Amount of Physical Assistance Provided No physical assistance   Eating CARE Score 6   Grooming   Able To Comb/Brush Hair;Wash/Dry Face   Limitation Noted In Timeliness   Findings completed seated @ sink   Tub/Shower Transfer   Reason Not Assessed Sponge Bath;Patient refusal  (therapist offered shower, pt requesting SB)   Shower/Bathe Self   Type of Assistance Needed Physical assistance   Amount of Physical Assistance Provided 50%-74%   Comment Pt able to wash above the knees w/ therapist providing A for bathing BLE below knees 2* pt maintaining spinal precautions and dec functional reach, and therapist providing Remy for stance while pt washed rudolph/buttock areas  Shower/Bathe Self CARE Score 2   Bathing   Assessed Bath Style Sponge Bath   Anticipated D/C Bath Style Shower   Able to Lenin Eric No   Able to [employer] Temperature No   Able to Wash/Rinse/Dry (body part) Left Arm;Right Arm;L Upper Leg;R Upper Leg;Chest;Abdomen;Perineal Area; Buttocks   Limitations Noted in Balance; Endurance; Safety;ROM;Strength;Timeliness   Positioning Seated;Standing   Findings  see above comment   Dressing/Undressing Clothing   Remove UB Clothes Other  (hospital gown)   Don UB Clothes Pullover Shirt  (LSO brace)   Type of Assistance Needed Physical assistance   Amount of Physical Assistance Provided 50%-74%   Comment Pt able to don pull-over shirt w/o assistance, while seated in w/c  Therapist proividng A to don LSO brace  Upper Body Dressing CARE Score 2   Remove LB Clothes Socks   Don LB [place]; Undergarment;Pants   Type of Assistance Needed Physical assistance   Amount of Physical Assistance Provided 75% or more   Comment Therapist providing A to don undergarment and pants while pt seated in w/c 2* to dec functional reach and maintenace of spinal precautions  Pt in stance attempted assistance for CM but limited to fatuigue, therapist providing A to pull up for pt  Lower Body Dressing CARE Score 2   Limitations Noted In Balance; Endurance;ROM;Strength; Safety;Timeliness   Positioning Standing;Supported Sit   Putting On/Taking Off Footwear   Type of Assistance Needed Physical assistance   Amount of Physical Assistance Provided Total assistance   Comment TA for don socks 2* to spinal precautions   Putting On/Taking Off Footwear CARE Score 1   Toileting Hygiene   Type of Assistance Needed Physical assistance   Amount of Physical Assistance Provided 50%-74%   Comment with inc time pt able to perform sit< stand, requring Remy for balance and TA for CM   Toileting Hygiene CARE Score 2   Toilet Transfer   Surface Assessed Drop Arm Commode   Transfer Technique Stand Pivot   Limitations Noted In Balance;Confidence; Endurance;ROM;Safety;UE Strength;LE Strength   Adaptive Equipment Walker   Findings see above   Type of Assistance Needed Physical assistance   Amount of Physical Assistance Provided 25%-49%   Comment Pt requiring threapist assistance 2* timeliness, pain, and maintaining ROM precautions to perform stand pivot txfer w/ RW  Toilet Transfer CARE Score 3   Toileting   Able to Pull Clothing down no, up no     Able to Manage Clothing Closures No   Manage Hygiene Bladder   Limitations Noted In Balance;ROM;Safety;UE Strength;LE Strength Transfer Bed/Chair/Wheelchair   Positioning Concerns Other  (ROM: spinal precautions)   Limitations Noted In Balance;Confidence; Endurance;Pain Management;UE Strength;LE Strength   Adaptive Equipment Roller Walker   Type of Assistance Needed Physical assistance   Amount of Physical Assistance Provided 25%-49%   Comment Pt providing for Remy for stance and balance, therapist educating pt on VC for hand placement upon stance and sitting    Chair/Bed-to-Chair Transfer CARE Score 3   Sit to Stand   Type of Assistance Needed Physical assistance   Amount of Physical Assistance Provided 25%-49%   Comment Pt providing Remy for stance from w/c to RW 2* to pt timeliness, pain, and ROM restrictions  pt requiring inc time 2* to pain   Sit to Stand CARE Score 3   Comprehension   Assist Devices Glasses   Auditory Basic   Visual Basic   QI: Comprehension 3  Usually Understands: Understands most conversations, but misses some part/intent of message  Requires cues at times to understand  Comprehension (FIM) 6 - Understands complex/abstract but requires  glasses for visual comp   Expression   Verbal Basic   Non-Verbal Basic   Intelligibility Sentence   QI: Expression 3  Exhibits some difficulty with expressing needs and ideas (e g , some words or finishing thoughts) or speech is not clear   Expression (FIM) 5 - Needs help/cues only RARELY (< 10% of the time)   RUE Assessment   RUE Assessment WFL  (4/5 grossly)   LUE Assessment   LUE Assessment WFL  (4/5 grossly)   Coordination   Movements are Fluid and Coordinated 1   Sensation   Light Touch No apparent deficits   Cognition   Overall Cognitive Status WFL   Arousal/Participation Alert; Responsive; Cooperative   Attention Within functional limits   Orientation Level Oriented X4   Memory Within functional limits   Following Commands Follows one step commands without difficulty   Objective Measure   OT Measure(s) BArhtel Index score   OT Findings see below   Therapeutic Exercise Therapeutic Exercise/Activity Pt performed BUE strengthening using a 2lb dowel liliana 2x15 in all functional shoulder planes  Pt reporting the weight was "too easy"  Therapy next sessin to inc weight to cont o inc functional strength   Discharge Information   Barriers to Return to Vocation Endurance;Strength   Patient's Discharge Plan Pt reporting desire to be indpendent @ [employer]carge   Patient's Rehab Expectations Pt expected to acheive Carson goals at end of 2 weeks with LRD   Barriers to Discharge Home Limited Family Support;Decreased Endurance;Decreased Strength;Pain   Impressions Pt  presented to [facility] on 6/15/2020 for scheduled surgical intervention for known history of lumbar stenosis  Patient was being followed for low back pain and lumbar spinal stenosis that failed conservative measures  She went to the OR with Dr Artur Shwa of [facility] for L4/5, L5/S1 Laminectomy, L4/5 PSF, and L4-5 Instrumentation  She has been cleared for activity as tolerated in an LSO brace  Post operatively the patient was with complaints of right calf pain  Bilateral lower extremity venous duplex was completed on 6/16 that was negative for acute DVT but did show non occlusive chronic DVT in the left popliteal vein  On 6/18 patient was with complaints of dyspnea and low oxygen saturations   Pt currently lives in a single level home with a w/c ramp to entrance  Pt reports having a full bathroom, w/ comfort height toilet and no GBs  Pt also reports having shower stall w/ lip and w/ interior/exterior GBs and shower chair (w/o back)  Pt reports living with daughter, son-in-law, and grandson who live in basement converted to apt  However, pt reports receiving limited help from at-home family resulting in pt being independent with financial , and home mgmt, med mngmnt  Pt is (-)   Daughter assists with cooking meals, shopping, and transporting pt to appointments   Pt seen for 90mins of skilled OT services with emphasis on self-care, toileting, BUE strengthening  Pt is currently modA for LB bathing and maxA for LB dressing 2* to spinal precautions  Pt also Remy for transfers with RW  Pt also modA for  UB dressing at this time, req assistance for donning LSO brace  Pt presents with the following performance component deficits impacting ADL/IADL skills: decreased mobility 2* pain, decreased activity tolerance, dec BUE strength, dec cognition (22/30 MOCA basic in acute) dec  Balance/standing tolerance, and ROM restrictions 2* spinal surgery    Pt to continue to benefit from continued acute rehab OT services during hospital stay to address defined deficits and to maximize level of functional independence in the following Occupational Performance areas: grooming, bathing/shower, toilet hygiene, dressing, medication management, functional mobility,clothing management, cleaning, meal prep and household maintenance  Pt presents with good rehab potential  Pt is unsafe to D/C home at this time, recommending ELOS 2 weeks to achieve Carson level goals with least restrictive device to address these areas and resume prior occupational roles to maximize independence to reduce risk of fall and decrease risk of readmission        OT Therapy Minutes   OT Time In 1000   OT Time Out 1130   OT Total Time (minutes) 90   OT Mode of treatment - Individual (minutes) 90   OT Mode of treatment - Concurrent (minutes) 0   OT Mode of treatment - Group (minutes) 0   OT Mode of treatment - Co-treat (minutes) 0   OT Mode of Treatment - Total time(minutes) 90 minutes   OT Cumulative Minutes 90   Cumulative Minutes   Cumulative therapy minutes 90     Barthel Index Score (IE)  Feeding- 10/10  Bathing- 0/5  Grooming- 5/5  Dressing- 5/10  Bowels- 10/10  Bladder- 10/10  Toilet use- 5/10  Transfers-5/15  Mobility- 5/15  Stairs- n/a pt has ramp to enter home    Total- 55/90  (0-20 total dependence) (21-60 severe dependence) (61-90 moderate dependence) (91-99 slight dependence) (100 independent)

## 2020-06-22 NOTE — ASSESSMENT & PLAN NOTE
CTA performed on 6/18 revealed "fat stranding noted in the right axillary/chest wall region, of uncertain significance, recommend clinical correlation"  Follow up with PCP on discharge

## 2020-06-22 NOTE — PROGRESS NOTES
ARC PT EVALUATION   06/22/20 1400   Patient Data   Rehab Impairment Other Orthopedic   Etiologic Diagnosis Lumbar spinal stenosis   Date of Onset 06/15/20   Home Setup   Type of Home Single Level   Method of Entry Ramp   Number of Stairs 0  (per pt report)   Number of Stairs in Home   (FF to basement, pt does not access)   First Floor Bathroom Full; Shower;Grab Bars   First Floor Bathroom Accessibility Shower chair;Grab bars in tub/shower;Grab bars by toilet   Second Floor Bathroom None   First Floor Setup Available Yes   Home Modifications Necessary? No   Available Equipment Rollator;Single [employer]; Shower Chair  (reacher)   Baseline Information   Transportation Family/friends drive   Prior Device(s) Used Rollator;Single [employer]   Prior Level of Function   Indoor-Mobility (Ambulation) 3  Independent - Patient completed the activities by him/herself, with or without an assistive device, with no assistance from a helper  Stairs 9  Not Applicable   Prior Assistance Needed for Driving;Meal Preparation   Prior Device Used   ([facility] or [facility])   Falls in the Last Year   Number of falls in the past 12 months 25  (per pt report at least 25 falls)   Patient Preference   Nickname (Patient Preference) Ericka   Psychosocial   Psychosocial (WDL) WDL   Restrictions/Precautions   Precautions Fall Risk;Bed/chair alarms;Limb alert;Pain;Supervision on toilet/commode   ROM Restrictions Yes  (spinal precautions)   Braces or Orthoses LSO   Pain Assessment   Pain Assessment Tool 0-10   Pain Score 7   Pain Location/Orientation Location: Back   Effect of Pain on Daily Activities pt received pain meds prior to therapy tx; increased time to complete mobilities, especially STS transfers as well as motivational cueing to trial transfers and mobility   Transfer Bed/Chair/Wheelchair   Limitations Noted In Balance;Confidence; Endurance;Pain Management;Problem Solving; Sequencing;LE Strength   Adaptive Equipment Roller Walker   Type of Assistance Needed Physical assistance   Amount of Physical Assistance Provided 25%-49%   Chair/Bed-to-Chair Transfer CARE Score 3   Roll Left and Right   Type of Assistance Needed Physical assistance   Amount of Physical Assistance Provided 25%-49%   Roll Left and Right CARE Score 3   Sit to Lying   Type of Assistance Needed Physical assistance   Amount of Physical Assistance Provided 50%-74%   Comment HOB flat w/o use of bed rails   Sit to Lying CARE Score 2   Lying to Sitting on Side of Bed   Type of Assistance Needed Physical assistance   Amount of Physical Assistance Provided 25%-49%   Comment log rolling technique; HOB flat w/o bed rails   Lying to Sitting on Side of Bed CARE Score 3   Sit to Stand   Type of Assistance Needed Physical assistance   Amount of Physical Assistance Provided 25%-49%   Comment minAx1 with increased time to complete 2* back pain as well as vc's for hand placement   Sit to Stand CARE Score 3   Picking Up Object   Type of Assistance Needed Physical assistance   Amount of Physical Assistance Provided 25%-49%   Comment with reacher picking up pen; max vc's to get closer to object rather than bending forward to retrieve   Picking Up Object CARE Score 3   Car Transfer   Reason if not Attempted Environmental limitations   Car Transfer CARE Score 10   Ambulation   Primary Mode of Locomotion Prior to Admission Walk   Distance Walked (feet) 200 ft  (x1, 60'x1)   Assist Device Roller Walker   Gait Pattern Inconsistant Cayla; Slow Cayla;Decreased foot clearance; Forward Flexion; Step to; Step through; Improper weight shift   Limitations Noted In Balance; Endurance;Device Management; Heel Strike;Posture; Safety; Sequencing;Speed;Strength;Swing   Provided Assistance with: Balance   Walk Assist Level Minimum Assist;Chair Follow   Findings amb performed with second person w/c follow for safety; pt initially able to amb 200' with occ standing rest breaks, then only able to complete 60' due to fatigue      Walk 10 Feet Type of Assistance Needed Physical assistance   Amount of Physical Assistance Provided Total assistance   Walk 10 Feet CARE Score 1   Walk 50 Feet with Two Turns   Type of Assistance Needed Physical assistance   Amount of Physical Assistance Provided Total assistance   Walk 50 Feet with Two Turns CARE Score 1   Walk 150 Feet   Type of Assistance Needed Physical assistance   Amount of Physical Assistance Provided Total assistance   Walk 150 Feet CARE Score 1   Walking 10 Feet on Uneven Surfaces   Type of Assistance Needed Physical assistance   Amount of Physical Assistance Provided Total assistance   Comment inside ramp   Walking 10 Feet on Uneven Surfaces CARE Score 1   Wheel 50 Feet with Two Turns   Reason if not Attempted Activity not applicable   Wheel 50 Feet with Two Turns CARE Score 9   Wheel 150 Feet   Reason if not Attempted Activity not applicable   Wheel 293 Feet CARE Score 9   Curb or Single Stair   Style negotiated Single stair   Type of Assistance Needed Physical assistance   Amount of Physical Assistance Provided Total assistance   Comment Ax2 for safety; use of b/l HR's; second person SBA, minAx1 from therapist   1 Step (Curb) CARE Score 1   4 Steps   Reason if not Attempted Activity not applicable   4 Steps CARE Score 9   12 Steps   Reason if not Attempted Activity not applicable   12 Steps CARE Score 9   Stairs   Type  Steps   # of Steps 1   Weight Bearing Precautions Fall Risk;Back   Assist Devices Bilateral Rail   Comprehension   QI: Comprehension 3  Usually Understands: Understands most conversations, but misses some part/intent of message  Requires cues at times to understand  Expression   QI: Expression 3   Exhibits some difficulty with expressing needs and ideas (e g , some words or finishing thoughts) or speech is not clear   RLE Assessment   RLE Assessment WFL  (grossly 4-/5)   LLE Assessment   LLE Assessment WFL  (grossly 4-/5)   Sensation   Light Touch No apparent deficits Propioception No apparent deficits   Cognition   Overall Cognitive Status WFL   Arousal/Participation Alert; Responsive   Attention Within functional limits   Orientation Level Oriented X4   Memory Within functional limits   Following Commands Follows one step commands without difficulty   Comments Initially unable to recall spinal precautions  Reeducated pt on spinal precautions and actions to avoid during session  Therapeutic Exercise   Therapeutic Exercise/Activity ankle pumps x30   Discharge Information   Vocational Plan Retired/not working   Patient's Discharge Plan D/c home with family support   Patient's Rehab Expectations "To get strong enough to go to my granddaughter's wedding "   Barriers to Discharge Home Limited Family Support;Decreased Strength;Decreased Endurance;Pain; Safety Considerations;Depression   Impressions Pt is an 78 y o  Female who was admitted to [address] [place] [address]  on 6/21/2020 with dx of lumbar spinal stenosis, undergoing elective surgery on 6/15/2020 for L4/5, L5/S1 laminectomy, L4/5 PSF, and L4-5 instrumentation  Pt was seen for high complexity evaluation with comorbidities including Lumbar spinal stenosis, acute pain, hypokalemia, ambulatory dysfunction, physical deconditioning, DM 2, hiatal hernia with esophageal reflux, chronic popliteal DVT, history of SVT, and right chest wall/ axilla fat stranding  Pt's PMH also significant for anxiety, arthritis, breast CA (limb alert), HLD, and HTN  Pt reports PTA she lives with her daughter, son-in-law, and grandson in multi level house  Pt states she lives on first level with ramp to enter and never has to do steps in house as laundry is also on first floor  Pt reports she relied on family for driving as well as for meal prep but reports that she does laundry and cleaning  Pt reports that since 2017 she has had ongoing chronic back pain and trialed conservative tx, however, was not successful   Pt states PTA she utilized rollator in home and reports that she would utilize [employer] outside to get into grocery store, however, states, once in grocery store she would utilize scooter  Pt also reporting PTA she had "more than 25 falls" in the past year due to poor balance  Upon evaluation, pt with LSO brace for all activity which was adjusted at beginning of session  Pt with 7/10 back pain during session as well as decreased b/l LE strength, ROM, decreased activity tolerance, decreased static and dynamic standing balance and pt currently functioning below her baseline  Pt also demo'ing anxious behaviors throughout evaluation often stating "I can't do it" but with motivational cueing and increased time, is able to perform  Pt requiring modAx1 for bed mobilities, min-modAx1 for STS and SPT's, and Ax2 for amb (second person for w/c follow)  Due to above listed impairments, pt is good rehab candidate with goals set at [place] with LRAD and ELOS 2 weeks      PT Therapy Minutes   PT Time In 1400   PT Time Out 1530   PT Total Time (minutes) 90   PT Mode of treatment - Individual (minutes) 80   PT Mode of treatment - Concurrent (minutes) 10   PT Mode of treatment - Group (minutes) 0   PT Mode of treatment - Co-treat (minutes) 0   PT Mode of Treatment - Total time(minutes) 90 minutes   PT Cumulative Minutes 90   Cumulative Minutes   Cumulative therapy minutes 180

## 2020-06-22 NOTE — TREATMENT PLAN
Individualized Plan of Sindy Lara 58  78 y o  female MRN: [id number]  Unit/Bed#: [facility] 411-65 Encounter: [id number]     PATIENT INFORMATION  ADMISSION DATE: 6/21/2020 11:39 AM [facility] CATEGORY:Orthopedic Disorders:  08 9  Other Orthopedic lumbar spinal stenosis   ADMISSION DIAGNOSIS: Lumbar spinal stenosis [[id number]]  EXPECTED LOS: 7-10 days     MEDICAL/FUNCTIONAL PROGNOSIS  Based on my assessment of the patient's medical conditions and current functional status, the prognosis for attaining medical and functional goals or the IRF stay is:  Excellent    Medical Goals: Patient will be medically stable for discharge to [facility] upon completion of rehab program    [address]: Home - with supervision  Is a 24-hr caregiver available? Yes, daughter  Has discharge plan been discussed with primary caregiver? Yes  Date of Discussion: 6/22    ANTICIPATED FOLLOW-UP SERVICE:   Outpatient Therapy Services: PT and OT            DISCIPLINE SPECIFIC PLANS:  Required Disciplines & Services: Rehabillitation Nursing and Case Management    REQUIRED THERAPY:  Therapy Hours per Day Days per Week Total Days   Physical Therapy 1 5 5 5   Occupational Therapy 1 5 5 5   NOTE: Additional therapy time(s) may be added as appropriate to meet patient needs and to achieve functional goals        Patient will either participate in above therapy regimen or participate in 900 minutes of therapy within 7 day week consisting of PT and OT due to the following medical procedure/condition:Orthopedic Disorders:  08 9  Other Orthopedic lumbar spinal stenosis    ANTICIPATED FUNCTIONAL OUTCOMES:  ADL:  mod I   Bladder/Bowel: Patient will return to premorbid level for bladder/bowel management upon completion of rehab program   Transfers:  mod I   Locomotion:  mod I   Cognitive:       DISCHARGE PLANNING NEEDS  Equipment needs: tub/shower bench, RW      REHAB ANTICIPATED PARTICIPATION RESTRICTIONS:  None

## 2020-06-22 NOTE — PLAN OF CARE
Problem: Prexisting or High Potential for Compromised Skin Integrity  Goal: Skin integrity is maintained or improved  Description  INTERVENTIONS:  - Identify patients at risk for skin breakdown  - Assess and monitor skin integrity  - Assess and monitor nutrition and hydration status  - Monitor labs   - Assess for incontinence   - Turn and reposition patient  - Assist with mobility/ambulation  - Relieve pressure over bony prominences  - Avoid friction and shearing  - Provide appropriate hygiene as needed including keeping skin clean and dry  - Evaluate need for skin moisturizer/barrier cream  - Collaborate with interdisciplinary team   - Patient/family teaching  - Consider wound care consult   Outcome: Progressing     Problem: Potential for Falls  Goal: Patient will remain free of falls  Description  INTERVENTIONS:  - Assess patient frequently for physical needs  -  Identify cognitive and physical deficits and behaviors that affect risk of falls    -  [place] fall precautions as indicated by assessment   - Educate patient/family on patient safety including physical limitations  - Instruct patient to call for assistance with activity based on assessment  - Modify environment to reduce risk of injury  - Consider OT/PT consult to assist with strengthening/mobility  Outcome: Progressing     Problem: PAIN - ADULT  Goal: Verbalizes/displays adequate comfort level or baseline comfort level  Description  Interventions:  - Encourage patient to monitor pain and request assistance  - Assess pain using appropriate pain scale  - Administer analgesics based on type and severity of pain and evaluate response  - Implement non-pharmacological measures as appropriate and evaluate response  - Consider cultural and social influences on pain and pain management  - Notify physician/advanced practitioner if interventions unsuccessful or patient reports new pain  Outcome: Progressing     Problem: INFECTION - ADULT  Goal: Absence or prevention of progression during hospitalization  Description  INTERVENTIONS:  - Assess and monitor for signs and symptoms of infection  - Monitor lab/diagnostic results  - Monitor all insertion sites, i e  indwelling lines, tubes, and drains  - Monitor endotracheal if appropriate and nasal secretions for changes in amount and color  - [place] appropriate cooling/warming therapies per order  - Administer medications as ordered  - Instruct and encourage patient and family to use good hand hygiene technique  - Identify and instruct in appropriate isolation precautions for identified infection/condition  Outcome: Progressing     Problem: SAFETY ADULT  Goal: Maintain or return to baseline ADL function  Description  INTERVENTIONS:  -  Assess patient's ability to carry out ADLs; assess patient's baseline for ADL function and identify physical deficits which impact ability to perform ADLs (bathing, care of mouth/teeth, toileting, grooming, dressing, etc )  - Assess/evaluate cause of self-care deficits   - Assess range of motion  - Assess patient's mobility; develop plan if impaired  - Assess patient's need for assistive devices and provide as appropriate  - Encourage maximum independence but intervene and supervise when necessary  - Involve family in performance of ADLs  - Assess for home care needs following discharge   - Consider OT consult to assist with ADL evaluation and planning for discharge  - Provide patient education as appropriate  Outcome: Progressing  Goal: Maintain or return mobility status to optimal level  Description  INTERVENTIONS:  - Assess patient's baseline mobility status (ambulation, transfers, stairs, etc )    - Identify cognitive and physical deficits and behaviors that affect mobility  - Identify mobility aids required to assist with transfers and/or ambulation (gait belt, sit-to-stand, lift, walker, cane, etc )  - [place] fall precautions as indicated by assessment  - Record patient progress and toleration of activity level on Mobility SBAR; progress patient to next Phase/Stage  - Instruct patient to call for assistance with activity based on assessment  - Consider rehabilitation consult to assist with strengthening/weightbearing, etc   Outcome: Progressing     Problem: DISCHARGE PLANNING  Goal: Discharge to home or other facility with appropriate resources  Description  INTERVENTIONS:  - Identify barriers to discharge w/patient and caregiver  - Arrange for needed discharge resources and transportation as appropriate  - Identify discharge learning needs (meds, wound care, etc )  - Arrange for interpretive services to assist at discharge as needed  - Refer to Case Management Department for coordinating discharge planning if the patient needs post-hospital services based on physician/advanced practitioner order or complex needs related to functional status, cognitive ability, or social support system  Outcome: Progressing     Problem: SKIN/TISSUE INTEGRITY - ADULT  Goal: Skin integrity remains intact  Description  INTERVENTIONS  - Identify patients at risk for skin breakdown  - Assess and monitor skin integrity  - Assess and monitor nutrition and hydration status  - Monitor labs (i e  albumin)  - Assess for incontinence   - Turn and reposition patient  - Assist with mobility/ambulation  - Relieve pressure over bony prominences  - Avoid friction and shearing  - Provide appropriate hygiene as needed including keeping skin clean and dry  - Evaluate need for skin moisturizer/barrier cream  - Collaborate with interdisciplinary team (i e  Nutrition, Rehabilitation, etc )   - Patient/family teaching  Outcome: Progressing  Goal: Incision(s), wounds(s) or drain site(s) healing without S/S of infection  Description  INTERVENTIONS  - Assess and document risk factors for skin impairment   - Assess and document dressing, incision, wound bed, drain sites and surrounding tissue  - Consider nutrition services referral as needed  - Oral mucous membranes remain intact  - Provide patient/ family education  Outcome: Progressing  Goal: Oral mucous membranes remain intact  Description  INTERVENTIONS  - Assess oral mucosa and hygiene practices  - Implement preventative oral hygiene regimen  - Implement oral medicated treatments as ordered  - Initiate Nutrition services referral as needed  Outcome: Progressing     Problem: MUSCULOSKELETAL - ADULT  Goal: Maintain or return mobility to safest level of function  Description  INTERVENTIONS:  - Assess patient's ability to carry out ADLs; assess patient's baseline for ADL function and identify physical deficits which impact ability to perform ADLs (bathing, care of mouth/teeth, toileting, grooming, dressing, etc )  - Assess/evaluate cause of self-care deficits   - Assess range of motion  - Assess patient's mobility  - Assess patient's need for assistive devices and provide as appropriate  - Encourage maximum independence but intervene and supervise when necessary  - Involve family in performance of ADLs  - Assess for home care needs following discharge   - Consider OT consult to assist with ADL evaluation and planning for discharge  - Provide patient education as appropriate  Outcome: Progressing  Goal: Maintain proper alignment of affected body part  Description  INTERVENTIONS:  - Support, maintain and protect limb and body alignment  - Provide patient/ family with appropriate education  Outcome: Progressing

## 2020-06-23 PROBLEM — Z79.4 TYPE 2 DIABETES MELLITUS WITH HYPERGLYCEMIA, WITH LONG-TERM CURRENT USE OF INSULIN (HCC): Status: ACTIVE | Noted: 2020-06-21

## 2020-06-23 PROBLEM — E11.65 TYPE 2 DIABETES MELLITUS WITH HYPERGLYCEMIA, WITH LONG-TERM CURRENT USE OF INSULIN (HCC): Status: ACTIVE | Noted: 2020-06-21

## 2020-06-23 PROBLEM — E11.22 TYPE 2 DIABETES MELLITUS WITH CHRONIC KIDNEY DISEASE, WITHOUT LONG-TERM CURRENT USE OF INSULIN (HCC): Status: ACTIVE | Noted: 2020-06-21

## 2020-06-23 LAB
ANION GAP SERPL CALCULATED.3IONS-SCNC: 7 MMOL/L (ref 5–14)
BUN SERPL-MCNC: 13 MG/DL (ref 5–25)
CALCIUM SERPL-MCNC: 9.3 MG/DL (ref 8.4–10.2)
CHLORIDE SERPL-SCNC: 100 MMOL/L (ref 97–108)
CO2 SERPL-SCNC: 29 MMOL/L (ref 22–30)
CREAT SERPL-MCNC: 0.52 MG/DL (ref 0.6–1.2)
EOSINOPHIL # BLD AUTO: 0.09 THOUSAND/UL (ref 0–0.4)
EOSINOPHIL NFR BLD MANUAL: 1 % (ref 0–6)
ERYTHROCYTE [DISTWIDTH] IN BLOOD BY AUTOMATED COUNT: 13.8 %
GFR SERPL CREATININE-BSD FRML MDRD: 90 ML/MIN/1.73SQ M
GLUCOSE P FAST SERPL-MCNC: 137 MG/DL (ref 70–99)
GLUCOSE SERPL-MCNC: 119 MG/DL (ref 65–140)
GLUCOSE SERPL-MCNC: 119 MG/DL (ref 65–140)
GLUCOSE SERPL-MCNC: 137 MG/DL (ref 70–99)
GLUCOSE SERPL-MCNC: 149 MG/DL (ref 65–140)
GLUCOSE SERPL-MCNC: 173 MG/DL (ref 65–140)
HCT VFR BLD AUTO: 35.8 % (ref 36–46)
HGB BLD-MCNC: 11.9 G/DL (ref 12–16)
INR PPP: 1.6 (ref 0.84–1.19)
LYMPHOCYTES # BLD AUTO: 2.79 THOUSAND/UL (ref 0.5–4)
LYMPHOCYTES # BLD AUTO: 30 % (ref 25–45)
MCH RBC QN AUTO: 30.4 PG (ref 26–34)
MCHC RBC AUTO-ENTMCNC: 33.4 G/DL (ref 31–36)
MCV RBC AUTO: 91 FL (ref 80–100)
MONOCYTES # BLD AUTO: 1.58 THOUSAND/UL (ref 0.2–0.9)
MONOCYTES NFR BLD AUTO: 17 % (ref 1–10)
NEUTS BAND NFR BLD MANUAL: 2 % (ref 0–8)
NEUTS SEG # BLD: 4.84 THOUSAND/UL (ref 1.8–7.8)
NEUTS SEG NFR BLD AUTO: 50 %
PLATELET # BLD AUTO: 346 THOUSANDS/UL (ref 150–450)
PLATELET BLD QL SMEAR: ADEQUATE
PMV BLD AUTO: 9.1 FL (ref 8.9–12.7)
POTASSIUM SERPL-SCNC: 4 MMOL/L (ref 3.6–5)
PROTHROMBIN TIME: 18.3 SECONDS (ref 11.6–14.5)
RBC # BLD AUTO: 3.93 MILLION/UL (ref 4–5.2)
RBC MORPH BLD: NORMAL
SODIUM SERPL-SCNC: 136 MMOL/L (ref 137–147)
TOTAL CELLS COUNTED SPEC: 100
WBC # BLD AUTO: 9.3 THOUSAND/UL (ref 4.5–11)

## 2020-06-23 PROCEDURE — 97110 THERAPEUTIC EXERCISES: CPT

## 2020-06-23 PROCEDURE — 85610 PROTHROMBIN TIME: CPT | Performed by: PHYSICAL MEDICINE & REHABILITATION

## 2020-06-23 PROCEDURE — 99232 SBSQ HOSP IP/OBS MODERATE 35: CPT | Performed by: PHYSICAL MEDICINE & REHABILITATION

## 2020-06-23 PROCEDURE — 85007 BL SMEAR W/DIFF WBC COUNT: CPT | Performed by: NURSE PRACTITIONER

## 2020-06-23 PROCEDURE — 97535 SELF CARE MNGMENT TRAINING: CPT

## 2020-06-23 PROCEDURE — 99232 SBSQ HOSP IP/OBS MODERATE 35: CPT | Performed by: NURSE PRACTITIONER

## 2020-06-23 PROCEDURE — 85027 COMPLETE CBC AUTOMATED: CPT | Performed by: NURSE PRACTITIONER

## 2020-06-23 PROCEDURE — 97116 GAIT TRAINING THERAPY: CPT

## 2020-06-23 PROCEDURE — 80048 BASIC METABOLIC PNL TOTAL CA: CPT | Performed by: NURSE PRACTITIONER

## 2020-06-23 PROCEDURE — 82948 REAGENT STRIP/BLOOD GLUCOSE: CPT

## 2020-06-23 PROCEDURE — 97530 THERAPEUTIC ACTIVITIES: CPT

## 2020-06-23 RX ORDER — LISINOPRIL 5 MG/1
5 TABLET ORAL DAILY
Status: DISCONTINUED | OUTPATIENT
Start: 2020-06-24 | End: 2020-06-26

## 2020-06-23 RX ORDER — WARFARIN SODIUM 5 MG/1
5 TABLET ORAL
Status: COMPLETED | OUTPATIENT
Start: 2020-06-23 | End: 2020-06-23

## 2020-06-23 RX ORDER — AMOXICILLIN 250 MG
2 CAPSULE ORAL
Status: DISCONTINUED | OUTPATIENT
Start: 2020-06-23 | End: 2020-06-28

## 2020-06-23 RX ORDER — LIDOCAINE 50 MG/G
1 PATCH TOPICAL DAILY
Status: DISCONTINUED | OUTPATIENT
Start: 2020-06-23 | End: 2020-07-06 | Stop reason: HOSPADM

## 2020-06-23 RX ADMIN — DICYCLOMINE HYDROCHLORIDE 20 MG: 20 TABLET ORAL at 11:52

## 2020-06-23 RX ADMIN — DICYCLOMINE HYDROCHLORIDE 20 MG: 20 TABLET ORAL at 18:39

## 2020-06-23 RX ADMIN — HEPARIN SODIUM 5000 UNITS: 5000 INJECTION INTRAVENOUS; SUBCUTANEOUS at 21:15

## 2020-06-23 RX ADMIN — INSULIN LISPRO 1 UNITS: 100 INJECTION, SOLUTION INTRAVENOUS; SUBCUTANEOUS at 11:52

## 2020-06-23 RX ADMIN — METHOCARBAMOL 750 MG: 750 TABLET, FILM COATED ORAL at 20:42

## 2020-06-23 RX ADMIN — ATORVASTATIN CALCIUM 40 MG: 40 TABLET, FILM COATED ORAL at 16:14

## 2020-06-23 RX ADMIN — METFORMIN HYDROCHLORIDE 500 MG: 500 TABLET ORAL at 16:13

## 2020-06-23 RX ADMIN — LISINOPRIL 2.5 MG: 5 TABLET ORAL at 08:02

## 2020-06-23 RX ADMIN — OXYCODONE HYDROCHLORIDE 5 MG: 5 TABLET ORAL at 20:43

## 2020-06-23 RX ADMIN — ACETAMINOPHEN 975 MG: 325 TABLET ORAL at 21:14

## 2020-06-23 RX ADMIN — OXYCODONE HYDROCHLORIDE 5 MG: 5 TABLET ORAL at 04:31

## 2020-06-23 RX ADMIN — OXYCODONE HYDROCHLORIDE 5 MG: 5 TABLET ORAL at 15:37

## 2020-06-23 RX ADMIN — DICYCLOMINE HYDROCHLORIDE 20 MG: 20 TABLET ORAL at 05:49

## 2020-06-23 RX ADMIN — METOPROLOL TARTRATE 12.5 MG: 25 TABLET ORAL at 21:14

## 2020-06-23 RX ADMIN — LEVOTHYROXINE SODIUM 125 MCG: 125 TABLET ORAL at 05:49

## 2020-06-23 RX ADMIN — GABAPENTIN 100 MG: 100 CAPSULE ORAL at 15:37

## 2020-06-23 RX ADMIN — PANTOPRAZOLE SODIUM 40 MG: 40 TABLET, DELAYED RELEASE ORAL at 05:49

## 2020-06-23 RX ADMIN — GABAPENTIN 100 MG: 100 CAPSULE ORAL at 08:01

## 2020-06-23 RX ADMIN — LIDOCAINE 1 PATCH: 50 PATCH CUTANEOUS at 14:14

## 2020-06-23 RX ADMIN — METHOCARBAMOL 750 MG: 750 TABLET, FILM COATED ORAL at 11:52

## 2020-06-23 RX ADMIN — METOPROLOL TARTRATE 12.5 MG: 25 TABLET ORAL at 08:01

## 2020-06-23 RX ADMIN — AMLODIPINE BESYLATE 5 MG: 5 TABLET ORAL at 08:01

## 2020-06-23 RX ADMIN — WARFARIN SODIUM 5 MG: 5 TABLET ORAL at 18:39

## 2020-06-23 RX ADMIN — SERTRALINE HYDROCHLORIDE 25 MG: 25 TABLET ORAL at 08:02

## 2020-06-23 RX ADMIN — ACETAMINOPHEN 975 MG: 325 TABLET ORAL at 14:13

## 2020-06-23 RX ADMIN — HEPARIN SODIUM 5000 UNITS: 5000 INJECTION INTRAVENOUS; SUBCUTANEOUS at 05:49

## 2020-06-23 RX ADMIN — GABAPENTIN 100 MG: 100 CAPSULE ORAL at 20:42

## 2020-06-23 RX ADMIN — HEPARIN SODIUM 5000 UNITS: 5000 INJECTION INTRAVENOUS; SUBCUTANEOUS at 14:14

## 2020-06-23 RX ADMIN — METFORMIN HYDROCHLORIDE 500 MG: 500 TABLET ORAL at 08:01

## 2020-06-23 RX ADMIN — OXYCODONE HYDROCHLORIDE 5 MG: 5 TABLET ORAL at 10:58

## 2020-06-23 RX ADMIN — ACETAMINOPHEN 975 MG: 325 TABLET ORAL at 05:48

## 2020-06-23 NOTE — ASSESSMENT & PLAN NOTE
Per most recent hospitalization, K+ as low as 2 2 (felt due to poor p o  Intake)  6/22 K+ 3 5  Replaced with 40 mEq yesterday  6/23 K+ 4 0  Encouraged potassium rich diet      BMP on Thursday

## 2020-06-23 NOTE — ASSESSMENT & PLAN NOTE
Non-occlusive LLE DVT (popliteal vein) on 6/16 imaging  Patient had been on Eliquis prior to recent spinal surgery  Per surgery, patient not cleared to resume Eliquis  Dr Melly Hobson does not want patient on Eliquis for at least 3 weeks post-op  Decision made prior to recent hospital discharge to start Coumadin with INR goal of 2 0-2 5  (when goal reached, heparin may be discontinued  INR 6/22 1 35; 6/23 1 60  Will give Coumadin 5 mg again this evening with INR tomorrow  Patient will need clearance by spine surgery to be switched back to Eliquis

## 2020-06-23 NOTE — ASSESSMENT & PLAN NOTE
Continue amlodipine 5 mg daily, Lopressor 12 5 mg BID  Will increase Lisinopril to 5 mg daily starting in a m  ril 2 5 mg daily    Monitor and adjust as needed

## 2020-06-23 NOTE — PROGRESS NOTES
06/23/20 1030   Pain Assessment   Pain Assessment Tool 0-10   Pain Score 7   Pain Location/Orientation Orientation: Bilateral;Location: Back   Pain Radiating Towards into L LE   Pain Onset/Description Onset: Ongoing; Descriptor: Aching;Descriptor: Discomfort   Effect of Pain on Daily Activities asked for meds, Rn provided 1/2 through session  Restrictions/Precautions   Precautions Fall Risk;Spinal precautions;Supervision on toilet/commode   Braces or Orthoses LSO   Cognition   Overall Cognitive Status WFL   Arousal/Participation Alert; Responsive   Attention Within functional limits   Orientation Level Oriented X4   Memory Within functional limits   Following Commands Follows one step commands without difficulty   Comments becomes anxious with inc pain  Subjective   Subjective expresses concern for feeling she has to move her bowels but when she tries she cant  Sit to Lying   Type of Assistance Needed Incidental touching   Amount of Physical Assistance Provided No physical assistance   Sit to Lying CARE Score 4   Lying to Sitting on Side of Bed   Type of Assistance Needed Physical assistance;Verbal cues   Amount of Physical Assistance Provided 25%-49%   Comment cues to roll R and s/l to sit  Lying to Sitting on Side of Bed CARE Score 3   Sit to Stand   Type of Assistance Needed Physical assistance   Amount of Physical Assistance Provided Less than 25%   Comment vaired due to inc pain, needs inc time  Sit to Stand CARE Score 3   Bed-Chair Transfer   Type of Assistance Needed Incidental touching   Amount of Physical Assistance Provided No physical assistance   Comment CGA for balance with inc time  Chair/Bed-to-Chair Transfer CARE Score 4   Transfer Bed/Chair/Wheelchair   Limitations Noted In Balance; Endurance;Pain Management;UE Strength;LE Strength   Adaptive Equipment Roller Walker   Stand Pivot Contact Guard   Sit to Stand Minimal Assist   Stand to [employer] Guard   Supine to [facility] [employer] Guard; Minimal Assist   Sit to Supine Contact Guard   Findings poor tolerance to transfers due to inc pain  Car Transfer   Reason if not Attempted Environmental limitations   Car Transfer CARE Score 10   Walk 10 Feet   Type of Assistance Needed Incidental touching   Amount of Physical Assistance Provided No physical assistance   Walk 10 Feet CARE Score 4   Walk 50 Feet with Two Turns   Type of Assistance Needed Physical assistance   Amount of Physical Assistance Provided 25%-49%   Comment min A with RW  outbursts of pain throughout, A for RW with turns  Walk 50 Feet with Two Turns CARE Score 3   Walk 150 Feet   Reason if not Attempted Safety concerns   Walk 150 Feet CARE Score 88   Walking 10 Feet on Uneven Surfaces   Reason if not Attempted Safety concerns   Walking 10 Feet on Uneven Surfaces CARE Score 88   Ambulation   Does the patient walk? 2  Yes   Primary Mode of Locomotion Prior to Admission Walk   Distance Walked (feet) 15 ft  (2x12, 1x50)   Assist Device Roller Walker   Gait Pattern Inconsistant [place]; Slow [place];Decreased foot clearance;Shuffle;Improper weight shift   Limitations Noted In Balance; Endurance;Device Management;Speed;Swing;Strength   Provided Assistance with: Balance   Walk Assist Level Minimum Assist   Findings dec amb ata due to pain, needs encourgament to inc distance  Wheel 50 Feet with Two Turns   Reason if not Attempted Activity not applicable   Wheel 50 Feet with Two Turns CARE Score 9   Wheel 150 Feet   Reason if not Attempted Activity not applicable   Wheel 369 Feet CARE Score 9   Wheelchair mobility   Does the patient use a wheelchair? 0   No   Curb or Single Stair   Comment unsafe fro tiral due to pain   Reason if not Attempted Safety concerns   1 Step (Curb) CARE Score 88   4 Steps   Reason if not Attempted Activity not applicable   4 Steps CARE Score 9   12 Steps   Reason if not Attempted Activity not applicable   12 Steps CARE Score 9   Therapeutic Interventions Strengthening seated B LE yellow tband HS curls x20 each  Attempted supine Te pt with inc pain in supine position  Balance item retrival around room with Rw, CGA x50ft  Assessment   Treatment Assessment Pt engaged in second 60 min skilled PT intervention, severly limted by pain  Medicated by RN 1/2 way into session  Use of BSC in room, c/o inc pain standing and with sit<>stand transfers  Poor ata to stand at sink for hand hygiene, yelling out to sit down  Attempted supine TE, with sig poor ata to supine due to inc pain  Needing A for sup to sit and brace mgmt  Trialed fucntional amb pt asking to cut length short, with encouragement able to reach 50ft, almost tearful however  Overall unsafe this session for stair training or ramp trial  Refusal to trial additional amb due to pain  May need a set schedule with scheduled pre PT pain meds  Will discuss with team  Continue per POC, progress as able  Pt did comment she does have either 3 ZABRINA or ramp and that she thought she was going to [facility] and was not expecting this much therapy  Family/Caregiver Present no   Problem List Decreased strength;Decreased endurance; Impaired balance;Decreased mobility; Impaired vision;Orthopedic restrictions;Pain   Barriers to Discharge Decreased caregiver support   Barriers to Discharge Comments pain control  PT Barriers   Physical Impairment Decreased strength;Decreased endurance; Impaired balance;Decreased mobility;Orthopedic restrictions;Pain   Functional Limitation Car transfers; Ramp negotiation;Standing;Transfers; Walking   Plan   Treatment/Interventions Therapeutic exercise; Endurance training;Gait training   Progress Slow progress, decreased activity tolerance   Recommendation   PT Discharge Recommendation Home with skilled therapy   Equipment Recommended Walker   PT Therapy Minutes   PT Time In 1030   PT Time Out 1130   PT Total Time (minutes) 60   PT Mode of treatment - Individual (minutes) 60   PT Mode of treatment - Concurrent (minutes) 0   PT Mode of treatment - Group (minutes) 0   PT Mode of treatment - Co-treat (minutes) 0   PT Mode of Treatment - Total time(minutes) 60 minutes   PT Cumulative Minutes 210   Therapy Time missed   Time missed?  No

## 2020-06-23 NOTE — ASSESSMENT & PLAN NOTE
Lab Results   Component Value Date    HGBA1C 6 4 (H) 05/12/2020       Recent Labs     06/22/20  1129 06/22/20  1633 06/22/20  2047 06/23/20  0608   POCGLU 157* 141* 197* 149*       Blood Sugar Average: Last 72 hrs:    Continue Metformin 500 mg BID with SSI  Monitor kidney function       (P) [phone] [id number]

## 2020-06-23 NOTE — ASSESSMENT & PLAN NOTE
Continue home dose of Bentyl  Patient with constipation at this time  Patient refuses Miralax today  Prune juice given  Senna S increased to 2 qhs today     Monitor

## 2020-06-23 NOTE — PLAN OF CARE
Problem: Prexisting or High Potential for Compromised Skin Integrity  Goal: Skin integrity is maintained or improved  Description  INTERVENTIONS:  - Identify patients at risk for skin breakdown  - Assess and monitor skin integrity  - Assess and monitor nutrition and hydration status  - Monitor labs   - Assess for incontinence   - Turn and reposition patient  - Assist with mobility/ambulation  - Relieve pressure over bony prominences  - Avoid friction and shearing  - Provide appropriate hygiene as needed including keeping skin clean and dry  - Evaluate need for skin moisturizer/barrier cream  - Collaborate with interdisciplinary team   - Patient/family teaching  - Consider wound care consult   Outcome: Progressing     Problem: Potential for Falls  Goal: Patient will remain free of falls  Description  INTERVENTIONS:  - Assess patient frequently for physical needs  -  Identify cognitive and physical deficits and behaviors that affect risk of falls    -  [place] fall precautions as indicated by assessment   - Educate patient/family on patient safety including physical limitations  - Instruct patient to call for assistance with activity based on assessment  - Modify environment to reduce risk of injury  - Consider OT/PT consult to assist with strengthening/mobility  Outcome: Progressing     Problem: PAIN - ADULT  Goal: Verbalizes/displays adequate comfort level or baseline comfort level  Description  Interventions:  - Encourage patient to monitor pain and request assistance  - Assess pain using appropriate pain scale  - Administer analgesics based on type and severity of pain and evaluate response  - Implement non-pharmacological measures as appropriate and evaluate response  - Consider cultural and social influences on pain and pain management  - Notify physician/advanced practitioner if interventions unsuccessful or patient reports new pain  Outcome: Progressing

## 2020-06-23 NOTE — ASSESSMENT & PLAN NOTE
Lab Results   Component Value Date    HGBA1C 6 4 (H) 05/12/2020       Recent Labs     06/22/20  1129 06/22/20  1633 06/22/20  2047 06/23/20  0608   POCGLU 157* 141* 197* 149*       Blood Sugar Average: Last 72 hrs:  (P) 401 [id number]     Continue Metformin 500 mg BID with SSI  Monitor kidney function

## 2020-06-23 NOTE — ASSESSMENT & PLAN NOTE
PT/OT per primary team   Pain management per primary team    S/p L4-S1 laminectomy on 6/15  Spine precautions/LSO brace  Follow up with Dr Elias Hanson on discharge

## 2020-06-23 NOTE — ASSESSMENT & PLAN NOTE
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

## 2020-06-23 NOTE — PROGRESS NOTES
Progress Note - Uzma Payor 1940, 78 y o  female MRN: [id number]    Unit/Bed#: -01 Encounter: [id number]    Primary Care Provider: Oleksandr Atkinson DO   Date and time admitted to hospital: 6/21/2020 11:39 AM        Lymph node disorder  Assessment & Plan  Per hospital records, patient noted to have subcentimeter lymph node in the rt axilla with fat stranding on imaging  Due to history of breast CA, radiology report clinical correlation was recommended  Patient was evaluated by Dr Mariusz Gunter in acute care who noted this to be an asymptomatic finding and recommended no further SMITH at this time other than routine FU with PCP & continued regular mammograms for her h/o breast CA  Follow up with PCP on discharge  IBS (irritable bowel syndrome)  Assessment & Plan  Continue home dose of Bentyl  Patient with constipation at this time  Patient refuses Miralax today  Prune juice given  Senna S increased to 2 qhs today  Monitor    Mood disorder [facility])  Assessment & Plan  Continue sertraline 25 mg daily  Neuropathic pain  Assessment & Plan  Patient with DM  She was on neurontin 100 mg TID at home  Continue same  HTN (hypertension)  Assessment & Plan  Continue amlodipine 5 mg daily, Lopressor 12 5 mg BID  Will increase Lisinopril to 5 mg daily starting in a m  ril 2 5 mg daily  Monitor and adjust as needed     Type 2 diabetes mellitus with chronic kidney disease, without long-term current use of insulin [facility])  Assessment & Plan  Lab Results   Component Value Date    HGBA1C 6 4 (H) 05/12/2020       Recent Labs     06/22/20  1129 06/22/20  1633 06/22/20  2047 06/23/20  0608   POCGLU 157* 141* 197* 149*       Blood Sugar Average: Last 72 hrs:  (P) 172 [id number]     Continue Metformin 500 mg BID with SSI  Monitor kidney function           Hypokalemia  Assessment & Plan  Per most recent hospitalization, K+ as low as 2 2 (felt due to poor p o  Intake)  6/22 K+ 3 5    Replaced with 40 mEq yesterday  6/23 K+ 4 0  Encouraged potassium rich diet  BMP on Thursday    Chronic deep vein thrombosis (DVT) of left popliteal vein (HCC)  Assessment & Plan  Non-occlusive LLE DVT (popliteal vein) on 6/16 imaging  Patient had been on Eliquis prior to recent spinal surgery  Per surgery, patient not cleared to resume Eliquis  Dr Chris Ortiz does not want patient on Eliquis for at least 3 weeks post-op  Decision made prior to recent hospital discharge to start Coumadin with INR goal of 2 0-2 5  (when goal reached, heparin may be discontinued  INR 6/22 1 35; 6/23 1 60  Will give Coumadin 5 mg again this evening with INR tomorrow  Patient will need clearance by spine surgery to be switched back to Eliquis  Arrhythmia  Assessment & Plan  Follows with [facility]  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 (Dr Sue Green)  H/o a-flutter (patient does not remember this diagnosis) - patient on chronic AC due to DVT)  Monitor rate/rhythm  Hiatal hernia with gastroesophageal reflux  Assessment & Plan  Found on CTA during most recent admission  General surgery saw patient in consultation and recommend outpatient follow up   Continue Bentyl 20 mg q6h and Protonix 40 mg daily  Hyperlipidemia  Assessment & Plan  Most recent lipid profile (5/12/2020) - total cholesterol 209, triglycerides 145, HDL 64,   Continue atorvastatin 40 mg daily    Hypothyroidism  Assessment & Plan  Most recent TSH (5/12) was 2 23  Continue levothyroxine (home dose was 125 mcg daily)    * Spinal stenosis of lumbar region  Assessment & Plan  PT/OT per primary team   Pain management per primary team    S/p L4-S1 laminectomy on 6/15  Spine precautions/LSO brace  Follow up with Dr Chris Ortiz on discharge               VTE Pharmacologic Prophylaxis:   Pharmacologic: Heparin  Mechanical VTE Prophylaxis in Place: Yes    Current Length of Stay: 2 day(s)    Current Patient Status: Inpatient Rehab     Discharge Plan: As per treatment team     Code Status: Level 1 - Full Code    Subjective:   No overnight events  Patient states she slept well last night  She offers no new issues/concerns  LBM was   Patient states she usually has a BM q3-4 days at home  She states she eats fruit and drinks prune juice at home  She refuses to take Miralax today  She denies fever, chills, abdominal pain  She states her low back pain is under good control at this time  Objective:     Vitals:   Temp (24hrs), Av 9 °F (36 6 °C), Min:97 °F (36 1 °C), Max:98 5 °F (36 9 °C)    Temp:  [97 °F (36 1 °C)-98 5 °F (36 9 °C)] 97 °F (36 1 °C)  HR:  [77-89] 77  Resp:  [18] 18  BP: (131-148)/(61-70) 148/70  SpO2:  [94 %-96 %] 94 %  Body mass index is 33 12 kg/m²  Review of Systems   Constitutional: Negative for chills, fatigue and fever  Eyes: Negative  Respiratory: Negative for cough, chest tightness and shortness of breath  Cardiovascular: Negative for chest pain, palpitations and leg swelling  Gastrointestinal: Positive for constipation (LBM )  Negative for abdominal distention, abdominal pain, diarrhea and nausea  Genitourinary: Negative for difficulty urinating, dysuria, frequency and urgency  Musculoskeletal: Positive for back pain and gait problem  Negative for joint swelling and myalgias  Skin: Positive for wound  Neurological: Positive for weakness  Negative for dizziness, light-headedness and headaches  Hematological: Negative for adenopathy  Does not bruise/bleed easily  Psychiatric/Behavioral: Negative for confusion and dysphoric mood  The patient is not nervous/anxious  Input and Output Summary (last 24 hours): Intake/Output Summary (Last 24 hours) at 2020 1044  Last data filed at 2020 0801  Gross per 24 hour   Intake 780 ml   Output 413 ml   Net 367 ml       Physical Exam:     Physical Exam   Constitutional: She is oriented to person, place, and time   She appears well-developed  Cardiovascular: Normal rate, regular rhythm, normal heart sounds and intact distal pulses  Pulmonary/Chest: Effort normal and breath sounds normal    Abdominal: Soft  Bowel sounds are normal  She exhibits no distension  There is no tenderness  Musculoskeletal: She exhibits edema (bilateral LE edema - TEDS present)  She exhibits no tenderness or deformity  Neurological: She is alert and oriented to person, place, and time  Skin: Skin is warm and dry  Psychiatric: She has a normal mood and affect  Her behavior is normal        Additional Data:     Labs:    Results from last 7 days   Lab Units 06/23/20  0547 06/20/20  0607   WBC Thousand/uL 9 30 9 64   HEMOGLOBIN g/dL 11 9* 11 8   HEMATOCRIT % 35 8* 36 3   PLATELETS Thousands/uL 346 258   BANDS PCT % 2  --    NEUTROS PCT %  --  65   LYMPHS PCT %  --  19   LYMPHO PCT % 30  --    MONOS PCT %  --  14*   MONO PCT % 17*  --    EOS PCT % 1 1     Results from last 7 days   Lab Units 06/23/20  0547  06/18/20  1422   SODIUM mmol/L 136*   < > 139   POTASSIUM mmol/L 4 0   < > 2 7*   CHLORIDE mmol/L 100   < > 101   CO2 mmol/L 29   < > 28   BUN mg/dL 13   < > 9   CREATININE mg/dL 0 52*   < > 0 73   ANION GAP mmol/L 7   < > 10   CALCIUM mg/dL 9 3   < > 9 2   ALBUMIN g/dL  --   --  2 8*   TOTAL BILIRUBIN mg/dL  --   --  0 52   ALK PHOS U/L  --   --  62   ALT U/L  --   --  30   AST U/L  --   --  28   GLUCOSE RANDOM mg/dL 137*   < > 157*    < > = values in this interval not displayed       Results from last 7 days   Lab Units 06/23/20  0547   INR  1 60*     Results from last 7 days   Lab Units 06/23/20  0608 06/22/20  2047 06/22/20  1633 06/22/20  1129 06/22/20  0635 06/21/20  2131 06/21/20  1634 06/21/20  0711 06/20/20  2035 06/20/20  1626 06/20/20  1114 06/20/20  0707   POC GLUCOSE mg/dl 149* 197* 141* 157* 152* 146* 153* 147* 133 112 181* 134               Labs reviewed    Imaging:    Imaging reviewed    Recent Cultures (last 7 days): Last 24 Hours Medication List:     Current Facility-Administered Medications:  acetaminophen 975 mg Oral Q8H Jacob Smith MD   amLODIPine 5 mg Oral Daily Earle Go MD   atorvastatin 40 mg Oral Daily With Koffi Demarco MD   dicyclomine 20 mg Oral Rich Montilla MD   gabapentin 100 mg Oral TID Earle Go MD   heparin (porcine) 5,000 Units Subcutaneous [facility] Earle Go MD   insulin lispro 1-5 Units Subcutaneous TID AC Earle Go MD   insulin lispro 1-5 Units Subcutaneous HS Earle Go MD   levothyroxine 125 mcg Oral Early Morning [facility] lisinopril 5 mg Oral Daily HUNTER Kuhn   metFORMIN 500 mg Oral BID With Meals Earle Go MD   methocarbamol 750 mg Oral Q6H PRN Earle Go MD   metoprolol tartrate 12 5 mg Oral Q12H [address] HUNTER Kuhn   oxyCODONE 2 5 mg Oral Q4H PRN Earle Go MD   oxyCODONE 5 mg Oral Q4H PRN Earle Go MD   pantoprazole 40 mg Oral Early Morning Earle Go MD   polyethylene glycol 17 g Oral Daily PRN Earle Go MD   senna-docusate sodium 2 tablet Oral HS HUNTER Kuhn   sertraline 25 mg Oral Daily Earle Go MD   warfarin 5 mg Oral Once (warfarin) HUNTER Saleh        M*Modal software was used to dictate this note  It may contain errors with dictating incorrect words or incorrect spelling  Please contact the provider directly with any questions

## 2020-06-23 NOTE — PLAN OF CARE
Problem: Prexisting or High Potential for Compromised Skin Integrity  Goal: Skin integrity is maintained or improved  Description  INTERVENTIONS:  - Identify patients at risk for skin breakdown  - Assess and monitor skin integrity  - Assess and monitor nutrition and hydration status  - Monitor labs   - Assess for incontinence   - Turn and reposition patient  - Assist with mobility/ambulation  - Relieve pressure over bony prominences  - Avoid friction and shearing  - Provide appropriate hygiene as needed including keeping skin clean and dry  - Evaluate need for skin moisturizer/barrier cream  - Collaborate with interdisciplinary team   - Patient/family teaching  - Consider wound care consult   Outcome: Progressing     Problem: Potential for Falls  Goal: Patient will remain free of falls  Description  INTERVENTIONS:  - Assess patient frequently for physical needs  -  Identify cognitive and physical deficits and behaviors that affect risk of falls    -  [place] fall precautions as indicated by assessment   - Educate patient/family on patient safety including physical limitations  - Instruct patient to call for assistance with activity based on assessment  - Modify environment to reduce risk of injury  - Consider OT/PT consult to assist with strengthening/mobility  Outcome: Progressing     Problem: PAIN - ADULT  Goal: Verbalizes/displays adequate comfort level or baseline comfort level  Description  Interventions:  - Encourage patient to monitor pain and request assistance  - Assess pain using appropriate pain scale  - Administer analgesics based on type and severity of pain and evaluate response  - Implement non-pharmacological measures as appropriate and evaluate response  - Consider cultural and social influences on pain and pain management  - Notify physician/advanced practitioner if interventions unsuccessful or patient reports new pain  Outcome: Progressing     Problem: INFECTION - ADULT  Goal: Absence or prevention of progression during hospitalization  Description  INTERVENTIONS:  - Assess and monitor for signs and symptoms of infection  - Monitor lab/diagnostic results  - Monitor all insertion sites, i e  indwelling lines, tubes, and drains  - Monitor endotracheal if appropriate and nasal secretions for changes in amount and color  - [place] appropriate cooling/warming therapies per order  - Administer medications as ordered  - Instruct and encourage patient and family to use good hand hygiene technique  - Identify and instruct in appropriate isolation precautions for identified infection/condition  Outcome: Progressing     Problem: SAFETY ADULT  Goal: Maintain or return to baseline ADL function  Description  INTERVENTIONS:  -  Assess patient's ability to carry out ADLs; assess patient's baseline for ADL function and identify physical deficits which impact ability to perform ADLs (bathing, care of mouth/teeth, toileting, grooming, dressing, etc )  - Assess/evaluate cause of self-care deficits   - Assess range of motion  - Assess patient's mobility; develop plan if impaired  - Assess patient's need for assistive devices and provide as appropriate  - Encourage maximum independence but intervene and supervise when necessary  - Involve family in performance of ADLs  - Assess for home care needs following discharge   - Consider OT consult to assist with ADL evaluation and planning for discharge  - Provide patient education as appropriate  Outcome: Progressing  Goal: Maintain or return mobility status to optimal level  Description  INTERVENTIONS:  - Assess patient's baseline mobility status (ambulation, transfers, stairs, etc )    - Identify cognitive and physical deficits and behaviors that affect mobility  - Identify mobility aids required to assist with transfers and/or ambulation (gait belt, sit-to-stand, lift, walker, cane, etc )  - [place] fall precautions as indicated by assessment  - Record patient progress and toleration of activity level on Mobility SBAR; progress patient to next Phase/Stage  - Instruct patient to call for assistance with activity based on assessment  - Consider rehabilitation consult to assist with strengthening/weightbearing, etc   Outcome: Progressing     Problem: DISCHARGE PLANNING  Goal: Discharge to home or other facility with appropriate resources  Description  INTERVENTIONS:  - Identify barriers to discharge w/patient and caregiver  - Arrange for needed discharge resources and transportation as appropriate  - Identify discharge learning needs (meds, wound care, etc )  - Arrange for interpretive services to assist at discharge as needed  - Refer to Case Management Department for coordinating discharge planning if the patient needs post-hospital services based on physician/advanced practitioner order or complex needs related to functional status, cognitive ability, or social support system  Outcome: Progressing     Problem: SKIN/TISSUE INTEGRITY - ADULT  Goal: Skin integrity remains intact  Description  INTERVENTIONS  - Identify patients at risk for skin breakdown  - Assess and monitor skin integrity  - Assess and monitor nutrition and hydration status  - Monitor labs (i e  albumin)  - Assess for incontinence   - Turn and reposition patient  - Assist with mobility/ambulation  - Relieve pressure over bony prominences  - Avoid friction and shearing  - Provide appropriate hygiene as needed including keeping skin clean and dry  - Evaluate need for skin moisturizer/barrier cream  - Collaborate with interdisciplinary team (i e  Nutrition, Rehabilitation, etc )   - Patient/family teaching  Outcome: Progressing  Goal: Incision(s), wounds(s) or drain site(s) healing without S/S of infection  Description  INTERVENTIONS  - Assess and document risk factors for skin impairment   - Assess and document dressing, incision, wound bed, drain sites and surrounding tissue  - Consider nutrition services referral as needed  - Oral mucous membranes remain intact  - Provide patient/ family education  Outcome: Progressing  Goal: Oral mucous membranes remain intact  Description  INTERVENTIONS  - Assess oral mucosa and hygiene practices  - Implement preventative oral hygiene regimen  - Implement oral medicated treatments as ordered  - Initiate Nutrition services referral as needed  Outcome: Progressing     Problem: MUSCULOSKELETAL - ADULT  Goal: Maintain or return mobility to safest level of function  Description  INTERVENTIONS:  - Assess patient's ability to carry out ADLs; assess patient's baseline for ADL function and identify physical deficits which impact ability to perform ADLs (bathing, care of mouth/teeth, toileting, grooming, dressing, etc )  - Assess/evaluate cause of self-care deficits   - Assess range of motion  - Assess patient's mobility  - Assess patient's need for assistive devices and provide as appropriate  - Encourage maximum independence but intervene and supervise when necessary  - Involve family in performance of ADLs  - Assess for home care needs following discharge   - Consider OT consult to assist with ADL evaluation and planning for discharge  - Provide patient education as appropriate  Outcome: Progressing  Goal: Maintain proper alignment of affected body part  Description  INTERVENTIONS:  - Support, maintain and protect limb and body alignment  - Provide patient/ family with appropriate education  Outcome: Progressing

## 2020-06-23 NOTE — PROGRESS NOTES
06/23/20 0830   Pain Assessment   Pain Assessment Tool 0-10   Pain Score 7   Pain Location/Orientation Location: Back   Pain Radiating Towards L greater than R LE  Pain Onset/Description Onset: Ongoing   Effect of Pain on Daily Activities pain meds given just piror to PT  Restrictions/Precautions   Precautions Fall Risk;Spinal precautions;Supervision on toilet/commode   Braces or Orthoses LSO   Cognition   Overall Cognitive Status WFL   Arousal/Participation Alert; Responsive   Attention Within functional limits   Orientation Level Oriented X4   Memory Within functional limits   Following Commands Follows one step commands without difficulty   Subjective   Subjective reports slept good last night, woke up with inc pain, meds were given prior to PT  Sit to Stand   Type of Assistance Needed Physical assistance   Amount of Physical Assistance Provided Less than 25%   Comment CGA to min A for balance upon intial stand  Sit to Stand CARE Score 3   Bed-Chair Transfer   Type of Assistance Needed Physical assistance   Amount of Physical Assistance Provided Less than 25%   Comment CG tomin A for balance with inc time   Chair/Bed-to-Chair Transfer CARE Score 3   Transfer Bed/Chair/Wheelchair   Limitations Noted In Balance;Pain Management; Endurance;UE Strength;LE Strength   Adaptive Equipment Roller Walker   Stand Pivot Minimal Assist;Contact Guard   Sit to Stand Minimal Assist;Contact Guard   Stand to [employer]   Reason if not Attempted Environmental limitations   Car Transfer CARE Score 10   Walk 10 Feet   Type of Assistance Needed Incidental touching   Amount of Physical Assistance Provided No physical assistance   Comment CGA with RW  Walk 10 Feet CARE Score 4   Walk 50 Feet with Two Turns   Type of Assistance Needed Physical assistance   Amount of Physical Assistance Provided Less than 25%   Comment min A for RW mgmt with turns      Walk 50 Feet with Two Turns CARE Score 3 Ambulation   Does the patient walk? 2  Yes   Primary Mode of Locomotion Prior to Admission Walk   Distance Walked (feet) 70 ft  (50,)   Assist Device Roller Walker   Gait Pattern Inconsistant [place]; Slow [place]; Step through; Improper weight shift   Limitations Noted In Balance; Endurance;Speed;Strength;Swing   Provided Assistance with: Balance   Walk Assist Level Contact Guard;Minimum Assist   Wheel 50 Feet with Two Turns   Reason if not Attempted Activity not applicable   Wheel 50 Feet with Two Turns CARE Score 9   Wheel 150 Feet   Reason if not Attempted Activity not applicable   Wheel 157 Feet CARE Score 9   Wheelchair mobility   Does the patient use a wheelchair? 0  No   4 Steps   Reason if not Attempted Activity not applicable   4 Steps CARE Score 9   12 Steps   Reason if not Attempted Activity not applicable   12 Steps CARE Score 9   Therapeutic Interventions   Strengthening Seated B LE TE: APs in 4in block  ball squeeze, yellow tband abd 3x10   LAQ and seated march 2x10 each  Flexibility manual stretch B LE DF/HS 4x15 sec each  Assessment   Treatment Assessment Pt engaged in 60 min AM skilled PT intervention with focus on gait, transfers, and B LE gentle strengthening  Pt denies inc pain throughout session  Education for sitting posture and benefits of support under feet  Pt reported no strain on her back with 4in block under feet  Second amb trial 50ft needing inc time and few stand pauses due to inc L LE pain  Adequate theraputic rest provided throughout session  To trial supine Te and steps next session as well as inc distance if appropriate  Family/Caregiver Present no   Problem List Decreased strength;Decreased endurance; Impaired balance;Decreased mobility; Impaired vision;Orthopedic restrictions;Pain   Barriers to Discharge Decreased caregiver support   Barriers to Discharge Comments home alone most of the time  Not getting along with her daughter currently      PT Barriers   Physical Impairment Decreased strength;Decreased endurance; Impaired balance;Decreased mobility   Plan   Treatment/Interventions Functional transfer training;LE strengthening/ROM; Therapeutic exercise; Endurance training;Patient/family training;Gait training   Progress Progressing toward goals   Recommendation   PT Discharge Recommendation Home with skilled therapy   Equipment Recommended Walker   PT Equipment ordered pt only has rollator  PT Therapy Minutes   PT Time In 0830   PT Time Out 0930   PT Total Time (minutes) 60   PT Mode of treatment - Individual (minutes) 60   PT Mode of treatment - Concurrent (minutes) 0   PT Mode of treatment - Group (minutes) 0   PT Mode of treatment - Co-treat (minutes) 0   PT Mode of Treatment - Total time(minutes) 60 minutes   PT Cumulative Minutes 150   Therapy Time missed   Time missed?  No

## 2020-06-23 NOTE — PROGRESS NOTES
Physical Medicine and Rehabilitation Progress Note  Jame Weir 78 y o  female MRN: [id number]  Unit/Bed#: -01 Encounter: [id number]    HPI: 78 y o  female who is admitted after L4-S1 laminectomy/fusion for lumbar stenosis  CC: f/u lumbar laminectomy/fusion    Subjective: patient reports significant back pain today, after therapy  We discussed taking PRN pain medication before the therapy  Patient is agreeable to plan  We will also trial lidocaine patch  No BM x3 days  Will add bowel med  Nursing staff reporting: no issues     ROS: No fever, chills, chest pain, SOB, cough, nausea, vomiting, diarrhea, abdominal pain, dysuria, bowel/bladder incontinence, new weakness or changes in sensation  +constipation  Complete ROS obtained and otherwise negative        Assessment/Plan:  Abnormal CAT scan  Assessment & Plan  Abnormalities on CT of 11/2019 including but not limited to:    - diverticulosis  - L renal cyst  - B/L non-obstructing renal stones     - OP FU     Lymph node disorder  Assessment & Plan  - noted to have subcentimeter lymph node in the rt axilla with fat stranding on imaging in the setting of h/o breast CA therefore per radiology report clinical correlation was recommended and patient was evaluated by Dr Jennifer Villegas in acute care who noted this to be an asymptomatic finding and recommended no further SMITH at this time other than routine FU with PCP & continued regular mammograms for her h/o breast CA     IBS (irritable bowel syndrome)  Assessment & Plan  - on home bentyl 20 mg q6     Mood disorder (HCC)  Assessment & Plan  - on home zoloft 25 mg qd     Neuropathic pain  Assessment & Plan  - h/o DM  - on home neurontin 100 mg TID     HTN (hypertension)  Assessment & Plan  - at home on lopressor, norvasc, & lisinopril - continue   - monitor for orthostatic hypotension   - IM managing     Type 2 diabetes mellitus with chronic kidney disease, without long-term current use of insulin [facility])  Assessment & Plan  - on home metformin 500 mg BID with meals & ISS  - IM managing       Hypokalemia  Assessment & Plan  - periodic BMP  - IM managing     Chronic deep vein thrombosis (DVT) of left popliteal vein (HCC)  Assessment & Plan  - non-occlusive LLE DVT of the popliteal vein per imaging on 6/16/20, however similar finding on imaging going back to at least 6/2007  - patient was previously on eliquis, but per Dr Lex Al would hold eliquis for 3 weeks post-op   - currently on coumadin, as well as heparin 5000mg q8h until INR is 2-2 5  - trend INR    Arrhythmia  Assessment & Plan  - h/o AVNRT s/p ablation in 11/2019  - h/o a-flutter (chronically on [facility] due to h/o DVT)  - follows with Dr Nathanael Hurst of [facility] cards     Hiatal hernia with gastroesophageal reflux  Assessment & Plan  - therapeutic substitution for home omeprazole 40 mg qd     Hyperlipidemia  Assessment & Plan  - on home lipitor 40 mg qpm     Hypothyroidism  Assessment & Plan  - on home levothyroxine 125 mcg qd   - TSH on 5/12/20 WNL     * Spinal stenosis of lumbar region  Assessment & Plan  - s/p L4-S1 lami-fusion by Dr Lex Al on 6/15  - spine precautions/LSO brace  - OP FU with Dr Lex Al     DVT ppx  - SCDs  - on coumadin and heparin 5000 q8h until INR 2-2 5     Scheduled Meds:    Current Facility-Administered Medications:  acetaminophen 975 mg Oral Q8H Margarito Colon MD   amLODIPine 5 mg Oral Daily Shai Gibbs MD   atorvastatin 40 mg Oral Daily With Darylene Fenton, MD   dicyclomine 20 mg Oral Lesly Art MD   gabapentin 100 mg Oral TID Shai Gibbs MD   heparin (porcine) 5,000 Units Subcutaneous Q8H Margarito Colon MD   insulin lispro 1-5 Units Subcutaneous TID AC Shai Gibbs MD   insulin lispro 1-5 Units Subcutaneous HS Shai Gibbs MD   levothyroxine 125 mcg Oral Early Morning Shai Gibbs MD   lidocaine 1 patch Topical Daily Justice Cunningham MD   [START ON 6/24/2020] lisinopril 5 mg Oral Daily HUNTER Egan metFORMIN 500 mg Oral BID With Meals Crissy Patricia MD   methocarbamol 750 mg Oral Q6H PRN Crissy Patricia MD   metoprolol tartrate 12 5 mg Oral Q12H [facility] HUNTER Kuhn   oxyCODONE 2 5 mg Oral Q4H PRN Crissy Patricia MD   oxyCODONE 5 mg Oral Q4H PRN Crissy Patricia MD   pantoprazole 40 mg Oral Early Morning Crissy Patricia MD   polyethylene glycol 17 g Oral Daily PRN Crissy Patricia MD   senna-docusate sodium 2 tablet Oral HS HUNTER Kuhn   sertraline 25 mg Oral Daily Crissy Patricia MD   warfarin 5 mg Oral Once (warfarin) HUNTER Kuhn       Allergies   Allergen Reactions    Penicillins Hives        Objective:      Physical Exam:  Temp:  [97 °F (36 1 °C)-98 5 °F (36 9 °C)] 97 °F (36 1 °C)  HR:  [77-89] 77  Resp:  [18] 18  BP: (131-148)/(61-70) 148/70  Oxygen Therapy  SpO2: 94 %    GEN: NAD, sitting comfortably in chair  Head: NCAT, no gross lesions  Eyes: PERRL, EOMI  Throat: clear, no thrush, MMM  Pulm: CTAB, no rales/wheezes  CV: RRR, normal s1/s2  Abd: soft, NTND  Ext: no pedal edema bilaterally, distal extremities warm and well perfused  Psych: normal affect, no agitation  Skin: no observable rashes; lumbar incision c/d/i with steristrips overlying   Neuro: A+Ox3, fluent speech, follows commands   Moving bilateral LE spontaneously     Diagnostic Studies: reviewed, no new imaging  No results found      Laboratory:    Results from last 7 days   Lab Units 06/23/20  0547 06/20/20  0607 06/19/20  1244   HEMOGLOBIN g/dL 11 9* 11 8 12 6   HEMATOCRIT % 35 8* 36 3 38 2   WBC Thousand/uL 9 30 9 64 10 57*     Results from last 7 days   Lab Units 06/23/20  0547 06/22/20  0520 06/20/20  0607  06/19/20  1244 06/18/20  1422   BUN mg/dL 13  --  27*  --  16 9   POTASSIUM mmol/L 4 0 3 5* 3 5   < > 2 7* 2 7*   CHLORIDE mmol/L 100  --  101  --  97* 101   CREATININE mg/dL 0 52*  --  1 01  --  1 04 0 73   AST U/L  --   --   --   --   --  28   ALT U/L  --   --   --   --   --  30    < > = values in this interval not displayed  Results from last 7 days   Lab Units 06/23/20  0547 06/22/20  0520 06/21/20  0427   PROTIME seconds 18 3* 16 0* 12 8   INR  1 60* 1 35* 0 95        Patient Active Problem List   Diagnosis    Spinal stenosis of lumbar region    Hypothyroidism    Hyperlipidemia    Hiatal hernia with gastroesophageal reflux    Arrhythmia    Chronic deep vein thrombosis (DVT) of left popliteal vein (HCC)    Hypokalemia    Type 2 diabetes mellitus with chronic kidney disease, without long-term current use of insulin (HCC)    HTN (hypertension)    Neuropathic pain    Mood disorder (HCC)    IBS (irritable bowel syndrome)    Lymph node disorder    Abnormal CAT scan       ** Please Note: Fluency Direct voice to text software may have been used in the creation of this document   **

## 2020-06-23 NOTE — NURSING NOTE
Pt is alert and oriented, cooperative with care  Pt has pain in her lower back, oxy ir 5 mg was administered, as well as robaxin, and lidocaine pain patch  Pt states that her pain level is a 7/10 and after medications given it is a 6/10  No facial grimacing present  Pt did have prune juice today, she refused miralax  Call bell is in reach, pt makes needs known

## 2020-06-23 NOTE — PROGRESS NOTES
06/23/20 1230   Pain Assessment   Pain Assessment Tool 0-10   Pain Score 7   Pain Location/Orientation Orientation: Lower; Location: Back   Pain Onset/Description Onset: Ongoing   Patient's Stated Pain Goal No pain   Hospital Pain Intervention(s) Cold applied;Repositioned; Rest   Restrictions/Precautions   Precautions Fall Risk;Spinal precautions;Pain   Weight Bearing Restrictions No   ROM Restrictions Yes  (lumbar spinal precautions)   Braces or Orthoses   (LSO)   Lower Body Dressing   Type of Assistance Needed Verbal cues; Supervision   Amount of Physical Assistance Provided 50%-74%   Comment Pt performed LB dressing simulation w/ use of LHAE grabber and theraband to simulate wasteband of pants 2* to spinal precautions and to improve functional performance of ADLs  Completed 2 trials, seated in w/c, w/ therapist providing VC for orientation  Lower Body Dressing CARE Score 2   Putting On/Taking Off Footwear   Type of Assistance Needed Verbal cues; Physical assistance   Amount of Physical Assistance Provided No physical assistance   Comment Pt performed donning/doffing of socks while being educated in use of LHAE (dressing stick and sock aid) 2* spinal precautions for improved functional performance of ADLs  Therapist providing VCs for body positioning, sequencing regarding sock aid and dressing stick   However, pt demonstrating dec problem solving, sequencing and carryover with LHAE, cont practice and education will be required   Putting On/Taking Off Footwear CARE Score -   Sit to Stand   Type of Assistance Needed Physical assistance   Amount of Physical Assistance Provided 25%-49%   Comment performed w/ RW with pt req extra time (5-10mins) 2* to pain   Sit to Stand CARE Score 3   Bed-Chair Transfer   Type of Assistance Needed Incidental touching   Amount of Physical Assistance Provided No physical assistance   Comment Pt CGA with RW for SPT to w/c w/ pt req extra time 2* to pain w/ therapist providing VC on hand placement   Chair/Bed-to-Chair Transfer CARE Score 4   Transfer Bed/Chair/Wheelchair   Positioning Concerns Other  (LSO brace, posterior pelvic tilt)   Limitations Noted In Balance;Confidence; Endurance;Pain Management;LE Strength;UE Strength   Adaptive Equipment Roller Walker   Therapeutic Excerise-Strength   UE Strength Yes   Right Upper Extremity- Strength   R Shoulder Flexion; Extension   R Elbow Elbow flexion;Elbow extension   Equipment Dowel  (2lb)   R Weight/Reps/Sets 2 lbs/2/15   RUE Strength Comment Pt performing BUE strengthening, moving shoulders in all available shoulder planes, seated in w/c  in order to increase functional strength for transfers   Left Upper Extremity-Strength   L Shoulder Extension;Flexion   L Elbow Elbow flexion;Elbow extension   Equipment Dowel   L Weights/Reps/Sets 2 lbs/2/15   LUE Strength Comment Pt performing BUE strengthening, moving shoulders in all available shoulder planes, seated in w/c  in order to increase funcitonal strength for transfers   Cognition   Overall Cognitive Status [facility]   Arousal/Participation Alert; Responsive; Cooperative   Attention Within functional limits   Orientation Level Oriented X4   Memory Within functional limits   Following Commands Follows one step commands with increased time or repetition   Activity Tolerance   Activity Tolerance Patient limited by pain; Patient tolerated treatment well   Assessment   Treatment Assessment Pt participated in 60 minute OT session w/ focus on BUE strengthening and adaptive equipment training to improve performance of sit to stand transfers and increase  functional performance of ADLs  Pt seated on recliner w/in room at start of session, w/ mood friendly and amiable to therapy  Pt reporting significant pain in lower back with pt providing ice end of session  Therapist educating pt on plan adjusting pt's medication and therapy schedule, w/ pt verbalizing understanding   During LHAE activity pt observed with inc difficulty with use of dressing stick, sock aid  Therapist provided pt with max vc and visual demonstration, pt with poor sequencing, problem solving and dec carryover of technique, will require further education  Pt presenting with safety issues 2* spinal precautions and pain, dec activity tolerance, dec standing balance/endurance and would benefit to receive continued skilled OT with focus on improving activity tolerance, balance, LHAE education, and medication mgmt  Prognosis Fair   Problem List Decreased strength;Decreased range of motion;Decreased endurance; Impaired balance;Decreased mobility; Decreased cognition;Pain;Orthopedic restrictions   Barriers to Discharge Decreased caregiver support  (reports not good relationship with dtr who lives [facility] pt)   Plan   Treatment/Interventions ADL retraining;Functional transfer training; Therapeutic exercise; Endurance training;Equipment eval/education;Patient/family training;Bed mobility; Compensatory technique education   Progress Progressing toward goals   Recommendation   OT Discharge Recommendation Return to previous environment with social support   OT Therapy Minutes   OT Time In 1230   OT Time Out 1330   OT Total Time (minutes) 60   OT Mode of treatment - Individual (minutes) 60   OT Mode of treatment - Concurrent (minutes) 0   OT Mode of treatment - Group (minutes) 0   OT Mode of treatment - Co-treat (minutes) 0   OT Mode of Treatment - Total time(minutes) 60 minutes   OT Cumulative Minutes 150   Therapy Time missed   Time missed?  No

## 2020-06-23 NOTE — ASSESSMENT & PLAN NOTE
Per hospital records, patient noted to have subcentimeter lymph node in the rt axilla with fat stranding on imaging  Due to history of breast CA, radiology report clinical correlation was recommended  Patient was evaluated by Dr Ashley Phipps in acute care who noted this to be an asymptomatic finding and recommended no further SMITH at this time other than routine FU with PCP & continued regular mammograms for her h/o breast CA  Follow up with PCP on discharge

## 2020-06-24 LAB
GLUCOSE SERPL-MCNC: 126 MG/DL (ref 65–140)
GLUCOSE SERPL-MCNC: 127 MG/DL (ref 65–140)
GLUCOSE SERPL-MCNC: 134 MG/DL (ref 65–140)
GLUCOSE SERPL-MCNC: 158 MG/DL (ref 65–140)
INR PPP: 1.83 (ref 0.84–1.19)
PROTHROMBIN TIME: 20.4 SECONDS (ref 11.6–14.5)

## 2020-06-24 PROCEDURE — 97530 THERAPEUTIC ACTIVITIES: CPT

## 2020-06-24 PROCEDURE — 97535 SELF CARE MNGMENT TRAINING: CPT

## 2020-06-24 PROCEDURE — 99233 SBSQ HOSP IP/OBS HIGH 50: CPT | Performed by: PHYSICAL MEDICINE & REHABILITATION

## 2020-06-24 PROCEDURE — 97116 GAIT TRAINING THERAPY: CPT

## 2020-06-24 PROCEDURE — 97110 THERAPEUTIC EXERCISES: CPT

## 2020-06-24 PROCEDURE — 82948 REAGENT STRIP/BLOOD GLUCOSE: CPT

## 2020-06-24 PROCEDURE — 85610 PROTHROMBIN TIME: CPT | Performed by: PHYSICAL MEDICINE & REHABILITATION

## 2020-06-24 PROCEDURE — 99232 SBSQ HOSP IP/OBS MODERATE 35: CPT | Performed by: NURSE PRACTITIONER

## 2020-06-24 RX ORDER — WARFARIN SODIUM 2 MG/1
2 TABLET ORAL
Status: COMPLETED | OUTPATIENT
Start: 2020-06-24 | End: 2020-06-24

## 2020-06-24 RX ADMIN — SERTRALINE HYDROCHLORIDE 25 MG: 25 TABLET ORAL at 08:04

## 2020-06-24 RX ADMIN — PANTOPRAZOLE SODIUM 40 MG: 40 TABLET, DELAYED RELEASE ORAL at 06:01

## 2020-06-24 RX ADMIN — OXYCODONE HYDROCHLORIDE 5 MG: 5 TABLET ORAL at 19:40

## 2020-06-24 RX ADMIN — LIDOCAINE 1 PATCH: 50 PATCH CUTANEOUS at 08:04

## 2020-06-24 RX ADMIN — ATORVASTATIN CALCIUM 40 MG: 40 TABLET, FILM COATED ORAL at 16:48

## 2020-06-24 RX ADMIN — HEPARIN SODIUM 5000 UNITS: 5000 INJECTION INTRAVENOUS; SUBCUTANEOUS at 14:33

## 2020-06-24 RX ADMIN — METHOCARBAMOL 750 MG: 750 TABLET, FILM COATED ORAL at 11:46

## 2020-06-24 RX ADMIN — DICYCLOMINE HYDROCHLORIDE 20 MG: 20 TABLET ORAL at 06:01

## 2020-06-24 RX ADMIN — WARFARIN SODIUM 2 MG: 2 TABLET ORAL at 18:16

## 2020-06-24 RX ADMIN — OXYCODONE HYDROCHLORIDE 5 MG: 5 TABLET ORAL at 08:04

## 2020-06-24 RX ADMIN — ACETAMINOPHEN 975 MG: 325 TABLET ORAL at 06:01

## 2020-06-24 RX ADMIN — INSULIN LISPRO 1 UNITS: 100 INJECTION, SOLUTION INTRAVENOUS; SUBCUTANEOUS at 21:08

## 2020-06-24 RX ADMIN — DICYCLOMINE HYDROCHLORIDE 20 MG: 20 TABLET ORAL at 00:42

## 2020-06-24 RX ADMIN — AMLODIPINE BESYLATE 5 MG: 5 TABLET ORAL at 08:03

## 2020-06-24 RX ADMIN — GABAPENTIN 100 MG: 100 CAPSULE ORAL at 16:48

## 2020-06-24 RX ADMIN — OXYCODONE HYDROCHLORIDE 5 MG: 5 TABLET ORAL at 02:16

## 2020-06-24 RX ADMIN — ACETAMINOPHEN 975 MG: 325 TABLET ORAL at 21:07

## 2020-06-24 RX ADMIN — HEPARIN SODIUM 5000 UNITS: 5000 INJECTION INTRAVENOUS; SUBCUTANEOUS at 21:07

## 2020-06-24 RX ADMIN — METOPROLOL TARTRATE 12.5 MG: 25 TABLET ORAL at 21:07

## 2020-06-24 RX ADMIN — LISINOPRIL 5 MG: 5 TABLET ORAL at 08:04

## 2020-06-24 RX ADMIN — OXYCODONE HYDROCHLORIDE 5 MG: 5 TABLET ORAL at 14:33

## 2020-06-24 RX ADMIN — METFORMIN HYDROCHLORIDE 500 MG: 500 TABLET ORAL at 16:48

## 2020-06-24 RX ADMIN — DICYCLOMINE HYDROCHLORIDE 20 MG: 20 TABLET ORAL at 18:16

## 2020-06-24 RX ADMIN — ACETAMINOPHEN 975 MG: 325 TABLET ORAL at 14:33

## 2020-06-24 RX ADMIN — METOPROLOL TARTRATE 12.5 MG: 25 TABLET ORAL at 08:03

## 2020-06-24 RX ADMIN — LEVOTHYROXINE SODIUM 125 MCG: 125 TABLET ORAL at 06:01

## 2020-06-24 RX ADMIN — GABAPENTIN 100 MG: 100 CAPSULE ORAL at 21:07

## 2020-06-24 RX ADMIN — HEPARIN SODIUM 5000 UNITS: 5000 INJECTION INTRAVENOUS; SUBCUTANEOUS at 06:02

## 2020-06-24 RX ADMIN — DICYCLOMINE HYDROCHLORIDE 20 MG: 20 TABLET ORAL at 11:44

## 2020-06-24 RX ADMIN — GABAPENTIN 100 MG: 100 CAPSULE ORAL at 08:04

## 2020-06-24 RX ADMIN — SENNOSIDES AND DOCUSATE SODIUM 2 TABLET: 8.6; 5 TABLET ORAL at 21:08

## 2020-06-24 RX ADMIN — METFORMIN HYDROCHLORIDE 500 MG: 500 TABLET ORAL at 08:04

## 2020-06-24 NOTE — ASSESSMENT & PLAN NOTE
Continue amlodipine 5 mg daily, Lopressor 12 5 mg BID,  Lisinopril to 5 mg daily   Monitor and adjust as needed

## 2020-06-24 NOTE — PLAN OF CARE
Problem: Prexisting or High Potential for Compromised Skin Integrity  Goal: Skin integrity is maintained or improved  Description  INTERVENTIONS:  - Identify patients at risk for skin breakdown  - Assess and monitor skin integrity  - Assess and monitor nutrition and hydration status  - Monitor labs   - Assess for incontinence   - Turn and reposition patient  - Assist with mobility/ambulation  - Relieve pressure over bony prominences  - Avoid friction and shearing  - Provide appropriate hygiene as needed including keeping skin clean and dry  - Evaluate need for skin moisturizer/barrier cream  - Collaborate with interdisciplinary team   - Patient/family teaching  - Consider wound care consult   Outcome: Progressing     Problem: Potential for Falls  Goal: Patient will remain free of falls  Description  INTERVENTIONS:  - Assess patient frequently for physical needs  -  Identify cognitive and physical deficits and behaviors that affect risk of falls    -  [place] fall precautions as indicated by assessment   - Educate patient/family on patient safety including physical limitations  - Instruct patient to call for assistance with activity based on assessment  - Modify environment to reduce risk of injury  - Consider OT/PT consult to assist with strengthening/mobility  Outcome: Progressing     Problem: PAIN - ADULT  Goal: Verbalizes/displays adequate comfort level or baseline comfort level  Description  Interventions:  - Encourage patient to monitor pain and request assistance  - Assess pain using appropriate pain scale  - Administer analgesics based on type and severity of pain and evaluate response  - Implement non-pharmacological measures as appropriate and evaluate response  - Consider cultural and social influences on pain and pain management  - Notify physician/advanced practitioner if interventions unsuccessful or patient reports new pain  Outcome: Progressing     Problem: INFECTION - ADULT  Goal: Absence or prevention of progression during hospitalization  Description  INTERVENTIONS:  - Assess and monitor for signs and symptoms of infection  - Monitor lab/diagnostic results  - Monitor all insertion sites, i e  indwelling lines, tubes, and drains  - Monitor endotracheal if appropriate and nasal secretions for changes in amount and color  - Junior appropriate cooling/warming therapies per order  - Administer medications as ordered  - Instruct and encourage patient and family to use good hand hygiene technique  - Identify and instruct in appropriate isolation precautions for identified infection/condition  Outcome: Progressing     Problem: SKIN/TISSUE INTEGRITY - ADULT  Goal: Incision(s), wounds(s) or drain site(s) healing without S/S of infection  Description  INTERVENTIONS  - Assess and document risk factors for skin impairment   - Assess and document dressing, incision, wound bed, drain sites and surrounding tissue  - Consider nutrition services referral as needed  - Oral mucous membranes remain intact  - Provide patient/ family education  Outcome: Progressing

## 2020-06-24 NOTE — PCC NURSING
Pt is  78 y o  female who presented to the [address] [place] [address] with lumbar stenosis s/p L4-S1 lami-fusion by Dr Deepak Simpson on 6/15  Pt is on spinal precaution, using LSO brace when OOB  Pt is taking Bentyl for IBS, Zoloft for mood disorder  Pt has hx of DM taking Neurontin for neuropathic pain, on Metformin and SSI  On Lopressor, Norvasc, Lisinopril for HTN  Pt is on Coumadin for non- occlusive LLE DVT  Pt was on Eliquis but it was D/C'd for 3 wks after surgery  Pt is also on Heparin SQ until INR is between 2-2 5 due to high DVT risk  Pt is on Omeprazole for GERD, Lipitor for Hyperlipidemia, Levothyroxine for Hypothyroidism  Pt is on Robaxin for muscle spasm, on Oxy IR, Tylenol and Lidocaine patch for pain  Pt is on Senokot and Miralax for constipation  We will continue to monitor v/s, lab results, pain management, safety by encouraging pt to use call bell if help is needed, hourly rounding to meet every pt's needs and concerns  We will also monitor pt's skin and incision site to make sure that the incision is healing accordingly and to prevent infection

## 2020-06-24 NOTE — PROGRESS NOTES
06/24/20 0830   Pain Assessment   Pain Assessment Tool 0-10   Pain Score 6   Pain Location/Orientation Orientation: Bilateral;Location: Back   Pain Radiating Towards into L LE  Restrictions/Precautions   Precautions Fall Risk;Spinal precautions;Pain   Braces or Orthoses LSO   Cognition   Overall Cognitive Status WFL   Arousal/Participation Alert; Responsive; Cooperative   Attention Within functional limits   Orientation Level Oriented X4   Memory Within functional limits   Following Commands Follows one step commands with increased time or repetition   Subjective   Subjective reports sleeping well and was medicated around 815 this AM     Sit to Stand   Type of Assistance Needed Physical assistance   Amount of Physical Assistance Provided 25%-49%   Comment Cues to press up from seat  Sit to Stand CARE Score 3   Bed-Chair Transfer   Type of Assistance Needed Incidental touching   Amount of Physical Assistance Provided No physical assistance   Chair/Bed-to-Chair Transfer CARE Score 4   Transfer Bed/Chair/Wheelchair   Limitations Noted In Balance; Endurance   Adaptive Equipment Roller Walker   Stand Pivot Contact Guard   Sit to Stand Minimal Assist   Stand to [facility]   Findings better ability for sit to stand this AM     Car Transfer   Reason if not Attempted Environmental limitations   Car Transfer CARE Score 10   Walk 10 Feet   Type of Assistance Needed Incidental touching   Walk 10 Feet CARE Score 4   Walk 50 Feet with Two Turns   Type of Assistance Needed Incidental touching   Walk 50 Feet with Two Turns CARE Score 4   Walk 150 Feet   Type of Assistance Needed Physical assistance   Amount of Physical Assistance Provided Total assistance   Comment +CF, CGA for safety  Walk 150 Feet CARE Score 1   Ambulation   Does the patient walk? 2  Yes   Primary Mode of Locomotion Prior to Admission Walk   Distance Walked (feet) 150 ft  (50)   Assist Device Roller Walker   Gait Pattern Inconsistant [place]; Slow [place]; Improper weight shift; Shuffle   Limitations Noted In Balance; Endurance;Speed;Strength;Swing   Provided Assistance with: Balance   Walk Assist Level Contact Guard; Chair Follow   Findings better tolerance to amb this AM  Use of CF for safety with longer distance  Wheel 50 Feet with Two Turns   Reason if not Attempted Activity not applicable   Wheel 50 Feet with Two Turns CARE Score 9   Wheel 150 Feet   Reason if not Attempted Activity not applicable   Wheel 769 Feet CARE Score 9   Wheelchair mobility   Does the patient use a wheelchair? 0  No   4 Steps   Reason if not Attempted Activity not applicable   4 Steps CARE Score 9   12 Steps   Reason if not Attempted Activity not applicable   12 Steps CARE Score 9   Therapeutic Interventions   Strengthening Seated B LE TE: APs on 4in block, LAQ, seated march to 90, seated heel slides consectutively 2x1 min with 3 sec rest breaks, last x2 min  Assessment   Treatment Assessment Pt engaged in 60 min skilled PT intervention with continued focus on stengthening, transfers, and amb  Daughter Peg asked to observe therapy treatment, present for session  Extensive verbal education provided reagrding post op recovery time and progression of mobility, spinal precuations and need for functional hourly amb to optimize recovery  Pt and daughter verbalize understanding  Pt with better pain control this AM, medicated prior to treatment, inc Amb tolerance, use of CF for safety  Pt distractable throughout seated TE, needs periodic redirection  Daughter iquired about [facility] and need for RW  Advised DME recommended will be submitted  to trial ramp and steps when able  Per mary, pt has about 4-5ft ramp to enter home, and 3 steps onto patio if she wishes to go there  Family/Caregiver Present yes  PT Family training done with: daughter Peg  verbal education only  Problem List Decreased strength;Decreased range of motion;Decreased endurance; Impaired balance;Decreased mobility; Decreased cognition;Pain;Orthopedic restrictions   Barriers to Discharge Decreased caregiver support   Barriers to Discharge Comments pain mgmt  limited family support  PT Barriers   Physical Impairment Decreased strength;Decreased endurance; Impaired balance;Decreased mobility;Orthopedic restrictions;Pain   Functional Limitation Car transfers; Ramp negotiation;Standing;Transfers; Walking   Plan   Treatment/Interventions Functional transfer training;LE strengthening/ROM; Therapeutic exercise; Endurance training;Gait training;Patient/family training   Progress Progressing toward goals   Recommendation   PT Discharge Recommendation Home with skilled therapy  (home PT)   Equipment Recommended Walker   PT Equipment ordered Will need RW and BSC per daughter  PT Therapy Minutes   PT Time In 0830   PT Time Out 0930   PT Total Time (minutes) 60   PT Mode of treatment - Individual (minutes) 60   PT Mode of treatment - Concurrent (minutes) 0   PT Mode of treatment - Group (minutes) 0   PT Mode of treatment - Co-treat (minutes) 0   PT Mode of Treatment - Total time(minutes) 60 minutes   PT Cumulative Minutes 270   Therapy Time missed   Time missed?  No

## 2020-06-24 NOTE — ASSESSMENT & PLAN NOTE
Lab Results   Component Value Date    HGBA1C 6 4 (H) 05/12/2020       Recent Labs     06/23/20  1148 06/23/20  1610 06/23/20  2041 06/24/20  0606   POCGLU 173* 119 119 126       Blood Sugar Average: Last 72 hrs:  (P) 635 [id number]     Continue Metformin 500 mg BID with SSI  Monitor kidney function

## 2020-06-24 NOTE — PCC OCCUPATIONAL THERAPY
ADL: Pt cont tor eq A for don/doff LSO brace which family will A with, Remy/CGA with use of LHAE  TRANSFER: Remy/CGA with RWsolo inc time 2* to pain  D/C PLAN: 24/7 supervision at time of dc 2* to mod cog deficits which dtrs and son to A with IADL  Dtr Benny Marinelli to attend FT on 7/2  Dtr Peg has been present during previous OT sessions and has been educated  Dc date to be set in team this week    Pt barriers include pain, dec functional strength, dec functional reach, dec activity, dec standing balance impacting participation in ADL/IADL's  Therapist has discussed POC with pt and areas of focus with pt verbalizing understanding

## 2020-06-24 NOTE — PCC CARE MANAGEMENT
Pt continues to participate well with therapy, and plans to return home  Pt has been educated on the potential for cont'd care, ie therapy and services  Family training will begin today  Following to assist with d/c planning needs

## 2020-06-24 NOTE — PCC PHYSICAL THERAPY
6 24 2020    Pt is an 78 y o  Female who was admitted to [address] [place] [address]  on 6/21/2020 with dx of lumbar spinal stenosis, undergoing elective surgery on 6/15/2020 for L4/5, L5/S1 laminectomy, L4/5 PSF, and L4-5 instrumentation  Pts daughter lives with her however currently they are getting along well at this time  [place] with ramp entry or 3 Steps onto patio  At baseline pt use of SPC or rollator, I ADLs  Pt limited by pain tolerance/control  Better when pre medicated  C/o radiating pain into L LE, as prior to surgery  Pt amb up to 150ft with RW, CGA/min A with main focus on functional household distances  Pt demonstrates CG to min A for transfers  Pt making slowed progress towards LTGs  Will need brace don/doffing education  Plan for DC home at mod I level with RW and home PT services  Will need Rw and BSC  Anticipate additional skilled PT to maximize functional I      7-1-20  Pt is making progress in therapy but limited by pain/ fatigue/ cognition  She is not always showing initiation and and not showing comprehension of education explained  Due to dec balance and hip weakness she can present with mild LOB during turns and chair approach which she needs someone provided supervision /CG at times when up moving around  She initially had MI goals but due to cognition and safety awareness and dec balance she will need 24hr supervision which the family can provide    We will need to perform family training with the 1 daughter to review bed mobility, functional amb, back precautions and guarding techniques- set up for Thursday 7/2 at 10am

## 2020-06-24 NOTE — TEAM CONFERENCE
Acute RehabilitationTeam Conference Note  Date: 6/25/2020   Time: 12:51 PM       Patient Name:  Lise Ellison Record Number: [id number]   YOB: 1940  Sex: Female          Room/Bed:  [facility] 263/[facility] [address]  Payor Info:  Payor: MEDICARE / Plan: MEDICARE A AND B / Product Type: Medicare A & B Fee for Service /      Admitting Diagnosis: Lumbar spinal stenosis [M48 [id number]]   Admit Date/Time:  6/21/2020 11:39 AM  Admission Comments: No comment available     Primary Diagnosis:  Spinal stenosis of lumbar region  Principal Problem: Spinal stenosis of lumbar region    Patient Active Problem List    Diagnosis Date Noted    Hypothyroidism 06/21/2020    Hyperlipidemia 06/21/2020    Hiatal hernia with gastroesophageal reflux 06/21/2020    Arrhythmia 06/21/2020    Chronic deep vein thrombosis (DVT) of left popliteal vein ([facility] ) 06/21/2020    Hypokalemia 06/21/2020    Type 2 diabetes mellitus with hyperglycemia, with long-term current use of insulin ([place] [address] ) 06/21/2020    HTN (hypertension) 06/21/2020    Neuropathic pain 06/21/2020    Mood disorder ([place] [address] ) 06/21/2020    IBS (irritable bowel syndrome) 06/21/2020    Lymph node disorder 06/21/2020    Abnormal CAT scan 06/21/2020    Spinal stenosis of lumbar region 10/01/2019       Physical Therapy:    Weight Bearing Status: Full Weight Bearing  Transfers: Minimal Assistance, Incidental Touching  Bed Mobility: Moderate Assistance  Amulation Distance (ft): 150 feet(more consistently 50-100ft pending pain control)  Ambulation: Minimal Assistance  Assistive Device for Ambulation: Roller Walker  Number of Stairs: 1  Assistive Device for Stairs: Bilateral Hand Rails  Stair Assistance: Minimal Assistance  Assistive Device for Ramp: (not attempted to date  )  Discharge Recommendations: Home with:  [address] with[de-identified] Family Support, Home Physical Therapy    6 24 2020    Pt is an 78 y o   Female who was admitted to [address] [place] [address]  on 6/21/2020 with dx of lumbar spinal stenosis, undergoing elective surgery on 6/15/2020 for L4/5, L5/S1 laminectomy, L4/5 PSF, and L4-5 instrumentation  Pts daughter lives with her however currently they are getting along well at this time  [place] with ramp entry or 3 Steps onto patio  At baseline pt use of SPC or rollator, I ADLs  Pt limited by pain tolerance/control  Better when pre medicated  C/o radiating pain into L LE, as prior to surgery  Pt amb up to 150ft with RW, CGA/min A with main focus on functional household distances  Pt demonstrates CG to min A for transfers  Pt making slowed progress towards LTGs  Will need brace don/doffing education  Plan for DC home at mod I level with RW and home PT services  Will need Rw and BSC  Anticipate additional skilled PT to maximize functional I  Occupational Therapy:  Eating: Independent  Grooming: Supervision  Bathing: Moderate Assistance  Bathing: Moderate Assistance  Upper Body Dressing: Moderate Assistance  Lower Body Dressing: Maximum Assistance  Toileting: Moderate Assistance  Tub/Shower Transfer: (TBA)  Toilet Transfer: Incidental Touching  Cognition: Within Defined Limits  Orientation: Person, Place, Time, Situation  Discharge Recommendations: Home with:  [address] with[de-identified] Home Occupational Therapy(possible family support)       Pt  [facility]ed to [facility] on 6/[facility]/2020 for scheduled surgical intervention for known history of lumbar stenosis  Patient was being followed for low back pain and lumbar spinal stenosis that failed conservative measures  She went to the OR with Dr Gela Hernandez of [facility] for L4/5, L5/S1 Laminectomy, L4/5 PSF, and L4-5 Instrumentation  She has been cleared for activity as tolerated in an LSO brace  Post operatively the patient was with complaints of right calf pain  Bilateral lower extremity venous duplex was completed on 6/16 that was negative for acute DVT but did show non occlusive chronic DVT in the left popliteal vein    On 6/18 patient was with complaints of dyspnea and low oxygen saturations   Pt currently lives in a single level home with a w/c ramp to entrance  Pt reports having a full bathroom, w/ comfort height toilet and no GBs  Pt also reports having shower stall w/ lip and w/ interior/exterior GBs and shower chair (w/o back)  Pt reports living with daughter, son-in-law, and grandson who live in basement converted to apt  However, pt reports receiving limited help from at-home family resulting in pt being independent with financial , and home mgmt, med mngmnt  Pt is (-)   Daughter assists with cooking meals, shopping, and transporting pt to appointments  Pt seen for 90mins of skilled OT services with emphasis on self-care, toileting, BUE strengthening  Pt is currently modA for LB bathing and maxA for LB dressing 2* to spinal precautions  Pt also Remy for transfers with RW  Pt also modA for  UB dressing at this time, req assistance for donning LSO brace  Pt presents with the following performance component deficits impacting ADL/IADL skills: decreased mobility 2* pain, decreased activity tolerance, dec BUE strength, dec cognition (22/30 MOCA basic in acute) dec  Balance/standing tolerance, and ROM restrictions 2* spinal surgery    Pt to continue to benefit from continued acute rehab OT services during hospital stay to address defined deficits and to maximize level of functional independence in the following Occupational Performance areas: grooming, bathing/shower, toilet hygiene, dressing, medication management, functional mobility,clothing management, cleaning, meal prep and household maintenance   Pt presents with good rehab potential  Pt is unsafe to D/C home at this time, recommending ELOS 2 weeks to achieve [place] level goals with least restrictive device to address these areas and resume prior occupational roles to maximize independence to reduce risk of fall and decrease risk of readmission    Speech Therapy:           No notes on file    Nursing Notes:  Appetite: Good  Diet Type: Diabetic                           Type of Wound (LDA): (surgical incision)                       Bladder: 3 - Moderate Assistance        Bowel: 1 - Total Assistance        Pain Location/Orientation: Orientation: Bilateral, Location: Knee  Pain Score: 7                       Hospital Pain Intervention(s): Cold applied(CP post session dec pain 6/10)     Medication Management/Safety  Injectable: Insulin    Pt is  78 y o  female who presented to the [facility] with lumbar stenosis s/p L4-S1 lami-fusion by Dr Salazar Rangel on 6/15  Pt is on spinal precaution, using LSO brace when OOB  Pt is taking Bentyl for IBS, Zoloft for mood disorder  Pt has hx of DM taking Neurontin for neuropathic pain, on Metformin and SSI  On Lopressor, Norvasc, Lisinopril for HTN  Pt is taking K+ supplement for low normal result  Pt is on Coumadin for non- occlusive LLE DVT  Pt was on Eliquis but it was D/C'd for 3 wks after surgery  Pt is also on Heparin SQ until INR is between 2-2 5 due to high DVT risk  Pt is on Omeprazole for GERD, Lipitor for Hyperlipidemia, Levothyroxine for Hypothyroidism  Pt is on Robaxin for muscle spasm, on Oxy IR, Tylenol and Lidocaine patch for pain  Pt is on Senokot and Miralax for constipation  We will continue to monitor v/s, lab results, pain management, safety by encouraging pt to use call bell if help is needed, hourly rounding to meet every pt's needs and concerns  We will also monitor pt's skin and incision site to make sure that the incision is healing accordingly and to prevent infection  Case Management:     Discharge Planning  Living Arrangements: Children  Support Systems: Children  Assistance Needed: tbd  Type of Current Residence: Private residence  Current Home Care Services: No  Pt is participating well with therapy, and plans to return home  Pt has been educated on the potential for cont'd care, ie therapy and services  Following to assist with d/c planning needs  Is the patient actively participating in therapies? yes  List any modifications to the treatment plan:     Barriers Interventions   Decreased activity tolerance Energy conservation education   Decreased balance/standing tolerance Therapy exercises/adaptive equipment   Increased pain Medication management   Decreased cognition Cueing/redirection/Speech therapy         Is the patient making expected progress toward goals?  yes  List any update or changes to goals:     Medical Goals: Patient will be medically stable for discharge to [facility] upon completion of rehab program and Patient will be able to manage medical conditions and comorbid conditions with medications and follow up upon completion of rehab program    Weekly Team Goals:   Rehab Team Goals  ADL Team Goal: Patient will be independent with ADLs with least restrictive device upon completion of rehab program  Bowel/Bladder Team Goal: Patient will return to premorbid level for bladder/bowel management upon completion of rehab program  Transfer Team Goal: Patient will be independent with transfers with least restrictive device upon completion of rehab program  Locomotion Team Goal: Patient will be independent with locomotion with least restrictive device upon completion of rehab program    Discussion: Pt presents with the above barriers  Pt is currently min a-incidental touching for transfers, mod a for bed mobility  Pt is consistently ambulating 50-100ft, min a with RW  Pts ADLs are mod a for bathing, upper body dressing, toileting, max a for lower body dressing, incidental touching for toilet transfer  Pt goals are mod I with an ELOS of 2wks  Anticipated Discharge Date: reteam [facility] Team Members Present: The following team members are supervising care for this patient and were present during this Weekly Team Conference      Physician: Dr Kaitlyn Navarrete MD  : SANDRA Abarca/ LCSW  Registered Nurse: Benjie Werner, LEE  Physical Therapist: Gary Pollock, JORGE  Occupational Therapist: Prashanth Smiley MS, OTR/L  Speech Therapist: Deann Amaro MS, CCC-SLP  Other:

## 2020-06-24 NOTE — PROGRESS NOTES
Progress Note - Carolyn Son 1940, 78 y o  female MRN: [id number]    Unit/Bed#: -01 Encounter: [id number]    Primary Care Provider: Reagan Jerez DO   Date and time admitted to hospital: 6/21/2020 11:39 AM        Abnormal CAT scan  Assessment & Plan  CTA performed on 6/18 revealed "fat stranding noted in the right axillary/chest wall region, of uncertain significance, recommend clinical correlation"  Follow up with PCP on discharge         Lymph node disorder  Assessment & Plan  Per hospital records, patient noted to have subcentimeter lymph node in the rt axilla with fat stranding on imaging  Due to history of breast CA, radiology report clinical correlation was recommended  Patient was evaluated by Dr Jimenez Fuller in acute care who noted this to be an asymptomatic finding and recommended no further SMITH at this time other than routine FU with PCP & continued regular mammograms for her h/o breast CA  Follow up with PCP on discharge  IBS (irritable bowel syndrome)  Assessment & Plan  Continue home dose of Bentyl  LBM 6/23  Continue prune juice daily and Senna S 2 qhs  Monitor    Mood disorder [facility])  Assessment & Plan  Continue sertraline 25 mg daily  Neuropathic pain  Assessment & Plan  Patient with DM  She was on neurontin 100 mg TID at home  Continue same       HTN (hypertension)  Assessment & Plan  Continue amlodipine 5 mg daily, Lopressor 12 5 mg BID,  Lisinopril to 5 mg daily   Monitor and adjust as needed     Type 2 diabetes mellitus with hyperglycemia, with long-term current use of insulin [facility])  Assessment & Plan  Lab Results   Component Value Date    HGBA1C 6 4 (H) 05/12/2020       Recent Labs     06/23/20  1148 06/23/20  1610 06/23/20  2041 06/24/20  0606   POCGLU 173* 119 119 126       Blood Sugar Average: Last 72 hrs:  (P) 132 [id number]     Continue Metformin 500 mg BID with SSI  Monitor kidney function           Hypokalemia  Assessment & Plan  Per most recent hospitalization, K+ as low as 2 2 (felt due to poor p o  Intake)  6/22 K+ 3 5  Replaced with 40 mEq yesterday  6/23 K+ 4 0  Encouraged potassium rich diet  BMP on Thursday    Chronic deep vein thrombosis (DVT) of left popliteal vein (HCC)  Assessment & Plan  Non-occlusive LLE DVT (popliteal vein) on 6/16 imaging  Patient had been on Eliquis prior to recent spinal surgery  Per surgery, patient not cleared to resume Eliquis  Dr Salazar Rangel does not want patient on Eliquis for at least 3 weeks post-op  Decision made prior to recent hospital discharge to start Coumadin with INR goal of 2 0-2 5  (when goal reached, heparin may be discontinued  INR 6/23 1 83; 6/23 1 60;  6/22 1 35  Based on Coumadin protocol, will change Coumadin to 2 mg daily with INR in a m  Patient will need clearance by spine surgery to be switched back to Eliquis  Arrhythmia  Assessment & Plan  [facility]  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 (Dr Myra Poon)  H/o a-flutter (patient does not remember this diagnosis) - patient on chronic AC due to DVT)  Monitor rate/rhythm  Hiatal hernia with gastroesophageal reflux  Assessment & Plan  Found on CTA during most recent admission  General surgery saw patient in consultation and recommend outpatient follow up   Continue Bentyl 20 mg q6h and Protonix 40 mg daily  Hyperlipidemia  Assessment & Plan  Most recent lipid profile (5/12/2020) - total cholesterol 209, triglycerides 145, HDL 64,   Continue atorvastatin 40 mg daily    Hypothyroidism  Assessment & Plan  Most recent TSH (5/12) was 2 23  Continue levothyroxine (home dose was 125 mcg daily)    * Spinal stenosis of lumbar region  Assessment & Plan  PT/OT per primary team   Pain management per primary team    S/p L4-S1 laminectomy on 6/15  Spine precautions/LSO brace  Follow up with Dr Salazar Rangel on discharge         VTE Pharmacologic Prophylaxis:   Pharmacologic: Heparin  Mechanical VTE Prophylaxis in Place: Yes    Current Length of Stay: 3 day(s)    Current Patient Status: Inpatient Rehab     Discharge Plan: As per treatment team     Code Status: Level 1 - Full Code    Subjective:   No overnight events  Patient states she had trouble falling asleep due to lower pain with radiation into right leg  This is the same pain she had prior to surgery  She states her daughter was in this morning and was able to see "how I am doing in therapy"  She states she feels she is doing well in therapy  LB   She denies new issues/concerns  Objective:     Vitals:   Temp (24hrs), Av 1 °F (36 7 °C), Min:97 5 °F (36 4 °C), Max:99 °F (37 2 °C)    Temp:  [97 5 °F (36 4 °C)-99 °F (37 2 °C)] 99 °F (37 2 °C)  HR:  [76-91] 84  Resp:  [16-18] 18  BP: (120-150)/(59-70) 120/59  SpO2:  [96 %-97 %] 96 %  Body mass index is 33 18 kg/m²  Review of Systems   Constitutional: Negative for chills, fatigue and fever  Respiratory: Negative for cough, chest tightness, shortness of breath and wheezing  Cardiovascular: Negative for chest pain, palpitations and leg swelling  Gastrointestinal: Negative for abdominal distention, abdominal pain, constipation, diarrhea and nausea  Genitourinary: Negative for difficulty urinating and frequency  Musculoskeletal: Positive for back pain (with radiation into right leg)  Negative for joint swelling and myalgias  Skin: Positive for wound  Neurological: Negative for dizziness, light-headedness and headaches  Hematological: Negative for adenopathy  Does not bruise/bleed easily  Input and Output Summary (last 24 hours): Intake/Output Summary (Last 24 hours) at 2020 1058  Last data filed at 2020 0813  Gross per 24 hour   Intake 480 ml   Output 500 ml   Net -20 ml       Physical Exam:     Physical Exam   Constitutional: She is oriented to person, place, and time  She appears well-developed     Cardiovascular: Normal rate, regular rhythm, normal heart sounds and intact distal pulses  Pulmonary/Chest: Effort normal and breath sounds normal    Abdominal: Soft  Bowel sounds are normal  She exhibits no distension  There is no tenderness  Musculoskeletal: She exhibits edema (trace bilateral LE )  She exhibits no tenderness or deformity  Neurological: She is alert and oriented to person, place, and time  Skin: Skin is warm and dry  Psychiatric: She has a normal mood and affect  Her behavior is normal        Additional Data:     Labs:    Results from last 7 days   Lab Units 06/23/20  0547 06/20/20  0607   WBC Thousand/uL 9 30 9 64   HEMOGLOBIN g/dL 11 9* 11 8   HEMATOCRIT % 35 8* 36 3   PLATELETS Thousands/uL 346 258   BANDS PCT % 2  --    NEUTROS PCT %  --  65   LYMPHS PCT %  --  19   LYMPHO PCT % 30  --    MONOS PCT %  --  14*   MONO PCT % 17*  --    EOS PCT % 1 1     Results from last 7 days   Lab Units 06/23/20  0547  06/18/20  1422   SODIUM mmol/L 136*   < > 139   POTASSIUM mmol/L 4 0   < > 2 7*   CHLORIDE mmol/L 100   < > 101   CO2 mmol/L 29   < > 28   BUN mg/dL 13   < > 9   CREATININE mg/dL 0 52*   < > 0 73   ANION GAP mmol/L 7   < > 10   CALCIUM mg/dL 9 3   < > 9 2   ALBUMIN g/dL  --   --  2 8*   TOTAL BILIRUBIN mg/dL  --   --  0 52   ALK PHOS U/L  --   --  62   ALT U/L  --   --  30   AST U/L  --   --  28   GLUCOSE RANDOM mg/dL 137*   < > 157*    < > = values in this interval not displayed       Results from last 7 days   Lab Units 06/24/20  0522   INR  1 83*     Results from last 7 days   Lab Units 06/24/20  0606 06/23/20  2041 06/23/20  1610 06/23/20  1148 06/23/20  0608 06/22/20  2047 06/22/20  1633 06/22/20  1129 06/22/20  0635 06/21/20  2131 06/21/20  1634 06/21/20  0711   POC GLUCOSE mg/dl 126 119 119 173* 149* 197* 141* 157* 152* 146* 153* 147*               Labs reviewed    Imaging:    Imaging reviewed    Recent Cultures (last 7 days):           Last 24 Hours Medication List:     Current Facility-Administered Medications:  acetaminophen 975 mg Oral Q8H Gurjit Daniel MD   amLODIPine 5 mg Oral Daily Janene Wyman MD   atorvastatin 40 mg Oral Daily With Dallas Johnson MD   dicyclomine 20 mg Oral Q6H Janene Wyman MD   gabapentin 100 mg Oral TID Janene Wyman MD   heparin (porcine) 5,000 Units Subcutaneous [facility] Janene Wyman MD   insulin lispro 1-5 Units Subcutaneous TID AC Janene Wyman MD   insulin lispro 1-5 Units Subcutaneous HS Janene Wyman MD   levothyroxine 125 mcg Oral Early Morning Janene Wyman MD   lidocaine 1 patch Topical Daily Rachael Chaparro MD   lisinopril 5 mg Oral Daily HUNTER Kuhn   metFORMIN 500 mg Oral BID With Meals Janene Wyman MD   methocarbamol 750 mg Oral Q6H PRN Janene Wyman MD   metoprolol tartrate 12 5 mg Oral Q12H [address] HUNTER [place]   oxyCODONE 2 5 mg Oral Q4H PRN Janene Wyman MD   oxyCODONE 5 mg Oral Q4H PRN Janene Wyman MD   pantoprazole 40 mg Oral Early Morning Janene Wyman MD   polyethylene glycol 17 g Oral Daily PRN Janene Wyman MD   senna-docusate sodium 2 tablet Oral HS HUNTER [place]   sertraline 25 mg Oral Daily Janene Wyman MD   warfarin 2 mg Oral Once (warfarin) Forrestine HUNTER [place]        M*Modal software was used to dictate this note  It may contain errors with dictating incorrect words or incorrect spelling  Please contact the provider directly with any questions

## 2020-06-24 NOTE — PROGRESS NOTES
06/24/20 1501   Pain Assessment   Pain Assessment Tool 0-10   Pain Score 5   Pain Radiating Towards pt denies much pain at rest however sig inc with sit to stand transfers  Restrictions/Precautions   Precautions Fall Risk;Spinal precautions;Supervision on toilet/commode   Braces or Orthoses LSO   Cognition   Overall Cognitive Status Impaired   Arousal/Participation Alert; Responsive; Cooperative   Attention Within functional limits   Orientation Level Oriented X4   Memory Decreased short term memory;Decreased recall of precautions   Following Commands Follows one step commands with increased time or repetition   Comments inc anxiety with inc pain  Subjective   Subjective pt asking to not have therapy this PM, agreeable with encouragement  Sit to Stand   Type of Assistance Needed Physical assistance   Amount of Physical Assistance Provided Less than 25%   Comment min A with RW, inc time needed  Sit to Stand CARE Score 3   Bed-Chair Transfer   Type of Assistance Needed Physical assistance   Amount of Physical Assistance Provided Less than 25%   Chair/Bed-to-Chair Transfer CARE Score 3   Transfer Bed/Chair/Wheelchair   Limitations Noted In Balance; Endurance;Pain Management   Adaptive Equipment Roller Walker   Stand Pivot Contact Guard   Sit to [employer] Guard;Minimal Assist   Stand to [facility]   Findings pt more fearful with inc pain with sit<>stand transfers, quick to sit    Car Transfer   Reason if not Attempted Environmental limitations   Car Transfer CARE Score 10   Walk 10 Feet   Type of Assistance Needed Incidental touching   Amount of Physical Assistance Provided No physical assistance   Comment quick to sit post first amb trial  ata 15ft  Walk 10 Feet CARE Score 4   Walk 50 Feet with Two Turns   Comment x120ft straight with RW, CGA   Walk 150 Feet   Reason if not Attempted Safety concerns   Walk 150 Feet CARE Score 88   Ambulation   Does the patient walk? 2   Yes   Primary Mode of Locomotion Prior to Admission Walk   Distance Walked (feet) 120 ft   Assist Device Roller Walker   Gait Pattern Inconsistant Cayla; Slow [place]; Improper weight shift; Shuffle   Limitations Noted In Balance; Endurance; Safety;Speed;Strength;Swing   Provided Assistance with: Balance   Walk Assist Level Contact Guard   Findings needs encourgement to inc distance  CGA, with chair pulled behind for safety  Wheel 50 Feet with Two Turns   Reason if not Attempted Activity not applicable   Wheel 50 Feet with Two Turns CARE Score 9   Wheel 150 Feet   Reason if not Attempted Activity not applicable   Wheel 897 Feet CARE Score 9   Wheelchair mobility   Does the patient use a wheelchair? 0  No   Curb or Single Stair   Style negotiated Single stair   Type of Assistance Needed Physical assistance   Amount of Physical Assistance Provided 25%-49%   Comment min A with B HR, 1 step x2 trials  1 Step (Curb) CARE Score 3   4 Steps   Reason if not Attempted Activity not applicable   4 Steps CARE Score 9   12 Steps   Reason if not Attempted Activity not applicable   12 Steps CARE Score 9   Assessment   Treatment Assessment Pt seen for short 30 min skilled PT intervention despite pt not feeling up to it  Brace removed while sitting in chair, will need to clarify brace wearing orders  Pt with limited amb on first trial due to pain, quick to sit  Trialed 1 6in ch step x2 trials, B HR, pt plopping back into WC after  Education for safety and need for controlled sit for spinal protection  Able to inc distance with second amb trial, chair pulled behind for safety  To trial ramp next session as pt has  4-5 ft ramp to enter home  Continue per POC, with continued focus functional mobilty and spinal precuation educaiton  Problem List Decreased strength;Decreased range of motion;Decreased endurance; Impaired balance;Decreased mobility; Decreased cognition   Barriers to Discharge Decreased caregiver support   PT Barriers   Physical Impairment Decreased strength;Decreased endurance; Impaired balance;Decreased mobility;Orthopedic restrictions;Pain   Functional Limitation Car transfers; Ramp negotiation;Standing;Transfers; Walking   Plan   Treatment/Interventions Endurance training;Gait training   Progress Slow progress, decreased activity tolerance   Recommendation   PT Discharge Recommendation Home with skilled therapy   Equipment Recommended Walker   PT Therapy Minutes   PT Time In 1500   PT Time Out 1530   PT Total Time (minutes) 30   PT Mode of treatment - Individual (minutes) 30   PT Mode of treatment - Concurrent (minutes) 0   PT Mode of treatment - Group (minutes) 0   PT Mode of treatment - Co-treat (minutes) 0   PT Mode of Treatment - Total time(minutes) 30 minutes   PT Cumulative Minutes 300   Therapy Time missed   Time missed?  No

## 2020-06-24 NOTE — ASSESSMENT & PLAN NOTE
PT/OT per primary team   Pain management per primary team    S/p L4-S1 laminectomy on 6/15  Spine precautions/LSO brace  Follow up with Dr Alma Mclean on discharge

## 2020-06-24 NOTE — ASSESSMENT & PLAN NOTE
Per hospital records, patient noted to have subcentimeter lymph node in the rt axilla with fat stranding on imaging  Due to history of breast CA, radiology report clinical correlation was recommended  Patient was evaluated by Dr Anisa Herron in acute care who noted this to be an asymptomatic finding and recommended no further SMITH at this time other than routine FU with PCP & continued regular mammograms for her h/o breast CA  Follow up with PCP on discharge

## 2020-06-24 NOTE — PROGRESS NOTES
PM&R Progress Note:    ASSESSMENT:  18-year-old female with hypertension, diabetes, CKD, lumbar stenosis [place] [facility] [place] s/p L4-S1 lami-fusion by Dr Sue Coleman on 6/15  Stable and progressing    PLAN:    Rehabilitation   Continue current rehabilitation plan of care to maximize function   Progressing to a contact guard assist level with rolling walker 150 ft    Pain   Improving in lumbar spine - continue current regimen    DVT prophylaxis   Already on Coumadin and SCDs      Bladder plan   Continent    Bowel plan   Continent    * Spinal stenosis of lumbar region  Assessment & Plan  - s/p L4-S1 lami-fusion by Dr Sue Coleman on 6/15  - spine precautions/LSO brace  - OP FU with Dr Sue Coleman     Abnormal CAT scan  Assessment & Plan  Abnormalities on CT of 11/2019 including but not limited to:    - diverticulosis  - L renal cyst  - B/L non-obstructing renal stones     - OP FU     Lymph node disorder  Assessment & Plan  - noted to have subcentimeter lymph node in the rt axilla with fat stranding on imaging in the setting of h/o breast CA therefore per radiology report clinical correlation was recommended and patient was evaluated by Dr Cami Morrow in acute care who noted this to be an asymptomatic finding and recommended no further SMITH at this time other than routine FU with PCP & continued regular mammograms for her h/o breast CA     IBS (irritable bowel syndrome)  Assessment & Plan  - on home bentyl 20 mg q6     Mood disorder (HCC)  Assessment & Plan  - on home zoloft 25 mg qd     Neuropathic pain  Assessment & Plan  - h/o DM  - on home neurontin 100 mg TID     HTN (hypertension)  Assessment & Plan  - at home on lopressor, norvasc, & lisinopril - continue   - monitor for orthostatic hypotension   - IM managing     Type 2 diabetes mellitus with hyperglycemia, with long-term current use of insulin ([address] )  Assessment & Plan  - on home metformin 500 mg BID with meals & ISS  - IM managing Hypokalemia  Assessment & Plan  - periodic BMP  - IM managing     Chronic deep vein thrombosis (DVT) of left popliteal vein (HCC)  Assessment & Plan  - non-occlusive LLE DVT of the popliteal vein per imaging on 6/16/20, however similar finding on imaging going back to at least 6/2007  - patient was previously on eliquis, but per Dr Gabrielle Dumont would hold eliquis for 3 weeks post-op   - currently on coumadin, as well as heparin 5000mg q8h until INR is 2-2 5  - trend INR    Arrhythmia  Assessment & Plan  - h/o AVNRT s/p ablation in 11/2019  - h/o a-flutter (chronically on [facility] due to h/o DVT)  - follows with Dr Andree Christianson of [facility] cards     Hiatal hernia with gastroesophageal reflux  Assessment & Plan  - therapeutic substitution for home omeprazole 40 mg qd     Hyperlipidemia  Assessment & Plan  - on home lipitor 40 mg qpm     Hypothyroidism  Assessment & Plan  - on home levothyroxine 125 mcg qd   - TSH on 5/12/20 WNL       Appreciate IM consultants medical co-management  Labs, medications, and imaging personally reviewed  SUBJECTIVE:  Patient seen face to face today while in physical therapy practicing sitting tolerance  She states only minimal lower back pain  Denies shortness of breath chest pain, numbness or tingling  Last BM yesterday  ROS:  A ten point review of systems was completed 6/24/20 and pertinent positives are listed in subjective section  All other systems reviewed were negative  OBJECTIVE:   /59 (BP Location: Right arm)   Pulse 84   Temp 99 °F (37 2 °C) (Tympanic)   Resp 18   Ht 5' 1" (1 549 m)   Wt 79 7 kg (175 lb 9 6 oz)   SpO2 96%   BMI 33 18 kg/m²     Physical Exam   Constitutional: She is oriented to person, place, and time  She appears well-developed and well-nourished  No distress  HENT:   Head: Normocephalic  Nose: Nose normal    Eyes: Conjunctivae and EOM are normal    Neck: Neck supple     Cardiovascular: Normal rate, regular rhythm and intact distal pulses  Pulmonary/Chest: Effort normal and breath sounds normal  She has no wheezes  Abdominal: Soft  Bowel sounds are normal  She exhibits no distension  Musculoskeletal: She exhibits no edema  LSO in place   Neurological: She is alert and oriented to person, place, and time  Sensation to light touch intact in bilateral lower extremities  Motor exam:  4/5 throughout   Skin: Skin is warm  Psychiatric: She has a normal mood and affect  Nursing note and vitals reviewed         Lab Results   Component Value Date    WBC 9 30 06/23/2020    HGB 11 9 (L) 06/23/2020    HCT 35 8 (L) 06/23/2020    MCV 91 06/23/2020     06/23/2020     Lab Results   Component Value Date    SODIUM 136 (L) 06/23/2020    K 4 0 06/23/2020     06/23/2020    CO2 29 06/23/2020    BUN 13 06/23/2020    CREATININE 0 52 (L) 06/23/2020    GLUC 137 (H) 06/23/2020    CALCIUM 9 3 06/23/2020     Lab Results   Component Value Date    INR 1 83 (H) 06/24/2020    INR 1 60 (H) 06/23/2020    INR 1 35 (H) 06/22/2020    PROTIME 20 4 (H) 06/24/2020    PROTIME 18 3 (H) 06/23/2020    PROTIME 16 0 (H) 06/22/2020           Current Facility-Administered Medications:     acetaminophen (TYLENOL) tablet 975 mg, 975 mg, Oral, Q8H [facility], Gisel Palmer MD, 975 mg at 06/24/20 0601    amLODIPine (NORVASC) tablet 5 mg, 5 mg, Oral, Daily, Gisel Palmer MD, 5 mg at 06/24/20 0803    atorvastatin (LIPITOR) tablet 40 mg, 40 mg, Oral, Daily With Kathi Capps MD, 40 mg at 06/23/20 1614    dicyclomine (BENTYL) tablet 20 mg, 20 mg, Oral, Q6H, Gisel Palmer MD, 20 mg at 06/24/20 1144    gabapentin (NEURONTIN) capsule 100 mg, 100 mg, Oral, TID, Gisel Palmer MD, 100 mg at 06/24/20 0804    heparin (porcine) subcutaneous injection 5,000 Units, 5,000 Units, Subcutaneous, Q8H [facility], Gisel Palmer MD, 5,000 Units at 06/24/20 0602    insulin lispro (HumaLOG) 100 units/mL subcutaneous injection 1-5 Units, 1-5 Units, Subcutaneous, TID AC, 1 Units at 06/23/20 1151 **AND** Fingerstick Glucose (POCT), , , TID AC, Keily Escamilla MD    insulin lispro (HumaLOG) 100 units/mL subcutaneous injection 1-5 Units, 1-5 Units, Subcutaneous, HS, Keily Escamilla MD, 1 Units at 06/22/20 2111    levothyroxine tablet 125 mcg, 125 mcg, Oral, Early Morning, Keily Escamilla MD, 125 mcg at 06/24/20 0601    lidocaine (LIDODERM) 5 % patch 1 patch, 1 patch, Topical, Daily, Valerie Caraballo MD, 1 patch at 06/24/20 0804    lisinopril (ZESTRIL) tablet 5 mg, 5 mg, Oral, Daily, HUNTER Kuhn, 5 mg at 06/24/20 0804    metFORMIN (GLUCOPHAGE) tablet 500 mg, 500 mg, Oral, BID With Meals, Keily Escamilla MD, 500 mg at 06/24/20 0804    methocarbamol (ROBAXIN) tablet 750 mg, 750 mg, Oral, Q6H PRN, Keily Escamilla MD, 750 mg at 06/24/20 1146    metoprolol tartrate (LOPRESSOR) partial tablet 12 5 mg, 12 5 mg, Oral, Q12H MELISSA, HUNTER Kuhn, 12 5 mg at 06/24/20 0803    oxyCODONE (ROXICODONE) IR tablet 2 5 mg, 2 5 mg, Oral, Q4H PRN, Keily Escamilla MD, 2 5 mg at 06/21/20 2137    oxyCODONE (ROXICODONE) IR tablet 5 mg, 5 mg, Oral, Q4H PRN, Keily Escamilla MD, 5 mg at 06/24/20 0804    pantoprazole (PROTONIX) EC tablet 40 mg, 40 mg, Oral, Early Morning, Keily Escamilla MD, 40 mg at 06/24/20 0601    polyethylene glycol (MIRALAX) packet 17 g, 17 g, Oral, Daily PRN, Keily Escamilla MD    senna-docusate sodium (SENOKOT S) 8 6-50 mg per tablet 2 tablet, 2 tablet, Oral, HS, HUNTER Kuhn    sertraline (ZOLOFT) tablet 25 mg, 25 mg, Oral, Daily, Keily Escamilla MD, 25 mg at 06/24/20 0804    warfarin (COUMADIN) tablet 2 mg, 2 mg, Oral, Once (warfarin), HUNTER Berger    Past Medical History:   Diagnosis Date    Ambulates with cane     Anxiety     Arthritis     Back pain     Bowel trouble     Cancer [facility])     left breast- left mastectomy- chemo    Chronic pain disorder     Coronary artery disease     2 stents    Depression     Diabetes mellitus ([facility] [address] )     NIDDM    Disease of thyroid gland     hypo    DVT (deep venous thrombosis) (HCC)     left leg    Full dentures     H/O breast implant     left    Headache     History of transfusion     no adverse reaction    Hyperlipidemia     Hypertension     Irregular heart beat     Afib    Localized swelling of both lower legs     Multiple falls     Neuropathy     Right knee pain     Shortness of breath     on exertion    Spinal stenosis, lumbar     Uses walker     Wears glasses     Wears glasses        Patient Active Problem List    Diagnosis Date Noted    Spinal stenosis of lumbar region 10/01/2019     Priority: High    Hypothyroidism 06/21/2020    Hyperlipidemia 06/21/2020    Hiatal hernia with gastroesophageal reflux 06/21/2020    Arrhythmia 06/21/2020    Chronic deep vein thrombosis (DVT) of left popliteal vein ([facility] ) 06/21/2020    Hypokalemia 06/21/2020    Type 2 diabetes mellitus with hyperglycemia, with long-term current use of insulin ([place] [id number] ) 06/21/2020    HTN (hypertension) 06/21/2020    Neuropathic pain 06/21/2020    Mood disorder ([place] [id number] ) 06/21/2020    IBS (irritable bowel syndrome) 06/21/2020    Lymph node disorder 06/21/2020    Abnormal CAT scan 06/21/2020          Fermin Foreman MD    Total time spent:  30 minutes with more than 50% spent counseling/coordinating care  Counseling includes discussion with patient re: progress and discussion with patient of his/her current medical state/information  Coordination of patient's care was performed in conjunction with consulting services  Time invested included review of patient's labs, vitals, and management of their comorbidities with continued monitoring  The care of the patient was extensively discussed and appropriate treatment plan was formulated unique for this patient  ** Please Note:  voice to text software may have been used in the creation of this document   Although proof errors in transcription or interpretation are a potential of such software**

## 2020-06-24 NOTE — ASSESSMENT & PLAN NOTE
Non-occlusive LLE DVT (popliteal vein) on 6/16 imaging  Patient had been on Eliquis prior to recent spinal surgery  Per surgery, patient not cleared to resume Eliquis  Dr Rudi Stubbs does not want patient on Eliquis for at least 3 weeks post-op  Decision made prior to recent hospital discharge to start Coumadin with INR goal of 2 0-2 5  (when goal reached, heparin may be discontinued  INR 6/23 1 83; 6/23 1 60;  6/22 1 35  Based on Coumadin protocol, will change Coumadin to 2 mg daily with INR in a m  Patient will need clearance by spine surgery to be switched back to Eliquis

## 2020-06-24 NOTE — PROGRESS NOTES
06/24/20 1230   Pain Assessment   Pain Assessment Tool 0-10   Pain Score 6   Pain Location/Orientation Orientation: Lower; Location: Back   Pain Onset/Description Onset: Ongoing   Effect of Pain on Daily Activities pt req mod encouragement and time for all transfers and pain also limiting functional mobility   Patient's Stated Pain Goal No pain   Hospital Pain Intervention(s) Cold applied;Repositioned; Rest   Multiple Pain Sites No   Restrictions/Precautions   Precautions Fall Risk;Spinal precautions;Pain;Limb alert   Weight Bearing Restrictions No   ROM Restrictions Yes  (spinal precautions )   Braces or Orthoses LSO   Upper Body Dressing   Type of Assistance Needed Physical assistance   Amount of Physical Assistance Provided 75% or more   Comment pt requiring maxA to don/doff LSO brace, therapist providng simple instruction  However, pt observed with dec problem solcing, sequencing and direction follwoing  Therapist recommend pt to have A for don/doff LSO brace at time of dc   Upper Body Dressing CARE Score 2   Picking Up Object   Comment Pt attempted to perform functional mobility activity w/ RW and RW tray, picking up cones w/ grabber to place on tray for functional transportation of object simulation  Activity not attempted 2* to pt pain requesting to sit   Sit to Stand   Type of Assistance Needed Physical assistance   Amount of Physical Assistance Provided 25%-49%   Comment pt requiring Remy during mid stance 2* to pain   Sit to Stand CARE Score 3   Bed-Chair Transfer   Type of Assistance Needed Physical assistance   Amount of Physical Assistance Provided 25%-49%   Comment Performed w/ RW to mat w/ pt requiring VC for reaching back before sitting and decreasing speed of descent    Chair/Bed-to-Chair Transfer CARE Score 3   Transfer Bed/Chair/Wheelchair   Positioning Concerns Other  (pain)   Limitations Noted In Balance;Confidence;Pain Management; Endurance;UE Strength;LE Strength   Adaptive Equipment [address] Management Level Wheelchair   Health Management Level of Assistance Maximum verbal cues; Moderate assistance;Close supervision   Health Management Pt performed med mgmt activity challenging pt interpretation of dosage frequency and distribution into pill box  Pt observed wi/ difficulty req max VCs for calculating per day pill frequency 2* to incorrect distribution  Therapist providing education on [facility] Simple Dose service w/ pt verbalizing interest  Therapist calling dhgtr at this time to discuss setting up service, w/ dhgtr complying  Pt with dec problem solving, sequencing during med mngmnt  Therapist refer to SLP for cog  Cognition   Overall Cognitive Status Impaired   Arousal/Participation Alert; Responsive; Cooperative   Attention Within functional limits   Orientation Level Oriented X4   Memory Decreased short term memory;Decreased recall of precautions   Following Commands Follows one step commands with increased time or repetition   Activity Tolerance   Activity Tolerance Patient limited by pain   Assessment   Treatment Assessment Pt participated in 90 min OT session w/ focus on transfers, brace mgmt, med mgmt, and functional mobility to increase functional performance of ADLs and IADLs and decrease caregiver burden  Pt found within room seated in recliner @ start of session, with mood friendly throughout session  During brace mgmt activity, pt observed with difficulty following directions  Therapist recommending daughter/son to assist with don/doff of LSO w/ pt in agreement  During med mgmt activity, pt observed w/ difficulty in pill distribution, problem solving w/ therapist providing education on pre-sorted pill packages available through drug stores, w/ pt verbalizing interest  Therapist consulting with daughter Peg to initiate this service (Simple Dose, [facility] [place]) for pt   During functional mobility task, pt reporting increased pain with therapist stopping activity to return pt to room w/ ice  Pt presents with continued safety risks 2* pain, diminished activity tolerance, decreased balance/endurance, and poor carryover of instruction  Pt would benefit to continue skilled OT services with focus on functional transfers, standing/activity tolerance, and LHAE retraining to improve functional performance of ADLs/IADLs  Prognosis Fair   Problem List Decreased strength;Decreased range of motion;Decreased endurance; Impaired balance;Decreased mobility; Decreased cognition   Barriers to Discharge Decreased caregiver support   Plan   Treatment/Interventions ADL retraining;Functional transfer training; Therapeutic exercise;Patient/family training;Equipment eval/education;Cognitive reorientation; Endurance training; Compensatory technique education   Progress Slow progress, decreased activity tolerance   Recommendation   OT Discharge Recommendation Return to previous environment with social support  (home OT)   OT Therapy Minutes   OT Time In 1230   OT Time Out 1400   OT Total Time (minutes) 90   OT Mode of treatment - Individual (minutes) 90   OT Mode of treatment - Concurrent (minutes) 0   OT Mode of treatment - Group (minutes) 0   OT Mode of treatment - Co-treat (minutes) 0   OT Mode of Treatment - Total time(minutes) 90 minutes   OT Cumulative Minutes 270   Therapy Time missed   Time missed?  No

## 2020-06-24 NOTE — PROGRESS NOTES
06/24/20 0930   Pain Assessment   Pain Assessment Tool 0-10   Pain Score 6   Restrictions/Precautions   Precautions Fall Risk;Spinal precautions;Pain   Weight Bearing Restrictions No   ROM Restrictions Yes   Braces or Orthoses Other (Comment)  (LSO)   Sit to Stand   Type of Assistance Needed Physical assistance   Amount of Physical Assistance Provided 25%-49%   Comment Performed w/ RW with pt req extra time for pain and orientation   Sit to Stand CARE Score 3   Bed-Chair Transfer   Type of Assistance Needed Physical assistance   Amount of Physical Assistance Provided 25%-49%   Comment Pt req extra time 2* to pain and foot placement, Remy for txfer to recliner   Chair/Bed-to-Chair Transfer CARE Score 3   Transfer Bed/Chair/Wheelchair   Positioning Concerns Other  (pain, LSO positioning)   Limitations Noted In Balance;Confidence;Pain Management; Endurance;UE Strength;LE Strength   Adaptive Equipment Roller Walker   Therapeutic Excerise-Strength   UE Strength Yes   Right Upper Extremity- Strength   R Shoulder Flexion; Extension   R Elbow Elbow flexion;Elbow extension   R Position Seated   Equipment Dowel   R Weight/Reps/Sets 1-2 lbs/15/2   RUE Strength Comment Pt performed BUE strengthening exercises while seated in w/c  Pt observed w/ inc fatigue during shoulder flexion exervcise, w/ pt downgrading weight to 1 lb dowel for remainder of repetitions  Pt observed w/ improved performance  Left Upper Extremity-Strength   L Shoulder Flexion; Extension   L Elbow Elbow flexion;Elbow extension   L Position Seated   Equipment Dowel   L Weights/Reps/Sets 1-2lbs/15/2   LUE Strength Comment Pt performed BUE strengthening exercises while seated in w/c  Pt observed w/ inc fatigue during shoulder flexion exervcise, w/ pt downgrading weight to 1 lb dowel for remainder of repetitions  Pt observed w/ improved performance  Cognition   Overall Cognitive Status WFL   Arousal/Participation Alert; Responsive; Cooperative   Attention Within functional limits   Orientation Level Oriented X4   Memory Within functional limits   Following Commands Follows one step commands with increased time or repetition   Activity Tolerance   Activity Tolerance Patient limited by fatigue   Assessment   Treatment Assessment Pt participated in 30 min OT session w/ focus on BUE strengthening for increasing functional performance on ADLs/IADLs  [place] BUE exercise, pt observed w/ fatigue reporting "that's enough"  Therapist educating pt on body mechanics to decrease muscle strain, also downgrading weight at this time, to improve performance  Pt reporting to feel stronger and motivated by granddaughter's upcoming wedding  Pt presents w/ safety issues 2* to pain, spinal precautions, decreased activity tolerance and balance  Pt would continue to benefit from skilled OT services w/ focus on BUE strengthening, standing tolerance, LHAE follow-up training, and ADL/IADL retraining  OT Family training done with: Pts dtr Peg present during OT session this AM  Therapist communicated with dtr if pt were to need help at time of dc who will be available 2* to pt reporting dtr who lives with her will not A  Peg reporting herself and brother live close and witll be able to A even with bathing/dressing (including don LSO brace) if needed  Peg reports she will A pt with laundry/IADL tasks where other sister who lives iw pt Blake Espino) will A with meals only  Therapist educated dtr on plan of OT is to make pt as independent as possible with ADL s including LHAE education/training, introduing walker basket vs tray to inc funcitonla performance in ADL tasks  Pt reporting she would like a new shower chair, where therapist provided dtr with handout from [facility] for purchase  Dtr very thankful and was provided with therapy gym number with any questions   Prognosis Fair   Problem List Decreased strength;Decreased range of motion;Decreased endurance; Impaired balance   Barriers to Discharge Decreased caregiver support   Plan   Treatment/Interventions ADL retraining;Functional transfer training; Therapeutic exercise; Endurance training;Patient/family training;Equipment eval/education; Compensatory technique education   Progress Progressing toward goals   Recommendation   Equipment Recommended Bedside commode  (shower chair, LHAE)   OT Equipment ordered handout for shower chair provided to dtr Peg   OT Therapy Minutes   OT Time In 0930   OT Time Out 1000   OT Total Time (minutes) 30   OT Mode of treatment - Individual (minutes) 30   OT Mode of treatment - Concurrent (minutes) 0   OT Mode of treatment - Group (minutes) 0   OT Mode of treatment - Co-treat (minutes) 0   OT Mode of Treatment - Total time(minutes) 30 minutes   OT Cumulative Minutes 180   Therapy Time missed   Time missed?  No

## 2020-06-24 NOTE — ASSESSMENT & PLAN NOTE
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

## 2020-06-25 LAB
ANION GAP SERPL CALCULATED.3IONS-SCNC: 10 MMOL/L (ref 5–14)
BUN SERPL-MCNC: 16 MG/DL (ref 5–25)
CALCIUM SERPL-MCNC: 10.2 MG/DL (ref 8.4–10.2)
CHLORIDE SERPL-SCNC: 96 MMOL/L (ref 97–108)
CO2 SERPL-SCNC: 31 MMOL/L (ref 22–30)
CREAT SERPL-MCNC: 0.75 MG/DL (ref 0.6–1.2)
ERYTHROCYTE [DISTWIDTH] IN BLOOD BY AUTOMATED COUNT: 13.9 %
GFR SERPL CREATININE-BSD FRML MDRD: 75 ML/MIN/1.73SQ M
GLUCOSE SERPL-MCNC: 143 MG/DL (ref 65–140)
GLUCOSE SERPL-MCNC: 144 MG/DL (ref 70–99)
GLUCOSE SERPL-MCNC: 148 MG/DL (ref 65–140)
GLUCOSE SERPL-MCNC: 149 MG/DL (ref 65–140)
GLUCOSE SERPL-MCNC: 193 MG/DL (ref 65–140)
HCT VFR BLD AUTO: 37.5 % (ref 36–46)
HGB BLD-MCNC: 12.6 G/DL (ref 12–16)
INR PPP: 2.01 (ref 0.84–1.19)
LYMPHOCYTES # BLD AUTO: 1.76 THOUSAND/UL (ref 0.5–4)
LYMPHOCYTES # BLD AUTO: 14 % (ref 25–45)
MCH RBC QN AUTO: 30.5 PG (ref 26–34)
MCHC RBC AUTO-ENTMCNC: 33.7 G/DL (ref 31–36)
MCV RBC AUTO: 91 FL (ref 80–100)
MONOCYTES # BLD AUTO: 1.13 THOUSAND/UL (ref 0.2–0.9)
MONOCYTES NFR BLD AUTO: 9 % (ref 1–10)
MYELOCYTES NFR BLD MANUAL: 3 % (ref 0–1)
NEUTS BAND NFR BLD MANUAL: 3 % (ref 0–8)
NEUTS SEG # BLD: 9.32 THOUSAND/UL (ref 1.8–7.8)
NEUTS SEG NFR BLD AUTO: 71 %
PLATELET # BLD AUTO: 445 THOUSANDS/UL (ref 150–450)
PLATELET BLD QL SMEAR: ABNORMAL
PMV BLD AUTO: 8.7 FL (ref 8.9–12.7)
POTASSIUM SERPL-SCNC: 3.8 MMOL/L (ref 3.6–5)
PROTHROMBIN TIME: 21.9 SECONDS (ref 11.6–14.5)
RBC # BLD AUTO: 4.14 MILLION/UL (ref 4–5.2)
RBC MORPH BLD: NORMAL
SODIUM SERPL-SCNC: 137 MMOL/L (ref 137–147)
TOTAL CELLS COUNTED SPEC: 100
WBC # BLD AUTO: 12.6 THOUSAND/UL (ref 4.5–11)

## 2020-06-25 PROCEDURE — 97535 SELF CARE MNGMENT TRAINING: CPT

## 2020-06-25 PROCEDURE — 80048 BASIC METABOLIC PNL TOTAL CA: CPT | Performed by: NURSE PRACTITIONER

## 2020-06-25 PROCEDURE — 85027 COMPLETE CBC AUTOMATED: CPT | Performed by: NURSE PRACTITIONER

## 2020-06-25 PROCEDURE — 85610 PROTHROMBIN TIME: CPT | Performed by: NURSE PRACTITIONER

## 2020-06-25 PROCEDURE — 97116 GAIT TRAINING THERAPY: CPT

## 2020-06-25 PROCEDURE — 97110 THERAPEUTIC EXERCISES: CPT

## 2020-06-25 PROCEDURE — 82948 REAGENT STRIP/BLOOD GLUCOSE: CPT

## 2020-06-25 PROCEDURE — 99232 SBSQ HOSP IP/OBS MODERATE 35: CPT | Performed by: NURSE PRACTITIONER

## 2020-06-25 PROCEDURE — 85007 BL SMEAR W/DIFF WBC COUNT: CPT | Performed by: NURSE PRACTITIONER

## 2020-06-25 PROCEDURE — 97530 THERAPEUTIC ACTIVITIES: CPT

## 2020-06-25 PROCEDURE — 99233 SBSQ HOSP IP/OBS HIGH 50: CPT | Performed by: PHYSICAL MEDICINE & REHABILITATION

## 2020-06-25 RX ORDER — GABAPENTIN 100 MG/1
100 CAPSULE ORAL 2 TIMES DAILY
Status: DISCONTINUED | OUTPATIENT
Start: 2020-06-25 | End: 2020-07-06 | Stop reason: HOSPADM

## 2020-06-25 RX ORDER — MUSCLE RUB CREAM 100; 150 MG/G; MG/G
CREAM TOPICAL
Status: DISCONTINUED | OUTPATIENT
Start: 2020-06-25 | End: 2020-07-06 | Stop reason: HOSPADM

## 2020-06-25 RX ORDER — MUSCLE RUB CREAM 100; 150 MG/G; MG/G
CREAM TOPICAL 3 TIMES DAILY PRN
Status: DISCONTINUED | OUTPATIENT
Start: 2020-06-25 | End: 2020-07-06 | Stop reason: HOSPADM

## 2020-06-25 RX ORDER — WARFARIN SODIUM 2 MG/1
2 TABLET ORAL
Status: DISCONTINUED | OUTPATIENT
Start: 2020-06-25 | End: 2020-06-26

## 2020-06-25 RX ORDER — GABAPENTIN 100 MG/1
200 CAPSULE ORAL
Status: DISCONTINUED | OUTPATIENT
Start: 2020-06-25 | End: 2020-07-06 | Stop reason: HOSPADM

## 2020-06-25 RX ORDER — ACETAMINOPHEN 325 MG/1
650 TABLET ORAL EVERY 8 HOURS SCHEDULED
Status: DISCONTINUED | OUTPATIENT
Start: 2020-06-25 | End: 2020-07-06 | Stop reason: HOSPADM

## 2020-06-25 RX ADMIN — LISINOPRIL 5 MG: 5 TABLET ORAL at 08:28

## 2020-06-25 RX ADMIN — METHOCARBAMOL 750 MG: 750 TABLET, FILM COATED ORAL at 00:30

## 2020-06-25 RX ADMIN — SERTRALINE HYDROCHLORIDE 25 MG: 25 TABLET ORAL at 08:28

## 2020-06-25 RX ADMIN — METOPROLOL TARTRATE 12.5 MG: 25 TABLET ORAL at 08:28

## 2020-06-25 RX ADMIN — ACETAMINOPHEN 650 MG: 325 TABLET ORAL at 21:41

## 2020-06-25 RX ADMIN — HEPARIN SODIUM 5000 UNITS: 5000 INJECTION INTRAVENOUS; SUBCUTANEOUS at 06:35

## 2020-06-25 RX ADMIN — WARFARIN SODIUM 2 MG: 2 TABLET ORAL at 17:59

## 2020-06-25 RX ADMIN — PANTOPRAZOLE SODIUM 40 MG: 40 TABLET, DELAYED RELEASE ORAL at 06:36

## 2020-06-25 RX ADMIN — OXYCODONE HYDROCHLORIDE 5 MG: 5 TABLET ORAL at 02:35

## 2020-06-25 RX ADMIN — MENTHOL, METHYL SALICYLATE: 10; 15 CREAM TOPICAL at 21:43

## 2020-06-25 RX ADMIN — GABAPENTIN 100 MG: 100 CAPSULE ORAL at 08:28

## 2020-06-25 RX ADMIN — LEVOTHYROXINE SODIUM 125 MCG: 125 TABLET ORAL at 06:34

## 2020-06-25 RX ADMIN — OXYCODONE HYDROCHLORIDE 5 MG: 5 TABLET ORAL at 10:36

## 2020-06-25 RX ADMIN — ACETAMINOPHEN 975 MG: 325 TABLET ORAL at 06:35

## 2020-06-25 RX ADMIN — GABAPENTIN 100 MG: 100 CAPSULE ORAL at 14:58

## 2020-06-25 RX ADMIN — HEPARIN SODIUM 5000 UNITS: 5000 INJECTION INTRAVENOUS; SUBCUTANEOUS at 14:58

## 2020-06-25 RX ADMIN — METFORMIN HYDROCHLORIDE 500 MG: 500 TABLET ORAL at 08:28

## 2020-06-25 RX ADMIN — METOPROLOL TARTRATE 12.5 MG: 25 TABLET ORAL at 20:35

## 2020-06-25 RX ADMIN — GABAPENTIN 200 MG: 100 CAPSULE ORAL at 21:42

## 2020-06-25 RX ADMIN — OXYCODONE HYDROCHLORIDE 5 MG: 5 TABLET ORAL at 20:36

## 2020-06-25 RX ADMIN — DICYCLOMINE HYDROCHLORIDE 20 MG: 20 TABLET ORAL at 17:59

## 2020-06-25 RX ADMIN — METFORMIN HYDROCHLORIDE 500 MG: 500 TABLET ORAL at 16:47

## 2020-06-25 RX ADMIN — LIDOCAINE 1 PATCH: 50 PATCH CUTANEOUS at 08:28

## 2020-06-25 RX ADMIN — DICYCLOMINE HYDROCHLORIDE 20 MG: 20 TABLET ORAL at 00:27

## 2020-06-25 RX ADMIN — METHOCARBAMOL 750 MG: 750 TABLET, FILM COATED ORAL at 16:47

## 2020-06-25 RX ADMIN — ATORVASTATIN CALCIUM 40 MG: 40 TABLET, FILM COATED ORAL at 16:47

## 2020-06-25 RX ADMIN — AMLODIPINE BESYLATE 5 MG: 5 TABLET ORAL at 08:28

## 2020-06-25 RX ADMIN — DICYCLOMINE HYDROCHLORIDE 20 MG: 20 TABLET ORAL at 06:34

## 2020-06-25 RX ADMIN — INSULIN LISPRO 1 UNITS: 100 INJECTION, SOLUTION INTRAVENOUS; SUBCUTANEOUS at 16:48

## 2020-06-25 RX ADMIN — HEPARIN SODIUM 5000 UNITS: 5000 INJECTION INTRAVENOUS; SUBCUTANEOUS at 21:42

## 2020-06-25 RX ADMIN — DICYCLOMINE HYDROCHLORIDE 20 MG: 20 TABLET ORAL at 12:01

## 2020-06-25 NOTE — ASSESSMENT & PLAN NOTE
Non-occlusive LLE DVT (popliteal vein) on 6/16 imaging  Patient had been on Eliquis prior to recent spinal surgery  Per surgery, patient not cleared to resume Eliquis  Dr Saad Parada does not want patient on Eliquis for at least 3 weeks post-op  Decision made prior to recent hospital discharge to start Coumadin with INR goal of 2 0-2 5  (when goal reached, heparin may be discontinued  Labs pending at this time  INR 6/23 1 83; 6/23 1 60;  6/22 1 35  She received Coumadin to 2 mg daily with INR pending  Patient will need clearance by spine surgery to be switched back to Eliquis

## 2020-06-25 NOTE — PROGRESS NOTES
06/25/20 1030   Pain Assessment   Pain Assessment Tool 0-10   Pain Score 7   Pain Location/Orientation Location: Back   Hospital Pain Intervention(s) Cold applied  (CP post session dec pain 6/10)   Restrictions/Precautions   Precautions Fall Risk;Cognitive;Spinal precautions;Supervision on toilet/commode;Limb alert   ROM Restrictions Yes   Braces or Orthoses LSO   Subjective   Subjective Pt states tired from not sleeping but willing for therapy    Sit to Stand   Type of Assistance Needed Physical assistance   Amount of Physical Assistance Provided Less than 25%   Comment VCs    Sit to Stand CARE Score 3   Bed-Chair Transfer   Type of Assistance Needed Physical assistance   Amount of Physical Assistance Provided 25%-49%   Comment Min A with RW   Chair/Bed-to-Chair Transfer CARE Score 3   Transfer Bed/Chair/Wheelchair   Limitations Noted In Balance; Endurance; Coordination;Problem Solving;LE Strength   Adaptive Equipment Roller Walker   Stand Pivot Minimal Assist   Sit to Stand Minimal Assist   Stand to [facility]   Findings delayed initiation when asked to perform task, needs  cues for foot placement and times of repeating to stand up due to pt not initiating movement, dec balance with transition , improved control with sitting   Walk 10 Feet   Type of Assistance Needed Incidental touching   Comment CG with RW   Walk 10 Feet CARE Score 4   Walk 50 Feet with Two Turns   Type of Assistance Needed Physical assistance   Amount of Physical Assistance Provided 25%-49%   Comment min A with RW   Walk 50 Feet with Two Turns CARE Score 3   Ambulation   Does the patient walk? 2  Yes   Primary Mode of Locomotion Prior to Admission Walk   Distance Walked (feet) 120 ft  (20x1  40x1)   Assist Device Roller Walker   Gait Pattern Inconsistant [place];Decreased foot clearance; Forward Flexion;Trendelenburg; Improper weight shift   Limitations Noted In Balance;Device Management; Endurance; Heel Strike;Posture; Safety;Strength Provided Assistance with: Balance;Direction   Walk Assist Level Minimum Assist;Contact Guard   Findings A varies with task and pain,  noted dec motor planning chair approach, by passed recliner and needed prompts on which was to turn,  slightly unsteady with retro stepping to chair  Needs CG specially with turns for balance   Wheelchair mobility   Does the patient use a wheelchair? 0  No   Therapeutic Interventions   Strengthening Seated Ankle PF with heels off 4'' box,  ankle DF yellow T band resisted,  LAQ arom,  hip add isometric  all 10x3   Other Comments   Comments toward end of session concluded with CP in brace which dec pain to 6/10   Assessment   Treatment Assessment 60 min session started with pt had BM and was having inc leg pain states knees and hamstring area so pain meds provided also CP at end of session helped dec pain  Pt is slow moving and Min A for stability with amb  Noted dec cognition with motor planning and delayed initiation/ though processing  Cont skilled PT toward LTGs   Barriers to Discharge Inaccessible home environment;Decreased caregiver support   PT Barriers   Physical Impairment Decreased strength;Decreased endurance; Impaired balance;Decreased mobility;Orthopedic restrictions;Pain   Functional Limitation Car transfers; Ramp negotiation;Standing;Transfers; Walking   Plan   Progress Slow progress, decreased activity tolerance   Recommendation   PT Discharge Recommendation Home with skilled therapy   PT Therapy Minutes   PT Time In 1030   PT Time Out 1130   PT Total Time (minutes) 60   PT Mode of treatment - Individual (minutes) 60   PT Mode of treatment - Concurrent (minutes) 0   PT Mode of treatment - Group (minutes) 0   PT Mode of treatment - Co-treat (minutes) 0   PT Mode of Treatment - Total time(minutes) 60 minutes   PT Cumulative Minutes 360   Therapy Time missed   Time missed?  No

## 2020-06-25 NOTE — ASSESSMENT & PLAN NOTE
Per most recent hospitalization, K+ as low as 2 2 (felt due to poor p o  Intake)  6/22 K+ 3 5  Replaced with 40 mEq yesterday  6/23 K+ 4 0  Encouraged potassium rich diet      BMP pending

## 2020-06-25 NOTE — PROGRESS NOTES
06/25/20 1330   Pain Assessment   Pain Assessment Tool 0-10   Pain Score 5   Pain Location/Orientation Orientation: Bilateral;Location: Back; Location: Leg   Pain Radiating Towards c/o radiating pain into B knees L>R  Restrictions/Precautions   Braces or Orthoses LSO   Cognition   Overall Cognitive Status Impaired   Arousal/Participation Alert; Responsive   Attention Within functional limits   Orientation Level Oriented X4   Memory Decreased short term memory;Decreased recall of precautions   Following Commands Follows one step commands with increased time or repetition   Subjective   Subjective Reports better pain this PM however did have L>R B knee pain earlier      Sit to Stand   Type of Assistance Needed Physical assistance   Amount of Physical Assistance Provided Less than 25%   Sit to Stand CARE Score 3   Bed-Chair Transfer   Type of Assistance Needed Physical assistance   Amount of Physical Assistance Provided 25%-49%   Chair/Bed-to-Chair Transfer CARE Score 3   Transfer Bed/Chair/Wheelchair   Stand Pivot Minimal Assist   Sit to Stand Minimal Assist   Stand to Sit Contact Guard   Car Transfer   Reason if not Attempted Environmental limitations   Car Transfer CARE Score 10   Walk 10 Feet   Type of Assistance Needed Incidental touching   Amount of Physical Assistance Provided No physical assistance   Walk 10 Feet CARE Score 4   Walk 50 Feet with Two Turns   Type of Assistance Needed Incidental touching   Amount of Physical Assistance Provided No physical assistance   Walk 50 Feet with Two Turns CARE Score 4   Walk 150 Feet   Type of Assistance Needed Incidental touching   Amount of Physical Assistance Provided No physical assistance   Comment CGA with RW, inc time needed    Walk 150 Feet CARE Score 4   Walking 10 Feet on Uneven Surfaces   Type of Assistance Needed Physical assistance;Verbal cues   Amount of Physical Assistance Provided 25%-49%   Comment indoor ramp with Rw x10 ft, inc fear ascending, better descending  Walking 10 Feet on Uneven Surfaces CARE Score 3   Ambulation   Does the patient walk? 2  Yes   Primary Mode of Locomotion Prior to Admission Walk   Distance Walked (feet) 150 ft   Assist Device Roller Walker   Gait Pattern Inconsistant George   Limitations Noted In Balance;Device Management; Endurance;Speed;Strength;Swing   Provided Assistance with: Balance   Walk Assist Level Minimum Assist   Findings mildly ataxic, with varied george, NBOS, Cues to pause as needed  Wheelchair mobility   Does the patient use a wheelchair? 0  No   Picking Up Object   Type of Assistance Needed Incidental touching   Amount of Physical Assistance Provided No physical assistance   Comment w/ reacher   Picking Up Object CARE Score 4   Therapeutic Interventions   Balance standing unsupported with CS 3x20 sec  Assessment   Treatment Assessment Pt engaged in 30 min skilled PT intervention with focus on ramp entry and balance  Pt demonstrated inc fear ascending ramp however better control descending  Trials static standing usupported, 3x20 sec  Pt c/o in L post LE pain with unsupported stand  Needs encouragment at times  Pt able to inc distance without chair follow, vareid george and LE control  Observed pt with dec stance ata on L LE  MD notfied and asked re: LSO wear orders  Continue per POC with continued focus on fucntional I and household amb  Limitations due to pain mgmt vs ata  To have cog assessment from speech  Family/Caregiver Present no   Problem List Decreased strength;Decreased range of motion;Decreased endurance; Impaired balance;Decreased mobility; Decreased coordination;Decreased cognition;Decreased safety awareness;Orthopedic restrictions;Pain   Barriers to Discharge Inaccessible home environment;Decreased caregiver support   PT Barriers   Physical Impairment Decreased strength;Decreased endurance; Impaired balance;Decreased mobility;Orthopedic restrictions;Pain   Functional Limitation Car transfers; Ramp negotiation;Standing;Transfers; Walking   Recommendation   PT Discharge Recommendation Home with skilled therapy   Equipment Recommended Walker   PT Therapy Minutes   PT Time In 1330   PT Time Out 1400   PT Total Time (minutes) 30   PT Mode of treatment - Individual (minutes) 30   PT Mode of treatment - Concurrent (minutes) 0   PT Mode of treatment - Group (minutes) 0   PT Mode of treatment - Co-treat (minutes) 0   PT Mode of Treatment - Total time(minutes) 30 minutes   PT Cumulative Minutes 390   Therapy Time missed   Time missed?  No

## 2020-06-25 NOTE — ASSESSMENT & PLAN NOTE
Lab Results   Component Value Date    HGBA1C 6 4 (H) 05/12/2020       Recent Labs     06/24/20  1111 06/24/20  1629 06/24/20 2039 06/25/20  0545   POCGLU 127 134 158* 148*       Blood Sugar Average: Last 72 hrs:  (P) 380 [id number]     Continue Metformin 500 mg BID with SSI  Monitor kidney function   BMP pending

## 2020-06-25 NOTE — ASSESSMENT & PLAN NOTE
Found on CTA during most recent admission  General surgery saw patient in consultation and recommend outpatient follow up   Continue Protonix 40 mg daily

## 2020-06-25 NOTE — ASSESSMENT & PLAN NOTE
Episode of dizziness this morning  Her SBP was on lower side at 117   SBP yesterday at 2100 was 160  Continue amlodipine 5 mg daily, Lopressor 12 5 mg BID,  Lisinopril to 5 mg daily   Monitor and adjust as needed

## 2020-06-25 NOTE — ASSESSMENT & PLAN NOTE
Patient with DM  Patient had difficulty sleeping last night due to bilateral LE "burning" pain      Continues on neurontin 100 mg TID   Will discuss with primary team  May benefit from increase in gabapentin

## 2020-06-25 NOTE — ASSESSMENT & PLAN NOTE
PT/OT per primary team   Pain management per primary team    S/p L4-S1 laminectomy on 6/15  Spine precautions/LSO brace  Follow up with Dr Gene Ahumada on discharge

## 2020-06-25 NOTE — PROGRESS NOTES
06/25/20 0700   Pain Assessment   Pain Assessment Tool 0-10   Pain Score 7   Pain Location/Orientation Orientation: Bilateral;Location: Knee   Pain Onset/Description Descriptor: Aching; Onset: Ongoing   Patient's Stated Pain Goal 1   Hospital Pain Intervention(s) Rest;Shower/Bath   Restrictions/Precautions   Precautions Fall Risk;Spinal precautions;Supervision on toilet/commode   Weight Bearing Restrictions No   ROM Restrictions Yes  (spinal prec's)   Braces or Orthoses LSO  (b/l knee high teds)   Oral Hygiene   Type of Assistance Needed Supervision   Amount of Physical Assistance Provided No physical assistance   Comment Pt seated to perform oral hyg routine  Oral Hygiene CARE Score 4   Shower/Bathe Self   Type of Assistance Needed Physical assistance   Amount of Physical Assistance Provided 25%-49%   Comment Pt performed shower during OT session, bathing 8/10 parts  Lower back incision covered w/ Telfa and Tegaderm for shower  Pt seated to perform shower 2* to LSO not being donned during shower  Pt performed lat wt shift side to side to bathe rudolph/buttock area  Req A to bathe below B knee's 2* to spinal prec's  Pt would benefit from [facility] for LB bathing routine  Shower/Bathe Self CARE Score 3   Tub/Shower Transfer   Limitations Noted In Balance; Coordination; Endurance; Safety   Adaptive Equipment Grab Bars;Transfer Bench   Assessed Shower   Findings Remy SPT w/ grab bar, side stepping over simulated 2" lip  Upper Body Dressing   Type of Assistance Needed Physical assistance   Amount of Physical Assistance Provided 25%-49%   Comment Pt seated on transfer bench to thread BUE's into shirt and don overhead  Required A for placement of LSO  Pt able to fasten/tighten LSO  Upper Body Dressing CARE Score 3   Lower Body Dressing   Type of Assistance Needed Physical assistance; Adaptive equipment;Verbal cues   Amount of Physical Assistance Provided 50%-74%   Comment Pt seated utilizing LHAE to thread BLE's into pull up/pants  Required A to fully thread BLE's and vc's for technique  Pt in stance at [facility] performing unilateral hand release to manage clothing up over hips  A for steadying  Pt observed w/ posterior LOB suddenly plopping into w/c  Lower Body Dressing CARE Score 2   Putting On/Taking Off Footwear   Type of Assistance Needed Physical assistance; Adaptive equipment   Amount of Physical Assistance Provided 75% or more   Comment TA to don nini's on BLE's  Pt utilized LHAE to don L slip on shoe, A to fully don R shoe  Putting On/Taking Off Footwear CARE Score 2   Lying to Sitting on Side of Bed   Type of Assistance Needed Physical assistance   Amount of Physical Assistance Provided Less than 25%   Lying to Sitting on Side of Bed CARE Score 3   Sit to Stand   Type of Assistance Needed Physical assistance   Amount of Physical Assistance Provided Less than 25%   Comment w/ RW; A for steadying   Sit to Stand CARE Score 3   Bed-Chair Transfer   Type of Assistance Needed Physical assistance   Amount of Physical Assistance Provided 25%-49%   Comment Remy w/ RW   Chair/Bed-to-Chair Transfer CARE Score 3   Toileting Hygiene   Type of Assistance Needed Physical assistance   Amount of Physical Assistance Provided 25%-49%   Comment Pt in stance at RW to perform CM up/down and rudolph hyg  A for steadying/safety and for safe placement of RW  Pt experienced posterior LOB while performing CM, unable to correct w/o A from [place]  Toileting Hygiene CARE Score 3   Toilet Transfer   Type of Assistance Needed Physical assistance   Amount of Physical Assistance Provided 25%-49%   Comment w/ RW to over the toilet commode   Toilet Transfer CARE Score 3   Cognition   Overall Cognitive Status Impaired   Arousal/Participation Alert; Responsive   Attention Within functional limits   Orientation Level Oriented X4   Memory Decreased short term memory;Decreased recall of precautions   Following Commands Follows one step commands with increased time or repetition   Activity Tolerance   Activity Tolerance Patient tolerated treatment well   Assessment   Treatment Assessment Pt seen x 90 min skilled OT Tx session focusing on ADL Retraining and performing ADL xfer's  Pt observed w/ posterior LOB 2 x's during session, unable to self correct  Pt reports freq urinary urgency during session, reporting need to use bathroom 3 x's during session  Pt did not void each attempt  Reported to Nursing  Pt would benefit from cont'd skilled OT to increase I w/ LB ADL's, increase I w/ safe ADL xfer's, improve activity tolerance, and overall increase I w/ ADL performance in order to progress towards OT goals and prepare for D/C to appopriate environment  Prognosis Fair   Problem List Decreased strength;Decreased range of motion;Decreased endurance; Impaired balance;Decreased mobility; Decreased coordination;Decreased cognition;Decreased safety awareness;Orthopedic restrictions;Pain   Barriers to Discharge Decreased caregiver support   Plan   Treatment/Interventions ADL retraining;Functional transfer training; Therapeutic exercise; Endurance training;Patient/family training   Progress Slow progress, decreased activity tolerance   Recommendation   OT Discharge Recommendation Return to previous environment with social support  (home OT)   OT Therapy Minutes   OT Time In 0700   OT Time Out 0830   OT Total Time (minutes) 90   OT Mode of treatment - Individual (minutes) 90   OT Mode of treatment - Concurrent (minutes) 0   OT Mode of treatment - Group (minutes) 0   OT Mode of treatment - Co-treat (minutes) 0   OT Mode of Treatment - Total time(minutes) 90 minutes   OT Cumulative Minutes 360   Therapy Time missed   Time missed?  No

## 2020-06-25 NOTE — NURSING NOTE
Pt complained of feeling tired after voiding in bathroom  Pt had walked in, RN used wheelchair to get her back to recliner, no dizziness present  Pt did have 6/10 pain in both knees, pt declined pain medication, RN reopositioned her, pt denied any pain in her back  Pt did state "I didn't sleep well last night, will encourage rest after therapies

## 2020-06-25 NOTE — SOCIAL WORK
Met with Pt to review the team meeting  Pt understands, and agrees with, the recommendation to reteam again next week  CM inquired if Pt would like her to contact her daughter, Esha, and Pt replied "it doesn't matter"  PCT Pts daughter, Esha, [phone], the phone rang and rang, and CM learned the VM is not set up

## 2020-06-25 NOTE — PROGRESS NOTES
PM&R Progress Note:    ASSESSMENT:  68-year-old female with hypertension, diabetes, CKD, lumbar stenosis now at [facility] due to lumbar stenosis s/p L4-S1 lami-fusion by Dr Gene Ahumada on 6/15  Stable and progressing    PLAN:    Rehabilitation   Continue current rehabilitation plan of care to maximize function   Progressing to a contact guard assist level with rolling walker 150 ft    Pain   Improving in lumbar spine - continue current regimen   Bilateral lower extremities:  Likely related to soreness with increased therapies  Increase night time Neurontin dose to 200mg  Added Bengay QHS topically to legs    DVT prophylaxis   Already on Coumadin and SCDs      Bladder plan   Continent    Bowel plan   Continent    * Spinal stenosis of lumbar region  Assessment & Plan  - s/p L4-S1 lami-fusion by Dr Gene Ahumada on 6/15  - spine precautions/LSO brace  - OP FU with Dr Gene Ahumada     Abnormal CAT scan  Assessment & Plan  Abnormalities on CT of 11/2019 including but not limited to:    - diverticulosis  - L renal cyst  - B/L non-obstructing renal stones     - OP FU     Lymph node disorder  Assessment & Plan  - noted to have subcentimeter lymph node in the rt axilla with fat stranding on imaging in the setting of h/o breast CA therefore per radiology report clinical correlation was recommended and patient was evaluated by Dr Carole Bañuelos in acute care who noted this to be an asymptomatic finding and recommended no further SMITH at this time other than routine FU with PCP & continued regular mammograms for her h/o breast CA     IBS (irritable bowel syndrome)  Assessment & Plan  - on home bentyl 20 mg q6     Mood disorder (HCC)  Assessment & Plan  - on home zoloft 25 mg qd     Neuropathic pain  Assessment & Plan  - h/o DM, at home on Neurontin 100mg TID  Increased here to 100mg BID and 200mg QHS (6/25)    HTN (hypertension)  Assessment & Plan  - at home on lopressor, norvasc, & lisinopril - continue   - monitor for orthostatic hypotension   - IM managing     Type 2 diabetes mellitus with hyperglycemia, with long-term current use of insulin (HCC)  Assessment & Plan  - on home metformin 500 mg BID with meals & ISS  - IM managing       Hypokalemia  Assessment & Plan  - periodic BMP  - IM managing     Chronic deep vein thrombosis (DVT) of left popliteal vein (HCC)  Assessment & Plan  - non-occlusive LLE DVT of the popliteal vein per imaging on 6/16/20, however similar finding on imaging going back to at least 6/2007  - patient was previously on eliquis, but per Dr Darline Bennett would hold eliquis for 3 weeks post-op   - currently on coumadin, as well as heparin 5000mg q8h until INR is 2-2 5  - trend INR    Arrhythmia  Assessment & Plan  - h/o AVNRT s/p ablation in 11/2019  - h/o a-flutter (chronically on [facility] due to h/o DVT)  - follows with Dr Grace Jacob of [facility] cards     Hiatal hernia with gastroesophageal reflux  Assessment & Plan  - therapeutic substitution for home omeprazole 40 mg qd     Hyperlipidemia  Assessment & Plan  - on home lipitor 40 mg qpm     Hypothyroidism  Assessment & Plan  - on home levothyroxine 125 mcg qd   - TSH on 5/12/20 WNL       Appreciate IM consultants medical co-management  Labs, medications, and imaging personally reviewed  SUBJECTIVE:  Patient seen face to face in her room  She states starting last night she felt a soreness type sensation in both of her legs from her knee down  Denies increased numbness or tingling, however intermittently feels numbness  She states at home she applies [address]  Back discomfort is mild to moderate worse with movement but not a barrier to her therapies  ROS:  A ten point review of systems was completed 6/25/20 and pertinent positives are listed in subjective section  All other systems reviewed were negative         OBJECTIVE:   /56 (BP Location: Right arm)   Pulse 94   Temp 98 5 °F (36 9 °C) (Tympanic)   Resp 18   Ht 5' 1" (1 549 m)   Wt 80 8 kg (178 lb 1 6 oz) SpO2 95%   BMI 33 65 kg/m²     Physical Exam   Constitutional: She is oriented to person, place, and time  She appears well-developed and well-nourished  No distress  HENT:   Head: Normocephalic  Nose: Nose normal    Eyes: Conjunctivae and EOM are normal    Neck: Neck supple  Cardiovascular: Normal rate, regular rhythm and intact distal pulses  Pulmonary/Chest: Effort normal and breath sounds normal  She has no wheezes  Abdominal: Soft  Bowel sounds are normal  She exhibits no distension  Musculoskeletal: She exhibits edema (trace in legs)  Both lower extremities examined: ROM intact  Mild soreness type pain to palpation   Neurological: She is alert and oriented to person, place, and time  Skin:   Incision C/D/I   Psychiatric: She has a normal mood and affect  Nursing note and vitals reviewed         Lab Results   Component Value Date    WBC 9 30 06/23/2020    HGB 11 9 (L) 06/23/2020    HCT 35 8 (L) 06/23/2020    MCV 91 06/23/2020     06/23/2020     Lab Results   Component Value Date    SODIUM 137 06/25/2020    K 3 8 06/25/2020    CL 96 (L) 06/25/2020    CO2 31 (H) 06/25/2020    BUN 16 06/25/2020    CREATININE 0 75 06/25/2020    GLUC 144 (H) 06/25/2020    CALCIUM 10 2 06/25/2020     Lab Results   Component Value Date    INR 1 83 (H) 06/24/2020    INR 1 60 (H) 06/23/2020    INR 1 35 (H) 06/22/2020    PROTIME 20 4 (H) 06/24/2020    PROTIME 18 3 (H) 06/23/2020    PROTIME 16 0 (H) 06/22/2020           Current Facility-Administered Medications:     acetaminophen (TYLENOL) tablet 975 mg, 975 mg, Oral, Q8H [address], Sylvester Aschoff, MD, 975 mg at 06/25/20 0635    amLODIPine (NORVASC) tablet 5 mg, 5 mg, Oral, Daily, Sylvester Aschoff, MD, 5 mg at 06/25/20 3655    atorvastatin (LIPITOR) tablet 40 mg, 40 mg, Oral, Daily With Patricia Lyons MD, 40 mg at 06/24/20 1648    dicyclomine (BENTYL) tablet 20 mg, 20 mg, Oral, Q6H, Sylvester Aschoff, MD, 20 mg at 06/25/20 1201    gabapentin (NEURONTIN) capsule 100 mg, 100 mg, Oral, TID, Sander Jimenez MD, 100 mg at 06/25/20 0828    heparin (porcine) subcutaneous injection 5,000 Units, 5,000 Units, Subcutaneous, Q8H [facility], Sander Jimenez MD, 5,000 Units at 06/25/20 0635    insulin lispro (HumaLOG) 100 units/mL subcutaneous injection 1-5 Units, 1-5 Units, Subcutaneous, TID AC, 1 Units at 06/23/20 1152 **AND** Fingerstick Glucose (POCT), , , TID AC, Sander Jimenez MD    insulin lispro (HumaLOG) 100 units/mL subcutaneous injection 1-5 Units, 1-5 Units, Subcutaneous, HS, Sander Jimenez MD, 1 Units at 06/24/20 2108    levothyroxine tablet 125 mcg, 125 mcg, Oral, Early Morning, Sander Jimenez MD, 125 mcg at 06/25/20 0634    lidocaine (LIDODERM) 5 % patch 1 patch, 1 patch, Topical, Daily, Robert Pires MD, 1 patch at 06/25/20 0828    lisinopril (ZESTRIL) tablet 5 mg, 5 mg, Oral, Daily, HUNTER Kuhn, 5 mg at 06/25/20 6746    metFORMIN (GLUCOPHAGE) tablet 500 mg, 500 mg, Oral, BID With Meals, Sander Jimenez MD, 500 mg at 06/25/20 1710    methocarbamol (ROBAXIN) tablet 750 mg, 750 mg, Oral, Q6H PRN, Sander Jimenez MD, 750 mg at 06/25/20 0030    metoprolol tartrate (LOPRESSOR) partial tablet 12 5 mg, 12 5 mg, Oral, Q12H [facility], HUNTER Kuhn, 12 5 mg at 06/25/20 3796    oxyCODONE (ROXICODONE) IR tablet 2 5 mg, 2 5 mg, Oral, Q4H PRN, Sander Jimenez MD, 2 5 mg at 06/21/20 2137    oxyCODONE (ROXICODONE) IR tablet 5 mg, 5 mg, Oral, Q4H PRN, Sander Jimenez MD, 5 mg at 06/25/20 1036    pantoprazole (PROTONIX) EC tablet 40 mg, 40 mg, Oral, Early Morning, Sander Jimenez MD, 40 mg at 06/25/20 0636    polyethylene glycol (MIRALAX) packet 17 g, 17 g, Oral, Daily PRN, Sander Jimenez MD    senna-docusate sodium (SENOKOT S) 8 6-50 mg per tablet 2 tablet, 2 tablet, Oral, HS, HUNTER Kuhn, 2 tablet at 06/24/20 2108    sertraline (ZOLOFT) tablet 25 mg, 25 mg, Oral, Daily, Sander Jimenez MD, 25 mg at 06/25/20 3641    Past Medical History:   Diagnosis Date    Ambulates with cane     Anxiety  Arthritis     Back pain     Bowel trouble     Cancer [facility])     left breast- left mastectomy- chemo    Chronic pain disorder     Coronary artery disease     2 stents    Depression     Diabetes mellitus (HCC)     NIDDM    Disease of thyroid gland     hypo    DVT (deep venous thrombosis) (HCC)     left leg    Full dentures     H/O breast implant     left    Headache     History of transfusion     no adverse reaction    Hyperlipidemia     Hypertension     Irregular heart beat     Afib    Localized swelling of both lower legs     Multiple falls     Neuropathy     Right knee pain     Shortness of breath     on exertion    Spinal stenosis, lumbar     Uses walker     Wears glasses     Wears glasses        Patient Active Problem List    Diagnosis Date Noted    Spinal stenosis of lumbar region 10/01/2019     Priority: High    Hypothyroidism 06/21/2020    Hyperlipidemia 06/21/2020    Hiatal hernia with gastroesophageal reflux 06/21/2020    Arrhythmia 06/21/2020    Chronic deep vein thrombosis (DVT) of left popliteal vein (HCC) 06/21/2020    Hypokalemia 06/21/2020    Type 2 diabetes mellitus with hyperglycemia, with long-term current use of insulin ([facility] [address] ) 06/21/2020    HTN (hypertension) 06/21/2020    Neuropathic pain 06/21/2020    Mood disorder (HCC) 06/21/2020    IBS (irritable bowel syndrome) 06/21/2020    Lymph node disorder 06/21/2020    Abnormal CAT scan 06/21/2020          Brijesh Dudley MD    Total time spent:  45 minutes with more than 50% spent counseling/coordinating care  Counseling includes discussion with patient re: progress and discussion with patient of his/her current medical state/information  Coordination of patient's care was performed in conjunction with consulting services  Time invested included review of patient's labs, vitals, and management of their comorbidities with continued monitoring   The care of the patient was extensively discussed and appropriate treatment plan was formulated unique for this patient  ** Please Note:  voice to text software may have been used in the creation of this document   Although proof errors in transcription or interpretation are a potential of such software**

## 2020-06-25 NOTE — PROGRESS NOTES
Progress Note - Shimon Villarrealenold 1940, 78 y o  female MRN: [id number]    Unit/Bed#: -01 Encounter: [id number]    Primary Care Provider: Gay Wallace DO   Date and time admitted to hospital: 6/21/2020 11:39 AM        Abnormal CAT scan  Assessment & Plan  CTA performed on 6/18 revealed "fat stranding noted in the right axillary/chest wall region, of uncertain significance, recommend clinical correlation"  Follow up with PCP on discharge         IBS (irritable bowel syndrome)  Assessment & Plan  Continue home dose of Bentyl  LBM 6/23  Continue prune juice daily and Senna S 2 qhs  Monitor    Mood disorder [facility])  Assessment & Plan  Continue sertraline 25 mg daily  Neuropathic pain  Assessment & Plan  Patient with DM  Patient had difficulty sleeping last night due to bilateral LE "burning" pain  Continues on neurontin 100 mg TID   Will discuss with primary team  May benefit from increase in gabapentin      HTN (hypertension)  Assessment & Plan  Episode of dizziness this morning  Her SBP was on lower side at 117  SBP yesterday at 2100 was 160  Continue amlodipine 5 mg daily, Lopressor 12 5 mg BID,  Lisinopril to 5 mg daily   Monitor and adjust as needed     Type 2 diabetes mellitus with hyperglycemia, with long-term current use of insulin [facility])  Assessment & Plan  Lab Results   Component Value Date    HGBA1C 6 4 (H) 05/12/2020       Recent Labs     06/24/20  1111 06/24/20  1629 06/24/20  2039 06/25/20  0545   POCGLU 127 134 158* 148*       Blood Sugar Average: Last 72 hrs:  (P) 484 [id number]     Continue Metformin 500 mg BID with SSI  Monitor kidney function   BMP pending        Hypokalemia  Assessment & Plan  Per most recent hospitalization, K+ as low as 2 2 (felt due to poor p o  Intake)  6/22 K+ 3 5  Replaced with 40 mEq yesterday  6/23 K+ 4 0  Encouraged potassium rich diet      BMP pending    Chronic deep vein thrombosis (DVT) of left popliteal vein (HCC)  Assessment & Plan  Non-occlusive LLE DVT (popliteal vein) on 6/16 imaging  Patient had been on Eliquis prior to recent spinal surgery  Per surgery, patient not cleared to resume Eliquis  Dr Delma Figueroa does not want patient on Eliquis for at least 3 weeks post-op  Decision made prior to recent hospital discharge to start Coumadin with INR goal of 2 0-2 5  (when goal reached, heparin may be discontinued  Labs pending at this time  INR 6/23 1 83; 6/23 1 60;  6/22 1 35  She received Coumadin to 2 mg daily with INR pending  Patient will need clearance by spine surgery to be switched back to Eliquis  Arrhythmia  Assessment & Plan  Follows with 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 (Dr Ginny Kilgore)  H/o a-flutter (patient does not remember this diagnosis) - patient on chronic AC due to DVT)  Regular rhythm at this time  Monitor rate/rhythm  Hiatal hernia with gastroesophageal reflux  Assessment & Plan  Found on CTA during most recent admission  General surgery saw patient in consultation and recommend outpatient follow up   Continue Protonix 40 mg daily  Hyperlipidemia  Assessment & Plan  Most recent lipid profile (5/12/2020) - total cholesterol 209, triglycerides 145, HDL 64,   Continue atorvastatin 40 mg daily    Hypothyroidism  Assessment & Plan  Most recent TSH (5/12) was 2 23  Continue levothyroxine (home dose was 125 mcg daily)    * Spinal stenosis of lumbar region  Assessment & Plan  PT/OT per primary team   Pain management per primary team    S/p L4-S1 laminectomy on 6/15  Spine precautions/LSO brace  Follow up with Dr Delma Figueroa on discharge         VTE Pharmacologic Prophylaxis:   Pharmacologic: Heparin  Mechanical VTE Prophylaxis in Place: Yes    Current Length of Stay: 4 day(s)    Current Patient Status: Inpatient Rehab     Discharge Plan: As per treatment team     Code Status: Level 1 - Full Code    Subjective:   Patient had episode of dizziness this morning after using bathroom (urination)  She denies dizziness, headache at this time  SBP was 117 this morning  SBP at 2100 yesterday was 160  She states she did not have a restful sleep last night due to bilateral LE pain which she describes as burning  She denies lower back pain at this time  Objective:     Vitals:   Temp (24hrs), Av °F (36 7 °C), Min:97 4 °F (36 3 °C), Max:98 5 °F (36 9 °C)    Temp:  [97 4 °F (36 3 °C)-98 5 °F (36 9 °C)] 98 5 °F (36 9 °C)  HR:  [91-96] 94  Resp:  [18] 18  BP: (117-160)/(56-70) 117/56  SpO2:  [95 %-98 %] 95 %  Body mass index is 33 65 kg/m²  Review of Systems   Constitutional: Negative for chills, fatigue and fever  Respiratory: Negative for cough, chest tightness and shortness of breath  Cardiovascular: Positive for leg swelling  Negative for chest pain and palpitations  Gastrointestinal: Negative for abdominal distention, abdominal pain, constipation, diarrhea and nausea  Genitourinary: Negative for difficulty urinating, dysuria and frequency  Musculoskeletal: Positive for gait problem  Negative for back pain, joint swelling and myalgias  Neurological: Negative for dizziness, light-headedness and headaches  Hematological: Negative for adenopathy  Does not bruise/bleed easily  Psychiatric/Behavioral: Negative for confusion  The patient is not nervous/anxious  Input and Output Summary (last 24 hours): Intake/Output Summary (Last 24 hours) at 2020 1056  Last data filed at 2020 0848  Gross per 24 hour   Intake 50 ml   Output 500 ml   Net -450 ml       Physical Exam:     Physical Exam   Constitutional: She is oriented to person, place, and time  She appears well-developed  Cardiovascular: Normal rate, regular rhythm, normal heart sounds and intact distal pulses  Pulmonary/Chest: Effort normal and breath sounds normal    Abdominal: Soft  Bowel sounds are normal  She exhibits no distension   There is no tenderness  Musculoskeletal: She exhibits edema (bilateral LE edema - TEDS present  )  She exhibits no tenderness or deformity  Neurological: She is alert and oriented to person, place, and time  Skin: Skin is warm and dry  Psychiatric: She has a normal mood and affect  Her behavior is normal        Additional Data:     Labs:    Results from last 7 days   Lab Units 06/23/20  0547 06/20/20  0607   WBC Thousand/uL 9 30 9 64   HEMOGLOBIN g/dL 11 9* 11 8   HEMATOCRIT % 35 8* 36 3   PLATELETS Thousands/uL 346 258   BANDS PCT % 2  --    NEUTROS PCT %  --  65   LYMPHS PCT %  --  19   LYMPHO PCT % 30  --    MONOS PCT %  --  14*   MONO PCT % 17*  --    EOS PCT % 1 1     Results from last 7 days   Lab Units 06/23/20  0547  06/18/20  1422   SODIUM mmol/L 136*   < > 139   POTASSIUM mmol/L 4 0   < > 2 7*   CHLORIDE mmol/L 100   < > 101   CO2 mmol/L 29   < > 28   BUN mg/dL 13   < > 9   CREATININE mg/dL 0 52*   < > 0 73   ANION GAP mmol/L 7   < > 10   CALCIUM mg/dL 9 3   < > 9 2   ALBUMIN g/dL  --   --  2 8*   TOTAL BILIRUBIN mg/dL  --   --  0 52   ALK PHOS U/L  --   --  62   ALT U/L  --   --  30   AST U/L  --   --  28   GLUCOSE RANDOM mg/dL 137*   < > 157*    < > = values in this interval not displayed       Results from last 7 days   Lab Units 06/24/20  0522   INR  1 83*     Results from last 7 days   Lab Units 06/25/20  0545 06/24/20  2039 06/24/20  1629 06/24/20  1111 06/24/20  0606 06/23/20  2041 06/23/20  1610 06/23/20  1148 06/23/20  0608 06/22/20  2047 06/22/20  1633 06/22/20  1129   POC GLUCOSE mg/dl 148* 158* 134 127 126 119 119 173* 149* 197* 141* 157*               Labs reviewed    Imaging:    Imaging reviewed    Recent Cultures (last 7 days):           Last 24 Hours Medication List:     Current Facility-Administered Medications:  acetaminophen 975 mg Oral Q8H [facility] Dallas Prakash MD   amLODIPine 5 mg Oral Daily [place] Prakash MD   atorvastatin 40 mg Oral Daily With George Ron MD   dicyclomine 20 mg Oral Q6H Dorothe Blizzard, MD   gabapentin 100 mg Oral TID Dorothe Blizzard, MD   heparin (porcine) 5,000 Units Subcutaneous [facility] Dorothe Blizzard, MD   insulin lispro 1-5 Units Subcutaneous TID Tru Molina MD   insulin lispro 1-5 Units Subcutaneous HS Dorothe Blizzard, MD   levothyroxine 125 mcg Oral Early Morning Dorothe Blizzard, MD   lidocaine 1 patch Topical Daily Deandra Odonnell MD   lisinopril 5 mg Oral Daily HUNTER Kuhn   metFORMIN 500 mg Oral BID With Meals Dorothe Blizzard, MD   methocarbamol 750 mg Oral Q6H PRN Dorothe Blizzard, MD   metoprolol tartrate 12 5 mg Oral Q12H [facility] HUNTER Kuhn   oxyCODONE 2 5 mg Oral Q4H PRN Dorothe Blizzard, MD   oxyCODONE 5 mg Oral Q4H PRN Dorothe Blizzard, MD   pantoprazole 40 mg Oral Early Morning Dorothe Blizzard, MD   polyethylene glycol 17 g Oral Daily PRN Dorothe Blizzard, MD   senna-docusate sodium 2 tablet Oral HS HUNTER Kuhn   sertraline 25 mg Oral Daily Dorothe Blizzard, MD        M*Modal software was used to dictate this note  It may contain errors with dictating incorrect words or incorrect spelling  Please contact the provider directly with any questions

## 2020-06-26 ENCOUNTER — PATIENT OUTREACH (OUTPATIENT)
Dept: CASE MANAGEMENT | Facility: OTHER | Age: 80
End: 2020-06-26

## 2020-06-26 ENCOUNTER — APPOINTMENT (INPATIENT)
Dept: RADIOLOGY | Facility: HOSPITAL | Age: 80
DRG: 561 | End: 2020-06-26
Payer: MEDICARE

## 2020-06-26 ENCOUNTER — APPOINTMENT (INPATIENT)
Dept: NON INVASIVE DIAGNOSTICS | Facility: HOSPITAL | Age: 80
DRG: 561 | End: 2020-06-26
Payer: MEDICARE

## 2020-06-26 PROBLEM — D72.829 LEUKOCYTOSIS: Status: ACTIVE | Noted: 2020-06-26

## 2020-06-26 LAB
BACTERIA UR QL AUTO: ABNORMAL /HPF
BILIRUB UR QL STRIP: NEGATIVE
CLARITY UR: CLEAR
COLOR UR: YELLOW
EOSINOPHIL # BLD AUTO: 0.14 THOUSAND/UL (ref 0–0.4)
EOSINOPHIL NFR BLD MANUAL: 1 % (ref 0–6)
ERYTHROCYTE [DISTWIDTH] IN BLOOD BY AUTOMATED COUNT: 13.9 %
GLUCOSE SERPL-MCNC: 142 MG/DL (ref 65–140)
GLUCOSE SERPL-MCNC: 144 MG/DL (ref 65–140)
GLUCOSE SERPL-MCNC: 158 MG/DL (ref 65–140)
GLUCOSE SERPL-MCNC: 199 MG/DL (ref 65–140)
GLUCOSE UR STRIP-MCNC: NEGATIVE MG/DL
HCT VFR BLD AUTO: 36.6 % (ref 36–46)
HGB BLD-MCNC: 12.4 G/DL (ref 12–16)
HGB UR QL STRIP.AUTO: 10
INR PPP: 1.86 (ref 0.84–1.19)
KETONES UR STRIP-MCNC: NEGATIVE MG/DL
LEUKOCYTE ESTERASE UR QL STRIP: 25
LYMPHOCYTES # BLD AUTO: 0.83 THOUSAND/UL (ref 0.5–4)
LYMPHOCYTES # BLD AUTO: 6 % (ref 25–45)
MCH RBC QN AUTO: 30.9 PG (ref 26–34)
MCHC RBC AUTO-ENTMCNC: 33.8 G/DL (ref 31–36)
MCV RBC AUTO: 91 FL (ref 80–100)
MONOCYTES # BLD AUTO: 0.56 THOUSAND/UL (ref 0.2–0.9)
MONOCYTES NFR BLD AUTO: 4 % (ref 1–10)
NEUTS BAND NFR BLD MANUAL: 1 % (ref 0–8)
NEUTS SEG # BLD: 12.37 THOUSAND/UL (ref 1.8–7.8)
NEUTS SEG NFR BLD AUTO: 88 %
NITRITE UR QL STRIP: NEGATIVE
NON-SQ EPI CELLS URNS QL MICRO: ABNORMAL /HPF
PH UR STRIP.AUTO: 7 [PH]
PLATELET # BLD AUTO: 446 THOUSANDS/UL (ref 150–450)
PLATELET BLD QL SMEAR: ABNORMAL
PMV BLD AUTO: 8.9 FL (ref 8.9–12.7)
PROT UR STRIP-MCNC: NEGATIVE MG/DL
PROTHROMBIN TIME: 20.6 SECONDS (ref 11.6–14.5)
RBC # BLD AUTO: 4.01 MILLION/UL (ref 4–5.2)
RBC #/AREA URNS AUTO: ABNORMAL /HPF
RBC MORPH BLD: NORMAL
SP GR UR STRIP.AUTO: 1.01 (ref 1–1.04)
TOTAL CELLS COUNTED SPEC: 100
UROBILINOGEN UA: NEGATIVE MG/DL
WBC # BLD AUTO: 13.9 THOUSAND/UL (ref 4.5–11)
WBC #/AREA URNS AUTO: ABNORMAL /HPF

## 2020-06-26 PROCEDURE — 93971 EXTREMITY STUDY: CPT

## 2020-06-26 PROCEDURE — 85610 PROTHROMBIN TIME: CPT | Performed by: NURSE PRACTITIONER

## 2020-06-26 PROCEDURE — 97535 SELF CARE MNGMENT TRAINING: CPT

## 2020-06-26 PROCEDURE — 97110 THERAPEUTIC EXERCISES: CPT

## 2020-06-26 PROCEDURE — 81001 URINALYSIS AUTO W/SCOPE: CPT | Performed by: NURSE PRACTITIONER

## 2020-06-26 PROCEDURE — 97116 GAIT TRAINING THERAPY: CPT

## 2020-06-26 PROCEDURE — 82948 REAGENT STRIP/BLOOD GLUCOSE: CPT

## 2020-06-26 PROCEDURE — 87086 URINE CULTURE/COLONY COUNT: CPT | Performed by: NURSE PRACTITIONER

## 2020-06-26 PROCEDURE — 97530 THERAPEUTIC ACTIVITIES: CPT

## 2020-06-26 PROCEDURE — 85027 COMPLETE CBC AUTOMATED: CPT | Performed by: NURSE PRACTITIONER

## 2020-06-26 PROCEDURE — 71046 X-RAY EXAM CHEST 2 VIEWS: CPT

## 2020-06-26 PROCEDURE — 97129 THER IVNTJ 1ST 15 MIN: CPT

## 2020-06-26 PROCEDURE — 85007 BL SMEAR W/DIFF WBC COUNT: CPT | Performed by: NURSE PRACTITIONER

## 2020-06-26 PROCEDURE — 81003 URINALYSIS AUTO W/O SCOPE: CPT | Performed by: NURSE PRACTITIONER

## 2020-06-26 PROCEDURE — 99233 SBSQ HOSP IP/OBS HIGH 50: CPT | Performed by: PHYSICAL MEDICINE & REHABILITATION

## 2020-06-26 RX ORDER — AMLODIPINE BESYLATE 5 MG/1
5 TABLET ORAL 2 TIMES DAILY
Status: DISCONTINUED | OUTPATIENT
Start: 2020-06-26 | End: 2020-06-26

## 2020-06-26 RX ORDER — WARFARIN SODIUM 2 MG/1
4 TABLET ORAL
Status: DISCONTINUED | OUTPATIENT
Start: 2020-06-26 | End: 2020-07-01

## 2020-06-26 RX ORDER — AMLODIPINE BESYLATE 5 MG/1
5 TABLET ORAL DAILY
Status: DISCONTINUED | OUTPATIENT
Start: 2020-06-27 | End: 2020-07-06 | Stop reason: HOSPADM

## 2020-06-26 RX ORDER — LISINOPRIL 5 MG/1
5 TABLET ORAL DAILY
Status: COMPLETED | OUTPATIENT
Start: 2020-06-26 | End: 2020-06-26

## 2020-06-26 RX ORDER — LISINOPRIL 10 MG/1
10 TABLET ORAL DAILY
Status: DISCONTINUED | OUTPATIENT
Start: 2020-06-27 | End: 2020-07-06 | Stop reason: HOSPADM

## 2020-06-26 RX ADMIN — DICYCLOMINE HYDROCHLORIDE 20 MG: 20 TABLET ORAL at 06:49

## 2020-06-26 RX ADMIN — METFORMIN HYDROCHLORIDE 500 MG: 500 TABLET ORAL at 15:30

## 2020-06-26 RX ADMIN — INSULIN LISPRO 1 UNITS: 100 INJECTION, SOLUTION INTRAVENOUS; SUBCUTANEOUS at 11:48

## 2020-06-26 RX ADMIN — ATORVASTATIN CALCIUM 40 MG: 40 TABLET, FILM COATED ORAL at 15:30

## 2020-06-26 RX ADMIN — METOPROLOL TARTRATE 12.5 MG: 25 TABLET ORAL at 08:34

## 2020-06-26 RX ADMIN — LIDOCAINE 1 PATCH: 50 PATCH CUTANEOUS at 08:34

## 2020-06-26 RX ADMIN — DICYCLOMINE HYDROCHLORIDE 20 MG: 20 TABLET ORAL at 16:42

## 2020-06-26 RX ADMIN — DICYCLOMINE HYDROCHLORIDE 20 MG: 20 TABLET ORAL at 11:48

## 2020-06-26 RX ADMIN — INSULIN LISPRO 1 UNITS: 100 INJECTION, SOLUTION INTRAVENOUS; SUBCUTANEOUS at 22:48

## 2020-06-26 RX ADMIN — GABAPENTIN 100 MG: 100 CAPSULE ORAL at 15:30

## 2020-06-26 RX ADMIN — GABAPENTIN 200 MG: 100 CAPSULE ORAL at 22:15

## 2020-06-26 RX ADMIN — LISINOPRIL 5 MG: 5 TABLET ORAL at 16:56

## 2020-06-26 RX ADMIN — METHOCARBAMOL 750 MG: 750 TABLET, FILM COATED ORAL at 01:12

## 2020-06-26 RX ADMIN — ACETAMINOPHEN 650 MG: 325 TABLET ORAL at 22:14

## 2020-06-26 RX ADMIN — PANTOPRAZOLE SODIUM 40 MG: 40 TABLET, DELAYED RELEASE ORAL at 06:48

## 2020-06-26 RX ADMIN — HEPARIN SODIUM 5000 UNITS: 5000 INJECTION INTRAVENOUS; SUBCUTANEOUS at 15:29

## 2020-06-26 RX ADMIN — MENTHOL, METHYL SALICYLATE 1 APPLICATION: 10; 15 CREAM TOPICAL at 22:20

## 2020-06-26 RX ADMIN — LISINOPRIL 5 MG: 5 TABLET ORAL at 08:34

## 2020-06-26 RX ADMIN — WARFARIN SODIUM 4 MG: 2 TABLET ORAL at 16:42

## 2020-06-26 RX ADMIN — GABAPENTIN 100 MG: 100 CAPSULE ORAL at 08:33

## 2020-06-26 RX ADMIN — OXYCODONE HYDROCHLORIDE 5 MG: 5 TABLET ORAL at 05:03

## 2020-06-26 RX ADMIN — AMLODIPINE BESYLATE 5 MG: 5 TABLET ORAL at 08:34

## 2020-06-26 RX ADMIN — LEVOTHYROXINE SODIUM 125 MCG: 125 TABLET ORAL at 06:48

## 2020-06-26 RX ADMIN — ACETAMINOPHEN 650 MG: 325 TABLET ORAL at 06:48

## 2020-06-26 RX ADMIN — HEPARIN SODIUM 5000 UNITS: 5000 INJECTION INTRAVENOUS; SUBCUTANEOUS at 22:16

## 2020-06-26 RX ADMIN — OXYCODONE HYDROCHLORIDE 5 MG: 5 TABLET ORAL at 01:11

## 2020-06-26 RX ADMIN — HEPARIN SODIUM 5000 UNITS: 5000 INJECTION INTRAVENOUS; SUBCUTANEOUS at 06:49

## 2020-06-26 RX ADMIN — ACETAMINOPHEN 650 MG: 325 TABLET ORAL at 15:29

## 2020-06-26 RX ADMIN — SERTRALINE HYDROCHLORIDE 25 MG: 25 TABLET ORAL at 08:33

## 2020-06-26 RX ADMIN — DICYCLOMINE HYDROCHLORIDE 20 MG: 20 TABLET ORAL at 01:11

## 2020-06-26 RX ADMIN — SENNOSIDES AND DOCUSATE SODIUM 2 TABLET: 8.6; 5 TABLET ORAL at 22:15

## 2020-06-26 RX ADMIN — METFORMIN HYDROCHLORIDE 500 MG: 500 TABLET ORAL at 08:33

## 2020-06-26 NOTE — ASSESSMENT & PLAN NOTE
PT/OT per primary team   Pain management per primary team    S/p L4-S1 laminectomy on 6/15  Spine precautions/LSO brace  Follow up with Dr Vi Turcios on discharge

## 2020-06-26 NOTE — PROGRESS NOTES
06/26/20 1410   Pain Assessment   Pain Assessment Tool 0-10   Pain Score 6   Pain Location/Orientation Location: Back; Location: Leg;Orientation: Left   Restrictions/Precautions   Precautions Cognitive; Fall Risk;Bed/chair alarms;Spinal precautions   Braces or Orthoses LSO   Subjective   Subjective Pt agreed for 30 min session   Sit to Lying   Type of Assistance Needed Physical assistance   Amount of Physical Assistance Provided 50%-74%   Comment BLE lift (pt did not use log roll technique)   Sit to Lying CARE Score 2   Sit to Stand   Type of Assistance Needed Physical assistance   Amount of Physical Assistance Provided 25%-49%   Comment Min A  RW   Sit to Stand CARE Score 3   Walk 10 Feet   Type of Assistance Needed Physical assistance   Amount of Physical Assistance Provided 25%-49%   Comment Min A with RW   Walk 10 Feet CARE Score 3   Walk 50 Feet with Two Turns   Type of Assistance Needed Physical assistance   Amount of Physical Assistance Provided Total assistance   Comment used WC follow for safety    Walk 50 Feet with Two Turns CARE Score 1   Walk 150 Feet   Reason if not Attempted Safety concerns   Walk 150 Feet CARE Score 88   Walking 10 Feet on Uneven Surfaces   Comment Pt stated she is too tired for this    Ambulation   Does the patient walk? 2  Yes   Primary Mode of Locomotion Prior to Admission Walk   Distance Walked (feet) 50 ft  (30x1)   Assist Device Roller Walker   Gait Pattern Inconsistant Cayla; Slow Cayla;Decreased foot clearance; Forward Flexion; Improper weight shift;Trendelenburg   Limitations Noted In Balance;Strength;Speed; Safety;Posture; Heel Strike; Endurance;Device Management   Provided Assistance with: Balance;Direction   Walk Assist Level Minimum Assist   Findings Instability due to knees flexed t/o gt cycle,  much trendelenberg bilat,  used WC follow for longer distance today and therapist limited distance to prevent inc pain    Wheelchair mobility   Does the patient use a wheelchair? 0  No   Therapeutic Interventions   Strengthening LAQ 2#,  Hip abd yellow T band , ankle PF DF arom,  10x3   Assessment   Treatment Assessment 30 min session started with seated TE,  pt states pain is better this afternoon but still states 6/10  Pt seemed flat  but was answering questions  Pt was amb better then this morning but still is unsteady and needs Min A for balance and safety  Labored and painful with transition of sit-stand  At end of session tech for doppler requested pt be in bed,  when going to lay down pt tried to use momentum and not log roll, so will benefit from log roll education  Cont skilled PT toward LTGs   Barriers to Discharge Inaccessible home environment;Decreased caregiver support   PT Barriers   Physical Impairment Decreased strength;Decreased endurance; Impaired balance;Decreased mobility;Orthopedic restrictions;Pain   Functional Limitation Car transfers; Ramp negotiation;Standing;Transfers; Walking   PT Therapy Minutes   PT Time In 1410   PT Time Out 1440   PT Total Time (minutes) 30   PT Mode of treatment - Individual (minutes) 30   PT Mode of treatment - Concurrent (minutes) 0   PT Mode of treatment - Group (minutes) 0   PT Mode of treatment - Co-treat (minutes) 0   PT Mode of Treatment - Total time(minutes) 30 minutes   PT Cumulative Minutes 480   Therapy Time missed   Time missed?  No

## 2020-06-26 NOTE — PROGRESS NOTES
06/26/20 0703   Pain Assessment   Pain Assessment Tool Pain Assessment not indicated - pt denies pain   Pain Score 5   Pain Location/Orientation Location: Leg;Orientation: Bilateral   Hospital Pain Intervention(s) Repositioned; Emotional support; Environmental changes   Restrictions/Precautions   Precautions Fall Risk;Bed/chair alarms;Spinal precautions;Supervision on toilet/commode   Weight Bearing Restrictions No   Braces or Orthoses LSO   Eating   Type of Assistance Needed Independent   Amount of Physical Assistance Provided No physical assistance   Eating CARE Score 6   Oral Hygiene   Type of Assistance Needed Supervision   Amount of Physical Assistance Provided No physical assistance   Comment seated   Oral Hygiene CARE Score 4   Grooming   Able To Comb/Brush Hair;Wash/Dry Face;Brush/Clean Teeth;Wash/Dry Hands   Limitation Noted In Timeliness; Sequencing;Problem Solving; Safety   Shower/Bathe Self   Type of Assistance Needed Physical assistance   Amount of Physical Assistance Provided 25%-49%   Comment Pt continues to require A for distal BLEs 2* to spinal precautions  Recommended LH sponge for increased independence  Pt able to bathe all other parts with increased time and overall Remy while in stance for rear rudolph hygiene  Shower/Bathe Self CARE Score 3   Bathing   Assessed Bath Style Sponge Bath   Anticipated D/C Bath Style Shower;Sponge Bath   Able to [employer] No   Able to [employer] Temperature No   Able to Wash/Rinse/Dry (body part) Left Arm;Right Arm;L Upper Leg;R Upper Leg;Chest;Abdomen;Perineal Area; Buttocks   Limitations Noted in Balance; Endurance;ROM;Safety;Strength;Timeliness   Positioning Seated;Standing   Tub/Shower Transfer   Reason Not Assessed Sponge Bath;Patient refusal   Upper Body Dressing   Type of Assistance Needed Physical assistance;Verbal cues   Amount of Physical Assistance Provided Less than 25%   Comment Pt continues to require A for LSO management   Pt noted to have trouble problem solving orientation of shirt this session and was noted to attempt placing LUE consistently through neck hole requiring cues and inc time to correct  Upper Body Dressing CARE Score 3   Lower Body Dressing   Type of Assistance Needed Physical assistance   Amount of Physical Assistance Provided 25%-49%   Comment Pt continues to be limited by spinal precautions with LB dressing and is unable to complete cross leg  Pt requires use of LHAE, max VC's to assist in problem solving and sequencing and modA in stance to maintain balance with again an abrupt descent into w/c with pt stating "NO, I just need to sit" when questioned if it was from pain or fatigue  Lower Body Dressing CARE Score 3   Putting On/Taking Off Footwear   Type of Assistance Needed Physical assistance   Amount of Physical Assistance Provided 50%-74%   Comment Total A for CECILIA management  Pt able to doff b/l socks with use of dressing stick and don slip on shoes with use of shoe horn with increased time  Putting On/Taking Off Footwear CARE Score 2   Dressing/Undressing Clothing   Remove UB Clothes Pullover Shirt   Don UB Clothes Pullover Shirt   Remove LB Clothes Socks   Don LB Clothes Undergarment;Pants;TEDs; Shoes   Limitations Noted In Balance; Endurance;Strength;ROM; Timeliness   Positioning Standing;Supported Sit   Sit to Stand   Type of Assistance Needed Physical assistance   Amount of Physical Assistance Provided 50%-74%   Comment max VC's for technique and modA for balance  Noted difficulty with achieving knee extension with noted increaed knee flexion in stance  Pt reports pain/stiffness in BLE initially and then reports feels better  Notified Sebastian Murphy who would bring up during stand up meeting  Pt reports up to date on pain medications  Sit to Stand CARE Score 2   Bed-Chair Transfer   Type of Assistance Needed Physical assistance   Amount of Physical Assistance Provided 50%-74%   Comment SPTs only due to pain  Chair/Bed-to-Chair Transfer CARE Score 2   Toileting Hygiene   Type of Assistance Needed Physical assistance   Amount of Physical Assistance Provided 50%-74%   Comment modA warranted to maintain balance in stance  Toileting Hygiene CARE Score 2   Toileting   Able to Pull Clothing down no, up no  Able to Manage Clothing Closures No   Manage Hygiene Bladder   Limitations Noted In Balance;ROM;Safety;UE Strength;LE Strength   Toilet Transfer   Type of Assistance Needed Physical assistance   Amount of Physical Assistance Provided 50%-74%   Toilet Transfer CARE Score 2   Cognition   Overall Cognitive Status Impaired   Arousal/Participation Alert; Responsive   Attention Within functional limits   Orientation Level Oriented X4   Memory Decreased short term memory;Decreased recall of precautions   Following Commands Follows one step commands with increased time or repetition   Comments Pt engaged in safety sequencing cards with pt able to complete with 100% accuracy completing 15/15  Pt able to explain rationale for all cards with G detail  However, throughout session when applied in a more unstructured functional tasks pt noted with significant difficulty with sequencing, safety, and problem solving  Activity Tolerance   Activity Tolerance Patient limited by pain   Assessment   Prognosis Fair   Problem List Decreased strength;Decreased range of motion;Decreased endurance; Impaired balance;Decreased mobility; Decreased coordination;Decreased cognition;Decreased safety awareness;Orthopedic restrictions;Pain   Plan   Treatment/Interventions ADL retraining;Functional transfer training; Therapeutic exercise; Endurance training;Cognitive reorientation;Patient/family training;Equipment eval/education; Bed mobility; Compensatory technique education   Progress Slow progress, decreased activity tolerance   Recommendation   OT Discharge Recommendation Return to previous environment with social support   OT Therapy Minutes   OT Time In 0700   OT Time Out 0830   OT Total Time (minutes) 90   OT Mode of treatment - Individual (minutes) 90   OT Mode of treatment - Concurrent (minutes) 0   OT Mode of treatment - Group (minutes) 0   OT Mode of treatment - Co-treat (minutes) 0   OT Mode of Treatment - Total time(minutes) 90 minutes   OT Cumulative Minutes 540   Therapy Time missed   Time missed?  No

## 2020-06-26 NOTE — ASSESSMENT & PLAN NOTE
Per most recent hospitalization, K+ as low as 2 2 (felt due to poor p o  Intake)  6/22 K+ 3 5  Replaced with 40 mEq yesterday  6/23 K+ 4 0  6/25 - 3 8  Encouraged potassium rich diet      BMP Monday

## 2020-06-26 NOTE — ASSESSMENT & PLAN NOTE
Per hospital records, patient noted to have subcentimeter lymph node in the rt axilla with fat stranding on imaging  Due to history of breast CA, radiology report clinical correlation was recommended  Patient was evaluated by Dr Mamadou Rush in acute care who noted this to be an asymptomatic finding and recommended no further SMITH at this time other than routine FU with PCP & continued regular mammograms for her h/o breast CA  Follow up with PCP on discharge

## 2020-06-26 NOTE — PROGRESS NOTES
PM&R Progress Note:    ASSESSMENT:  27-year-old female with hypertension, diabetes, CKD, lumbar stenosis now at [facility] due to lumbar stenosis s/p L4-S1 lami-fusion by Dr Gene Ahumada on 6/15  Stable and progressing    PLAN:    Rehabilitation   Continue current rehabilitation plan of care to maximize function   Estimated Discharge: TBD, RETEAM    Pain   Improving in lumbar spine - continue current regimen   Bilateral lower extremities:  Likely related to soreness with increased therapies  Increase night time Neurontin dose to 200mg  Added Bengay QHS topically to legs  Due to some new complaints of calf pain - checking LE Venous dopplers    DVT prophylaxis   Already on Coumadin and SCDs      Bladder plan   Continent    Bowel plan   Continent    * Spinal stenosis of lumbar region  Assessment & Plan  - s/p L4-S1 lami-fusion by Dr Gene Ahumada on 6/15  - spine precautions/LSO brace  - OP FU with Dr Rivas Palm  Patient asymptomatic and afebrile  CXR negative  UA pending  Dopplers pending   Will follow       Abnormal CAT scan  Assessment & Plan  Abnormalities on CT of 11/2019 including but not limited to:    - diverticulosis  - L renal cyst  - B/L non-obstructing renal stones     - OP FU     Lymph node disorder  Assessment & Plan  - noted to have subcentimeter lymph node in the rt axilla with fat stranding on imaging in the setting of h/o breast CA therefore per radiology report clinical correlation was recommended and patient was evaluated by Dr Carole Bañuelos in acute care who noted this to be an asymptomatic finding and recommended no further SMITH at this time other than routine FU with PCP & continued regular mammograms for her h/o breast CA     IBS (irritable bowel syndrome)  Assessment & Plan  - on home bentyl 20 mg q6     Mood disorder (HCC)  Assessment & Plan  - on home zoloft 25 mg qd     Neuropathic pain  Assessment & Plan  - h/o DM, at home on Neurontin 100mg TID  Increased here to 100mg BID and 200mg QHS (6/25)    HTN (hypertension)  Assessment & Plan  - at home on lopressor, norvasc, & lisinopril - continue   - monitor for orthostatic hypotension   - IM managing     DM (diabetes mellitus) ([facility] [address] )  Assessment & Plan  - on home metformin 500 mg BID with meals & ISS  - IM managing       Hypokalemia  Assessment & Plan  - periodic BMP  - IM managing     Chronic deep vein thrombosis (DVT) of left popliteal vein (HCC)  Assessment & Plan  - non-occlusive LLE DVT of the popliteal vein per imaging on 6/16/20, however similar finding on imaging going back to at least 6/2007  - patient was previously on eliquis, but per Dr Hernandez Dry would hold eliquis for 3 weeks post-op   - currently on coumadin, as well as heparin 5000mg q8h until INR is 2-2 5  - trend INR    Arrhythmia  Assessment & Plan  - h/o AVNRT s/p ablation in 11/2019  - h/o a-flutter (chronically on [facility] due to h/o DVT)  - follows with Dr Abdi Fairchild of [facility] cards     Hiatal hernia with gastroesophageal reflux  Assessment & Plan  - therapeutic substitution for home omeprazole 40 mg qd     Hyperlipidemia  Assessment & Plan  - on home lipitor 40 mg qpm     Hypothyroidism  Assessment & Plan  - on home levothyroxine 125 mcg qd   - TSH on 5/12/20 WNL       Appreciate IM consultants medical co-management  Labs, medications, and imaging personally reviewed  SUBJECTIVE:  Patient seen face to face  Continues to have right leg >left leg pain  No acute other issues overnight  ROS:  A ten point review of systems was completed 6/26/20 and pertinent positives are listed in subjective section  All other systems reviewed were negative  OBJECTIVE:   /92 (BP Location: Right arm)   Pulse 87   Temp 98 4 °F (36 9 °C) (Tympanic)   Resp 17   Ht 5' 1" (1 549 m)   Wt 76 7 kg (169 lb 3 2 oz)   SpO2 97%   BMI 31 97 kg/m²     Physical Exam   Constitutional: She is oriented to person, place, and time   She appears well-developed and well-nourished  No distress  HENT:   Head: Normocephalic  Nose: Nose normal    Eyes: Conjunctivae and EOM are normal    Neck: Neck supple  Cardiovascular: Normal rate, regular rhythm and intact distal pulses  Pulmonary/Chest: Effort normal and breath sounds normal  She has no wheezes  Abdominal: Soft  She exhibits no distension  Musculoskeletal: She exhibits no edema  Mild soreness to palpation in both legs   Neurological: She is alert and oriented to person, place, and time  Skin: Skin is warm  Incision clean and dry with steristrips - healing well   Psychiatric: She has a normal mood and affect  Nursing note and vitals reviewed         Lab Results   Component Value Date    WBC 13 90 (H) 06/26/2020    HGB 12 4 06/26/2020    HCT 36 6 06/26/2020    MCV 91 06/26/2020     06/26/2020     Lab Results   Component Value Date    SODIUM 137 06/25/2020    K 3 8 06/25/2020    CL 96 (L) 06/25/2020    CO2 31 (H) 06/25/2020    BUN 16 06/25/2020    CREATININE 0 75 06/25/2020    GLUC 144 (H) 06/25/2020    CALCIUM 10 2 06/25/2020     Lab Results   Component Value Date    INR 1 86 (H) 06/26/2020    INR 2 01 (H) 06/25/2020    INR 1 83 (H) 06/24/2020    PROTIME 20 6 (H) 06/26/2020    PROTIME 21 9 (H) 06/25/2020    PROTIME 20 4 (H) 06/24/2020           Current Facility-Administered Medications:     acetaminophen (TYLENOL) tablet 650 mg, 650 mg, Oral, Q8H [address], Yomaira Johnson MD, 650 mg at 06/26/20 0648    amLODIPine (NORVASC) tablet 5 mg, 5 mg, Oral, BID, HUNTER Kuhn    atorvastatin (LIPITOR) tablet 40 mg, 40 mg, Oral, Daily With Tita Patel MD, 40 mg at 06/25/20 1647    dicyclomine (BENTYL) tablet 20 mg, 20 mg, Oral, Q6H, Tiffany Kidd MD, 20 mg at 06/26/20 1148    gabapentin (NEURONTIN) capsule 100 mg, 100 mg, Oral, BID, Yomaira Johnson MD, 100 mg at 06/26/20 7136    gabapentin (NEURONTIN) capsule 200 mg, 200 mg, Oral, HS, Yomaira Johnson MD, 200 mg at 06/25/20 0369    heparin (porcine) subcutaneous injection 5,000 Units, 5,000 Units, Subcutaneous, Q8H [facility], Sander Jimenez MD, 5,000 Units at 06/26/20 0649    insulin lispro (HumaLOG) 100 units/mL subcutaneous injection 1-5 Units, 1-5 Units, Subcutaneous, TID AC, 1 Units at 06/26/20 1148 **AND** Fingerstick Glucose (POCT), , , TID AC, Sander Jimenez MD    insulin lispro (HumaLOG) 100 units/mL subcutaneous injection 1-5 Units, 1-5 Units, Subcutaneous, HS, Sander Jimenez MD, 1 Units at 06/24/20 2108    levothyroxine tablet 125 mcg, 125 mcg, Oral, Early Morning, Sander Jimenez MD, 125 mcg at 06/26/20 0648    lidocaine (LIDODERM) 5 % patch 1 patch, 1 patch, Topical, Daily, Robert Pires MD, 1 patch at 06/26/20 0834    lisinopril (ZESTRIL) tablet 5 mg, 5 mg, Oral, Daily, HUNTER Kuhn, 5 mg at 06/26/20 0834    menthol-methyl salicylate (BENGAY) 73-56 % cream, , Apply externally, HS, Carin Mas MD    menthol-methyl salicylate (BENGAY) 45-99 % cream, , Apply externally, TID PRN, Carin Mas MD    metFORMIN (GLUCOPHAGE) tablet 500 mg, 500 mg, Oral, BID With Meals, Sander Jimenez MD, 500 mg at 06/26/20 6327    methocarbamol (ROBAXIN) tablet 750 mg, 750 mg, Oral, Q6H PRN, Sander Jimenez MD, 750 mg at 06/26/20 0112    metoprolol tartrate (LOPRESSOR) partial tablet 12 5 mg, 12 5 mg, Oral, Q12H MELISSA, HUNTER Kuhn, 12 5 mg at 06/26/20 0834    oxyCODONE (ROXICODONE) IR tablet 2 5 mg, 2 5 mg, Oral, Q4H PRN, Sander Jimenez MD, 2 5 mg at 06/21/20 2137    oxyCODONE (ROXICODONE) IR tablet 5 mg, 5 mg, Oral, Q4H PRN, Sander Jimenez MD, 5 mg at 06/26/20 0503    pantoprazole (PROTONIX) EC tablet 40 mg, 40 mg, Oral, Early Morning, Sander Jimenez MD, 40 mg at 06/26/20 6673    polyethylene glycol (MIRALAX) packet 17 g, 17 g, Oral, Daily PRN, Sander Jimenez MD    senna-docusate sodium (SENOKOT S) 8 6-50 mg per tablet 2 tablet, 2 tablet, Oral, HS, HUNTER Kuhn, 2 tablet at 06/24/20 2108    sertraline (ZOLOFT) tablet 25 mg, 25 mg, Oral, Daily, Fahad Meghana Hatfield MD, 25 mg at 06/26/20 2851    warfarin (COUMADIN) tablet 4 mg, 4 mg, Oral, Daily (warfarin), HUNTER Kingsley Do    Past Medical History:   Diagnosis Date    Ambulates with cane     Anxiety     Arthritis     Back pain     Bowel trouble     Cancer ([facility] )     left breast- left mastectomy- chemo    Chronic pain disorder     Coronary artery disease     2 stents    Depression     Diabetes mellitus (HCC)     NIDDM    Disease of thyroid gland     hypo    DVT (deep venous thrombosis) ([facility])     left leg    Full dentures     H/O breast implant     left    Headache     History of transfusion     no adverse reaction    Hyperlipidemia     Hypertension     Irregular heart beat     Afib    Localized swelling of both lower legs     Multiple falls     Neuropathy     Right knee pain     Shortness of breath     on exertion    Spinal stenosis, lumbar     Uses walker     Wears glasses     Wears glasses        Patient Active Problem List    Diagnosis Date Noted    Spinal stenosis of lumbar region 10/01/2019     Priority: High    Leukocytosis 06/26/2020    Hypothyroidism 06/21/2020    Hyperlipidemia 06/21/2020    Hiatal hernia with gastroesophageal reflux 06/21/2020    Arrhythmia 06/21/2020    Chronic deep vein thrombosis (DVT) of left popliteal vein ([facility]) 06/21/2020    Hypokalemia 06/21/2020    DM (diabetes mellitus) ([place] [address] ) 06/21/2020    HTN (hypertension) 06/21/2020    Neuropathic pain 06/21/2020    Mood disorder ([place] [address] ) 06/21/2020    IBS (irritable bowel syndrome) 06/21/2020    Lymph node disorder 06/21/2020    Abnormal CAT scan 06/21/2020          Tana Paez MD    Total time spent:  30 minutes with more than 50% spent counseling/coordinating care  Counseling includes discussion with patient re: progress and discussion with patient of his/her current medical state/information  Coordination of patient's care was performed in conjunction with consulting services   Time invested included review of patient's labs, vitals, and management of their comorbidities with continued monitoring  The care of the patient was extensively discussed and appropriate treatment plan was formulated unique for this patient  ** Please Note:  voice to text software may have been used in the creation of this document   Although proof errors in transcription or interpretation are a potential of such software**

## 2020-06-26 NOTE — PROGRESS NOTES
Progress Note - Fernando Oakley 1940, 78 y o  female MRN: [id number]    Unit/Bed#: -01 Encounter: [id number]    Primary Care Provider: Michelle Morales DO   Date and time admitted to hospital: 6/21/2020 11:39 AM        Leukocytosis  Assessment & Plan  Patient with continued elevation of WBC to 13 90 today (6/26); 12 60 (6/25); 9 30 (6/23)  Patient afebrile at this time  She did have elevation in temp to 99 overnight  Chest x-ray ordered - decreased breath sounds but no adventitious lung sounds on exam  UA - patient had negative UA on 6/20; UA prior to surgery did reveal nitrates, bacteria, blood - patient denies dysuria but does have urinary frequency (nocturnal) which she states is chronic, unchanged  Abnormal CAT scan  Assessment & Plan  CTA performed on 6/18 revealed "fat stranding noted in the right axillary/chest wall region, of uncertain significance, recommend clinical correlation"  Follow up with PCP on discharge         Lymph node disorder  Assessment & Plan  Per hospital records, patient noted to have subcentimeter lymph node in the rt axilla with fat stranding on imaging  Due to history of breast CA, radiology report clinical correlation was recommended  Patient was evaluated by Dr Martell Estes in acute care who noted this to be an asymptomatic finding and recommended no further SMITH at this time other than routine FU with PCP & continued regular mammograms for her h/o breast CA  Follow up with PCP on discharge  IBS (irritable bowel syndrome)  Assessment & Plan  Continue home dose of Bentyl  LBM 6/25  Continue prune juice daily and Senna S 2 qhs  Monitor    Mood disorder (HCC)  Assessment & Plan  Continue sertraline 25 mg daily  Neuropathic pain  Assessment & Plan  Patient with DM  Patient had difficulty sleeping last night due to bilateral LE "burning" pain      Continues on neurontin 100 mg TID   Will discuss with primary team  May benefit from increase in gabapentin      HTN (hypertension)  Assessment & Plan  Her SBP this morning 146  She did have an episode of dizziness yesterday morning  SBP appear to be higher in the evening  Continue amlodipine 5 mg daily, Lopressor 12 5 mg BID,  Lisinopril to 5 mg daily   Monitor and adjust as needed     DM (diabetes mellitus) ([facility] [address] )  Assessment & Plan  Lab Results   Component Value Date    HGBA1C 6 4 (H) 05/12/2020       Recent Labs     06/25/20  1132 06/25/20  1623 06/25/20  2033 06/26/20  0619   POCGLU 149* 193* 143* 142*       Blood Sugar Average: Last 72 hrs:  (P) 289 [id number]     Continue Metformin 500 mg BID with SSI  Monitor kidney function   6/24 - creatinine 0 75          Hypokalemia  Assessment & Plan  Per most recent hospitalization, K+ as low as 2 2 (felt due to poor p o  Intake)  6/22 K+ 3 5  Replaced with 40 mEq yesterday  6/23 K+ 4 0  6/25 - 3 8  Encouraged potassium rich diet  BMP Monday    Chronic deep vein thrombosis (DVT) of left popliteal vein (HCC)  Assessment & Plan  + left lower extremity pain/tenderness/swelling  - Will order venous duplex  Patient has known non-occlusive DVT (6/16) and recent started on Coumadin (in place of Eliquis) - INR subtherapeutic at 1 86  Non-occlusive LLE DVT (popliteal vein) on 6/16 imaging  Patient had been on Eliquis prior to recent spinal surgery  Per surgery, patient not cleared to resume Eliquis  Dr Eller Sessions does not want patient on Eliquis for at least 3 weeks post-op  Decision made prior to recent hospital discharge to start Coumadin with INR goal of 2 0-2 5  (when goal reached, heparin may be discontinued  INR 6/26 1 86, 6/25 2 01; 1 86; 6/24 1 83; 6/23 1 60;  6/22 1 35  Will increase Coumadin from 2 mg to 4 mg with INR on Sunday  Patient will need clearance by spine surgery to be switched back to Eliquis      Arrhythmia  Assessment & Plan  Follows with [facility]  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 (  Josiah Templeton  H/o a-flutter (patient does not remember this diagnosis) - patient on chronic AC due to DVT)  Regular rhythm at this time  Monitor rate/rhythm  Hiatal hernia with gastroesophageal reflux  Assessment & Plan  Found on CTA during most recent admission  General surgery saw patient in consultation and recommend outpatient follow up   Continue Protonix 40 mg daily  Hyperlipidemia  Assessment & Plan  Most recent lipid profile (2020) - total cholesterol 209, triglycerides 145, HDL 64,   Continue atorvastatin 40 mg daily    Hypothyroidism  Assessment & Plan  Most recent TSH () was 2 23  Continue levothyroxine (home dose was 125 mcg daily)    * Spinal stenosis of lumbar region  Assessment & Plan  PT/OT per primary team   Pain management per primary team    S/p L4-S1 laminectomy on 6/15  Spine precautions/LSO brace  Follow up with Dr Alma Mclean on discharge  VTE Pharmacologic Prophylaxis:   Pharmacologic: Heparin  Mechanical VTE Prophylaxis in Place: Yes    Current Length of Stay: 5 day(s)    Current Patient Status: Inpatient Rehab     Discharge Plan: As per treatment team     Code Status: Level 1 - Full Code    Subjective:   Patient had increased left lower extremity pain (7/10) which she described as "stabbing" around/behind left knee  This morning, she continues with pain (6/10) with tenderness with palpation of left knee/lower extremity  Her WBC remain elevated today  She denies symptoms of fever, chills, chest pain/pressure, abdominal pain, N/V/D, dysuria, urinary retention  She states she has nocturnal frequency but this is not a new finding for her  LBM     Objective:     Vitals:   Temp (24hrs), Av 9 °F (37 2 °C), Min:98 4 °F (36 9 °C), Max:99 3 °F (37 4 °C)    Temp:  [98 4 °F (36 9 °C)-99 3 °F (37 4 °C)] 98 4 °F (36 9 °C)  HR:  [87-95] 87  Resp:  [16-18] 17  BP: (142-160)/(69-92) 142/92  SpO2:  [97 %-98 %] 97 %  Body mass index is 31 97 kg/m²       Review of Systems Constitutional: Negative for chills, fatigue (temps overnight 99) and fever  HENT: Negative for congestion, postnasal drip, rhinorrhea, sneezing and sore throat  Respiratory: Negative for cough, chest tightness and shortness of breath  Cardiovascular: Negative for chest pain, palpitations and leg swelling  Gastrointestinal: Negative for abdominal distention, abdominal pain, constipation, diarrhea and nausea  Genitourinary: Positive for frequency (nocturnal, chronic per patient)  Negative for difficulty urinating, dysuria and urgency  Musculoskeletal: Positive for arthralgias (left leg pain ), back pain and gait problem  Skin: Positive for wound  Neurological: Negative for dizziness, syncope, light-headedness and headaches  Hematological: Negative for adenopathy  Does not bruise/bleed easily  Input and Output Summary (last 24 hours): Intake/Output Summary (Last 24 hours) at 6/26/2020 1051  Last data filed at 6/26/2020 0845  Gross per 24 hour   Intake 360 ml   Output    Net 360 ml       Physical Exam:     Physical Exam   Constitutional: She is oriented to person, place, and time  She appears well-developed  Neck: Normal range of motion  Neck supple  Cardiovascular: Normal rate, regular rhythm, normal heart sounds and intact distal pulses  Pulmonary/Chest: Effort normal and breath sounds normal    Decreased throughout but no adventitious sounds noted   Abdominal: Soft  Bowel sounds are normal  She exhibits no distension  There is no tenderness  Musculoskeletal: She exhibits edema (trace bilateral LE edema) and tenderness (left lower knee/leg with palpitation)  She exhibits no deformity  Neurological: She is alert and oriented to person, place, and time  Skin: Skin is warm and dry  Psychiatric: She has a normal mood and affect   Her behavior is normal        Additional Data:     Labs:    Results from last 7 days   Lab Units 06/26/20  0557  06/20/20  0607   WBC Thousand/uL 13 90*   < > 9 64   HEMOGLOBIN g/dL 12 4   < > 11 8   HEMATOCRIT % 36 6   < > 36 3   PLATELETS Thousands/uL 446   < > 258   BANDS PCT % 1   < >  --    NEUTROS PCT %  --   --  65   LYMPHS PCT %  --   --  19   LYMPHO PCT % 6*   < >  --    MONOS PCT %  --   --  14*   MONO PCT % 4   < >  --    EOS PCT % 1   < > 1    < > = values in this interval not displayed       Results from last 7 days   Lab Units 06/25/20  1330   SODIUM mmol/L 137   POTASSIUM mmol/L 3 8   CHLORIDE mmol/L 96*   CO2 mmol/L 31*   BUN mg/dL 16   CREATININE mg/dL 0 75   ANION GAP mmol/L 10   CALCIUM mg/dL 10 2   GLUCOSE RANDOM mg/dL 144*     Results from last 7 days   Lab Units 06/26/20  0557   INR  1 86*     Results from last 7 days   Lab Units 06/26/20  0619 06/25/20  2033 06/25/20  1623 06/25/20  1132 06/25/20  0545 06/24/20  2039 06/24/20  1629 06/24/20  1111 06/24/20  0606 06/23/20  2041 06/23/20  1610 06/23/20  1148   POC GLUCOSE mg/dl 142* 143* 193* 149* 148* 158* 134 127 126 119 119 173*               Labs reviewed    Imaging:    Imaging reviewed    Recent Cultures (last 7 days):           Last 24 Hours Medication List:     Current Facility-Administered Medications:  acetaminophen 650 mg Oral Q8H [facility] Stacey Pettit MD   amLODIPine 5 mg Oral Daily Bettye Bellamy MD   atorvastatin 40 mg Oral Daily With Diana Chowdary MD   dicyclomine 20 mg Oral Q6H Bettye Bellamy MD   gabapentin 100 mg Oral BID Stacey Pettit MD   gabapentin 200 mg Oral HS Stacey Pettit MD   heparin (porcine) 5,000 Units Subcutaneous Q8H [facility] Bettye Bellamy MD   insulin lispro 1-5 Units Subcutaneous TID AC Bettye Bellamy MD   insulin lispro 1-5 Units Subcutaneous HS Bettye Bellamy MD   levothyroxine 125 mcg Oral Early Morning Bettye Bellamy MD   lidocaine 1 patch Topical Daily Elizabeth Davison MD   lisinopril 5 mg Oral Daily HUNTER Garcia   menthol-methyl salicylate  Apply externally HS Stacey Pettit MD   menthol-methyl salicylate  Apply externally TID PRN Stacey Pettit MD metFORMIN 500 mg Oral BID With Meals Yani Mcgregor MD   methocarbamol 750 mg Oral Q6H PRN Yani Mcgregor MD   metoprolol tartrate 12 5 mg Oral Q12H [address] HUNTER Kuhn   oxyCODONE 2 5 mg Oral Q4H PRN Yani Mcgregor MD   oxyCODONE 5 mg Oral Q4H PRN Yani Mcgregor MD   pantoprazole 40 mg Oral Early Morning Yani Mcgregor MD   polyethylene glycol 17 g Oral Daily PRN Yani Mcgregor MD   senna-docusate sodium 2 tablet Oral HS HUNTER Kuhn   sertraline 25 mg Oral Daily Yani Mcgregor MD   warfarin 4 mg Oral Daily (warfarin) HUNTER Rosado        M*Modal software was used to dictate this note  It may contain errors with dictating incorrect words or incorrect spelling  Please contact the provider directly with any questions

## 2020-06-26 NOTE — PROGRESS NOTES
06/26/20 0930   Pain Assessment   Pain Assessment Tool 0-10   Pain Score 6   Pain Location/Orientation Location: Back; Location: Knee;Orientation: Left   Hospital Pain Intervention(s) Cold applied  (Not ready for Meds yet)   Restrictions/Precautions   Precautions Fall Risk;Cognitive;Bed/chair alarms;Spinal precautions   Braces or Orthoses LSO   Subjective   Subjective Pt reports more pain that started over night and didnt sleep well   Sit to Stand   Type of Assistance Needed Physical assistance   Amount of Physical Assistance Provided 25%-49%   Comment RW   Sit to Stand CARE Score 3   Bed-Chair Transfer   Type of Assistance Needed Physical assistance   Amount of Physical Assistance Provided 50%-74%   Comment SPT with RW   Chair/Bed-to-Chair Transfer CARE Score 2   Transfer Bed/Chair/Wheelchair   Adaptive Equipment Roller Walker   Stand Pivot Moderate Assist   Sit to Stand Minimal Assist   Stand to Sit Minimal Assist   Findings Pt  very apprehensive, anticipating pain and requires multiple attempts, antalgic like movements and worse with balance once standing   Walk 10 Feet   Type of Assistance Needed Physical assistance   Amount of Physical Assistance Provided 25%-49%   Comment Min A with Rw   Walk 10 Feet CARE Score 3   Walk 150 Feet   Reason if not Attempted Safety concerns   Walk 150 Feet CARE Score 88   Ambulation   Primary Mode of Locomotion Prior to Admission Walk   Distance Walked (feet) 35 ft   Assist Device Roller Walker   Gait Pattern Antalgic; Inconsistant Cayla; Slow Cayla;Decreased foot clearance; Forward Flexion; Improper weight shift;Decreased L stance;Decreased R stance   Limitations Noted In Balance;Device Management;Posture; Safety;Strength   Provided Assistance with: Balance;Direction   Walk Assist Level Minimum Assist   Findings Pt more flexed at knees, worse balance than yesterday,  Pt again by passed chair and was walking in other direction despite knowing what chair she was amb to,  initially in tomasa pt stated pain was too much to amb, this trial was after 20 mins of Cp and toward end of session   Wheelchair mobility   Does the patient use a wheelchair? 0  No   Therapeutic Interventions   Strengthening hip add isometric (gentle),  knee flex/ext arom with towels on floor under shoe,  Hip ext MRE min resisted    Flexibility attempted seated hamstring stretch but pt gaurding and inc pain so d/c    Balance standing at walker static to assess pain and if able to ambulate   Assessment   Treatment Assessment Pt in more pain today, initially stated too much to even walk,  part of session investigating quality of pain and triggers  Questionable if it is radicular pain from irritated nerves  Concerned on pts mobility and progression due to pt unable to perform much this session and spent more on light mobility and pain management  Barriers to Discharge Inaccessible home environment   Barriers to Discharge Comments pain management and pt has a ramp to enter   PT Barriers   Physical Impairment Decreased strength;Decreased endurance; Impaired balance;Decreased mobility;Orthopedic restrictions;Pain   Functional Limitation Car transfers; Ramp negotiation;Standing;Transfers; Walking   Plan   Treatment/Interventions Functional transfer training;LE strengthening/ROM; Therapeutic exercise; Endurance training;Gait training;Bed mobility   Progress Slow progress, decreased activity tolerance   Recommendation   PT Discharge Recommendation Home with skilled therapy   PT Therapy Minutes   PT Time In 0930   PT Time Out 1030   PT Total Time (minutes) 60   PT Mode of treatment - Individual (minutes) 60   PT Mode of treatment - Concurrent (minutes) 0   PT Mode of treatment - Group (minutes) 0   PT Mode of treatment - Co-treat (minutes) 0   PT Mode of Treatment - Total time(minutes) 60 minutes   PT Cumulative Minutes 450   Therapy Time missed   Time missed?  No

## 2020-06-26 NOTE — ASSESSMENT & PLAN NOTE
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

## 2020-06-26 NOTE — ASSESSMENT & PLAN NOTE
Lab Results   Component Value Date    HGBA1C 6 4 (H) 05/12/2020       Recent Labs     06/25/20  1132 06/25/20  1623 06/25/20 2033 06/26/20  0619   POCGLU 149* 193* 143* 142*       Blood Sugar Average: Last 72 hrs:  (P) 285 [id number]     Continue Metformin 500 mg BID with SSI  Monitor kidney function   6/24 - creatinine 0 75

## 2020-06-26 NOTE — ASSESSMENT & PLAN NOTE
+ left lower extremity pain/tenderness/swelling  - Will order venous duplex  Patient has known non-occlusive DVT (6/16) and recent started on Coumadin (in place of Eliquis) - INR subtherapeutic at 1 86  Non-occlusive LLE DVT (popliteal vein) on 6/16 imaging  Patient had been on Eliquis prior to recent spinal surgery  Per surgery, patient not cleared to resume Eliquis  Dr Lex Al does not want patient on Eliquis for at least 3 weeks post-op  Decision made prior to recent hospital discharge to start Coumadin with INR goal of 2 0-2 5  (when goal reached, heparin may be discontinued  INR 6/26 1 86, 6/25 2 01; 1 86; 6/24 1 83; 6/23 1 60;  6/22 1 35  Will increase Coumadin from 2 mg to 4 mg with INR on Sunday  Patient will need clearance by spine surgery to be switched back to Eliquis

## 2020-06-26 NOTE — ASSESSMENT & PLAN NOTE
- Resolved; pt afebrile   - UCx mixed contaminants  - dopplers with chronic left DVT  - CXR negative

## 2020-06-26 NOTE — ASSESSMENT & PLAN NOTE
Patient with continued elevation of WBC to 13 90 today (6/26); 12 60 (6/25); 9 30 (6/23)  Patient afebrile at this time  She did have elevation in temp to 99 overnight  Chest x-ray ordered - decreased breath sounds but no adventitious lung sounds on exam  UA - patient had negative UA on 6/20; UA prior to surgery did reveal nitrates, bacteria, blood - patient denies dysuria but does have urinary frequency (nocturnal) which she states is chronic, unchanged

## 2020-06-26 NOTE — ASSESSMENT & PLAN NOTE
Her SBP this morning 146  She did have an episode of dizziness yesterday morning  SBP appear to be higher in the evening      Continue amlodipine 5 mg daily, Lopressor 12 5 mg BID,  Lisinopril to 5 mg daily   Monitor and adjust as needed

## 2020-06-26 NOTE — NURSING NOTE
Pt c/o L knee and L leg pain didn't sleep good last night  Pt stated Bengay didn't work at all  Will continue to monitor

## 2020-06-27 LAB
GLUCOSE SERPL-MCNC: 109 MG/DL (ref 65–140)
GLUCOSE SERPL-MCNC: 142 MG/DL (ref 65–140)
GLUCOSE SERPL-MCNC: 175 MG/DL (ref 65–140)
GLUCOSE SERPL-MCNC: 203 MG/DL (ref 65–140)

## 2020-06-27 PROCEDURE — 97116 GAIT TRAINING THERAPY: CPT

## 2020-06-27 PROCEDURE — 97530 THERAPEUTIC ACTIVITIES: CPT

## 2020-06-27 PROCEDURE — 82948 REAGENT STRIP/BLOOD GLUCOSE: CPT

## 2020-06-27 PROCEDURE — 93971 EXTREMITY STUDY: CPT | Performed by: SURGERY

## 2020-06-27 PROCEDURE — 97110 THERAPEUTIC EXERCISES: CPT

## 2020-06-27 RX ADMIN — METFORMIN HYDROCHLORIDE 500 MG: 500 TABLET ORAL at 08:27

## 2020-06-27 RX ADMIN — GABAPENTIN 100 MG: 100 CAPSULE ORAL at 14:38

## 2020-06-27 RX ADMIN — GABAPENTIN 200 MG: 100 CAPSULE ORAL at 21:45

## 2020-06-27 RX ADMIN — AMLODIPINE BESYLATE 5 MG: 5 TABLET ORAL at 08:27

## 2020-06-27 RX ADMIN — LIDOCAINE 1 PATCH: 50 PATCH CUTANEOUS at 08:26

## 2020-06-27 RX ADMIN — METFORMIN HYDROCHLORIDE 500 MG: 500 TABLET ORAL at 17:24

## 2020-06-27 RX ADMIN — METOPROLOL TARTRATE 12.5 MG: 25 TABLET ORAL at 08:25

## 2020-06-27 RX ADMIN — METOPROLOL TARTRATE 12.5 MG: 25 TABLET ORAL at 21:45

## 2020-06-27 RX ADMIN — ATORVASTATIN CALCIUM 40 MG: 40 TABLET, FILM COATED ORAL at 17:24

## 2020-06-27 RX ADMIN — ACETAMINOPHEN 650 MG: 325 TABLET ORAL at 05:52

## 2020-06-27 RX ADMIN — WARFARIN SODIUM 4 MG: 2 TABLET ORAL at 17:24

## 2020-06-27 RX ADMIN — ACETAMINOPHEN 650 MG: 325 TABLET ORAL at 14:38

## 2020-06-27 RX ADMIN — SERTRALINE HYDROCHLORIDE 25 MG: 25 TABLET ORAL at 08:25

## 2020-06-27 RX ADMIN — GABAPENTIN 100 MG: 100 CAPSULE ORAL at 08:26

## 2020-06-27 RX ADMIN — DICYCLOMINE HYDROCHLORIDE 20 MG: 20 TABLET ORAL at 11:49

## 2020-06-27 RX ADMIN — SENNOSIDES AND DOCUSATE SODIUM 2 TABLET: 8.6; 5 TABLET ORAL at 21:45

## 2020-06-27 RX ADMIN — HEPARIN SODIUM 5000 UNITS: 5000 INJECTION INTRAVENOUS; SUBCUTANEOUS at 05:53

## 2020-06-27 RX ADMIN — INSULIN LISPRO 1 UNITS: 100 INJECTION, SOLUTION INTRAVENOUS; SUBCUTANEOUS at 17:24

## 2020-06-27 RX ADMIN — INSULIN LISPRO 1 UNITS: 100 INJECTION, SOLUTION INTRAVENOUS; SUBCUTANEOUS at 21:48

## 2020-06-27 RX ADMIN — METHOCARBAMOL 750 MG: 750 TABLET, FILM COATED ORAL at 08:26

## 2020-06-27 RX ADMIN — ACETAMINOPHEN 650 MG: 325 TABLET ORAL at 21:45

## 2020-06-27 RX ADMIN — DICYCLOMINE HYDROCHLORIDE 20 MG: 20 TABLET ORAL at 00:39

## 2020-06-27 RX ADMIN — DICYCLOMINE HYDROCHLORIDE 20 MG: 20 TABLET ORAL at 17:24

## 2020-06-27 RX ADMIN — HEPARIN SODIUM 5000 UNITS: 5000 INJECTION INTRAVENOUS; SUBCUTANEOUS at 14:38

## 2020-06-27 RX ADMIN — DICYCLOMINE HYDROCHLORIDE 20 MG: 20 TABLET ORAL at 23:24

## 2020-06-27 RX ADMIN — OXYCODONE HYDROCHLORIDE 5 MG: 5 TABLET ORAL at 10:29

## 2020-06-27 RX ADMIN — HEPARIN SODIUM 5000 UNITS: 5000 INJECTION INTRAVENOUS; SUBCUTANEOUS at 21:46

## 2020-06-27 RX ADMIN — MENTHOL, METHYL SALICYLATE: 10; 15 CREAM TOPICAL at 21:46

## 2020-06-27 RX ADMIN — LEVOTHYROXINE SODIUM 125 MCG: 125 TABLET ORAL at 05:53

## 2020-06-27 RX ADMIN — PANTOPRAZOLE SODIUM 40 MG: 40 TABLET, DELAYED RELEASE ORAL at 05:53

## 2020-06-27 RX ADMIN — OXYCODONE HYDROCHLORIDE 5 MG: 5 TABLET ORAL at 23:24

## 2020-06-27 RX ADMIN — DICYCLOMINE HYDROCHLORIDE 20 MG: 20 TABLET ORAL at 05:53

## 2020-06-27 NOTE — ASSESSMENT & PLAN NOTE
Per hospital records, patient noted to have subcentimeter lymph node in the right axilla with fat stranding on imaging  Due to history of breast CA, radiology report clinical correlation was recommended  Patient was evaluated by Dr Sherita Cervantes in acute care who noted this to be an asymptomatic finding and recommended no further workup at this time other than routine FU with PCP & continued regular mammograms for her h/o breast CA  Follow up with PCP on discharge

## 2020-06-27 NOTE — ASSESSMENT & PLAN NOTE
Found on CTA during most recent admission  General surgery saw patient in consultation and recommended OP f/u  Continue Protonix 40 mg daily    Monitor for recurrence of esophageal spasm episodes/ any choking

## 2020-06-27 NOTE — ASSESSMENT & PLAN NOTE
Lab Results   Component Value Date    HGBA1C 6 4 (H) 05/12/2020       Recent Labs     06/26/20  1107 06/26/20  1603 06/26/20 2034 06/27/20  0629   POCGLU 199* 144* 158* 142*       Blood Sugar Average: Last 72 hrs:  (P) 151     Continue Metformin 500 mg BID with SSI  Monitor kidney function   6/24 - creatinine 0 75  BMP on Monday

## 2020-06-27 NOTE — ASSESSMENT & PLAN NOTE
Patient with continued elevation of WBC to 13 90 today (6/26); 12 60 (6/25); 9 30 (6/23)  Patient afebrile at this time  She did have elevation in temp to 99 overnight  Chest x-ray ordered - decreased breath sounds but no adventitious lung sounds on exam  UA - patient had negative UA on 6/20; UA prior to surgery did reveal nitrates, bacteria, blood - patient denies dysuria but does have urinary frequency (nocturnal) which she states is chronic, unchanged  Urine culture still pending

## 2020-06-27 NOTE — PROGRESS NOTES
Progress Note - Emry Kocher 1940, 78 y o  female MRN: [id number]    Unit/Bed#: -01 Encounter: [id number]    Primary Care Provider: Berlin Gomez DO   Date and time admitted to hospital: 6/21/2020 11:39 AM        Leukocytosis  Assessment & Plan  Patient with continued elevation of WBC to 13 90 today (6/26); 12 60 (6/25); 9 30 (6/23)  Patient afebrile at this time  She did have elevation in temp to 99 overnight  Chest x-ray ordered - decreased breath sounds but no adventitious lung sounds on exam  UA - patient had negative UA on 6/20; UA prior to surgery did reveal nitrates, bacteria, blood - patient denies dysuria but does have urinary frequency (nocturnal) which she states is chronic, unchanged  Urine culture still pending  Abnormal CAT scan  Assessment & Plan  CTA performed on 6/18 revealed "fat stranding noted in the right axillary/chest wall region, of uncertain significance, recommend clinical correlation"  Follow up with PCP upon discharge         Lymph node disorder  Assessment & Plan  Per hospital records, patient noted to have subcentimeter lymph node in the right axilla with fat stranding on imaging  Due to history of breast CA, radiology report clinical correlation was recommended  Patient was evaluated by Dr Emmie Norris in acute care who noted this to be an asymptomatic finding and recommended no further workup at this time other than routine FU with PCP & continued regular mammograms for her h/o breast CA  Follow up with PCP on discharge  IBS (irritable bowel syndrome)  Assessment & Plan  Continue home dose of Bentyl  LBM 6/26  Continue prune juice daily and Senna S 2 qhs  Monitor    Mood disorder (HCC)  Assessment & Plan  Continue sertraline 25 mg daily as prescribed    Neuropathic pain  Assessment & Plan  Patient with DM  Patient had difficulty sleepingThursday due to bilateral LE "burning" pain    Improved last night sleeping in recliner  Continues on neurontin 100 mg TID   Will discuss with primary team  May benefit from increase in gabapentin      HTN (hypertension)  Assessment & Plan  BP remains stable this morning  Amlodipine was changed to 5mg po bid yesterday d/t SBP being higher in evening  Continue amlodipine 5 mg po BID, Lopressor 12 5 mg BID,  Lisinopril to 5 mg daily   Monitor and adjust as needed     DM (diabetes mellitus) ([facility] [address] )  Assessment & Plan  Lab Results   Component Value Date    HGBA1C 6 4 (H) 05/12/2020       Recent Labs     06/26/20  1107 06/26/20  1603 06/26/20  2034 06/27/20  0629   POCGLU 199* 144* 158* 142*       Blood Sugar Average: Last 72 hrs:  (P) 151     Continue Metformin 500 mg BID with SSI  Monitor kidney function   6/24 - creatinine 0 75  BMP on Monday  Hypokalemia  Assessment & Plan  Per most recent hospitalization, K+ as low as 2 2 (felt due to poor p o  Intake)  6/22 K+ 3 5  Replaced with 40 mEq at that time  6/23 K+ 4 0  6/25 - 3 8  Encouraged potassium rich diet  BMP Monday    Chronic deep vein thrombosis (DVT) of left popliteal vein (HCC)  Assessment & Plan  + left lower extremity pain/tenderness/swelling noted yesterday  Improved today  Repeat venous duplex was unchanged from 6/16/20, showing chronic non-occl popliteal DVT  Is on coumadin, INR due tomorrow, was subtherapeutic yesterday at 1 86  Non-occlusive LLE DVT (popliteal vein) on 6/16 imaging  Patient had been on Eliquis prior to recent spinal surgery  Per surgery, patient not cleared to resume Eliquis  Dr Austin Vásquez does not want patient on Eliquis for at least 3 weeks post-op  Decision made prior to recent hospital discharge to start Coumadin with INR goal of 2 0-2 5  (when goal reached, heparin may be discontinued  INR 6/26 1 86, 6/25 2 01; 1 86; 6/24 1 83; 6/23 1 60;  6/22 1 35  Will increase Coumadin from 2 mg to 4 mg with INR on Sunday  Patient will need clearance by spine surgery to be switched back to Eliquis      Arrhythmia  Assessment & Plan  Follows with [facility]  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 (Dr Sia Lopez)  History of a-flutter (patient does not remember this diagnosis) - patient is on chronic AC due to DVT  Regular rhythm at this time  Monitor rate/rhythm- remains stable    Hiatal hernia with gastroesophageal reflux  Assessment & Plan  Found on CTA during most recent admission  General surgery saw patient in consultation and recommended OP f/u  Continue Protonix 40 mg daily  Monitor for recurrence of esophageal spasm episodes/ any choking    Hyperlipidemia  Assessment & Plan  Most recent lipid profile (5/12/2020) - total cholesterol 209, triglycerides 145, HDL 64,   Continue atorvastatin 40 mg daily as prescribed    Hypothyroidism  Assessment & Plan  Most recent TSH (5/12) was 2 23  Continue levothyroxine as prescribed (home dose was 125 mcg daily)    * Spinal stenosis of lumbar region  Assessment & Plan  PT/OT, pain management per primary team   S/p L4-S1 laminectomy on 6/15  Spine precautions/LSO brace  Follow up with Dr Deepak Simpson upon discharge  VTE Pharmacologic Prophylaxis:   Pharmacologic: Heparin  Mechanical VTE Prophylaxis in Place: Yes    Current Length of Stay: 6 day(s)    Current Patient Status: Inpatient Rehab     Discharge Plan: As per treatment team     Code Status: Level 1 - Full Code    Subjective:   Patient reports that she is feeling better than she did yesterday, which she attributes to sleeping in the recliner last night  She reports that she was able to stretch her legs out better, which she feels helped her discomfort from yesterday  She also reports that she seemed to have an esophageal spasm last night while eating dinner that caused food to be lodged in her throat for a short time  She denies previous hx of similar  Reports she was able to breathe without difficulty, was just startled by it happening   It soon passed and she was able to eat breakfast without incident  She will continue to monitor her sx for recurrence  Nursing was by her bedside for event/support, pt reports  She otherwise denies CP, SOB, N/V/D, headache, dizziness  LBM last night  Urine culture still pending  CXR resulted and negative  Venous doppler showed no change in previous chronic LLE DVT, compared to 20  INR due tomorrow  Objective:     Vitals:   Temp (24hrs), Av 4 °F (36 3 °C), Min:97 °F (36 1 °C), Max:97 6 °F (36 4 °C)    Temp:  [97 °F (36 1 °C)-97 6 °F (36 4 °C)] 97 6 °F (36 4 °C)  HR:  [76-83] 77  Resp:  [17-18] 18  BP: (104-158)/(54-71) 115/54  SpO2:  [96 %-97 %] 97 %  Body mass index is 31 97 kg/m²  Review of Systems   Constitutional: Negative for chills, fatigue and fever  HENT: Positive for trouble swallowing (per HPI)  Respiratory: Negative for cough, shortness of breath and wheezing  Cardiovascular: Negative for chest pain  Gastrointestinal: Negative for abdominal pain, constipation, diarrhea, nausea and vomiting  Musculoskeletal: Positive for arthralgias and gait problem  Skin: Positive for wound  Neurological: Negative for dizziness, light-headedness and headaches  Psychiatric/Behavioral: Negative for sleep disturbance (per HPI, improved)  Input and Output Summary (last 24 hours): Intake/Output Summary (Last 24 hours) at 2020 0953  Last data filed at 2020 0800  Gross per 24 hour   Intake 360 ml   Output 800 ml   Net -440 ml       Physical Exam:     Physical Exam   Constitutional: She is oriented to person, place, and time  She appears well-developed and well-nourished  No distress  HENT:   Head: Normocephalic and atraumatic  Right Ear: External ear normal    Left Ear: External ear normal    Mouth/Throat: No oropharyngeal exudate  Eyes: No scleral icterus  Neck: Normal range of motion  Neck supple  Cardiovascular: Normal rate and regular rhythm     Pulmonary/Chest: Effort normal and breath sounds normal  No respiratory distress  Abdominal: Soft  Bowel sounds are normal  She exhibits no distension  Musculoskeletal: She exhibits no edema (no LE edema noted today)  Neurological: She is alert and oriented to person, place, and time  Skin: Skin is warm and dry  Psychiatric: She has a normal mood and affect  Her behavior is normal    Nursing note and vitals reviewed        Additional Data:     Labs:    Results from last 7 days   Lab Units 06/26/20  0557   WBC Thousand/uL 13 90*   HEMOGLOBIN g/dL 12 4   HEMATOCRIT % 36 6   PLATELETS Thousands/uL 446   BANDS PCT % 1   LYMPHO PCT % 6*   MONO PCT % 4   EOS PCT % 1     Results from last 7 days   Lab Units 06/25/20  1330   SODIUM mmol/L 137   POTASSIUM mmol/L 3 8   CHLORIDE mmol/L 96*   CO2 mmol/L 31*   BUN mg/dL 16   CREATININE mg/dL 0 75   ANION GAP mmol/L 10   CALCIUM mg/dL 10 2   GLUCOSE RANDOM mg/dL 144*     Results from last 7 days   Lab Units 06/26/20  0557   INR  1 86*     Results from last 7 days   Lab Units 06/27/20  0629 06/26/20  2034 06/26/20  1603 06/26/20  1107 06/26/20  0619 06/25/20  2033 06/25/20  1623 06/25/20  1132 06/25/20  0545 06/24/20  2039 06/24/20  1629 06/24/20  1111   POC GLUCOSE mg/dl 142* 158* 144* 199* 142* 143* 193* 149* 148* 158* 134 127               Labs reviewed    Imaging:    Imaging reviewed    Recent Cultures (last 7 days):           Last 24 Hours Medication List:     Current Facility-Administered Medications:  acetaminophen 650 mg Oral Q8H [address] Joseph Garza MD   amLODIPine 5 mg Oral Daily HUNTER Kuhn   atorvastatin 40 mg Oral Daily With Desean Chance MD   dicyclomine 20 mg Oral Q6H Wojciech Matta MD   gabapentin 100 mg Oral BID Joseph Garza MD   gabapentin 200 mg Oral HS Joseph Garza MD   heparin (porcine) 5,000 Units Subcutaneous Q8H [address] Wojciech Matta MD   insulin lispro 1-5 Units Subcutaneous TID AC Wojciech Matta MD   insulin lispro 1-5 Units Subcutaneous HS Wojciech Matta MD   levothyroxine 125 mcg Oral Early Morning Obdulio Camarena MD   lidocaine 1 patch Topical Daily Nia Wen MD   lisinopril 10 mg Oral Daily HUNTER Mendieta   menthol-methyl salicylate  Apply externally HS Javier Ruiz MD   menthol-methyl salicylate  Apply externally TID PRN Javier Ruiz MD   metFORMIN 500 mg Oral BID With Meals Obdulio Camarena MD   methocarbamol 750 mg Oral Q6H PRN Obdulio Camarena MD   metoprolol tartrate 12 5 mg Oral Q12H [address] HUNTER Kuhn   oxyCODONE 2 5 mg Oral Q4H PRN Obdulio Camarena MD   oxyCODONE 5 mg Oral Q4H PRN Obdulio Camarena MD   pantoprazole 40 mg Oral Early Morning Obdulio Camarena MD   polyethylene glycol 17 g Oral Daily PRN Obdulio Camarena MD   senna-docusate sodium 2 tablet Oral HS HUNTER Kuhn   sertraline 25 mg Oral Daily Obdulio Camarena MD   warfarin 4 mg Oral Daily (warfarin) HUNTER Mendieta        M*Modal software was used to dictate this note  It may contain errors with dictating incorrect words or incorrect spelling  Please contact the provider directly with any questions

## 2020-06-27 NOTE — PLAN OF CARE
Problem: Prexisting or High Potential for Compromised Skin Integrity  Goal: Skin integrity is maintained or improved  Description  INTERVENTIONS:  - Identify patients at risk for skin breakdown  - Assess and monitor skin integrity  - Assess and monitor nutrition and hydration status  - Monitor labs   - Assess for incontinence   - Turn and reposition patient  - Assist with mobility/ambulation  - Relieve pressure over bony prominences  - Avoid friction and shearing  - Provide appropriate hygiene as needed including keeping skin clean and dry  - Evaluate need for skin moisturizer/barrier cream  - Collaborate with interdisciplinary team   - Patient/family teaching  - Consider wound care consult   Outcome: Progressing     Problem: Potential for Falls  Goal: Patient will remain free of falls  Description  INTERVENTIONS:  - Assess patient frequently for physical needs  -  Identify cognitive and physical deficits and behaviors that affect risk of falls    -  [place] fall precautions as indicated by assessment   - Educate patient/family on patient safety including physical limitations  - Instruct patient to call for assistance with activity based on assessment  - Modify environment to reduce risk of injury  - Consider OT/PT consult to assist with strengthening/mobility  Outcome: Progressing     Problem: PAIN - ADULT  Goal: Verbalizes/displays adequate comfort level or baseline comfort level  Description  Interventions:  - Encourage patient to monitor pain and request assistance  - Assess pain using appropriate pain scale  - Administer analgesics based on type and severity of pain and evaluate response  - Implement non-pharmacological measures as appropriate and evaluate response  - Consider cultural and social influences on pain and pain management  - Notify physician/advanced practitioner if interventions unsuccessful or patient reports new pain  Outcome: Progressing     Problem: INFECTION - ADULT  Goal: Absence or prevention of progression during hospitalization  Description  INTERVENTIONS:  - Assess and monitor for signs and symptoms of infection  - Monitor lab/diagnostic results  - Monitor all insertion sites, i e  indwelling lines, tubes, and drains  - Monitor endotracheal if appropriate and nasal secretions for changes in amount and color  - [place] appropriate cooling/warming therapies per order  - Administer medications as ordered  - Instruct and encourage patient and family to use good hand hygiene technique  - Identify and instruct in appropriate isolation precautions for identified infection/condition  Outcome: Progressing     Problem: SAFETY ADULT  Goal: Maintain or return to baseline ADL function  Description  INTERVENTIONS:  -  Assess patient's ability to carry out ADLs; assess patient's baseline for ADL function and identify physical deficits which impact ability to perform ADLs (bathing, care of mouth/teeth, toileting, grooming, dressing, etc )  - Assess/evaluate cause of self-care deficits   - Assess range of motion  - Assess patient's mobility; develop plan if impaired  - Assess patient's need for assistive devices and provide as appropriate  - Encourage maximum independence but intervene and supervise when necessary  - Involve family in performance of ADLs  - Assess for home care needs following discharge   - Consider OT consult to assist with ADL evaluation and planning for discharge  - Provide patient education as appropriate  Outcome: Progressing  Goal: Maintain or return mobility status to optimal level  Description  INTERVENTIONS:  - Assess patient's baseline mobility status (ambulation, transfers, stairs, etc )    - Identify cognitive and physical deficits and behaviors that affect mobility  - Identify mobility aids required to assist with transfers and/or ambulation (gait belt, sit-to-stand, lift, walker, cane, etc )  - [place] fall precautions as indicated by assessment  - Record patient progress and toleration of activity level on Mobility SBAR; progress patient to next Phase/Stage  - Instruct patient to call for assistance with activity based on assessment  - Consider rehabilitation consult to assist with strengthening/weightbearing, etc   Outcome: Progressing     Problem: DISCHARGE PLANNING  Goal: Discharge to home or other facility with appropriate resources  Description  INTERVENTIONS:  - Identify barriers to discharge w/patient and caregiver  - Arrange for needed discharge resources and transportation as appropriate  - Identify discharge learning needs (meds, wound care, etc )  - Arrange for interpretive services to assist at discharge as needed  - Refer to Case Management Department for coordinating discharge planning if the patient needs post-hospital services based on physician/advanced practitioner order or complex needs related to functional status, cognitive ability, or social support system  Outcome: Progressing     Problem: SKIN/TISSUE INTEGRITY - ADULT  Goal: Skin integrity remains intact  Description  INTERVENTIONS  - Identify patients at risk for skin breakdown  - Assess and monitor skin integrity  - Assess and monitor nutrition and hydration status  - Monitor labs (i e  albumin)  - Assess for incontinence   - Turn and reposition patient  - Assist with mobility/ambulation  - Relieve pressure over bony prominences  - Avoid friction and shearing  - Provide appropriate hygiene as needed including keeping skin clean and dry  - Evaluate need for skin moisturizer/barrier cream  - Collaborate with interdisciplinary team (i e  Nutrition, Rehabilitation, etc )   - Patient/family teaching  Outcome: Progressing  Goal: Incision(s), wounds(s) or drain site(s) healing without S/S of infection  Description  INTERVENTIONS  - Assess and document risk factors for skin impairment   - Assess and document dressing, incision, wound bed, drain sites and surrounding tissue  - Consider nutrition services referral as needed  - Oral mucous membranes remain intact  - Provide patient/ family education  Outcome: Progressing  Goal: Oral mucous membranes remain intact  Description  INTERVENTIONS  - Assess oral mucosa and hygiene practices  - Implement preventative oral hygiene regimen  - Implement oral medicated treatments as ordered  - Initiate Nutrition services referral as needed  Outcome: Progressing     Problem: MUSCULOSKELETAL - ADULT  Goal: Maintain or return mobility to safest level of function  Description  INTERVENTIONS:  - Assess patient's ability to carry out ADLs; assess patient's baseline for ADL function and identify physical deficits which impact ability to perform ADLs (bathing, care of mouth/teeth, toileting, grooming, dressing, etc )  - Assess/evaluate cause of self-care deficits   - Assess range of motion  - Assess patient's mobility  - Assess patient's need for assistive devices and provide as appropriate  - Encourage maximum independence but intervene and supervise when necessary  - Involve family in performance of ADLs  - Assess for home care needs following discharge   - Consider OT consult to assist with ADL evaluation and planning for discharge  - Provide patient education as appropriate  Outcome: Progressing  Goal: Maintain proper alignment of affected body part  Description  INTERVENTIONS:  - Support, maintain and protect limb and body alignment  - Provide patient/ family with appropriate education  Outcome: Progressing

## 2020-06-27 NOTE — ASSESSMENT & PLAN NOTE
Most recent lipid profile (5/12/2020) - total cholesterol 209, triglycerides 145, HDL 64,   Continue atorvastatin 40 mg daily as prescribed

## 2020-06-27 NOTE — ASSESSMENT & PLAN NOTE
CTA performed on 6/18 revealed "fat stranding noted in the right axillary/chest wall region, of uncertain significance, recommend clinical correlation"  Follow up with PCP upon discharge

## 2020-06-27 NOTE — ASSESSMENT & PLAN NOTE
Per most recent hospitalization, K+ as low as 2 2 (felt due to poor p o  Intake)  6/22 K+ 3 5  Replaced with 40 mEq at that time  6/23 K+ 4 0  6/25 - 3 8  Encouraged potassium rich diet      BMP Monday

## 2020-06-27 NOTE — ASSESSMENT & PLAN NOTE
Patient with DM  Patient had difficulty sleepingThursday due to bilateral LE "burning" pain    Improved last night sleeping in recliner  Continues on neurontin 100 mg TID   Will discuss with primary team  May benefit from increase in gabapentin

## 2020-06-27 NOTE — ASSESSMENT & PLAN NOTE
PT/OT, pain management per primary team   S/p L4-S1 laminectomy on 6/15  Spine precautions/LSO brace  Follow up with Dr Gela Hernandez upon discharge

## 2020-06-27 NOTE — PROGRESS NOTES
06/27/20 1230   Pain Assessment   Pain Assessment Tool 0-10   Pain Score 7   Pain Location/Orientation Orientation: Left;Orientation: Lower; Location: Leg   Hospital Pain Intervention(s) Medication (See MAR); Cold applied   Restrictions/Precautions   Precautions Fall Risk;Pain;Supervision on toilet/commode;Spinal precautions   Braces or Orthoses LSO   Cognition   Overall Cognitive Status WFL   Arousal/Participation Alert; Cooperative   Attention Attends with cues to redirect   Orientation Level Oriented X4   Following Commands Follows one step commands with increased time or repetition   Subjective   Subjective Pt agreeable to PT; reporting cold pack to L lower leg helped dec pain  C/o fatigue, requesting brief change  Sit to Stand   Type of Assistance Needed Incidental touching; Adaptive equipment   Sit to Stand CARE Score 4   Bed-Chair Transfer   Type of Assistance Needed Incidental touching; Adaptive equipment   Chair/Bed-to-Chair Transfer CARE Score 4   Transfer Bed/Chair/Wheelchair   Limitations Noted In Balance;Confidence; Endurance;Pain Management;Sensation; Sequencing;UE Strength;LE Strength   Adaptive Equipment Roller Walker   Stand Pivot Contact Guard   Sit to [employer]   Stand to [facility] time to complete, especially when fatigued   Car Transfer   Reason if not Attempted Environmental limitations   Car Transfer CARE Score 10   Walk 10 Feet   Type of Assistance Needed Incidental touching;Verbal cues; Adaptive equipment   Walk 10 Feet CARE Score 4   Walk 50 Feet with Two Turns   Type of Assistance Needed Incidental touching;Verbal cues; Adaptive equipment   Walk 50 Feet with Two Turns CARE Score 4   Walk 150 Feet   Type of Assistance Needed Incidental touching;Verbal cues; Adaptive equipment   Walk 150 Feet CARE Score 4   Ambulation   Does the patient walk? 2   Yes   Primary Mode of Locomotion Prior to Admission Walk   Distance Walked (feet) 150 ft   Assist Device Roller Walker   Gait Pattern Inconsistant [place]; Slow [place];Decreased foot clearance;Shuffle;Improper weight shift   Limitations Noted In Balance; Endurance; Heel Strike;Posture;Speed;Strength;Swing   Walk Assist Level Contact Guard;Close Supervision   Findings 150ft x2   Wheelchair mobility   Does the patient use a wheelchair? 0  No   Curb or Single Stair   Style negotiated Curb   Type of Assistance Needed Incidental touching; Adaptive equipment   Comment RW, step up onto scale   1 Step (Curb) CARE Score 4   4 Steps   Comment Due to fatigue   Reason if not Attempted Safety concerns   4 Steps CARE Score 88   12 Steps   Reason if not Attempted Safety concerns   12 Steps CARE Score 88   Stairs   Type Curb   # of Steps 1   Assist Devices Roller Walker   Findings CGA with RW, cues for walker placement and foot placement on step   Picking Up Object   Reason if not Attempted Safety concerns   Picking Up Object CARE Score 88   Toilet Transfer   Type of Assistance Needed Incidental touching;Verbal cues; Adaptive equipment   Toilet Transfer CARE Score 4   Toilet Transfer   Surface Assessed Raised Toilet   Limitations Noted In Balance; Endurance;UE Strength;LE Strength;Confidence   Adaptive Equipment Grab Bar   Therapeutic Interventions   Strengthening Seated LE TE 30x each: LAQ, hip flex, hip abd/add, hamstring curls yellow theraband   Other Pt stood at [facility] for brief change at beginning of session  At end of session, pt requesting to use bathroom  Transfered onto toilet but did not void  Equipment Use   NuStep UE/LE x10 minutes, L1   Assessment   Treatment Assessment Pt participated in PT session with focus on strengthening and gait and endurance training  Pt demonstrating improvement in overall mobility requiring CG/close sup for transfers, amb with RW and step up on standing scale today   Attempted  stairs in therapy gym however pt stating she felt too tired to perform and requested assistance back to her room to use bathroom then rest  Pt also demo good tolerance to seated exercises and NuStep  She will cont to benefit from skilled PT to further improve strength, balance, endurance, indep and safety with all mobility prior to d/c  Family/Caregiver Present No   Problem List Decreased strength;Decreased endurance;Decreased mobility; Impaired balance;Decreased safety awareness;Orthopedic restrictions;Pain   PT Barriers   Physical Impairment Decreased strength;Decreased endurance; Impaired balance;Decreased mobility; Decreased safety awareness;Orthopedic restrictions;Pain   Functional Limitation Car transfers; Ramp negotiation;Stair negotiation;Standing;Transfers; Walking   Plan   Treatment/Interventions Functional transfer training;LE strengthening/ROM; Elevations; Therapeutic exercise; Endurance training;Patient/family training;Bed mobility; Equipment eval/education;Gait training; Compensatory technique education   Progress Progressing toward goals   Recommendation   PT Discharge Recommendation Return to previous environment with social support;Home with skilled therapy;Post-Acute Rehabilitation Services   PT Therapy Minutes   PT Time In 1230   PT Time Out 1400   PT Total Time (minutes) 90   PT Mode of treatment - Individual (minutes) 90   PT Mode of treatment - Concurrent (minutes) 0   PT Mode of treatment - Group (minutes) 0   PT Mode of treatment - Co-treat (minutes) 0   PT Mode of Treatment - Total time(minutes) 90 minutes   PT Cumulative Minutes 570   Therapy Time missed   Time missed?  No

## 2020-06-27 NOTE — ASSESSMENT & PLAN NOTE
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

## 2020-06-27 NOTE — ASSESSMENT & PLAN NOTE
BP remains stable this morning  Amlodipine was changed to 5mg po bid yesterday d/t SBP being higher in evening     Continue amlodipine 5 mg po BID, Lopressor 12 5 mg BID,  Lisinopril to 5 mg daily   Monitor and adjust as needed

## 2020-06-27 NOTE — ASSESSMENT & PLAN NOTE
+ left lower extremity pain/tenderness/swelling noted yesterday  Improved today  Repeat venous duplex was unchanged from 6/16/20, showing chronic non-occl popliteal DVT  Is on coumadin, INR due tomorrow, was subtherapeutic yesterday at 1 86  Non-occlusive LLE DVT (popliteal vein) on 6/16 imaging  Patient had been on Eliquis prior to recent spinal surgery  Per surgery, patient not cleared to resume Eliquis  Dr Luna Cuellar does not want patient on Eliquis for at least 3 weeks post-op  Decision made prior to recent hospital discharge to start Coumadin with INR goal of 2 0-2 5  (when goal reached, heparin may be discontinued  INR 6/26 1 86, 6/25 2 01; 1 86; 6/24 1 83; 6/23 1 60;  6/22 1 35  Will increase Coumadin from 2 mg to 4 mg with INR on Sunday  Patient will need clearance by spine surgery to be switched back to Eliquis

## 2020-06-28 LAB
BACTERIA UR CULT: NORMAL
GLUCOSE SERPL-MCNC: 110 MG/DL (ref 65–140)
GLUCOSE SERPL-MCNC: 120 MG/DL (ref 65–140)
GLUCOSE SERPL-MCNC: 145 MG/DL (ref 65–140)
GLUCOSE SERPL-MCNC: 178 MG/DL (ref 65–140)
INR PPP: 2.23 (ref 0.84–1.19)
PROTHROMBIN TIME: 23.8 SECONDS (ref 11.6–14.5)

## 2020-06-28 PROCEDURE — 82948 REAGENT STRIP/BLOOD GLUCOSE: CPT

## 2020-06-28 PROCEDURE — 85610 PROTHROMBIN TIME: CPT | Performed by: NURSE PRACTITIONER

## 2020-06-28 RX ORDER — HYDROCORTISONE ACETATE 25 MG/1
25 SUPPOSITORY RECTAL 2 TIMES DAILY
Status: DISCONTINUED | OUTPATIENT
Start: 2020-06-28 | End: 2020-06-28

## 2020-06-28 RX ORDER — AMOXICILLIN 250 MG
2 CAPSULE ORAL
Status: DISCONTINUED | OUTPATIENT
Start: 2020-06-28 | End: 2020-07-06 | Stop reason: HOSPADM

## 2020-06-28 RX ADMIN — ACETAMINOPHEN 650 MG: 325 TABLET ORAL at 21:16

## 2020-06-28 RX ADMIN — LIDOCAINE 1 PATCH: 50 PATCH CUTANEOUS at 08:38

## 2020-06-28 RX ADMIN — LISINOPRIL 10 MG: 10 TABLET ORAL at 08:39

## 2020-06-28 RX ADMIN — METHOCARBAMOL 750 MG: 750 TABLET, FILM COATED ORAL at 11:09

## 2020-06-28 RX ADMIN — METHOCARBAMOL 750 MG: 750 TABLET, FILM COATED ORAL at 04:29

## 2020-06-28 RX ADMIN — DICYCLOMINE HYDROCHLORIDE 20 MG: 20 TABLET ORAL at 17:01

## 2020-06-28 RX ADMIN — ATORVASTATIN CALCIUM 40 MG: 40 TABLET, FILM COATED ORAL at 17:01

## 2020-06-28 RX ADMIN — WARFARIN SODIUM 4 MG: 2 TABLET ORAL at 17:01

## 2020-06-28 RX ADMIN — OXYCODONE HYDROCHLORIDE 2.5 MG: 5 TABLET ORAL at 11:07

## 2020-06-28 RX ADMIN — METFORMIN HYDROCHLORIDE 500 MG: 500 TABLET ORAL at 17:01

## 2020-06-28 RX ADMIN — PANTOPRAZOLE SODIUM 40 MG: 40 TABLET, DELAYED RELEASE ORAL at 05:57

## 2020-06-28 RX ADMIN — OXYCODONE HYDROCHLORIDE 5 MG: 5 TABLET ORAL at 04:29

## 2020-06-28 RX ADMIN — GABAPENTIN 200 MG: 100 CAPSULE ORAL at 21:16

## 2020-06-28 RX ADMIN — ACETAMINOPHEN 650 MG: 325 TABLET ORAL at 13:59

## 2020-06-28 RX ADMIN — DICYCLOMINE HYDROCHLORIDE 20 MG: 20 TABLET ORAL at 23:40

## 2020-06-28 RX ADMIN — GABAPENTIN 100 MG: 100 CAPSULE ORAL at 08:39

## 2020-06-28 RX ADMIN — METFORMIN HYDROCHLORIDE 500 MG: 500 TABLET ORAL at 11:05

## 2020-06-28 RX ADMIN — GABAPENTIN 100 MG: 100 CAPSULE ORAL at 13:59

## 2020-06-28 RX ADMIN — METHOCARBAMOL 750 MG: 750 TABLET, FILM COATED ORAL at 21:16

## 2020-06-28 RX ADMIN — METOPROLOL TARTRATE 12.5 MG: 25 TABLET ORAL at 08:39

## 2020-06-28 RX ADMIN — ACETAMINOPHEN 650 MG: 325 TABLET ORAL at 05:57

## 2020-06-28 RX ADMIN — METOPROLOL TARTRATE 12.5 MG: 25 TABLET ORAL at 21:16

## 2020-06-28 RX ADMIN — AMLODIPINE BESYLATE 5 MG: 5 TABLET ORAL at 08:39

## 2020-06-28 RX ADMIN — HEPARIN SODIUM 5000 UNITS: 5000 INJECTION INTRAVENOUS; SUBCUTANEOUS at 05:57

## 2020-06-28 RX ADMIN — DICYCLOMINE HYDROCHLORIDE 20 MG: 20 TABLET ORAL at 05:57

## 2020-06-28 RX ADMIN — SERTRALINE HYDROCHLORIDE 25 MG: 25 TABLET ORAL at 08:39

## 2020-06-28 RX ADMIN — MENTHOL, METHYL SALICYLATE: 10; 15 CREAM TOPICAL at 21:16

## 2020-06-28 RX ADMIN — OXYCODONE HYDROCHLORIDE 5 MG: 5 TABLET ORAL at 19:35

## 2020-06-28 RX ADMIN — LEVOTHYROXINE SODIUM 125 MCG: 125 TABLET ORAL at 05:57

## 2020-06-28 RX ADMIN — DICYCLOMINE HYDROCHLORIDE 20 MG: 20 TABLET ORAL at 11:05

## 2020-06-28 NOTE — ASSESSMENT & PLAN NOTE
Continue home dose of Bentyl  LBM today- multiple  Has been having prune juice daily and Senna S 2 qhs  Will change these to PRN due to loose stools     Monitor

## 2020-06-28 NOTE — PLAN OF CARE
Problem: Prexisting or High Potential for Compromised Skin Integrity  Goal: Skin integrity is maintained or improved  Description  INTERVENTIONS:  - Identify patients at risk for skin breakdown  - Assess and monitor skin integrity  - Assess and monitor nutrition and hydration status  - Monitor labs   - Assess for incontinence   - Turn and reposition patient  - Assist with mobility/ambulation  - Relieve pressure over bony prominences  - Avoid friction and shearing  - Provide appropriate hygiene as needed including keeping skin clean and dry  - Evaluate need for skin moisturizer/barrier cream  - Collaborate with interdisciplinary team   - Patient/family teaching  - Consider wound care consult   Outcome: Progressing     Problem: Potential for Falls  Goal: Patient will remain free of falls  Description  INTERVENTIONS:  - Assess patient frequently for physical needs  -  Identify cognitive and physical deficits and behaviors that affect risk of falls    -  [place] fall precautions as indicated by assessment   - Educate patient/family on patient safety including physical limitations  - Instruct patient to call for assistance with activity based on assessment  - Modify environment to reduce risk of injury  - Consider OT/PT consult to assist with strengthening/mobility  Outcome: Progressing     Problem: PAIN - ADULT  Goal: Verbalizes/displays adequate comfort level or baseline comfort level  Description  Interventions:  - Encourage patient to monitor pain and request assistance  - Assess pain using appropriate pain scale  - Administer analgesics based on type and severity of pain and evaluate response  - Implement non-pharmacological measures as appropriate and evaluate response  - Consider cultural and social influences on pain and pain management  - Notify physician/advanced practitioner if interventions unsuccessful or patient reports new pain  Outcome: Progressing     Problem: INFECTION - ADULT  Goal: Absence or prevention of progression during hospitalization  Description  INTERVENTIONS:  - Assess and monitor for signs and symptoms of infection  - Monitor lab/diagnostic results  - Monitor all insertion sites, i e  indwelling lines, tubes, and drains  - Monitor endotracheal if appropriate and nasal secretions for changes in amount and color  - [place] appropriate cooling/warming therapies per order  - Administer medications as ordered  - Instruct and encourage patient and family to use good hand hygiene technique  - Identify and instruct in appropriate isolation precautions for identified infection/condition  Outcome: Progressing     Problem: SAFETY ADULT  Goal: Maintain or return to baseline ADL function  Description  INTERVENTIONS:  -  Assess patient's ability to carry out ADLs; assess patient's baseline for ADL function and identify physical deficits which impact ability to perform ADLs (bathing, care of mouth/teeth, toileting, grooming, dressing, etc )  - Assess/evaluate cause of self-care deficits   - Assess range of motion  - Assess patient's mobility; develop plan if impaired  - Assess patient's need for assistive devices and provide as appropriate  - Encourage maximum independence but intervene and supervise when necessary  - Involve family in performance of ADLs  - Assess for home care needs following discharge   - Consider OT consult to assist with ADL evaluation and planning for discharge  - Provide patient education as appropriate  Outcome: Progressing  Goal: Maintain or return mobility status to optimal level  Description  INTERVENTIONS:  - Assess patient's baseline mobility status (ambulation, transfers, stairs, etc )    - Identify cognitive and physical deficits and behaviors that affect mobility  - Identify mobility aids required to assist with transfers and/or ambulation (gait belt, sit-to-stand, lift, walker, cane, etc )  - [place] fall precautions as indicated by assessment  - Record patient progress and toleration of activity level on Mobility SBAR; progress patient to next Phase/Stage  - Instruct patient to call for assistance with activity based on assessment  - Consider rehabilitation consult to assist with strengthening/weightbearing, etc   Outcome: Progressing     Problem: DISCHARGE PLANNING  Goal: Discharge to home or other facility with appropriate resources  Description  INTERVENTIONS:  - Identify barriers to discharge w/patient and caregiver  - Arrange for needed discharge resources and transportation as appropriate  - Identify discharge learning needs (meds, wound care, etc )  - Arrange for interpretive services to assist at discharge as needed  - Refer to Case Management Department for coordinating discharge planning if the patient needs post-hospital services based on physician/advanced practitioner order or complex needs related to functional status, cognitive ability, or social support system  Outcome: Progressing     Problem: SKIN/TISSUE INTEGRITY - ADULT  Goal: Skin integrity remains intact  Description  INTERVENTIONS  - Identify patients at risk for skin breakdown  - Assess and monitor skin integrity  - Assess and monitor nutrition and hydration status  - Monitor labs (i e  albumin)  - Assess for incontinence   - Turn and reposition patient  - Assist with mobility/ambulation  - Relieve pressure over bony prominences  - Avoid friction and shearing  - Provide appropriate hygiene as needed including keeping skin clean and dry  - Evaluate need for skin moisturizer/barrier cream  - Collaborate with interdisciplinary team (i e  Nutrition, Rehabilitation, etc )   - Patient/family teaching  Outcome: Progressing  Goal: Incision(s), wounds(s) or drain site(s) healing without S/S of infection  Description  INTERVENTIONS  - Assess and document risk factors for skin impairment   - Assess and document dressing, incision, wound bed, drain sites and surrounding tissue  - Consider nutrition services referral as needed  - Oral mucous membranes remain intact  - Provide patient/ family education  Outcome: Progressing  Goal: Oral mucous membranes remain intact  Description  INTERVENTIONS  - Assess oral mucosa and hygiene practices  - Implement preventative oral hygiene regimen  - Implement oral medicated treatments as ordered  - Initiate Nutrition services referral as needed  Outcome: Progressing     Problem: MUSCULOSKELETAL - ADULT  Goal: Maintain or return mobility to safest level of function  Description  INTERVENTIONS:  - Assess patient's ability to carry out ADLs; assess patient's baseline for ADL function and identify physical deficits which impact ability to perform ADLs (bathing, care of mouth/teeth, toileting, grooming, dressing, etc )  - Assess/evaluate cause of self-care deficits   - Assess range of motion  - Assess patient's mobility  - Assess patient's need for assistive devices and provide as appropriate  - Encourage maximum independence but intervene and supervise when necessary  - Involve family in performance of ADLs  - Assess for home care needs following discharge   - Consider OT consult to assist with ADL evaluation and planning for discharge  - Provide patient education as appropriate  Outcome: Progressing  Goal: Maintain proper alignment of affected body part  Description  INTERVENTIONS:  - Support, maintain and protect limb and body alignment  - Provide patient/ family with appropriate education  Outcome: Progressing

## 2020-06-28 NOTE — ASSESSMENT & PLAN NOTE
Lab Results   Component Value Date    HGBA1C 6 4 (H) 05/12/2020       Recent Labs     06/27/20  1121 06/27/20  1552 06/27/20 2039 06/28/20  0607   POCGLU 109 203* 175* 145*       Blood Sugar Average: Last 72 hrs:  (P) 368 [id number]     Continue Metformin 500 mg BID with SSI  Monitor kidney function   6/24 - creatinine 0 75  BMP tomorrow  Glucose stable

## 2020-06-28 NOTE — ASSESSMENT & PLAN NOTE
Patient with DM  Patient had difficulty sleepingThursday due to bilateral LE "burning" pain  Has improved over the past 2 nights     Continues on neurontin 100 mg TID

## 2020-06-28 NOTE — PLAN OF CARE
Problem: Prexisting or High Potential for Compromised Skin Integrity  Goal: Skin integrity is maintained or improved  Description  INTERVENTIONS:  - Identify patients at risk for skin breakdown  - Assess and monitor skin integrity  - Assess and monitor nutrition and hydration status  - Monitor labs   - Assess for incontinence   - Turn and reposition patient  - Assist with mobility/ambulation  - Relieve pressure over bony prominences  - Avoid friction and shearing  - Provide appropriate hygiene as needed including keeping skin clean and dry  - Evaluate need for skin moisturizer/barrier cream  - Collaborate with interdisciplinary team   - Patient/family teaching  - Consider wound care consult   Outcome: Progressing     Problem: Potential for Falls  Goal: Patient will remain free of falls  Description  INTERVENTIONS:  - Assess patient frequently for physical needs  -  Identify cognitive and physical deficits and behaviors that affect risk of falls    -  [place] fall precautions as indicated by assessment   - Educate patient/family on patient safety including physical limitations  - Instruct patient to call for assistance with activity based on assessment  - Modify environment to reduce risk of injury  - Consider OT/PT consult to assist with strengthening/mobility  Outcome: Progressing     Problem: PAIN - ADULT  Goal: Verbalizes/displays adequate comfort level or baseline comfort level  Description  Interventions:  - Encourage patient to monitor pain and request assistance  - Assess pain using appropriate pain scale  - Administer analgesics based on type and severity of pain and evaluate response  - Implement non-pharmacological measures as appropriate and evaluate response  - Consider cultural and social influences on pain and pain management  - Notify physician/advanced practitioner if interventions unsuccessful or patient reports new pain  Outcome: Progressing     Problem: INFECTION - ADULT  Goal: Absence or prevention of progression during hospitalization  Description  INTERVENTIONS:  - Assess and monitor for signs and symptoms of infection  - Monitor lab/diagnostic results  - Monitor all insertion sites, i e  indwelling lines, tubes, and drains  - Monitor endotracheal if appropriate and nasal secretions for changes in amount and color  - [place] appropriate cooling/warming therapies per order  - Administer medications as ordered  - Instruct and encourage patient and family to use good hand hygiene technique  - Identify and instruct in appropriate isolation precautions for identified infection/condition  Outcome: Progressing

## 2020-06-28 NOTE — ASSESSMENT & PLAN NOTE
Per most recent hospitalization, K+ as low as 2 2 (felt due to poor p o  Intake)  6/22 K+ 3 5  Replaced with 40 mEq at that time  6/23 K+ 4 0  6/25 - 3 8  Encouraged potassium rich diet  BMP tomorrow

## 2020-06-28 NOTE — PROGRESS NOTES
Progress Note - Kathern Cranker 1940, 78 y o  female MRN: [id number]    Unit/Bed#: -01 Encounter: [id number]    Primary Care Provider: Cathie Carranza,    Date and time admitted to hospital: 6/21/2020 11:39 AM        Leukocytosis  Assessment & Plan  Patient with continued elevation of WBC to 13 90 (6/26); 12 60 (6/25); 9 30 (6/23)  Patient remains afebrile  CXR negative  UA positive for blood and leuk estrase (25)  Urine culture still pending ( 6/28: "culture too young, will reincubate")  CBC tomorrow     Abnormal CAT scan  Assessment & Plan  CTA performed on 6/18 revealed "fat stranding noted in the right axillary/chest wall region, of uncertain significance, recommend clinical correlation"  Follow up with PCP on  discharge         Lymph node disorder  Assessment & Plan  Per hospital records, patient noted to have subcentimeter lymph node in the right axilla with fat stranding on imaging  Due to history of breast CA, radiology report clinical correlation was recommended  Patient was evaluated by Dr Mamadou Rush in acute care who noted this to be an asymptomatic finding and recommended no further workup at this time other than routine FU with PCP & continued regular mammograms for her history of  breast CA  Follow up with PCP on discharge  IBS (irritable bowel syndrome)  Assessment & Plan  Continue home dose of Bentyl  LBM today- multiple  Has been having prune juice daily and Senna S 2 qhs  Will change these to PRN due to loose stools  Monitor    Mood disorder [facility])  Assessment & Plan  Continue sertraline 25 mg daily    Neuropathic pain  Assessment & Plan  Patient with DM  Patient had difficulty sleepingThursday due to bilateral LE "burning" pain  Has improved over the past 2 nights     Continues on neurontin 100 mg TID         HTN (hypertension)  Assessment & Plan  Vitals:    06/28/20 0700   BP: 134/64   Pulse: 74   Resp: 18   Temp: 98 4 °F (36 9 °C)   SpO2:        BP remains stable this morning  Amlodipine was changed to 5mg po bid Friday d/t SBP being higher in evening  Continue amlodipine 5 mg po BID, Lopressor 12 5 mg BID,  Lisinopril to 5 mg daily   Monitor and adjust as needed     DM (diabetes mellitus) ([facility] [address] )  Assessment & Plan  Lab Results   Component Value Date    HGBA1C 6 4 (H) 05/12/2020       Recent Labs     06/27/20  1121 06/27/20  1552 06/27/20  2039 06/28/20  0607   POCGLU 109 203* 175* 145*       Blood Sugar Average: Last 72 hrs:  (P) 231 [id number]     Continue Metformin 500 mg BID with SSI  Monitor kidney function   6/24 - creatinine 0 75  BMP tomorrow  Glucose stable  Hypokalemia  Assessment & Plan  Per most recent hospitalization, K+ as low as 2 2 (felt due to poor p o  Intake)  6/22 K+ 3 5  Replaced with 40 mEq at that time  6/23 K+ 4 0  6/25 - 3 8  Encouraged potassium rich diet  BMP tomorrow  Chronic deep vein thrombosis (DVT) of left popliteal vein (HCC)  Assessment & Plan  Non-occlusive LLE DVT (popliteal vein) on 6/16 imaging, unchanged on repeat imaging 6/26  Patient had been on Eliquis prior to recent spinal surgery  Per surgery, patient not cleared to resume Eliquis  Dr Hall Laerika does not want patient on Eliquis for at least 3 weeks post-op  Decision made prior to recent hospital discharge to start Coumadin with INR goal of 2 0-2 5  (when goal reached, heparin may be discontinued  INR 6/26 1 86, 6/25 2 01; 1 86; 6/24 1 83; 6/23 1 60;  6/22 1 35  Will increase Coumadin from 2 mg to 4 mg with INR on Sunday  INR 2 23 6/28  No dosage change  Recheck Wednesday to ensure remaining within therapeutic range  Will discontinue Heparin at this time  Patient will need clearance by spine surgery to be switched back to Eliquis      Arrhythmia  Assessment & Plan  Follows with [facility]  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 (Dr Letty Blandon)  History of a-flutter (patient does not remember this dx) - patient is on chronic AC due to DVT  Regular rhythm at this time  Continue to monitor rate/rhythm- remains stable    Hiatal hernia with gastroesophageal reflux  Assessment & Plan  Found on CTA during most recent admission  General surgery saw patient in consultation and recommended OP f/u  Continue Protonix 40 mg daily  Monitor for recurrence of esophageal spasm episodes/ any choking  Patient doing well today     Hyperlipidemia  Assessment & Plan  Most recent lipid profile (2020) - total cholesterol 209, triglycerides 145, HDL 64,   Continue atorvastatin 40 mg daily    Hypothyroidism  Assessment & Plan  Most recent TSH () was 2 23  Continue levothyroxine 125mcg (home dose) as prescribed    * Spinal stenosis of lumbar region  Assessment & Plan  PT/OT and  pain management per primary team   S/p L4-S1 laminectomy on 6/15  Spine precautions/LSO brace  F/u with Dr Mary Russo upon discharge  VTE Pharmacologic Prophylaxis:   Pharmacologic: Heparin  Mechanical VTE Prophylaxis in Place: Yes    Current Length of Stay: 7 day(s)    Current Patient Status: Inpatient Rehab     Discharge Plan: As per treatment team     Code Status: Level 1 - Full Code    Subjective:   Patient reports that she is feeling better today  She reports that she slept well overnight and her appetite is doing well  She had a "good BM" this morning  Nursing does report that patient has been having multiple loose BMs, up to 4 per day, which the patient had not reported to this provider  Urine culture is being reincubated, no prelim results yet  INR therapeutic at 2 23 (goal 2 0-2 5)  Pt denies chest pain, SOB, N/V, headache, dizziness, and some mild leg pain       Objective:     Vitals:   Temp (24hrs), Av 2 °F (36 8 °C), Min:97 8 °F (36 6 °C), Max:98 5 °F (36 9 °C)    Temp:  [97 8 °F (36 6 °C)-98 5 °F (36 9 °C)] 98 4 °F (36 9 °C)  HR:  [74-86] 74  Resp:  [18] 18  BP: ()/(52-64) 134/64  SpO2:  [97 %-98 %] 97 %  Body mass index is 32 97 kg/m²  Review of Systems   Constitutional: Negative for chills, fatigue and fever  HENT: Negative for sore throat  Respiratory: Negative for shortness of breath and wheezing  Cardiovascular: Negative for chest pain  Gastrointestinal: Positive for diarrhea  Negative for abdominal pain, nausea and vomiting  Genitourinary: Negative for dysuria  Musculoskeletal: Positive for arthralgias and gait problem  Skin: Negative for rash  Neurological: Negative for dizziness and headaches  Psychiatric/Behavioral: Negative for sleep disturbance  Input and Output Summary (last 24 hours): Intake/Output Summary (Last 24 hours) at 6/28/2020 1031  Last data filed at 6/27/2020 2013  Gross per 24 hour   Intake 440 ml   Output 200 ml   Net 240 ml       Physical Exam:     Physical Exam   Constitutional: She is oriented to person, place, and time  She appears well-developed and well-nourished  No distress  HENT:   Head: Normocephalic and atraumatic  Right Ear: External ear normal    Left Ear: External ear normal    Eyes: No scleral icterus  Neck: Normal range of motion  Neck supple  Cardiovascular: Normal rate and regular rhythm  Pulmonary/Chest: Effort normal and breath sounds normal  No respiratory distress  Abdominal: Soft  Bowel sounds are normal  She exhibits no distension  There is no tenderness  Musculoskeletal: She exhibits no edema  Neurological: She is alert and oriented to person, place, and time  Skin: Skin is warm and dry  Psychiatric: She has a normal mood and affect  Her behavior is normal    Nursing note and vitals reviewed        Additional Data:     Labs:    Results from last 7 days   Lab Units 06/26/20  0557   WBC Thousand/uL 13 90*   HEMOGLOBIN g/dL 12 4   HEMATOCRIT % 36 6   PLATELETS Thousands/uL 446   BANDS PCT % 1   LYMPHO PCT % 6*   MONO PCT % 4   EOS PCT % 1     Results from last 7 days   Lab Units 06/25/20  1330   SODIUM mmol/L 137 POTASSIUM mmol/L 3 8   CHLORIDE mmol/L 96*   CO2 mmol/L 31*   BUN mg/dL 16   CREATININE mg/dL 0 75   ANION GAP mmol/L 10   CALCIUM mg/dL 10 2   GLUCOSE RANDOM mg/dL 144*     Results from last 7 days   Lab Units 06/28/20  0522   INR  2 23*     Results from last 7 days   Lab Units 06/28/20  0607 06/27/20  2039 06/27/20  1552 06/27/20  1121 06/27/20  0629 06/26/20  2034 06/26/20  1603 06/26/20  1107 06/26/20  0619 06/25/20  2033 06/25/20  1623 06/25/20  1132   POC GLUCOSE mg/dl 145* 175* 203* 109 142* 158* 144* 199* 142* 143* 193* 149*               Labs reviewed    Imaging:    Imaging reviewed    Recent Cultures (last 7 days):     Results from last 7 days   Lab Units 06/26/20  1318   URINE CULTURE  Culture too young- will reincubate       Last 24 Hours Medication List:     Current Facility-Administered Medications:  acetaminophen 650 mg Oral Q8H Lindsey Dsouza MD   amLODIPine 5 mg Oral Daily HUNTER Kuhn   atorvastatin 40 mg Oral Daily With Hien Fernandez MD   dicyclomine 20 mg Oral Q6H Darron Flower MD   gabapentin 100 mg Oral BID Umesh Laguna MD   gabapentin 200 mg Oral HS Umesh Laguna MD   insulin lispro 1-5 Units Subcutaneous TID AC Darron Flower MD   insulin lispro 1-5 Units Subcutaneous HS aDrron Flower MD   levothyroxine 125 mcg Oral Early Morning Darron Flower MD   lidocaine 1 patch Topical Daily Destinee Rogel MD   lisinopril 10 mg Oral Daily Samantha Ruiz KeenanHUNTER   menthol-methyl salicylate  Apply externally HS Umesh Laguna MD   menthol-methyl salicylate  Apply externally TID PRN Umesh Laguna MD   metFORMIN 500 mg Oral BID With Meals Darron Flower MD   methocarbamol 750 mg Oral Q6H PRN Darron Flower MD   metoprolol tartrate 12 5 mg Oral Q12H [facility] HUNTER Kuhn   oxyCODONE 2 5 mg Oral Q4H PRN Darron Flower MD   oxyCODONE 5 mg Oral Q4H PRN Darron Flower MD   pantoprazole 40 mg Oral Early Morning Darron Flower MD   polyethylene glycol 17 g Oral Daily PRN Darron Flower MD senna-docusate sodium 2 tablet Oral HS PRN HUNTER Woodard   sertraline 25 mg Oral Daily Sander Jimenez MD   warfarin 4 mg Oral Daily (warfarin) HUNTER Kwon        M*Modal software was used to dictate this note  It may contain errors with dictating incorrect words or incorrect spelling  Please contact the provider directly with any questions

## 2020-06-28 NOTE — ASSESSMENT & PLAN NOTE
Found on CTA during most recent admission  General surgery saw patient in consultation and recommended OP f/u  Continue Protonix 40 mg daily    Monitor for recurrence of esophageal spasm episodes/ any choking  Patient doing well today

## 2020-06-28 NOTE — ASSESSMENT & PLAN NOTE
Vitals:    06/28/20 0700   BP: 134/64   Pulse: 74   Resp: 18   Temp: 98 4 °F (36 9 °C)   SpO2:        BP remains stable this morning  Amlodipine was changed to 5mg po bid Friday d/t SBP being higher in evening     Continue amlodipine 5 mg po BID, Lopressor 12 5 mg BID,  Lisinopril to 5 mg daily   Monitor and adjust as needed

## 2020-06-28 NOTE — ASSESSMENT & PLAN NOTE
Per hospital records, patient noted to have subcentimeter lymph node in the right axilla with fat stranding on imaging  Due to history of breast CA, radiology report clinical correlation was recommended  Patient was evaluated by Dr Suhas Toney in acute care who noted this to be an asymptomatic finding and recommended no further workup at this time other than routine FU with PCP & continued regular mammograms for her history of  breast CA  Follow up with PCP on discharge

## 2020-06-28 NOTE — ASSESSMENT & PLAN NOTE
Patient with continued elevation of WBC to 13 90 (6/26); 12 60 (6/25); 9 30 (6/23)  Patient remains afebrile  CXR negative    UA positive for blood and leuk estrase (25)  Urine culture still pending ( 6/28: "culture too young, will reincubate")  CBC tomorrow

## 2020-06-29 LAB
ANION GAP SERPL CALCULATED.3IONS-SCNC: 12 MMOL/L (ref 5–14)
BASOPHILS # BLD AUTO: 0.3 THOUSANDS/ΜL (ref 0–0.1)
BASOPHILS NFR BLD AUTO: 2 % (ref 0–1)
BUN SERPL-MCNC: 16 MG/DL (ref 5–25)
CALCIUM SERPL-MCNC: 9.6 MG/DL (ref 8.4–10.2)
CHLORIDE SERPL-SCNC: 102 MMOL/L (ref 97–108)
CO2 SERPL-SCNC: 21 MMOL/L (ref 22–30)
CREAT SERPL-MCNC: 0.61 MG/DL (ref 0.6–1.2)
EOSINOPHIL # BLD AUTO: 0.2 THOUSAND/ΜL (ref 0–0.4)
EOSINOPHIL NFR BLD AUTO: 1 % (ref 0–6)
ERYTHROCYTE [DISTWIDTH] IN BLOOD BY AUTOMATED COUNT: 13.9 %
GFR SERPL CREATININE-BSD FRML MDRD: 85 ML/MIN/1.73SQ M
GLUCOSE P FAST SERPL-MCNC: 124 MG/DL (ref 70–99)
GLUCOSE SERPL-MCNC: 124 MG/DL (ref 70–99)
GLUCOSE SERPL-MCNC: 133 MG/DL (ref 65–140)
GLUCOSE SERPL-MCNC: 151 MG/DL (ref 65–140)
GLUCOSE SERPL-MCNC: 153 MG/DL (ref 65–140)
GLUCOSE SERPL-MCNC: 170 MG/DL (ref 65–140)
HCT VFR BLD AUTO: 35.3 % (ref 36–46)
HGB BLD-MCNC: 11.8 G/DL (ref 12–16)
LYMPHOCYTES # BLD AUTO: 2 THOUSANDS/ΜL (ref 0.5–4)
LYMPHOCYTES NFR BLD AUTO: 17 % (ref 25–45)
MCH RBC QN AUTO: 30.9 PG (ref 26–34)
MCHC RBC AUTO-ENTMCNC: 33.4 G/DL (ref 31–36)
MCV RBC AUTO: 93 FL (ref 80–100)
MONOCYTES # BLD AUTO: 0.8 THOUSAND/ΜL (ref 0.2–0.9)
MONOCYTES NFR BLD AUTO: 7 % (ref 1–10)
NEUTROPHILS # BLD AUTO: 8.5 THOUSANDS/ΜL (ref 1.8–7.8)
NEUTS SEG NFR BLD AUTO: 73 % (ref 45–65)
PLATELET # BLD AUTO: 470 THOUSANDS/UL (ref 150–450)
PMV BLD AUTO: 8.4 FL (ref 8.9–12.7)
POTASSIUM SERPL-SCNC: 5.1 MMOL/L (ref 3.6–5)
RBC # BLD AUTO: 3.81 MILLION/UL (ref 4–5.2)
SODIUM SERPL-SCNC: 135 MMOL/L (ref 137–147)
WBC # BLD AUTO: 11.8 THOUSAND/UL (ref 4.5–11)

## 2020-06-29 PROCEDURE — 82948 REAGENT STRIP/BLOOD GLUCOSE: CPT

## 2020-06-29 PROCEDURE — 97535 SELF CARE MNGMENT TRAINING: CPT

## 2020-06-29 PROCEDURE — 80048 BASIC METABOLIC PNL TOTAL CA: CPT | Performed by: NURSE PRACTITIONER

## 2020-06-29 PROCEDURE — 97530 THERAPEUTIC ACTIVITIES: CPT

## 2020-06-29 PROCEDURE — 97110 THERAPEUTIC EXERCISES: CPT

## 2020-06-29 PROCEDURE — 97116 GAIT TRAINING THERAPY: CPT

## 2020-06-29 PROCEDURE — 99233 SBSQ HOSP IP/OBS HIGH 50: CPT | Performed by: INTERNAL MEDICINE

## 2020-06-29 PROCEDURE — 85025 COMPLETE CBC W/AUTO DIFF WBC: CPT | Performed by: NURSE PRACTITIONER

## 2020-06-29 PROCEDURE — 99232 SBSQ HOSP IP/OBS MODERATE 35: CPT | Performed by: PHYSICAL MEDICINE & REHABILITATION

## 2020-06-29 RX ADMIN — ACETAMINOPHEN 650 MG: 325 TABLET ORAL at 14:44

## 2020-06-29 RX ADMIN — LIDOCAINE 1 PATCH: 50 PATCH CUTANEOUS at 08:34

## 2020-06-29 RX ADMIN — SERTRALINE HYDROCHLORIDE 25 MG: 25 TABLET ORAL at 08:33

## 2020-06-29 RX ADMIN — DICYCLOMINE HYDROCHLORIDE 20 MG: 20 TABLET ORAL at 17:32

## 2020-06-29 RX ADMIN — METFORMIN HYDROCHLORIDE 500 MG: 500 TABLET ORAL at 16:31

## 2020-06-29 RX ADMIN — INSULIN LISPRO 1 UNITS: 100 INJECTION, SOLUTION INTRAVENOUS; SUBCUTANEOUS at 21:08

## 2020-06-29 RX ADMIN — INSULIN LISPRO 1 UNITS: 100 INJECTION, SOLUTION INTRAVENOUS; SUBCUTANEOUS at 17:34

## 2020-06-29 RX ADMIN — PANTOPRAZOLE SODIUM 40 MG: 40 TABLET, DELAYED RELEASE ORAL at 05:35

## 2020-06-29 RX ADMIN — MENTHOL, METHYL SALICYLATE: 10; 15 CREAM TOPICAL at 21:07

## 2020-06-29 RX ADMIN — DICYCLOMINE HYDROCHLORIDE 20 MG: 20 TABLET ORAL at 05:36

## 2020-06-29 RX ADMIN — DICYCLOMINE HYDROCHLORIDE 20 MG: 20 TABLET ORAL at 23:13

## 2020-06-29 RX ADMIN — GABAPENTIN 100 MG: 100 CAPSULE ORAL at 08:33

## 2020-06-29 RX ADMIN — METHOCARBAMOL 750 MG: 750 TABLET, FILM COATED ORAL at 18:19

## 2020-06-29 RX ADMIN — ACETAMINOPHEN 650 MG: 325 TABLET ORAL at 05:36

## 2020-06-29 RX ADMIN — GABAPENTIN 200 MG: 100 CAPSULE ORAL at 21:06

## 2020-06-29 RX ADMIN — AMLODIPINE BESYLATE 5 MG: 5 TABLET ORAL at 08:33

## 2020-06-29 RX ADMIN — OXYCODONE HYDROCHLORIDE 5 MG: 5 TABLET ORAL at 21:07

## 2020-06-29 RX ADMIN — ATORVASTATIN CALCIUM 40 MG: 40 TABLET, FILM COATED ORAL at 16:32

## 2020-06-29 RX ADMIN — MENTHOL, METHYL SALICYLATE: 10; 15 CREAM TOPICAL at 12:29

## 2020-06-29 RX ADMIN — OXYCODONE HYDROCHLORIDE 5 MG: 5 TABLET ORAL at 02:15

## 2020-06-29 RX ADMIN — INSULIN LISPRO 1 UNITS: 100 INJECTION, SOLUTION INTRAVENOUS; SUBCUTANEOUS at 12:28

## 2020-06-29 RX ADMIN — LISINOPRIL 10 MG: 10 TABLET ORAL at 08:33

## 2020-06-29 RX ADMIN — GABAPENTIN 100 MG: 100 CAPSULE ORAL at 14:44

## 2020-06-29 RX ADMIN — LEVOTHYROXINE SODIUM 125 MCG: 125 TABLET ORAL at 05:36

## 2020-06-29 RX ADMIN — WARFARIN SODIUM 4 MG: 2 TABLET ORAL at 17:32

## 2020-06-29 RX ADMIN — METFORMIN HYDROCHLORIDE 500 MG: 500 TABLET ORAL at 08:33

## 2020-06-29 RX ADMIN — DICYCLOMINE HYDROCHLORIDE 20 MG: 20 TABLET ORAL at 12:28

## 2020-06-29 RX ADMIN — OXYCODONE HYDROCHLORIDE 5 MG: 5 TABLET ORAL at 08:34

## 2020-06-29 RX ADMIN — METOPROLOL TARTRATE 12.5 MG: 25 TABLET ORAL at 08:33

## 2020-06-29 RX ADMIN — ACETAMINOPHEN 650 MG: 325 TABLET ORAL at 21:07

## 2020-06-29 RX ADMIN — METOPROLOL TARTRATE 12.5 MG: 25 TABLET ORAL at 21:06

## 2020-06-29 NOTE — PROGRESS NOTES
Progress Note - Hope Scott 1940, 78 y o  female MRN: [id number]    Unit/Bed#: -01 Encounter: [id number]    Primary Care Provider: Khadijah Black DO   Date and time admitted to hospital: 6/21/2020 11:39 AM        Leukocytosis  Assessment & Plan  Patient with continued elevation of WBC to 13 90 (6/26); 12 60 (6/25); 9 30 (6/23)  Patient remains afebrile  CXR negative  UA positive for blood and leuk estrase (25)  Urine culture negative -  "  10,000-19,000 cfu/ml     Mixed Contaminants X4     CBC today - awaiting repeat draw    Abnormal CAT scan  Assessment & Plan  CTA performed on 6/18 revealed "fat stranding noted in the right axillary/chest wall region, of uncertain significance, recommend clinical correlation"  Follow up with PCP on  discharge         Lymph node disorder  Assessment & Plan  Per hospital records, patient noted to have subcentimeter lymph node in the right axilla with fat stranding on imaging  Due to history of breast CA, radiology report clinical correlation was recommended  Patient was evaluated by Dr Tayla Dos Santos in acute care who noted this to be an asymptomatic finding and recommended no further workup at this time other than routine FU with PCP & continued regular mammograms for her history of  breast CA  Follow up with PCP on discharge  IBS (irritable bowel syndrome)  Assessment & Plan  Continue home dose of Bentyl  LBM 6/28 - multiple  Her bowel meds were switched to as needed  Should bowels continue to be loose after 48 hours off bowel meds then may need to check for c-diff    Mood disorder [facility])  Assessment & Plan  Continue sertraline 25 mg daily    Neuropathic pain  Assessment & Plan  Patient with DM  Patient had difficulty sleepingThursday due to bilateral LE "burning" pain  Has improved over the past several nights  Continues on neurontin 100 mg BID and 200 q h s   Per primary team        HTN (hypertension)  Assessment & Plan  Vitals:    06/29/20 0703 BP: 145/63   Pulse: 75   Resp: 18   Temp: (!) 97 °F (36 1 °C)   SpO2: 98%       BP remains stable      Continue amlodipine 5 mg daily (would recommend not increasing due to tendency toward bilateral LE edema), Lopressor 12 5 mg BID,  Lisinopril to 10 mg daily   Monitor and adjust as needed     DM (diabetes mellitus) [facility])  Assessment & Plan  Lab Results   Component Value Date    HGBA1C 6 4 (H) 05/12/2020       Recent Labs     06/28/20  1603 06/28/20 2034 06/29/20  0618 06/29/20  1127   POCGLU 120 110 133 151*       Blood Sugar Average: Last 72 hrs:  (P) 150 [id number]     Continue Metformin 500 mg BID with SSI  Monitor kidney function   6/29 - creatinine 0 61  BMP Thursday  Glucose stable  Hypokalemia  Assessment & Plan  Per most recent hospitalization, K+ as low as 2 2 (felt due to poor p o  Intake)  6/22 K+ 3 5  Replaced with 40 mEq at that time  6/23 K+ 4 0  6/25 - 3 8  6/29 5 1 - specimen hemolized - will redraw today  Encouraged potassium rich diet  BMP Thursday  Chronic deep vein thrombosis (DVT) of left popliteal vein (HCC)  Assessment & Plan  Non-occlusive LLE DVT (popliteal vein) on 6/16 imaging, unchanged on repeat imaging 6/26  Patient had been on Eliquis prior to recent spinal surgery  Per surgery, patient not cleared to resume Eliquis  Dr Vi Turcios does not want patient on Eliquis for at least 3 weeks post-op  Decision made prior to recent hospital discharge to start Coumadin with INR goal of 2 0-2 5  (when goal reached, heparin may be discontinued  INR 6/26 1 86, 6/25 2 01; 1 86; 6/24 1 83; 6/23 1 60;  6/22 1 35  Will increase Coumadin from 2 mg to 4 mg with INR on Sunday  INR 2 23 6/28  No dosage change  Recheck Wednesday to ensure remaining within therapeutic range  Will discontinue Heparin at this time  Patient will need clearance by spine surgery to be switched back to Eliquis      Arrhythmia  Assessment & Plan  Follows with [facility]  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 (Dr David Fernandez)  History of a-flutter (patient does not remember this dx) - patient is on chronic AC due to DVT  Regular rhythm at this time  Continue to monitor rate/rhythm- remains stable    Hiatal hernia with gastroesophageal reflux  Assessment & Plan  Found on CTA during most recent admission  General surgery saw patient in consultation and recommended OP f/u  Continue Protonix 40 mg daily  Monitor for recurrence of esophageal spasm episodes/ any choking  Patient doing well today     Hyperlipidemia  Assessment & Plan  Most recent lipid profile (5/12/2020) - total cholesterol 209, triglycerides 145, HDL 64,   Continue atorvastatin 40 mg daily    Hypothyroidism  Assessment & Plan  Most recent TSH (5/12) was 2 23  Continue levothyroxine 125mcg (home dose) as prescribed    * Spinal stenosis of lumbar region  Assessment & Plan  PT/OT and  pain management per primary team   S/p L4-S1 laminectomy on 6/15  Spine precautions/LSO brace  F/u with Dr Linda Terrazas upon discharge  VTE Pharmacologic Prophylaxis:   Pharmacologic: Heparin  Mechanical VTE Prophylaxis in Place: Yes    Current Length of Stay: 8 day(s)    Current Patient Status: Inpatient Rehab     Discharge Plan: As per treatment team     Code Status: Level 1 - Full Code    Subjective:   No overnight events  Per nursing, patient had increased left leg pain this morning (7/10)  She states she did not sleep well last night due to pain and "family issues at home"  Patient had work up for UTI on Friday which was negative for infection  She is afebrile and denies dysuria, urinary frequency  She did have blood drawn this morning but unfortunately, BMP hemolyzed and CBC needs to be redrawn  Patient denies fever, chills, shortness of breath, chest, pain, N/V    She had loose stools over the weekend and her bowel regimen was changed to as needed yesterday ()    Objective:     Vitals:   Temp (24hrs), Av 6 °F (36 4 °C), Min:97 °F (36 1 °C), Max:98 3 °F (36 8 °C)    Temp:  [97 °F (36 1 °C)-98 3 °F (36 8 °C)] 97 °F (36 1 °C)  HR:  [75-94] 75  Resp:  [18-20] 18  BP: (132-177)/(61-81) 145/63  SpO2:  [94 %-98 %] 98 %  Body mass index is 32 62 kg/m²  Review of Systems   Constitutional: Negative for chills, fatigue and fever  Respiratory: Negative for cough, chest tightness and shortness of breath  Cardiovascular: Negative for chest pain, palpitations and leg swelling  Gastrointestinal: Negative for abdominal distention, abdominal pain, constipation, diarrhea and nausea  Genitourinary: Negative for difficulty urinating, dysuria, flank pain, frequency and urgency  Musculoskeletal: Positive for arthralgias (left leg), back pain (low back; ice helped this morning after therapy) and gait problem  Negative for joint swelling and myalgias  Skin: Positive for wound  Neurological: Negative for dizziness, light-headedness and headaches  Hematological: Negative for adenopathy  Does not bruise/bleed easily  Psychiatric/Behavioral: Negative for confusion and dysphoric mood  The patient is not nervous/anxious  Input and Output Summary (last 24 hours): Intake/Output Summary (Last 24 hours) at 2020 1538  Last data filed at 2020 1352  Gross per 24 hour   Intake 600 ml   Output    Net 600 ml       Physical Exam:     Physical Exam   Constitutional: She is oriented to person, place, and time  She appears well-developed  Neck: Normal range of motion  Neck supple  No thyromegaly present  Cardiovascular: Normal rate, regular rhythm, normal heart sounds and intact distal pulses  Pulmonary/Chest: Effort normal and breath sounds normal    Abdominal: Soft  Bowel sounds are normal  She exhibits no distension  There is no tenderness  Musculoskeletal: She exhibits tenderness (lower back )  She exhibits no edema     Lymphadenopathy:     She has no cervical adenopathy  Neurological: She is alert and oriented to person, place, and time  No cranial nerve deficit  Skin: Skin is warm and dry  Incision on lower back - CDI with steri strips     Psychiatric: She has a normal mood and affect   Her behavior is normal        Additional Data:     Labs:    Results from last 7 days   Lab Units 06/29/20  1333 06/26/20  0557   WBC Thousand/uL 11 80* 13 90*   HEMOGLOBIN g/dL 11 8* 12 4   HEMATOCRIT % 35 3* 36 6   PLATELETS Thousands/uL 470* 446   BANDS PCT %  --  1   NEUTROS PCT % 73*  --    LYMPHS PCT % 17*  --    LYMPHO PCT %  --  6*   MONOS PCT % 7  --    MONO PCT %  --  4   EOS PCT % 1 1     Results from last 7 days   Lab Units 06/29/20  0537   SODIUM mmol/L 135*   POTASSIUM mmol/L 5 1*   CHLORIDE mmol/L 102   CO2 mmol/L 21*   BUN mg/dL 16   CREATININE mg/dL 0 61   ANION GAP mmol/L 12   CALCIUM mg/dL 9 6   GLUCOSE RANDOM mg/dL 124*     Results from last 7 days   Lab Units 06/28/20  0522   INR  2 23*     Results from last 7 days   Lab Units 06/29/20  1127 06/29/20  0618 06/28/20  2034 06/28/20  1603 06/28/20  1118 06/28/20  0607 06/27/20  2039 06/27/20  1552 06/27/20  1121 06/27/20  0629 06/26/20  2034 06/26/20  1603   POC GLUCOSE mg/dl 151* 133 110 120 178* 145* 175* 203* 109 142* 158* 144*               Labs reviewed    Imaging:    Imaging reviewed    Recent Cultures (last 7 days):     Results from last 7 days   Lab Units 06/26/20  1318   URINE CULTURE  10,000-19,000 cfu/ml        Last 24 Hours Medication List:     Current Facility-Administered Medications:  acetaminophen 650 mg Oral Q8H [address] Sybil Shay MD   amLODIPine 5 mg Oral Daily HUNTER Kuhn   atorvastatin 40 mg Oral Daily With Maria De Jesus Sprague MD   dicyclomine 20 mg Oral Q6H Jacinto Bates MD   gabapentin 100 mg Oral BID Sybil Shay MD   gabapentin 200 mg Oral HS Sybil Shay MD   insulin lispro 1-5 Units Subcutaneous TID AC Jacinto Bates MD   insulin lispro 1-5 Units Subcutaneous HS Renee Randle MD   levothyroxine 125 mcg Oral Early Morning Renee Randle MD   lidocaine 1 patch Topical Daily Davy Linares MD   lisinopril 10 mg Oral Daily HUNTER Garrett   menthol-methyl salicylate  Apply externally HS Nereida Soria MD   menthol-methyl salicylate  Apply externally TID PRN Nereida Soria MD   metFORMIN 500 mg Oral BID With Meals Renee Randle MD   methocarbamol 750 mg Oral Q6H PRN Renee Randle MD   metoprolol tartrate 12 5 mg Oral Q12H [address] HUNTER Kuhn   oxyCODONE 2 5 mg Oral Q4H PRN Renee Randle MD   oxyCODONE 5 mg Oral Q4H PRN Renee Randle MD   pantoprazole 40 mg Oral Early Morning Renee Randle MD   polyethylene glycol 17 g Oral Daily PRN Renee Randle MD   senna-docusate sodium 2 tablet Oral HS PRN HUNTER Feliciano   sertraline 25 mg Oral Daily Renee Randle MD   warfarin 4 mg Oral Daily (warfarin) HUNTER Garrett        M*Modal software was used to dictate this note  It may contain errors with dictating incorrect words or incorrect spelling  Please contact the provider directly with any questions

## 2020-06-29 NOTE — ASSESSMENT & PLAN NOTE
Vitals:    06/29/20 0703   BP: 145/63   Pulse: 75   Resp: 18   Temp: (!) 97 °F (36 1 °C)   SpO2: 98%       BP remains stable      Continue amlodipine 5 mg daily (would recommend not increasing due to tendency toward bilateral LE edema), Lopressor 12 5 mg BID,  Lisinopril to 10 mg daily   Monitor and adjust as needed

## 2020-06-29 NOTE — ASSESSMENT & PLAN NOTE
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

## 2020-06-29 NOTE — PLAN OF CARE
Problem: Prexisting or High Potential for Compromised Skin Integrity  Goal: Skin integrity is maintained or improved  Description  INTERVENTIONS:  - Identify patients at risk for skin breakdown  - Assess and monitor skin integrity  - Assess and monitor nutrition and hydration status  - Monitor labs   - Assess for incontinence   - Turn and reposition patient  - Assist with mobility/ambulation  - Relieve pressure over bony prominences  - Avoid friction and shearing  - Provide appropriate hygiene as needed including keeping skin clean and dry  - Evaluate need for skin moisturizer/barrier cream  - Collaborate with interdisciplinary team   - Patient/family teaching  - Consider wound care consult   Outcome: Progressing     Problem: Potential for Falls  Goal: Patient will remain free of falls  Description  INTERVENTIONS:  - Assess patient frequently for physical needs  -  Identify cognitive and physical deficits and behaviors that affect risk of falls    -  [place] fall precautions as indicated by assessment   - Educate patient/family on patient safety including physical limitations  - Instruct patient to call for assistance with activity based on assessment  - Modify environment to reduce risk of injury  - Consider OT/PT consult to assist with strengthening/mobility  Outcome: Progressing     Problem: PAIN - ADULT  Goal: Verbalizes/displays adequate comfort level or baseline comfort level  Description  Interventions:  - Encourage patient to monitor pain and request assistance  - Assess pain using appropriate pain scale  - Administer analgesics based on type and severity of pain and evaluate response  - Implement non-pharmacological measures as appropriate and evaluate response  - Consider cultural and social influences on pain and pain management  - Notify physician/advanced practitioner if interventions unsuccessful or patient reports new pain  Outcome: Progressing     Problem: INFECTION - ADULT  Goal: Absence or prevention of progression during hospitalization  Description  INTERVENTIONS:  - Assess and monitor for signs and symptoms of infection  - Monitor lab/diagnostic results  - Monitor all insertion sites, i e  indwelling lines, tubes, and drains  - Monitor endotracheal if appropriate and nasal secretions for changes in amount and color  - [place] appropriate cooling/warming therapies per order  - Administer medications as ordered  - Instruct and encourage patient and family to use good hand hygiene technique  - Identify and instruct in appropriate isolation precautions for identified infection/condition  Outcome: Progressing     Problem: SAFETY ADULT  Goal: Maintain or return to baseline ADL function  Description  INTERVENTIONS:  -  Assess patient's ability to carry out ADLs; assess patient's baseline for ADL function and identify physical deficits which impact ability to perform ADLs (bathing, care of mouth/teeth, toileting, grooming, dressing, etc )  - Assess/evaluate cause of self-care deficits   - Assess range of motion  - Assess patient's mobility; develop plan if impaired  - Assess patient's need for assistive devices and provide as appropriate  - Encourage maximum independence but intervene and supervise when necessary  - Involve family in performance of ADLs  - Assess for home care needs following discharge   - Consider OT consult to assist with ADL evaluation and planning for discharge  - Provide patient education as appropriate  Outcome: Progressing  Goal: Maintain or return mobility status to optimal level  Description  INTERVENTIONS:  - Assess patient's baseline mobility status (ambulation, transfers, stairs, etc )    - Identify cognitive and physical deficits and behaviors that affect mobility  - Identify mobility aids required to assist with transfers and/or ambulation (gait belt, sit-to-stand, lift, walker, cane, etc )  - [place] fall precautions as indicated by assessment  - Record patient progress and toleration of activity level on Mobility SBAR; progress patient to next Phase/Stage  - Instruct patient to call for assistance with activity based on assessment  - Consider rehabilitation consult to assist with strengthening/weightbearing, etc   Outcome: Progressing     Problem: DISCHARGE PLANNING  Goal: Discharge to home or other facility with appropriate resources  Description  INTERVENTIONS:  - Identify barriers to discharge w/patient and caregiver  - Arrange for needed discharge resources and transportation as appropriate  - Identify discharge learning needs (meds, wound care, etc )  - Arrange for interpretive services to assist at discharge as needed  - Refer to Case Management Department for coordinating discharge planning if the patient needs post-hospital services based on physician/advanced practitioner order or complex needs related to functional status, cognitive ability, or social support system  Outcome: Progressing     Problem: SKIN/TISSUE INTEGRITY - ADULT  Goal: Skin integrity remains intact  Description  INTERVENTIONS  - Identify patients at risk for skin breakdown  - Assess and monitor skin integrity  - Assess and monitor nutrition and hydration status  - Monitor labs (i e  albumin)  - Assess for incontinence   - Turn and reposition patient  - Assist with mobility/ambulation  - Relieve pressure over bony prominences  - Avoid friction and shearing  - Provide appropriate hygiene as needed including keeping skin clean and dry  - Evaluate need for skin moisturizer/barrier cream  - Collaborate with interdisciplinary team (i e  Nutrition, Rehabilitation, etc )   - Patient/family teaching  Outcome: Progressing  Goal: Incision(s), wounds(s) or drain site(s) healing without S/S of infection  Description  INTERVENTIONS  - Assess and document risk factors for skin impairment   - Assess and document dressing, incision, wound bed, drain sites and surrounding tissue  - Consider nutrition services referral as needed  - Oral mucous membranes remain intact  - Provide patient/ family education  Outcome: Progressing  Goal: Oral mucous membranes remain intact  Description  INTERVENTIONS  - Assess oral mucosa and hygiene practices  - Implement preventative oral hygiene regimen  - Implement oral medicated treatments as ordered  - Initiate Nutrition services referral as needed  Outcome: Progressing     Problem: MUSCULOSKELETAL - ADULT  Goal: Maintain or return mobility to safest level of function  Description  INTERVENTIONS:  - Assess patient's ability to carry out ADLs; assess patient's baseline for ADL function and identify physical deficits which impact ability to perform ADLs (bathing, care of mouth/teeth, toileting, grooming, dressing, etc )  - Assess/evaluate cause of self-care deficits   - Assess range of motion  - Assess patient's mobility  - Assess patient's need for assistive devices and provide as appropriate  - Encourage maximum independence but intervene and supervise when necessary  - Involve family in performance of ADLs  - Assess for home care needs following discharge   - Consider OT consult to assist with ADL evaluation and planning for discharge  - Provide patient education as appropriate  Outcome: Progressing  Goal: Maintain proper alignment of affected body part  Description  INTERVENTIONS:  - Support, maintain and protect limb and body alignment  - Provide patient/ family with appropriate education  Outcome: Progressing

## 2020-06-29 NOTE — PLAN OF CARE
Problem: Prexisting or High Potential for Compromised Skin Integrity  Goal: Skin integrity is maintained or improved  Description  INTERVENTIONS:  - Identify patients at risk for skin breakdown  - Assess and monitor skin integrity  - Assess and monitor nutrition and hydration status  - Monitor labs   - Assess for incontinence   - Turn and reposition patient  - Assist with mobility/ambulation  - Relieve pressure over bony prominences  - Avoid friction and shearing  - Provide appropriate hygiene as needed including keeping skin clean and dry  - Evaluate need for skin moisturizer/barrier cream  - Collaborate with interdisciplinary team   - Patient/family teaching  - Consider wound care consult   Outcome: Progressing     Problem: Potential for Falls  Goal: Patient will remain free of falls  Description  INTERVENTIONS:  - Assess patient frequently for physical needs  -  Identify cognitive and physical deficits and behaviors that affect risk of falls    -  [place] fall precautions as indicated by assessment   - Educate patient/family on patient safety including physical limitations  - Instruct patient to call for assistance with activity based on assessment  - Modify environment to reduce risk of injury  - Consider OT/PT consult to assist with strengthening/mobility  Outcome: Progressing     Problem: PAIN - ADULT  Goal: Verbalizes/displays adequate comfort level or baseline comfort level  Description  Interventions:  - Encourage patient to monitor pain and request assistance  - Assess pain using appropriate pain scale  - Administer analgesics based on type and severity of pain and evaluate response  - Implement non-pharmacological measures as appropriate and evaluate response  - Consider cultural and social influences on pain and pain management  - Notify physician/advanced practitioner if interventions unsuccessful or patient reports new pain  Outcome: Progressing     Problem: SKIN/TISSUE INTEGRITY - ADULT  Goal: Incision(s), wounds(s) or drain site(s) healing without S/S of infection  Description  INTERVENTIONS  - Assess and document risk factors for skin impairment   - Assess and document dressing, incision, wound bed, drain sites and surrounding tissue  - Consider nutrition services referral as needed  - Oral mucous membranes remain intact  - Provide patient/ family education  Outcome: Progressing     Problem: MUSCULOSKELETAL - ADULT  Goal: Maintain or return mobility to safest level of function  Description  INTERVENTIONS:  - Assess patient's ability to carry out ADLs; assess patient's baseline for ADL function and identify physical deficits which impact ability to perform ADLs (bathing, care of mouth/teeth, toileting, grooming, dressing, etc )  - Assess/evaluate cause of self-care deficits   - Assess range of motion  - Assess patient's mobility  - Assess patient's need for assistive devices and provide as appropriate  - Encourage maximum independence but intervene and supervise when necessary  - Involve family in performance of ADLs  - Assess for home care needs following discharge   - Consider OT consult to assist with ADL evaluation and planning for discharge  - Provide patient education as appropriate  Outcome: Progressing

## 2020-06-29 NOTE — PROGRESS NOTES
PM&R Progress Note:    ASSESSMENT:  18-year-old female with hypertension, diabetes, CKD, lumbar stenosis now at [facility] due to lumbar stenosis s/p L4-S1 lami-fusion by Dr Elzie Buerger on 6/15  Stable and progressing    PLAN:    Rehabilitation   Continue current rehabilitation plan of care to maximize function   Estimated Discharge: TBD, RETEAM    Pain   Improving in lumbar spine - continue current regimen    DVT prophylaxis   Coumadin and SCDs      Bladder plan   Continent   Urinary frequency and incomplete voiding sensation reported; consider repeat UCx; check PVRs to rule out retention    Last UCx with mixed contaminants     Bowel plan   Continent    Leukocytosis  Assessment & Plan  - with reported urinary frequency, incomplete voiding  - check PVRs  - UCx mixed contaminants, will discuss with IM if want to repeat UCx    - dopplers with chronic left DVT  - CXR negative    Abnormal CAT scan  Assessment & Plan  Abnormalities on CT of 11/2019 including but not limited to:    - diverticulosis  - L renal cyst  - B/L non-obstructing renal stones     - OP FU     Lymph node disorder  Assessment & Plan  - noted to have subcentimeter lymph node in the rt axilla with fat stranding on imaging in the setting of h/o breast CA therefore per radiology report clinical correlation was recommended and patient was evaluated by Dr Ashley Phipps in acute care who noted this to be an asymptomatic finding and recommended no further SMITH at this time other than routine FU with PCP & continued regular mammograms for her h/o breast CA     IBS (irritable bowel syndrome)  Assessment & Plan  - on home bentyl 20 mg q6     Mood disorder (HCC)  Assessment & Plan  - on home zoloft 25 mg qd     Neuropathic pain  Assessment & Plan  - in setting of DM  - continue gabapentin 100/100/200mg, uptitrate as needed     HTN (hypertension)  Assessment & Plan  - at home on lopressor, norvasc, & lisinopril - continue   - monitor for orthostatic hypotension - IM managing     DM (diabetes mellitus) ([facility] [address] )  Assessment & Plan  - on home metformin 500 mg BID with meals & ISS  - IM managing       Hypokalemia  Assessment & Plan  - periodic BMP  - IM managing     Chronic deep vein thrombosis (DVT) of left popliteal vein (HCC)  Assessment & Plan  - non-occlusive LLE DVT of the popliteal vein per imaging on 6/16/20, however similar finding on imaging going back to at least 6/2007  - patient was previously on eliquis, but per Dr Thierno Berry would hold eliquis for 3 weeks post-op   - continue coumadin   - trend INR    Arrhythmia  Assessment & Plan  - h/o AVNRT s/p ablation in 11/2019  - h/o a-flutter (chronically on [facility] [place], operated by [facility] due to h/o DVT)  - follows with Dr Shane Schwartz of [facility] cards     Hiatal hernia with gastroesophageal reflux  Assessment & Plan  - therapeutic substitution for home omeprazole 40 mg qd     Hyperlipidemia  Assessment & Plan  - on home lipitor 40 mg qpm     Hypothyroidism  Assessment & Plan  - on home levothyroxine 125 mcg qd   - TSH on 5/12/20 WNL     * Spinal stenosis of lumbar region  Assessment & Plan  - s/p L4-S1 lami-fusion by Dr Thierno Berry on 6/15  - spine precautions/LSO brace  - OP FU with Dr Madisyn Cantu  Labs, medications, and imaging personally reviewed  SUBJECTIVE:  Endorses back pain this AM, improved once sitting on chair  Reports urinary frequency and feeling of incomplete voiding  No fever/chills  ROS:  No fever, chills, chest pain, SOB, cough, nausea, vomiting, diarrhea, constipation, abdominal pain, dysuria, bowel/bladder incontinence, new weakness or changes in sensation  +urinary frequency, feeling of incomplete voiding  Complete ROS obtained and otherwise negative          OBJECTIVE:   /63 (BP Location: Right arm)   Pulse 75   Temp (!) 97 °F (36 1 °C) (Tympanic)   Resp 18   Ht 5' 1" (1 549 m)   Wt 78 3 kg (172 lb 9 9 oz)   SpO2 98%   BMI 32 62 kg/m²     GEN: NAD, sitting comfortably in chair  Head: NCAT, no gross lesions  Eyes: PERRL, EOMI  Throat: clear, no thrush, MMM  Pulm: CTAB, no rales/wheezes  CV: RRR, normal s1/s2  Abd: soft, NTND  Ext: no pedal edema bilaterally, distal extremities warm and well perfused  Psych: normal affect, no agitation  Skin: no observable rashes; lumbar incision c/d/i  Neuro:  A+Ox3, fluent speech, follows commands   Moving bilateral UE and LE spontaneously     Lab Results   Component Value Date    WBC 13 90 (H) 06/26/2020    HGB 12 4 06/26/2020    HCT 36 6 06/26/2020    MCV 91 06/26/2020     06/26/2020     Lab Results   Component Value Date    SODIUM 135 (L) 06/29/2020    K 5 1 (H) 06/29/2020     06/29/2020    CO2 21 (L) 06/29/2020    BUN 16 06/29/2020    CREATININE 0 61 06/29/2020    GLUC 124 (H) 06/29/2020    CALCIUM 9 6 06/29/2020     Lab Results   Component Value Date    INR 2 23 (H) 06/28/2020    INR 1 86 (H) 06/26/2020    INR 2 01 (H) 06/25/2020    PROTIME 23 8 (H) 06/28/2020    PROTIME 20 6 (H) 06/26/2020    PROTIME 21 9 (H) 06/25/2020           Current Facility-Administered Medications:     acetaminophen (TYLENOL) tablet 650 mg, 650 mg, Oral, Q8H [address], Maryse Collins MD, 650 mg at 06/29/20 0536    amLODIPine (NORVASC) tablet 5 mg, 5 mg, Oral, Daily, HUNTER Kuhn, 5 mg at 06/29/20 9742    atorvastatin (LIPITOR) tablet 40 mg, 40 mg, Oral, Daily With Adilene Chaney MD, 40 mg at 06/28/20 1701    dicyclomine (BENTYL) tablet 20 mg, 20 mg, Oral, Q6H, Dorothe Blizzard, MD, 20 mg at 06/29/20 0536    gabapentin (NEURONTIN) capsule 100 mg, 100 mg, Oral, BID, Maryse Collins MD, 100 mg at 06/29/20 6920    gabapentin (NEURONTIN) capsule 200 mg, 200 mg, Oral, HS, Maryse Collins MD, 200 mg at 06/28/20 2116    insulin lispro (HumaLOG) 100 units/mL subcutaneous injection 1-5 Units, 1-5 Units, Subcutaneous, TID AC, 1 Units at 06/27/20 1724 **AND** Fingerstick Glucose (POCT), , , TID AC, Dorothe Blizzard, MD    insulin lispro (HumaLOG) 100 units/mL subcutaneous injection 1-5 Units, 1-5 Units, Subcutaneous, HS, Francesca Barrientos MD, 1 Units at 06/27/20 2148    levothyroxine tablet 125 mcg, 125 mcg, Oral, Early Morning, Francesca Barrientos MD, 125 mcg at 06/29/20 0536    lidocaine (LIDODERM) 5 % patch 1 patch, 1 patch, Topical, Daily, Jose Alberto Blount MD, 1 patch at 06/29/20 0834    lisinopril (ZESTRIL) tablet 10 mg, 10 mg, Oral, Daily, HUNTER Kuhn, 10 mg at 06/29/20 2679    menthol-methyl salicylate (BENGAY) 75-41 % cream, , Apply externally, HS, Gelacio Escobar MD    menthol-methyl salicylate (BENGAY) 53-37 % cream, , Apply externally, TID PRN, Gelacio Escobar MD    metFORMIN (GLUCOPHAGE) tablet 500 mg, 500 mg, Oral, BID With Meals, Francesca Barrientos MD, 500 mg at 06/29/20 9945    methocarbamol (ROBAXIN) tablet 750 mg, 750 mg, Oral, Q6H PRN, Francesca Barrientos MD, 750 mg at 06/28/20 2116    metoprolol tartrate (LOPRESSOR) partial tablet 12 5 mg, 12 5 mg, Oral, Q12H MELISSA, HUNTER Kuhn, 12 5 mg at 06/29/20 9942    oxyCODONE (ROXICODONE) IR tablet 2 5 mg, 2 5 mg, Oral, Q4H PRN, Francesca Barrientos MD, 2 5 mg at 06/28/20 1107    oxyCODONE (ROXICODONE) IR tablet 5 mg, 5 mg, Oral, Q4H PRN, Francesca Barrientos MD, 5 mg at 06/29/20 0834    pantoprazole (PROTONIX) EC tablet 40 mg, 40 mg, Oral, Early Morning, Francesca Barrientos MD, 40 mg at 06/29/20 0535    polyethylene glycol (MIRALAX) packet 17 g, 17 g, Oral, Daily PRN, Francesca Barrientos MD    senna-docusate sodium (SENOKOT S) 8 6-50 mg per tablet 2 tablet, 2 tablet, Oral, HS PRN, HUNTER Feliciano    sertraline (ZOLOFT) tablet 25 mg, 25 mg, Oral, Daily, Francesca Barrientos MD, 25 mg at 06/29/20 2197    warfarin (COUMADIN) tablet 4 mg, 4 mg, Oral, Daily (warfarin), HUNTER Kuhn, 4 mg at 06/28/20 1701    Past Medical History:   Diagnosis Date    Ambulates with cane     Anxiety     Arthritis     Back pain     Bowel trouble     Cancer [facility])     left breast- left mastectomy- chemo    Chronic pain disorder     Coronary artery disease     2 stents    Depression     Diabetes mellitus (HCC)     NIDDM    Disease of thyroid gland     hypo    DVT (deep venous thrombosis) (HCC)     left leg    Full dentures     H/O breast implant     left    Headache     History of transfusion     no adverse reaction    Hyperlipidemia     Hypertension     Irregular heart beat     Afib    Localized swelling of both lower legs     Multiple falls     Neuropathy     Right knee pain     Shortness of breath     on exertion    Spinal stenosis, lumbar     Uses walker     Wears glasses     Wears glasses        Patient Active Problem List    Diagnosis Date Noted    Leukocytosis 06/26/2020    Hypothyroidism 06/21/2020    Hyperlipidemia 06/21/2020    Hiatal hernia with gastroesophageal reflux 06/21/2020    Arrhythmia 06/21/2020    Chronic deep vein thrombosis (DVT) of left popliteal vein (HCC) 06/21/2020    Hypokalemia 06/21/2020    [facility] 06/21/2020    HTN (hypertension) 06/21/2020    Neuropathic pain 06/21/2020    [facility] 06/21/2020    IBS (irritable bowel syndrome) 06/21/2020    Lymph node disorder 06/21/2020    Abnormal CAT scan 06/21/2020    Spinal stenosis of lumbar region 10/01/2019          Terence Cui MD

## 2020-06-29 NOTE — ASSESSMENT & PLAN NOTE
Lab Results   Component Value Date    HGBA1C 6 4 (H) 05/12/2020       Recent Labs     06/28/20  1603 06/28/20  2034 06/29/20  0618 06/29/20  1127   POCGLU 120 110 133 151*       Blood Sugar Average: Last 72 hrs:  (P) 150 [id number]     Continue Metformin 500 mg BID with SSI  Monitor kidney function   6/29 - creatinine 0 61  BMP Thursday  Glucose stable

## 2020-06-29 NOTE — ASSESSMENT & PLAN NOTE
Per hospital records, patient noted to have subcentimeter lymph node in the right axilla with fat stranding on imaging  Due to history of breast CA, radiology report clinical correlation was recommended  Patient was evaluated by Dr Peng Osborn in acute care who noted this to be an asymptomatic finding and recommended no further workup at this time other than routine FU with PCP & continued regular mammograms for her history of  breast CA  Follow up with PCP on discharge

## 2020-06-29 NOTE — ASSESSMENT & PLAN NOTE
Per most recent hospitalization, K+ as low as 2 2 (felt due to poor p o  Intake)  6/22 K+ 3 5  Replaced with 40 mEq at that time  6/23 K+ 4 0  6/25 - 3 8  6/29 5 1 - specimen hemolized   6/30 4 2  Encouraged potassium rich diet  BMP Thursday

## 2020-06-29 NOTE — PROGRESS NOTES
06/29/20 1000   Pain Assessment   Pain Assessment Tool 0-10   Pain Score 6   Pain Location/Orientation Location: Buttocks;Orientation: Bilateral   Restrictions/Precautions   Precautions Fall Risk;Supervision on toilet/commode;Spinal precautions   ROM Restrictions Yes  (back precautions)   Braces or Orthoses LSO   Subjective   Subjective Pt agreed for session   Sit to Stand   Type of Assistance Needed Incidental touching   Amount of Physical Assistance Provided Less than 25%   Comment CG to RW and VCs   Sit to Stand CARE Score 3   Bed-Chair Transfer   Type of Assistance Needed Physical assistance   Amount of Physical Assistance Provided Less than 25%   Comment Min A with RW   Chair/Bed-to-Chair Transfer CARE Score 3   Transfer Bed/Chair/Wheelchair   Stand Pivot Contact Guard   Sit to Stand Contact Guard   Stand to [facility]   Findings transfers are still antalgic and apprehensive, CG for general safety,  pt at times partially stands and then walks feet back under for support,  VCs for positioning and chair approach but getting better   Walk 10 Feet   Type of Assistance Needed Incidental touching   Amount of Physical Assistance Provided Less than 25%   Comment CG with RW   Walk 10 Feet CARE Score 3   Walk 50 Feet with Two Turns   Type of Assistance Needed Incidental touching   Amount of Physical Assistance Provided Less than 25%   Walk 50 Feet with Two Turns CARE Score 3   Walk 150 Feet   Type of Assistance Needed Incidental touching   Comment CG with RW   Walk 150 Feet CARE Score 4   Walking 10 Feet on Uneven Surfaces   Type of Assistance Needed Physical assistance   Amount of Physical Assistance Provided Less than 25%   Comment Min A/CG with RW   Walking 10 Feet on Uneven Surfaces CARE Score 3   Ambulation   Does the patient walk? 2   Yes   Primary Mode of Locomotion Prior to Admission Walk   Distance Walked (feet) 150 ft  (125)   Assist Device [employer]   Walk Assist Level Contact Guard   Findings still appears flexed at times at knees, trendelenberg creates lateral instability,  VCs for chair approach and times of turning way too early then needs to retro step alot that is a safety concern,  trialed Rollator due to pt has one at home, it is too early yet, pt needed Min A for stability,  Ed family on not to use Rollator at home   Wheelchair mobility   Does the patient use a wheelchair? 0  No   Therapeutic Interventions   Strengthening seated LAQ 3#,  March arom (limited range),  10x3   Equipment Use   NuStep 3 mins and then d/c due to reports of inc pain in L hamstring   Other Comments   Comments CP applied for 10 mins pt sat on due to pain in bilat glutes, she states CP did help dec pain to 5/10   Assessment   Treatment Assessment 90 min session started on Nu step which d/c early due to inc pain  However overall pt was moving better today with less pain vs last week  Pt also seemed slightly clearer with cognition but still showing defecits  Pt states that daughter that lives with her is not helpful, pts son Lashay Campbell and girlfriend was in and discussed pt will most likely need 24hr S so family will need to discuss on who can help out and maybe make shifts of providing supervision  Cont skilled PT toward LTGs   PT Family training done with: son Lashay Campbell and his girfriend  (discussed about safety concerns and supervision needs)   PT Barriers   Physical Impairment Decreased strength;Decreased endurance; Impaired balance;Decreased mobility; Decreased safety awareness;Orthopedic restrictions;Pain   Functional Limitation Car transfers; Ramp negotiation;Stair negotiation;Standing;Transfers; Walking   Plan   Treatment/Interventions Functional transfer training;LE strengthening/ROM; Therapeutic exercise; Bed mobility;Gait training   Progress Progressing toward goals   Recommendation   PT Discharge Recommendation Return to previous environment with social support;Home with skilled therapy   PT Therapy Minutes   PT Time In 1000   PT Time Out 1130   PT Total Time (minutes) 90   PT Mode of treatment - Individual (minutes) 90   PT Mode of treatment - Concurrent (minutes) 0   PT Mode of treatment - Group (minutes) 0   PT Mode of treatment - Co-treat (minutes) 0   PT Mode of Treatment - Total time(minutes) 90 minutes   PT Cumulative Minutes 660   Therapy Time missed   Time missed?  No

## 2020-06-29 NOTE — ASSESSMENT & PLAN NOTE
PT/OT and  pain management per primary team   S/p L4-S1 laminectomy on 6/15  Spine precautions/LSO brace  F/u with Dr Inga Amador upon discharge

## 2020-06-29 NOTE — ASSESSMENT & PLAN NOTE
Per hospital records, patient noted to have subcentimeter lymph node in the right axilla with fat stranding on imaging  Due to history of breast CA, radiology report clinical correlation was recommended  Patient was evaluated by Dr Tyshawn Garza in acute care who noted this to be an asymptomatic finding and recommended no further workup at this time other than routine follow up  with PCP & continued regular mammograms for her history of  breast CA  Follow up with PCP on discharge

## 2020-06-29 NOTE — ASSESSMENT & PLAN NOTE
Continue home dose of Bentyl  LBM 6/28 - multiple  Her bowel meds were switched to as needed  Should bowels continue to be loose after 48 hours off bowel meds then may need to check for c-diff  Continue to monitor

## 2020-06-29 NOTE — ASSESSMENT & PLAN NOTE
PT/OT, pain management per primary team   S/p L4-S1 laminectomy on 6/15  Spine precautions/LSO brace  F/u with Dr Babs Dominguez upon discharge

## 2020-06-29 NOTE — ASSESSMENT & PLAN NOTE
Non-occlusive LLE DVT (popliteal vein) on 6/16 imaging, unchanged on repeat imaging 6/26  Patient had been on Eliquis prior to recent spinal surgery  Per surgery, patient not cleared to resume Eliquis  Dr Darline Bennett does not want patient on Eliquis for at least 3 weeks post-op  Decision made prior to recent hospital discharge to start Coumadin with INR goal of 2 0-2 5  (when goal reached, heparin could be discontinued)  INR 6/26 1 86, 6/25 2 01; 1 86; 6/24 1 83; 6/23 1 60;  6/22 1 35  Will increase Coumadin from 2 mg to 4 mg with INR on Sunday  INR 2 23 6/28  No dosage change  Recheck Wednesday to ensure remaining within therapeutic range  Heparin discontinued  Patient will need clearance by spine surgery to be switched back to Eliquis

## 2020-06-29 NOTE — SOCIAL WORK
Met with Pt to begin the conversation re: supervision at d/c, as CM will contact Pts children  Pt shared that she doesn't care to be in the middle of her daughters  CM stated that she would recommend Pt focus on what she wants/needs, versus trying to manage her daughters issues  Pt agreed  CM said she will call Pts daughter Peg, however the  isn't set up  CM wrote her number on the board in Pts room, and requested that Pt ask her to call  PCF Peg, CM covered the recommendations for supervision at d/c, and Peg states she understands  Peg is willing to work from her mother's home, if necessary, but Crystal Gayle is there 24/7  CM shared that Nubia Bautista, already spoke with Pts son, Peg's brother, Tracie Rodriguez, about the supervision, so CM will not call him, but she will call Crystal Gayle to explain the recommendations  CM added that she and the team want to express clearly, what the recommendations are, but beyond that the family should develop their own plan for coverage   left for Aline, [phone], requesting a return call to discuss recommendations  CM also offered the number to the therapy gym

## 2020-06-29 NOTE — ASSESSMENT & PLAN NOTE
Found on CTA during most recent admission    General surgery saw patient in consultation and recommended OP f/u  Continue Protonix 40 mg daily  Monitor for recurrence of esophageal spasm episodes/ any choking, denies thus far  Patient doing well today, finishing breakfast upon exam

## 2020-06-29 NOTE — ASSESSMENT & PLAN NOTE
Patient with continued elevation of WBC to 13 90 (6/26); 12 60 (6/25); 9 30 (6/23)  Patient remains afebrile  CXR negative    UA positive for blood and leuk estrase (25)  Urine culture negative -  "  10,000-19,000 cfu/ml     Mixed Contaminants X4     CBC today - awaiting repeat draw

## 2020-06-29 NOTE — ASSESSMENT & PLAN NOTE
Non-occlusive LLE DVT (popliteal vein) on 6/16 imaging, unchanged on repeat imaging 6/26  Patient had been on Eliquis prior to recent spinal surgery  Per surgery, patient not cleared to resume Eliquis  Dr Lex Al does not want patient on Eliquis for at least 3 weeks post-op  Decision made prior to recent hospital discharge to start Coumadin with INR goal of 2 0-2 5  (when goal reached, heparin may be discontinued  INR 6/26 1 86, 6/25 2 01; 1 86; 6/24 1 83; 6/23 1 60;  6/22 1 35  Will increase Coumadin from 2 mg to 4 mg with INR on Sunday  INR 2 23 6/28  No dosage change  Recheck Wednesday to ensure remaining within therapeutic range  Will discontinue Heparin at this time  Patient will need clearance by spine surgery to be switched back to Eliquis

## 2020-06-29 NOTE — ASSESSMENT & PLAN NOTE
Vitals:    06/30/20 0744   BP: 127/60   Pulse: 85   Resp: 18   Temp: 97 8 °F (36 6 °C)   SpO2: 96%       BP remains stable at present  Continue amlodipine 5 mg daily (would recommend not increasing due to tendency toward bilateral LE edema), Lopressor 12 5 mg BID,  Lisinopril to 10 mg daily   Monitor and adjust as needed

## 2020-06-29 NOTE — PROGRESS NOTES
06/29/20 0700   Pain Assessment   Pain Assessment Tool 0-10   Pain Score 7   Pain Location/Orientation Orientation: Bilateral;Location: Back; Location: Buttocks   Pain Onset/Description Onset: Ongoing   Hospital Pain Intervention(s) Repositioned; Environmental changes;Relaxation technique  (Distraction: Conversation)   Restrictions/Precautions   Precautions Fall Risk;Supervision on toilet/commode;Spinal precautions  (LSO brace when OOB, CECILIA stocking)   Braces or Orthoses LSO   Lifestyle   Autonomy "I want to be able to do it all on my own "   Eating   Type of Assistance Needed Set-up / clean-up   Amount of Physical Assistance Provided No physical assistance   Eating CARE Score 5   Eating Assessment   Meal Assessed Breakfast   Oral Hygiene   Type of Assistance Needed Set-up / clean-up   Amount of Physical Assistance Provided No physical assistance   Oral Hygiene CARE Score 5   Grooming   Able To Initiate Tasks;Comb/Brush Hair;Wash/Dry Face;Brush/Clean Teeth;Wash/Dry Hands   Limitation Noted In Timeliness   Findings Set-up seated supported at sinkside  Shower/Bathe Self   Type of Assistance Needed Incidental touching;Verbal cues; Adaptive equipment   Amount of Physical Assistance Provided Less than 25%   Comment Demonstration needed with Varun Jackson to follow spinal precautions with min verbal cues and min A to carryover techniques seated on shower chair in shower with hand-held faucet  Shower/Bathe Self CARE Score 3   Bathing   Assessed Bath Style Shower   Able to Tyronza Eric Yes   Able to [employer] Temperature Yes   Able to Wash/Rinse/Dry (body part) Left Arm;Right Arm;L Upper Leg;R Upper Leg;L Lower Leg/Foot;R Lower Leg/Foot;Chest;Abdomen;Perineal Area; Buttocks   Limitations Noted in Balance; Coordination; Endurance;Problem Solving; Safety; Sequencing;Strength;Timeliness   Positioning Seated   Adaptive Equipment Longhand Sponge; Shower [employer]; Shower Seat;Hand [employer]   Limitations Noted In Balance;Problem Solving; Safety;LE Strength   Adaptive Equipment Grab Bars;Seat with Back   Assessed Shower   Findings Incision covered with Telfa and Tegaderm prior to showering with verbal cues needed for safety for functional transfers with hand placement cues for safety  Upper Body Dressing   Type of Assistance Needed Supervision   Amount of Physical Assistance Provided No physical assistance   Comment Sup seated supported in chair  Upper Body Dressing CARE Score 4   Lower Body Dressing   Type of Assistance Needed Physical assistance; Adaptive equipment   Amount of Physical Assistance Provided Less than 25%   Comment Min A with min verbal cues follow demonstration with John Garg with increased timeliness needed to complete  Pt Dep for donning/doffing LSO brace in supported sit  Lower Body Dressing CARE Score 3   Dressing/Undressing Clothing   Remove UB Clothes Pullover Shirt   Don UB Clothes Pullover Shirt   Remove LB Clothes Pants; Undergarment;Socks   Don LB Clothes Pants; Undergarment;Socks   Limitations Noted In Balance; Endurance;Problem Solving; Safety;Strength;Timeliness   Adaptive Equipment Reacher;Dressing Stick;LH Shoehorn;Elastic Laces; Sock Aide   Positioning Supported Sit   Sit to Stand   Type of Assistance Needed Physical assistance   Amount of Physical Assistance Provided Less than 25%   Comment Min A with min verbal cues for hand placement/technique  Sit to Stand CARE Score 3   Bed-Chair Transfer   Type of Assistance Needed Physical assistance   Amount of Physical Assistance Provided Less than 25%   Comment Min A with min verbal cues for hand placement/technique  Chair/Bed-to-Chair Transfer CARE Score 3   Transfer Bed/Chair/Wheelchair   Limitations Noted In Balance; Endurance;LE Strength   Adaptive Equipment Roller Walker   Cognition   Overall Cognitive Status Impaired   Arousal/Participation Alert; Cooperative   Attention Attends with cues to redirect   Orientation Level Oriented X4   Memory Decreased recall of precautions   Following Commands Follows one step commands with increased time or repetition   Assessment   Treatment Assessment Pt participated in skilled OT session for 90 minutes with fair activity tolerance with focus on ADL training  Pt c/o 7/10 pain in low back/bottom at start of therapy session  Nursing notified and to provide medication during OT session  Pt agreeable to participate in AM care routine  Pt seated in bedside recliner with LSO brace on  Pt participated in functional mobility to bathroom at [address] [place] [address] level with RW  Pt participated in UB ADL tasks at Sup level, and LB ADL tasks at [facility] A level with re-training with [place] completed with min verbal cues at this time  Pt demonstrated poor carryover with LHAE training requiring continued repetition to carryover for continued IND with ADL tasks and carryover of spinal precautions  Pt required in addition verbal cues for weight shifting in shower on shower bench to wash bottom to follow lumbar spinal precatuions  Pt follow showering routine worked on mock shower transferring technique over 2 inch lip with pt able to complete at [facility] A level with increased fear and anxiety of falling noted however pt able and willing to participate  Pt Dep for donning/doffing LSO during self-care ADL routine  Pt seated in bedside recliner at end of therapy session with call bell, tray table, phone, and all needs within reach  Pt would benefit from continued OT services to progress pt through [address] to meet established OT goals  Problem List Decreased strength;Decreased endurance;Decreased mobility; Impaired balance;Decreased cognition;Decreased safety awareness   Barriers to Discharge Decreased caregiver support   Plan   Treatment/Interventions ADL retraining;Functional transfer training; Therapeutic exercise; Endurance training;Patient/family training; Compensatory technique education   OT Therapy Minutes   OT Time In 0700   OT Time Out 0830   OT Total Time (minutes) 90   OT Mode of treatment - Individual (minutes) 90   OT Mode of treatment - Concurrent (minutes) 0   OT Mode of treatment - Group (minutes) 0   OT Mode of treatment - Co-treat (minutes) 0   OT Mode of Treatment - Total time(minutes) 90 minutes   OT Cumulative Minutes 540   Therapy Time missed   Time missed?  No

## 2020-06-29 NOTE — ASSESSMENT & PLAN NOTE
Patient with DM  Patient had difficulty sleeping last week due to bilateral LE "burning" pain  Has improved over the past several nights  Continues on neurontin 100 mg BID and 200 q h s   Per primary team

## 2020-06-29 NOTE — ASSESSMENT & PLAN NOTE
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

## 2020-06-29 NOTE — ASSESSMENT & PLAN NOTE
Patient with DM  Patient had difficulty sleepingThursday due to bilateral LE "burning" pain  Has improved over the past several nights  Continues on neurontin 100 mg BID and 200 q h s   Per primary team

## 2020-06-29 NOTE — ASSESSMENT & PLAN NOTE
Lab Results   Component Value Date    HGBA1C 6 4 (H) 05/12/2020       Recent Labs     06/28/20  1603 06/28/20  2034 06/29/20  0618 06/29/20  1127   POCGLU 120 110 133 151*       Blood Sugar Average: Last 72 hrs:  (P) 150 [id number]     Continue Metformin 500 mg BID with SSI  Monitor kidney function, remains stable  6/30- creatinine 0 70  BMP Thursday  Glucose stable

## 2020-06-29 NOTE — ASSESSMENT & PLAN NOTE
Per most recent hospitalization, K+ as low as 2 2 (felt due to poor p o  Intake)  6/22 K+ 3 5  Replaced with 40 mEq at that time  6/23 K+ 4 0  6/25 - 3 8  6/29 5 1 - specimen hemolized   Encouraged potassium rich diet  [facility] Thursday

## 2020-06-29 NOTE — ASSESSMENT & PLAN NOTE
Patient with continued elevation of WBC to 13 90 (6/26); 12 60 (6/25); 9 30 (6/23)  Patient remains afebrile  CXR negative  UA positive for blood and leuk estrase (25)  Urine culture negative -  "  10,000-19,000 cfu/ml     Mixed Contaminants X4     CBC was redrawn yesterday 6/29  WBC 11 8, improving  Continue to monitor closely

## 2020-06-29 NOTE — ASSESSMENT & PLAN NOTE
Continue home dose of Bentyl  LBM 6/28 - multiple  Her bowel meds were switched to as needed     Should bowels continue to be loose after 48 hours off bowel meds then may need to check for c-diff

## 2020-06-30 LAB
ANION GAP SERPL CALCULATED.3IONS-SCNC: 9 MMOL/L (ref 5–14)
BUN SERPL-MCNC: 14 MG/DL (ref 5–25)
CALCIUM SERPL-MCNC: 9.7 MG/DL (ref 8.4–10.2)
CHLORIDE SERPL-SCNC: 100 MMOL/L (ref 97–108)
CO2 SERPL-SCNC: 29 MMOL/L (ref 22–30)
CREAT SERPL-MCNC: 0.7 MG/DL (ref 0.6–1.2)
GFR SERPL CREATININE-BSD FRML MDRD: 82 ML/MIN/1.73SQ M
GLUCOSE P FAST SERPL-MCNC: 150 MG/DL (ref 70–99)
GLUCOSE SERPL-MCNC: 129 MG/DL (ref 65–140)
GLUCOSE SERPL-MCNC: 150 MG/DL (ref 70–99)
GLUCOSE SERPL-MCNC: 159 MG/DL (ref 65–140)
GLUCOSE SERPL-MCNC: 161 MG/DL (ref 65–140)
GLUCOSE SERPL-MCNC: 209 MG/DL (ref 65–140)
POTASSIUM SERPL-SCNC: 4.2 MMOL/L (ref 3.6–5)
SODIUM SERPL-SCNC: 138 MMOL/L (ref 137–147)

## 2020-06-30 PROCEDURE — 99232 SBSQ HOSP IP/OBS MODERATE 35: CPT | Performed by: PHYSICAL MEDICINE & REHABILITATION

## 2020-06-30 PROCEDURE — 97535 SELF CARE MNGMENT TRAINING: CPT

## 2020-06-30 PROCEDURE — 97129 THER IVNTJ 1ST 15 MIN: CPT

## 2020-06-30 PROCEDURE — 97530 THERAPEUTIC ACTIVITIES: CPT

## 2020-06-30 PROCEDURE — 99232 SBSQ HOSP IP/OBS MODERATE 35: CPT | Performed by: INTERNAL MEDICINE

## 2020-06-30 PROCEDURE — 80048 BASIC METABOLIC PNL TOTAL CA: CPT | Performed by: NURSE PRACTITIONER

## 2020-06-30 PROCEDURE — 97116 GAIT TRAINING THERAPY: CPT

## 2020-06-30 PROCEDURE — 92523 SPEECH SOUND LANG COMPREHEN: CPT

## 2020-06-30 PROCEDURE — 97130 THER IVNTJ EA ADDL 15 MIN: CPT

## 2020-06-30 PROCEDURE — 97110 THERAPEUTIC EXERCISES: CPT

## 2020-06-30 PROCEDURE — 82948 REAGENT STRIP/BLOOD GLUCOSE: CPT

## 2020-06-30 RX ADMIN — INSULIN LISPRO 1 UNITS: 100 INJECTION, SOLUTION INTRAVENOUS; SUBCUTANEOUS at 12:02

## 2020-06-30 RX ADMIN — SERTRALINE HYDROCHLORIDE 25 MG: 25 TABLET ORAL at 08:29

## 2020-06-30 RX ADMIN — OXYCODONE HYDROCHLORIDE 5 MG: 5 TABLET ORAL at 21:08

## 2020-06-30 RX ADMIN — GABAPENTIN 100 MG: 100 CAPSULE ORAL at 08:29

## 2020-06-30 RX ADMIN — PANTOPRAZOLE SODIUM 40 MG: 40 TABLET, DELAYED RELEASE ORAL at 05:25

## 2020-06-30 RX ADMIN — OXYCODONE HYDROCHLORIDE 5 MG: 5 TABLET ORAL at 11:04

## 2020-06-30 RX ADMIN — METHOCARBAMOL 750 MG: 750 TABLET, FILM COATED ORAL at 00:47

## 2020-06-30 RX ADMIN — DICYCLOMINE HYDROCHLORIDE 20 MG: 20 TABLET ORAL at 05:25

## 2020-06-30 RX ADMIN — ACETAMINOPHEN 650 MG: 325 TABLET ORAL at 05:24

## 2020-06-30 RX ADMIN — LISINOPRIL 10 MG: 10 TABLET ORAL at 08:29

## 2020-06-30 RX ADMIN — WARFARIN SODIUM 4 MG: 2 TABLET ORAL at 17:22

## 2020-06-30 RX ADMIN — GABAPENTIN 200 MG: 100 CAPSULE ORAL at 21:07

## 2020-06-30 RX ADMIN — METFORMIN HYDROCHLORIDE 500 MG: 500 TABLET ORAL at 08:29

## 2020-06-30 RX ADMIN — OXYCODONE HYDROCHLORIDE 5 MG: 5 TABLET ORAL at 03:43

## 2020-06-30 RX ADMIN — DICYCLOMINE HYDROCHLORIDE 20 MG: 20 TABLET ORAL at 17:22

## 2020-06-30 RX ADMIN — DICYCLOMINE HYDROCHLORIDE 20 MG: 20 TABLET ORAL at 23:55

## 2020-06-30 RX ADMIN — METOPROLOL TARTRATE 12.5 MG: 25 TABLET ORAL at 08:30

## 2020-06-30 RX ADMIN — LEVOTHYROXINE SODIUM 125 MCG: 125 TABLET ORAL at 05:25

## 2020-06-30 RX ADMIN — MENTHOL, METHYL SALICYLATE 1 APPLICATION: 10; 15 CREAM TOPICAL at 21:08

## 2020-06-30 RX ADMIN — METOPROLOL TARTRATE 12.5 MG: 25 TABLET ORAL at 21:07

## 2020-06-30 RX ADMIN — GABAPENTIN 100 MG: 100 CAPSULE ORAL at 15:08

## 2020-06-30 RX ADMIN — ATORVASTATIN CALCIUM 40 MG: 40 TABLET, FILM COATED ORAL at 16:24

## 2020-06-30 RX ADMIN — ACETAMINOPHEN 650 MG: 325 TABLET ORAL at 21:07

## 2020-06-30 RX ADMIN — ACETAMINOPHEN 650 MG: 325 TABLET ORAL at 15:07

## 2020-06-30 RX ADMIN — AMLODIPINE BESYLATE 5 MG: 5 TABLET ORAL at 08:29

## 2020-06-30 RX ADMIN — LIDOCAINE 1 PATCH: 50 PATCH CUTANEOUS at 08:30

## 2020-06-30 RX ADMIN — INSULIN LISPRO 1 UNITS: 100 INJECTION, SOLUTION INTRAVENOUS; SUBCUTANEOUS at 21:08

## 2020-06-30 RX ADMIN — DICYCLOMINE HYDROCHLORIDE 20 MG: 20 TABLET ORAL at 12:02

## 2020-06-30 RX ADMIN — METFORMIN HYDROCHLORIDE 500 MG: 500 TABLET ORAL at 16:24

## 2020-06-30 RX ADMIN — INSULIN LISPRO 1 UNITS: 100 INJECTION, SOLUTION INTRAVENOUS; SUBCUTANEOUS at 16:25

## 2020-06-30 NOTE — SOCIAL WORK
VM from Pts Linette PADRON, stating CM can call [phone]  VM from Pts daughter, Rhona Cuevas, stating CM can call [phone]  VM left for Pts daughter, Rhona Cuevas, requesting a return call to CM or to the therapy gym  VM from Blanca, [phone] can call her home number, [phone]  PCT Aline, [phone], to review the team recommendations for supervision  Blanca was frustrated, bc she states that Pt is telling people she is all alone, when Blanca is there all the time, and does everything for Pt  CM assured Wyano that Pt has always stated she resides with her daughter/NEREIDA  Emelda Kayser, and added that Pt seems to be pitting her children against one another  CM mentioned that Ifeoma Espinosa, would like her to come in for family training, and Blanca is willing to do so  Aline cannot accommodate anything tomorrow though  CM shared that a d/c date might be set for next week, in the team meeting Thursday  Blanca requested that Nicole Araya call her at the home number, [phone]       CM shared the above with Ifeoma Espinosa

## 2020-06-30 NOTE — PROGRESS NOTES
SLP TAA     06/30/20 0900   Patient Data   Rehab Impairment Impairment of mobility, safety and Activities of Daily Living (ADLs) due to Orthopedic Disorders: Other Orthopedic   Etiologic Diagnosis Lumbar spinal stenosis   Date of Onset 06/15/20   Baseline Information   Transportation ; Family/friends drive   Prior IADL Participation   Money Management Identify Money;Estimate Costs;Estimate Change;Combine Bills;Manage Checkbook   Meal Preparation Full Participation   Laundry Full Participation   Home Cleaning Full Participation   Prior Level of Function   Functional Cognition 3  Independent - Patient completed the activities by him/herself, with or without an assistive device, with no assistance from a helper  Restrictions/Precautions   Precautions Bed/chair alarms;Cognitive; Fall Risk;Spinal precautions;Supervision on toilet/commode   ROM Restrictions Yes  (back precautions)   Braces or Orthoses LSO   Pain Assessment   Pain Assessment Tool 0-10   Pain Score 4   Pain Location/Orientation Orientation: Left; Location: Leg   Pain Onset/Description Onset: Gradual   Hospital Pain Intervention(s) Repositioned  (pt reported that elevating legs alleviated pain)   Comprehension   Assist Devices Glasses   Auditory Basic   Visual Basic   Findings Pt completed cognitive linguistic evaluation this session  See SLP Rehab note for full details  QI: Comprehension 3  Usually Understands: Understands most conversations, but misses some part/intent of message  Requires cues at times to understand  Comprehension (FIM) 4 - Understands basic info/conversation 75-90% of time   Expression   Verbal Basic   Non-Verbal Basic   Intelligibility Sentence   Findings Pt completed cognitive linguistic evaluation this session  See SLP Rehab note for full details  QI: Expression 3   Exhibits some difficulty with expressing needs and ideas (e g , some words or finishing thoughts) or speech is not clear   Expression (FIM) 4 - Expresses basic info/needs 75-90% of time   Social Interaction   Cooperation with staff   Participation Individual   Behaviors observed Appropriate   Findings Pt was cooperative and participatory throughout assessment  Social Interaction (FIM) 4 - Interacts 75-89% of time   Problem Solving   Routine Manages call bell   Findings Pt completed cognitive linguistic evaluation this session  See SLP Rehab note for full details  Problem solving (FIM) 3 - Solves basic problmes 50-74% of time   Memory   Remember Routine Yes   Short-Term Impaired   Long Term Intact   Recalls Precaution No   Findings Pt completed cognitive linguistic evaluation this session  See SLP Rehab note for full details  Memory (FIM) 3 - Recognizes, recalls/performs 50-74%   Cognition   Overall Cognitive Status Impaired   Arousal/Participation Alert; Cooperative   Attention Attends with cues to redirect   Orientation Level Oriented to person;Oriented to place;Oriented to time  (required cuing to orient to full situation)   Memory Decreased recall of precautions;Decreased short term memory   Following Commands Follows one step commands with increased time or repetition   Comments Pt completed cognitive linguistic evaluation this session  See SLP Rehab note for full details  Discharge Information   Vocational Plan Retired/not working   Patient's Discharge Plan discharge home with family support   Patient's Rehab Expectations "to be able to go home next week"   Barriers to Discharge Home Limited Family Support;Decreased Cognitive Function;Decreased Strength;Decreased Endurance;Pain; Safety Considerations   Impressions Cognitive linguistic evaluation completed where pt found to be presenting with slower/delayed processing, decreased attention, decreased problem solving, delayed initiation of tasks and decreased working memory  Pt also presenting with deficits in executive functions and visuospatial skills   As pt was previously functioning at an independent/mod I level but is currently demonstrating overall cognitive linguistic deficits, pt is recommended for skilled ST services during acute rehab stay to further assess and maximize functional and higher level cognitive linguistic skills in order to improve level of independence and safety  Pt presents with good rehab potential to improve overall cognitive linguistic skills to achieve greater level of independence      SLP Therapy Minutes   SLP Time In 0900   SLP Time Out 1000   SLP Total Time (minutes) 60   SLP Mode of treatment - Individual (minutes) 60   SLP Mode of treatment - Concurrent (minutes) 0   SLP Mode of treatment - Group (minutes) 0   SLP Mode of treatment - Co-treat (minutes) 0   SLP Mode of Treatment - Total time(minutes) 60 minutes   SLP Cumulative Minutes 60   Cumulative Minutes   Cumulative therapy minutes 1260

## 2020-06-30 NOTE — PROGRESS NOTES
Progress Note - Hope Scott 1940, 78 y o  female MRN: [id number]    Unit/Bed#: -01 Encounter: [id number]    Primary Care Provider: Khadijah Black DO   Date and time admitted to hospital: 6/21/2020 11:39 AM        Leukocytosis  Assessment & Plan  Patient with continued elevation of WBC to 13 90 (6/26); 12 60 (6/25); 9 30 (6/23)  Patient remains afebrile  CXR negative  UA positive for blood and leuk estrase (25)  Urine culture negative -  "  10,000-19,000 cfu/ml     Mixed Contaminants X4     CBC was redrawn yesterday 6/29  WBC 11 8, improving  Continue to monitor closely  Abnormal CAT scan  Assessment & Plan  CTA performed on 6/18 revealed "fat stranding noted in the right axillary/chest wall region, of uncertain significance, recommend clinical correlation"  Follow up with PCP on  discharge         Lymph node disorder  Assessment & Plan  Per hospital records, patient noted to have subcentimeter lymph node in the right axilla with fat stranding on imaging  Due to history of breast CA, radiology report clinical correlation was recommended  Patient was evaluated by Dr Tayla Dos Santos in acute care who noted this to be an asymptomatic finding and recommended no further workup at this time other than routine follow up  with PCP & continued regular mammograms for her history of  breast CA  Follow up with PCP on discharge  IBS (irritable bowel syndrome)  Assessment & Plan  Continue home dose of Bentyl  LBM 6/28 - multiple  Her bowel meds were switched to as needed  Should bowels continue to be loose after 48 hours off bowel meds then may need to check for c-diff  Continue to monitor  Mood disorder (HCC)  Assessment & Plan  Continue sertraline 25 mg daily as prescribed    Neuropathic pain  Assessment & Plan  Patient with DM  Patient had difficulty sleeping last week due to bilateral LE "burning" pain  Has improved over the past several nights     Continues on neurontin 100 mg BID and 200 q h s  Per primary team        HTN (hypertension)  Assessment & Plan  Vitals:    06/30/20 0744   BP: 127/60   Pulse: 85   Resp: 18   Temp: 97 8 °F (36 6 °C)   SpO2: 96%       BP remains stable at present  Continue amlodipine 5 mg daily (would recommend not increasing due to tendency toward bilateral LE edema), Lopressor 12 5 mg BID,  Lisinopril to 10 mg daily   Monitor and adjust as needed     DM (diabetes mellitus) ([facility] )  Assessment & Plan  Lab Results   Component Value Date    HGBA1C 6 4 (H) 05/12/2020       Recent Labs     06/28/20  1603 06/28/20  2034 06/29/20  0618 06/29/20  1127   POCGLU 120 110 133 151*       Blood Sugar Average: Last 72 hrs:  (P) 150 [id number]     Continue Metformin 500 mg BID with SSI  Monitor kidney function, remains stable  6/30- creatinine 0 70  BMP Thursday  Glucose stable  Hypokalemia  Assessment & Plan  Per most recent hospitalization, K+ as low as 2 2 (felt due to poor p o  Intake)  6/22 K+ 3 5  Replaced with 40 mEq at that time  6/23 K+ 4 0  6/25 - 3 8  6/29 5 1 - specimen hemolized   6/30 4 2  Encouraged potassium rich diet  BMP Thursday  Chronic deep vein thrombosis (DVT) of left popliteal vein (HCC)  Assessment & Plan  Non-occlusive LLE DVT (popliteal vein) on 6/16 imaging, unchanged on repeat imaging 6/26  Patient had been on Eliquis prior to recent spinal surgery  Per surgery, patient not cleared to resume Eliquis  Dr Austin Vásquez does not want patient on Eliquis for at least 3 weeks post-op  Decision made prior to recent hospital discharge to start Coumadin with INR goal of 2 0-2 5  (when goal reached, heparin could be discontinued)  INR 6/26 1 86, 6/25 2 01; 1 86; 6/24 1 83; 6/23 1 60;  6/22 1 35  Will increase Coumadin from 2 mg to 4 mg with INR on Sunday  INR 2 23 6/28  No dosage change  Recheck Wednesday to ensure remaining within therapeutic range  Heparin discontinued     Patient will need clearance by spine surgery to be switched back to Eliquis  Arrhythmia  Assessment & Plan  Follows with [facility]  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 (Dr Ginny Kilgore)  History of a-flutter (patient does not remember this dx) - patient is on chronic AC due to DVT (previously Eliquis, now coumadin)  Regular rhythm at this time  Continue to monitor rate/rhythm- remains stable    Hiatal hernia with gastroesophageal reflux  Assessment & Plan  Found on CTA during most recent admission  General surgery saw patient in consultation and recommended OP f/u  Continue Protonix 40 mg daily  Monitor for recurrence of esophageal spasm episodes/ any choking, denies thus far  Patient doing well today, finishing breakfast upon exam    Hyperlipidemia  Assessment & Plan  Most recent lipid profile (2020) - total cholesterol 209, triglycerides 145, HDL 64,   Continue atorvastatin 40 mg daily as prescribed    Hypothyroidism  Assessment & Plan  Most recent TSH () was 2 23  Continue levothyroxine 125mcg (as per home dose) as prescribed    * Spinal stenosis of lumbar region  Assessment & Plan  PT/OT, pain management per primary team   S/p L4-S1 laminectomy on 6/15  Spine precautions/LSO brace  F/u with Dr Delma Figueroa upon discharge  VTE Pharmacologic Prophylaxis:   Pharmacologic: Heparin  Mechanical VTE Prophylaxis in Place: Yes    Current Length of Stay: 9 day(s)    Current Patient Status: Inpatient Rehab     Discharge Plan: As per treatment team     Code Status: Level 1 - Full Code    Subjective:   No overnight nursing events  Pt reports that she is feeling better this morning than she did yesterday  She slept well and her appetite is good  She notes that her LBM was 6/28, where she had multiple loose stools  Since her bowel meds were adjusted, she notes that she has not had further loose stools  Leg pain   She denies CP, SOB, N/V/D, headache, dizziness         Objective:     Vitals:   Temp (24hrs), Av 4 °F (36 3 °C), Min:96 9 °F (36 1 °C), Max:97 8 °F (36 6 °C)    Temp:  [96 9 °F (36 1 °C)-97 8 °F (36 6 °C)] 97 8 °F (36 6 °C)  HR:  [80-85] 85  Resp:  [18] 18  BP: (127-132)/(60-63) 127/60  SpO2:  [95 %-96 %] 96 %  Body mass index is 33 07 kg/m²  Review of Systems   Constitutional: Negative for chills, fatigue and fever  Respiratory: Negative for shortness of breath and wheezing  Cardiovascular: Negative for chest pain  Gastrointestinal: Negative for abdominal pain, constipation, diarrhea, nausea and vomiting  Genitourinary: Negative for difficulty urinating  Musculoskeletal: Positive for arthralgias and gait problem  Skin: Negative for rash  Neurological: Negative for dizziness and headaches  Psychiatric/Behavioral: Negative for sleep disturbance (slept well last night)  The patient is not nervous/anxious  Input and Output Summary (last 24 hours): Intake/Output Summary (Last 24 hours) at 6/30/2020 0907  Last data filed at 6/30/2020 0830  Gross per 24 hour   Intake 680 ml   Output 599 ml   Net 81 ml       Physical Exam:     Physical Exam   Constitutional: She is oriented to person, place, and time  She appears well-developed and well-nourished  No distress  HENT:   Head: Normocephalic and atraumatic  Right Ear: External ear normal    Left Ear: External ear normal    Eyes: No scleral icterus  Neck: Normal range of motion  Neck supple  Cardiovascular: Normal rate and regular rhythm  Pulmonary/Chest: Effort normal and breath sounds normal  No respiratory distress  Abdominal: Soft  Bowel sounds are normal  She exhibits no distension  There is no tenderness  Musculoskeletal: She exhibits no edema  Lymphadenopathy:     She has no cervical adenopathy  Neurological: She is alert and oriented to person, place, and time  Skin: Skin is warm and dry  Psychiatric: She has a normal mood and affect  Her behavior is normal    Nursing note and vitals reviewed        Additional Data:     Labs:    Results from last 7 days   Lab Units 06/29/20  1333 06/26/20  0557   WBC Thousand/uL 11 80* 13 90*   HEMOGLOBIN g/dL 11 8* 12 4   HEMATOCRIT % 35 3* 36 6   PLATELETS Thousands/uL 470* 446   BANDS PCT %  --  1   NEUTROS PCT % 73*  --    LYMPHS PCT % 17*  --    LYMPHO PCT %  --  6*   MONOS PCT % 7  --    MONO PCT %  --  4   EOS PCT % 1 1     Results from last 7 days   Lab Units 06/30/20  0439   SODIUM mmol/L 138   POTASSIUM mmol/L 4 2   CHLORIDE mmol/L 100   CO2 mmol/L 29   BUN mg/dL 14   CREATININE mg/dL 0 70   ANION GAP mmol/L 9   CALCIUM mg/dL 9 7   GLUCOSE RANDOM mg/dL 150*     Results from last 7 days   Lab Units 06/28/20  0522   INR  2 23*     Results from last 7 days   Lab Units 06/30/20  0620 06/29/20  2046 06/29/20  1647 06/29/20  1127 06/29/20  0618 06/28/20  2034 06/28/20  1603 06/28/20  1118 06/28/20  0607 06/27/20  2039 06/27/20  1552 06/27/20  1121   POC GLUCOSE mg/dl 129 170* 153* 151* 133 110 120 178* 145* 175* 203* 109               Labs reviewed    Imaging:    Imaging reviewed    Recent Cultures (last 7 days):     Results from last 7 days   Lab Units 06/26/20  1318   URINE CULTURE  10,000-19,000 cfu/ml        Last 24 Hours Medication List:     Current Facility-Administered Medications:  acetaminophen 650 mg Oral Q8H [address] Sachin Storm MD   amLODIPine 5 mg Oral Daily HUNTER Kuhn   atorvastatin 40 mg Oral Daily With Filippo Gamez MD   dicyclomine 20 mg Oral Q6H Kait Jolly MD   gabapentin 100 mg Oral BID Sachin Storm MD   gabapentin 200 mg Oral HS Sachin Storm MD   insulin lispro 1-5 Units Subcutaneous TID AC Kait Jolly MD   insulin lispro 1-5 Units Subcutaneous HS Kait Jolly MD   levothyroxine 125 mcg Oral Early Morning Kait Jolly MD   lidocaine 1 patch Topical Daily Elvis Laureano MD   lisinopril 10 mg Oral Daily Skippy Bone, CRNP   menthol-methyl salicylate  Apply externally HS Sachin Storm MD   menthol-methyl salicylate  Apply externally TID PRN Willian Sotomayor MD   metFORMIN 500 mg Oral BID With Meals Darrell Tolliver, MD   methocarbamol 750 mg Oral Q6H PRN Darrell Tolliver, MD   metoprolol tartrate 12 5 mg Oral Q12H [address] HUNTER Kuhn   oxyCODONE 2 5 mg Oral Q4H PRN Darrell Tolliver, MD   oxyCODONE 5 mg Oral Q4H PRN Darrell Tolliver, MD   pantoprazole 40 mg Oral Early Morning Darrell Tolliver, MD   polyethylene glycol 17 g Oral Daily PRN Darrell Tolliver, MD   senna-docusate sodium 2 tablet Oral HS PRN HUNTER Feliciano   sertraline 25 mg Oral Daily Darrell Tolliver MD   warfarin 4 mg Oral Daily (warfarin) HUNTER Damon        M*Modal software was used to dictate this note  It may contain errors with dictating incorrect words or incorrect spelling  Please contact the provider directly with any questions

## 2020-06-30 NOTE — PROGRESS NOTES
06/30/20 1000   Pain Assessment   Pain Assessment Tool 0-10   Pain Score 8   Pain Location/Orientation Orientation: Left; Location: Leg   Pain Onset/Description Onset: Gradual   Effect of Pain on Daily Activities Limits functional mobility   Hospital Pain Intervention(s) Rest   Multiple Pain Sites No   Restrictions/Precautions   Precautions Bed/chair alarms;Cognitive; Fall Risk;Supervision on toilet/commode;Spinal precautions   Weight Bearing Restrictions No   ROM Restrictions Yes  (spinal prec's)   Braces or Orthoses LSO   Lower Body Dressing   Type of Assistance Needed Physical assistance; Adaptive equipment   Amount of Physical Assistance Provided Less than 25%   Comment Pt seated to thread BLE's into pull up/pants w/ LH reacher  Pt req A to fully thread B foot through pull up  Pt in stance at RW to perform CM up over hips  Lower Body Dressing CARE Score 3   Picking Up Object   Type of Assistance Needed Incidental touching   Amount of Physical Assistance Provided No physical assistance   Comment w/ RW and  reacher   Picking Up Object CARE Score 4   Sit to Stand   Type of Assistance Needed Incidental touching   Amount of Physical Assistance Provided No physical assistance   Comment CG w/ RW   Sit to Stand CARE Score 4   Bed-Chair Transfer   Type of Assistance Needed Incidental touching   Amount of Physical Assistance Provided No physical assistance   Comment CG w/ RW   Chair/Bed-to-Chair Transfer CARE Score 4   Light Housekeeping   Light Housekeeping Level Walker   Light Housekeeping Level of Assistance Minimum assistance   Light Housekeeping Pt engaged in laundry retrieval task, funct amb w/ RW to retrieve clothing items from floor level  Pt then draped clothing items over RW to safely transport to OT mat  Pt initially CG w/ RW, however, req Remy during functional mobility for steadying 2* to fatigue and inc pain in LLE  Pt reports folding laundry in stance at bed   EDWARDS encouraged pt to stand at OT mat to fold laundry items, however, pt R stating "I just cant do it " EDWARDS discussed folding laundry seated at home to conserve energy and dec risk of fall  Pt receptive to recommendation  Therapeutic Excerise-Strength   UE Strength Yes   Right Upper Extremity- Strength   R Shoulder Flexion;ABduction;Horizontal ABduction; External rotation; Internal rotation; Extension   R Elbow Elbow flexion;Elbow extension   R Position Seated   Equipment Dowel  (2#)   R Weight/Reps/Sets 2 x 15   RUE Strength Comment Pt engaged in UB strengthening while seated utilizing 2# dowel  Focus of therex to increase BUE strength for participation in ADL xfer's/performance  Left Upper Extremity-Strength   L Shoulder Flexion;ABduction; Extension; External rotation; Internal rotation;Horizontal ABduction   L Elbow Elbow flexion;Elbow extension   L Position Seated   Equipment Dowel  (2#)   L Weights/Reps/Sets 2 x 15   LUE Strength Comment See above  Cognition   Overall Cognitive Status Impaired   Arousal/Participation Alert; Cooperative   Attention Attends with cues to redirect   Orientation Level Oriented to person;Oriented to place;Oriented to time   Memory Decreased recall of precautions   Following Commands Follows one step commands with increased time or repetition   Activity Tolerance   Activity Tolerance Patient limited by pain   Assessment   Treatment Assessment Pt participated in 75 min skilled OT Tx session focusing on LB dressing, UB strengthening, and light IADL's  See above for further Tx details  Pt limited by 8/10 in LLE during session  EDWARDS reported to RN, RN Lali Bañuelos) administered pain medication  Pt initially req CG for laundry retrieveal, however, req Remy during functional mobility 2* to fatigue and inc pain  OT/PT discussed plan to contact pt's family and s/u family training prior to D/C home   Pt would benefit from cont'd skilled OT to improve I w/ ADL xfer's, participate in family training session, increase activity tolerance, improve dynamic standing balance, and overall increase I w/ ADL/IADL performance in order to progress towards OT goals and prepare for D/C home w/ support  Prognosis Fair   Barriers to Discharge Decreased caregiver support   Plan   Treatment/Interventions ADL retraining;Functional transfer training; Therapeutic exercise; Endurance training;Patient/family training   Progress Progressing toward goals   Recommendation   OT Discharge Recommendation Return to previous environment with social support   OT Therapy Minutes   OT Time In 1000   OT Time Out 1115   OT Total Time (minutes) 75   OT Mode of treatment - Individual (minutes) 75   OT Mode of treatment - Concurrent (minutes) 0   OT Mode of treatment - Group (minutes) 0   OT Mode of treatment - Co-treat (minutes) 0   OT Mode of Treatment - Total time(minutes) 75 minutes   OT Cumulative Minutes 615   Therapy Time missed   Time missed?  No

## 2020-06-30 NOTE — PROGRESS NOTES
SLP: Cognitive Linguistic Evaluation     06/30/20 0900   Pain Assessment   Pain Assessment Tool 0-10   Pain Score 4   Pain Location/Orientation Orientation: Left; Location: Leg   Pain Onset/Description Onset: Gradual   Hospital Pain Intervention(s) Repositioned  (pt reported that elevating legs alleviated pain)   Restrictions/Precautions   Precautions Bed/chair alarms;Cognitive; Fall Risk;Spinal precautions;Supervision on toilet/commode   ROM Restrictions Yes  (back precautions)   Braces or Orthoses LSO   Cognitive Linguisitic Assessments   Cognitive Linquistic Quick Test (CLQT) Pt completed the CLQT+ on initial evaluation with a Composite Severity Rating score of 2 0 out of 4 0, correlating to overall MODERATE cognitive linguistic deficits at time of assessment and in comparison to age matched peers ranging from 65-81 y/o  Pt's Cognitive Domain Scores are as follows: Attention-score of 29, correlating to SEVERE deficits; Memory-score of 138, correlating to MILD deficits; Executive Functions-score of 5, correlating to SEVERE deficits; Language-score of 25, correlating to MILD deficits; Visuospatial Skills-score of 26, correlating to MODERATE deficits and Clock Drawing Screen-score of 5, correlating to SEVERE deficits  Pt scored at or above criterion cut score for 4/10 tasks completed  Pt educated on results of assessment and receptive to information  Pt agreeable to skilled ST services during acute rehab stay  Comprehension   Assist Devices Glasses   Auditory Basic   Visual Basic   Findings Pt completed cognitive linguistic evaluation this session  See SLP Rehab note for full details  QI: Comprehension 3  Usually Understands: Understands most conversations, but misses some part/intent of message  Requires cues at times to understand     Comprehension (FIM) 4 - Understands basic info/conversation 75-90% of time   Expression   Verbal Basic   Non-Verbal Basic   Intelligibility Sentence   Findings Pt completed cognitive linguistic evaluation this session  See SLP Rehab note for full details  QI: Expression 3  Exhibits some difficulty with expressing needs and ideas (e g , some words or finishing thoughts) or speech is not clear   Expression (FIM) 4 - Expresses basic info/needs 75-90% of time   Social Interaction   Cooperation with staff   Participation Individual   Behaviors observed Appropriate   Findings Pt was cooperative and participatory throughout assessment  Social Interaction (FIM) 4 - Interacts 75-89% of time   Problem Solving   Routine Manages call bell   Findings Pt completed cognitive linguistic evaluation this session  See SLP Rehab note for full details  Problem solving (FIM) 3 - Solves basic problmes 50-74% of time   Memory   Remember Routine Yes   Short-Term Impaired   Long Term Intact   Recalls Precaution No   Findings Pt completed cognitive linguistic evaluation this session  See SLP Rehab note for full details      Memory (FIM) 3 - Recognizes, recalls/performs 50-74%   Executive Function Skills   Problem Solving X   Simple Functional Tasks To be assessed in therapy   Complex Functional Tasks To be assessed in therapy   Managing Finances To be assessed in therapy   Managing Medications To be assessed in therapy   Insight Mild insight   Flexibility of Thought Reduced flexibility   Planning Reduced planning skills   Organization Mildly disorganized   Processing Speed Delayed   Memory Skills   Orientation Level Oriented to person;Oriented to place;Oriented to time  (required cuing to orient to full situation)   Long Term Biographical Recall WFL - Within Functional Limits   Short Term Recent Recall Mild Impairment   Short Term Recall of Paragraph Moderate Impairment   Short Term Working Recall Moderate Impairment   Speech/Language/Cognition Assessmetn   Treatment Assessment Cognitive linguistic evaluation was completed this session, consisting of patient interview, informal assessment and administration of the CLQT+ (results correlating to overall moderate cognitive linguistic deficits-see above for details)  Therapy team requesting ST consult due to cognitive deficits observed across OT/PT therapy sessions, therefore, ST completed cognitive linguistic evaluation  Pt oriented to person, place and month/year, while partially oriented to situation  Pt required consistent follow up questions to elicit full situation and need for rehab  Pt demonstrating functional LTM recall of biographical information during interview  Pt stating that her daughter, son in law and grandson live w/ her and that her daughter is able to be home at all times (however, staff reporting contradicting info that daughter is working but able to assist in AM and PM after work)  Pt also reports that she was previously independent for all IADLs to include driving  Pt currently denies any cognitive linguistic difficulties  Throughout assessment, pt demonstrating slower/delayed processing, decreased attention, decreased problem solving, delayed initiation of tasks and decreased working memory  As pt was previously functioning at an independent/mod I level but is currently demonstrating overall cognitive linguistic deficits, pt is recommended for skilled ST services during acute rehab stay to further assess and maximize functional and higher level cognitive linguistic skills in order to improve level of independence and safety  SLP Therapy Minutes   SLP Time In 0900   SLP Time Out 1000   SLP Total Time (minutes) 60   SLP Mode of treatment - Individual (minutes) 60   SLP Mode of treatment - Concurrent (minutes) 0   SLP Mode of treatment - Group (minutes) 0   SLP Mode of treatment - Co-treat (minutes) 0   SLP Mode of Treatment - Total time(minutes) 60 minutes   SLP Cumulative Minutes 60   Therapy Time missed   Time missed?  No

## 2020-06-30 NOTE — OCCUPATIONAL THERAPY NOTE
Therapist contacted pts dtr Yun Armenta  [phone] to setup FT  Dtr agreeable to attend FT on 7/2 @10am  Therapist educated dtr FT will consist of educated on donning LSO brace as well as providing 24/7 supervision  Dtr in agreement

## 2020-06-30 NOTE — PROGRESS NOTES
PM&R Progress Note:    ASSESSMENT:  68-year-old female with hypertension, diabetes, CKD, lumbar stenosis now at [facility] due to lumbar stenosis s/p L4-S1 lami-fusion by Dr Artur Shaw on 6/15  Stable and progressing    PLAN:    Rehabilitation   Continue current rehabilitation plan of care to maximize function   Estimated Discharge: TBD, RETEAM    Pain   Improving in lumbar spine - continue current regimen    DVT prophylaxis   Coumadin and SCDs      Bladder plan   Continent   Urinary frequency and incomplete voiding sensation reported; UCx negative, PVRs < 200cc   Last UCx with mixed contaminants     Bowel plan   Continent    Leukocytosis  Assessment & Plan  - with reported urinary frequency, incomplete voiding  - PVRs 174, 162  - UCx mixed contaminants, continue to monitor for signs of UTI     - dopplers with chronic left DVT  - CXR negative    Abnormal CAT scan  Assessment & Plan  Abnormalities on CT of 11/2019 including but not limited to:    - diverticulosis  - L renal cyst  - B/L non-obstructing renal stones     - OP FU     Lymph node disorder  Assessment & Plan  - noted to have subcentimeter lymph node in the rt axilla with fat stranding on imaging in the setting of h/o breast CA therefore per radiology report clinical correlation was recommended and patient was evaluated by Dr Susan Hugo in acute care who noted this to be an asymptomatic finding and recommended no further SMITH at this time other than routine FU with PCP & continued regular mammograms for her h/o breast CA     IBS (irritable bowel syndrome)  Assessment & Plan  - on home bentyl 20 mg q6     Mood disorder (HCC)  Assessment & Plan  - on home zoloft 25 mg qd     Neuropathic pain  Assessment & Plan  - in setting of DM  - continue gabapentin 100/100/200mg, uptitrate as needed     HTN (hypertension)  Assessment & Plan  - at home on lopressor, norvasc, & lisinopril - continue   - monitor for orthostatic hypotension   - IM managing     DM (diabetes mellitus) ([facility] [address] )  Assessment & Plan  - on home metformin 500 mg BID with meals & ISS  - IM managing       Hypokalemia  Assessment & Plan  - periodic BMP  - IM managing     Chronic deep vein thrombosis (DVT) of left popliteal vein (HCC)  Assessment & Plan  - non-occlusive LLE DVT of the popliteal vein per imaging on 6/16/20, however similar finding on imaging going back to at least 6/2007  - patient was previously on eliquis, but per Dr Abena Spencer would hold eliquis for 3 weeks post-op   - continue coumadin   - trend INR    Arrhythmia  Assessment & Plan  - h/o AVNRT s/p ablation in 11/2019  - h/o a-flutter (chronically on [facility] due to h/o DVT)  - follows with Dr Gina Keith of [facility] cards     Hiatal hernia with gastroesophageal reflux  Assessment & Plan  - therapeutic substitution for home omeprazole 40 mg qd     Hyperlipidemia  Assessment & Plan  - on home lipitor 40 mg qpm     Hypothyroidism  Assessment & Plan  - on home levothyroxine 125 mcg qd   - TSH on 5/12/20 WNL     * Spinal stenosis of lumbar region  Assessment & Plan  - s/p L4-S1 lami-fusion by Dr Abena Spencer on 6/15  - spine precautions/LSO brace  - OP FU with Dr Johny Benitez  Labs, medications, and imaging personally reviewed  SUBJECTIVE:  Minimal back pain today  Continues to endorse feeling of incomplete bladder voiding; however, this has been going on chronically  Otherwise no complaints; no acute events overnight  ROS:  No fever, chills, chest pain, SOB, cough, nausea, vomiting, diarrhea, constipation, abdominal pain, dysuria, bowel/bladder incontinence, new weakness or changes in sensation  Complete ROS obtained and otherwise negative          OBJECTIVE:   /60 (BP Location: Right arm)   Pulse 85   Temp 97 8 °F (36 6 °C) (Tympanic)   Resp 18   Ht 5' 1" (1 549 m)   Wt 79 4 kg (175 lb 0 7 oz)   SpO2 96%   BMI 33 07 kg/m²     GEN: NAD, sitting comfortably in chair  Head: NCAT, no gross lesions  Eyes: PERRL, EOMI  Throat: clear, no thrush, MMM  Pulm: CTAB, no rales/wheezes  CV: RRR, normal s1/s2  Abd: soft, NTND  Ext: no pedal edema bilaterally, distal extremities warm and well perfused  Psych: normal affect, no agitation  Skin: no observable rashes  Neuro:  A+Ox3, fluent speech, follows commands   Moving all extremities spontaneously     Lab Results   Component Value Date    WBC 11 80 (H) 06/29/2020    HGB 11 8 (L) 06/29/2020    HCT 35 3 (L) 06/29/2020    MCV 93 06/29/2020     (H) 06/29/2020     Lab Results   Component Value Date    SODIUM 138 06/30/2020    K 4 2 06/30/2020     06/30/2020    CO2 29 06/30/2020    BUN 14 06/30/2020    CREATININE 0 70 06/30/2020    GLUC 150 (H) 06/30/2020    CALCIUM 9 7 06/30/2020     Lab Results   Component Value Date    INR 2 23 (H) 06/28/2020    INR 1 86 (H) 06/26/2020    INR 2 01 (H) 06/25/2020    PROTIME 23 8 (H) 06/28/2020    PROTIME 20 6 (H) 06/26/2020    PROTIME 21 9 (H) 06/25/2020           Current Facility-Administered Medications:     acetaminophen (TYLENOL) tablet 650 mg, 650 mg, Oral, Q8H [address], Keiht Santos MD, 650 mg at 06/30/20 0524    amLODIPine (NORVASC) tablet 5 mg, 5 mg, Oral, Daily, HUNTER Kuhn, 5 mg at 06/30/20 1389    atorvastatin (LIPITOR) tablet 40 mg, 40 mg, Oral, Daily With Bonny Goins MD, 40 mg at 06/29/20 1632    dicyclomine (BENTYL) tablet 20 mg, 20 mg, Oral, Q6H, Akanksha Araujo MD, 20 mg at 06/30/20 0525    gabapentin (NEURONTIN) capsule 100 mg, 100 mg, Oral, BID, Keith Santos MD, 100 mg at 06/30/20 0829    gabapentin (NEURONTIN) capsule 200 mg, 200 mg, Oral, HS, Keith Santso MD, 200 mg at 06/29/20 2106    insulin lispro (HumaLOG) 100 units/mL subcutaneous injection 1-5 Units, 1-5 Units, Subcutaneous, TID AC, 1 Units at 06/29/20 1734 **AND** Fingerstick Glucose (POCT), , , TID AC, Akanksha Araujo MD    insulin lispro (HumaLOG) 100 units/mL subcutaneous injection 1-5 Units, 1-5 Units, Subcutaneous, HS, Akanksha Araujo MD, 1 Units at 06/29/20 2108    levothyroxine tablet 125 mcg, 125 mcg, Oral, Early Morning, Akanksha Araujo MD, 125 mcg at 06/30/20 0525    lidocaine (LIDODERM) 5 % patch 1 patch, 1 patch, Topical, Daily, Kelly Iglesias MD, 1 patch at 06/30/20 0830    lisinopril (ZESTRIL) tablet 10 mg, 10 mg, Oral, Daily, HUNTER Kuhn, 10 mg at 06/30/20 0829    menthol-methyl salicylate (BENGAY) 62-28 % cream, , Apply externally, HS, Keith Santos MD    menthol-methyl salicylate (BENGAY) 91-79 % cream, , Apply externally, TID PRN, Keith Santos MD    metFORMIN (GLUCOPHAGE) tablet 500 mg, 500 mg, Oral, BID With Meals, Akanksha Araujo MD, 500 mg at 06/30/20 3543    methocarbamol (ROBAXIN) tablet 750 mg, 750 mg, Oral, Q6H PRN, Akanksha Araujo MD, 750 mg at 06/30/20 0047    metoprolol tartrate (LOPRESSOR) partial tablet 12 5 mg, 12 5 mg, Oral, Q12H MELISSA, HUNTER Kuhn, 12 5 mg at 06/30/20 0830    oxyCODONE (ROXICODONE) IR tablet 2 5 mg, 2 5 mg, Oral, Q4H PRN, Akanksha Araujo MD, 2 5 mg at 06/28/20 1107    oxyCODONE (ROXICODONE) IR tablet 5 mg, 5 mg, Oral, Q4H PRN, Akanksha Araujo MD, 5 mg at 06/30/20 0343    pantoprazole (PROTONIX) EC tablet 40 mg, 40 mg, Oral, Early Morning, Akanksha Araujo MD, 40 mg at 06/30/20 0525    polyethylene glycol (MIRALAX) packet 17 g, 17 g, Oral, Daily PRN, Akanksha Araujo MD    senna-docusate sodium (SENOKOT S) 8 6-50 mg per tablet 2 tablet, 2 tablet, Oral, HS PRN, HUNTER Feliciano    sertraline (ZOLOFT) tablet 25 mg, 25 mg, Oral, Daily, Akanksha Araujo MD, 25 mg at 06/30/20 5058    warfarin (COUMADIN) tablet 4 mg, 4 mg, Oral, Daily (warfarin), HUNTER Kuhn, 4 mg at 06/29/20 0649    Past Medical History:   Diagnosis Date    Ambulates with cane     Anxiety     Arthritis     Back pain     Bowel trouble     Cancer [facility])     left breast- left mastectomy- chemo    Chronic pain disorder     Coronary artery disease     2 stents    Depression     Diabetes mellitus ([facility] )     NIDDM    Disease of thyroid gland     hypo    DVT (deep venous thrombosis) (HCC)     left leg    Full dentures     H/O breast implant     left    Headache     History of transfusion     no adverse reaction    Hyperlipidemia     Hypertension     Irregular heart beat     Afib    Localized swelling of both lower legs     Multiple falls     Neuropathy     Right knee pain     Shortness of breath     on exertion    Spinal stenosis, lumbar     Uses walker     Wears glasses     Wears glasses        Patient Active Problem List    Diagnosis Date Noted    Leukocytosis 06/26/2020    Hypothyroidism 06/21/2020    Hyperlipidemia 06/21/2020    Hiatal hernia with gastroesophageal reflux 06/21/2020    Arrhythmia 06/21/2020    Chronic deep vein thrombosis (DVT) of left popliteal vein (HCC) 06/21/2020    Hypokalemia 06/21/2020    DM (diabetes mellitus) ([place] [address] ) 06/21/2020    HTN (hypertension) 06/21/2020    Neuropathic pain 06/21/2020    Mood disorder ([place] [address] ) 06/21/2020    IBS (irritable bowel syndrome) 06/21/2020    Lymph node disorder 06/21/2020    Abnormal CAT scan 06/21/2020    Spinal stenosis of lumbar region 10/01/2019          Elizabeth Davison MD

## 2020-06-30 NOTE — PROGRESS NOTES
06/30/20 1330   Pain Assessment   Pain Assessment Tool 0-10   Pain Score 6   Pain Location/Orientation Orientation: Left; Location: Leg;Location: Back   Restrictions/Precautions   Precautions Bed/chair alarms;Cognitive; Fall Risk;Spinal precautions   ROM Restrictions Yes  (back precautions)   Braces or Orthoses LSO   Subjective   Subjective Pt was tired but agreed for session    Sit to Lying   Type of Assistance Needed Physical assistance   Amount of Physical Assistance Provided 25%-49%   Comment Min A to get heel up over edge of bed   Sit to Lying CARE Score 3   Lying to Sitting on Side of Bed   Type of Assistance Needed Supervision   Comment with use of HR   Lying to Sitting on Side of Bed CARE Score 4   Sit to Stand   Type of Assistance Needed Incidental touching   Comment CG   Sit to Stand CARE Score 4   Bed-Chair Transfer   Type of Assistance Needed Incidental touching   Comment CG with RW   Chair/Bed-to-Chair Transfer CARE Score 4   Walk 10 Feet   Type of Assistance Needed Supervision   Comment close marlene with RW   Walk 10 Feet CARE Score 4   Walk 50 Feet with Two Turns   Type of Assistance Needed Incidental touching   Comment CG during chair approach using RW   Walk 50 Feet with Two Turns CARE Score 4   Walking 10 Feet on Uneven Surfaces   Type of Assistance Needed Supervision   Comment close marlene   Walking 10 Feet on Uneven Surfaces CARE Score 4   Ambulation   Does the patient walk? 2  Yes   Primary Mode of Locomotion Prior to Admission Walk   Distance Walked (feet) 125 ft  (50x2)   Assist Device Roller Walker   Gait Pattern Inconsistant [place];Decreased foot clearance; Forward Flexion;Narrow BECCA;Scissoring; Improper weight shift   Limitations Noted In Balance; Endurance; Safety;Speed;Strength   Findings Overall close S for most amb,  CG for turning and chair approach (pts weak hip abd and narrow BECCA and momentum creates lateral instability with pivots and chair approach)   Wheelchair mobility   Does the patient use a wheelchair? 0  No   Therapeutic Interventions   Strengthening Hip abd yellow Tband,  Hip ext MRE min resisted,  10x3   Other spent good portion on bed mobility using RW on side of mat to simulate bed rail pt has at home,  spent portion of session with rest break sitting on CP and CP placed on back for 15mins due to inc pain,  CP helps dec pain and brings it down a number   Assessment   Treatment Assessment 90 min session focus on amb shorter distance from chair to chair and focused on turn to chair (narrow BECCA and weakness causes slight LOB) this decreased after education and slowed down turn and doesnt get momentum  Also Ed pt on back surgery and reasoning for precaution and specific bed mobility  Pt needed Min A with log roll to get feet up over edge of bed (used walker as bed rail to simulate home set up)  After education and performance pt could not recall what we performed and why  showing how significant cog defecits are  Cont skilled PT toward LTGS   PT Barriers   Physical Impairment Decreased strength;Decreased endurance; Impaired balance;Decreased mobility; Decreased safety awareness;Orthopedic restrictions;Pain   Functional Limitation Car transfers; Ramp negotiation;Stair negotiation;Standing;Transfers; Walking   Plan   Treatment/Interventions LE strengthening/ROM; Functional transfer training; Therapeutic exercise;Gait training;Bed mobility   Progress Progressing toward goals  (limited carry over due to cognition)   Recommendation   PT Discharge Recommendation   (need family training)   PT Therapy Minutes   PT Time In 1330   PT Time Out 1500   PT Total Time (minutes) 90   PT Mode of treatment - Individual (minutes) 90   PT Mode of treatment - Concurrent (minutes) 0   PT Mode of treatment - Group (minutes) 0   PT Mode of treatment - Co-treat (minutes) 0   PT Mode of Treatment - Total time(minutes) 90 minutes   PT Cumulative Minutes 750   Therapy Time missed   Time missed?  No

## 2020-06-30 NOTE — PLAN OF CARE
Problem: Prexisting or High Potential for Compromised Skin Integrity  Goal: Skin integrity is maintained or improved  Description  INTERVENTIONS:  - Identify patients at risk for skin breakdown  - Assess and monitor skin integrity  - Assess and monitor nutrition and hydration status  - Monitor labs   - Assess for incontinence   - Turn and reposition patient  - Assist with mobility/ambulation  - Relieve pressure over bony prominences  - Avoid friction and shearing  - Provide appropriate hygiene as needed including keeping skin clean and dry  - Evaluate need for skin moisturizer/barrier cream  - Collaborate with interdisciplinary team   - Patient/family teaching  - Consider wound care consult   Outcome: Progressing     Problem: Potential for Falls  Goal: Patient will remain free of falls  Description  INTERVENTIONS:  - Assess patient frequently for physical needs  -  Identify cognitive and physical deficits and behaviors that affect risk of falls    -  [place] fall precautions as indicated by assessment   - Educate patient/family on patient safety including physical limitations  - Instruct patient to call for assistance with activity based on assessment  - Modify environment to reduce risk of injury  - Consider OT/PT consult to assist with strengthening/mobility  Outcome: Progressing     Problem: PAIN - ADULT  Goal: Verbalizes/displays adequate comfort level or baseline comfort level  Description  Interventions:  - Encourage patient to monitor pain and request assistance  - Assess pain using appropriate pain scale  - Administer analgesics based on type and severity of pain and evaluate response  - Implement non-pharmacological measures as appropriate and evaluate response  - Consider cultural and social influences on pain and pain management  - Notify physician/advanced practitioner if interventions unsuccessful or patient reports new pain  Outcome: Progressing

## 2020-06-30 NOTE — PLAN OF CARE
Problem: Prexisting or High Potential for Compromised Skin Integrity  Goal: Skin integrity is maintained or improved  Description  INTERVENTIONS:  - Identify patients at risk for skin breakdown  - Assess and monitor skin integrity  - Assess and monitor nutrition and hydration status  - Monitor labs   - Assess for incontinence   - Turn and reposition patient  - Assist with mobility/ambulation  - Relieve pressure over bony prominences  - Avoid friction and shearing  - Provide appropriate hygiene as needed including keeping skin clean and dry  - Evaluate need for skin moisturizer/barrier cream  - Collaborate with interdisciplinary team   - Patient/family teaching  - Consider wound care consult   Outcome: Progressing     Problem: Potential for Falls  Goal: Patient will remain free of falls  Description  INTERVENTIONS:  - Assess patient frequently for physical needs  -  Identify cognitive and physical deficits and behaviors that affect risk of falls    -  [place] fall precautions as indicated by assessment   - Educate patient/family on patient safety including physical limitations  - Instruct patient to call for assistance with activity based on assessment  - Modify environment to reduce risk of injury  - Consider OT/PT consult to assist with strengthening/mobility  Outcome: Progressing     Problem: PAIN - ADULT  Goal: Verbalizes/displays adequate comfort level or baseline comfort level  Description  Interventions:  - Encourage patient to monitor pain and request assistance  - Assess pain using appropriate pain scale  - Administer analgesics based on type and severity of pain and evaluate response  - Implement non-pharmacological measures as appropriate and evaluate response  - Consider cultural and social influences on pain and pain management  - Notify physician/advanced practitioner if interventions unsuccessful or patient reports new pain  Outcome: Progressing     Problem: INFECTION - ADULT  Goal: Absence or prevention of progression during hospitalization  Description  INTERVENTIONS:  - Assess and monitor for signs and symptoms of infection  - Monitor lab/diagnostic results  - Monitor all insertion sites, i e  indwelling lines, tubes, and drains  - Monitor endotracheal if appropriate and nasal secretions for changes in amount and color  - [place] appropriate cooling/warming therapies per order  - Administer medications as ordered  - Instruct and encourage patient and family to use good hand hygiene technique  - Identify and instruct in appropriate isolation precautions for identified infection/condition  Outcome: Progressing     Problem: SAFETY ADULT  Goal: Maintain or return to baseline ADL function  Description  INTERVENTIONS:  -  Assess patient's ability to carry out ADLs; assess patient's baseline for ADL function and identify physical deficits which impact ability to perform ADLs (bathing, care of mouth/teeth, toileting, grooming, dressing, etc )  - Assess/evaluate cause of self-care deficits   - Assess range of motion  - Assess patient's mobility; develop plan if impaired  - Assess patient's need for assistive devices and provide as appropriate  - Encourage maximum independence but intervene and supervise when necessary  - Involve family in performance of ADLs  - Assess for home care needs following discharge   - Consider OT consult to assist with ADL evaluation and planning for discharge  - Provide patient education as appropriate  Outcome: Progressing  Goal: Maintain or return mobility status to optimal level  Description  INTERVENTIONS:  - Assess patient's baseline mobility status (ambulation, transfers, stairs, etc )    - Identify cognitive and physical deficits and behaviors that affect mobility  - Identify mobility aids required to assist with transfers and/or ambulation (gait belt, sit-to-stand, lift, walker, cane, etc )  - [place] fall precautions as indicated by assessment  - Record patient progress and toleration of activity level on Mobility SBAR; progress patient to next Phase/Stage  - Instruct patient to call for assistance with activity based on assessment  - Consider rehabilitation consult to assist with strengthening/weightbearing, etc   Outcome: Progressing     Problem: DISCHARGE PLANNING  Goal: Discharge to home or other facility with appropriate resources  Description  INTERVENTIONS:  - Identify barriers to discharge w/patient and caregiver  - Arrange for needed discharge resources and transportation as appropriate  - Identify discharge learning needs (meds, wound care, etc )  - Arrange for interpretive services to assist at discharge as needed  - Refer to Case Management Department for coordinating discharge planning if the patient needs post-hospital services based on physician/advanced practitioner order or complex needs related to functional status, cognitive ability, or social support system  Outcome: Progressing     Problem: SKIN/TISSUE INTEGRITY - ADULT  Goal: Skin integrity remains intact  Description  INTERVENTIONS  - Identify patients at risk for skin breakdown  - Assess and monitor skin integrity  - Assess and monitor nutrition and hydration status  - Monitor labs (i e  albumin)  - Assess for incontinence   - Turn and reposition patient  - Assist with mobility/ambulation  - Relieve pressure over bony prominences  - Avoid friction and shearing  - Provide appropriate hygiene as needed including keeping skin clean and dry  - Evaluate need for skin moisturizer/barrier cream  - Collaborate with interdisciplinary team (i e  Nutrition, Rehabilitation, etc )   - Patient/family teaching  Outcome: Progressing  Goal: Incision(s), wounds(s) or drain site(s) healing without S/S of infection  Description  INTERVENTIONS  - Assess and document risk factors for skin impairment   - Assess and document dressing, incision, wound bed, drain sites and surrounding tissue  - Consider nutrition services referral as needed  - Oral mucous membranes remain intact  - Provide patient/ family education  Outcome: Progressing  Goal: Oral mucous membranes remain intact  Description  INTERVENTIONS  - Assess oral mucosa and hygiene practices  - Implement preventative oral hygiene regimen  - Implement oral medicated treatments as ordered  - Initiate Nutrition services referral as needed  Outcome: Progressing     Problem: MUSCULOSKELETAL - ADULT  Goal: Maintain or return mobility to safest level of function  Description  INTERVENTIONS:  - Assess patient's ability to carry out ADLs; assess patient's baseline for ADL function and identify physical deficits which impact ability to perform ADLs (bathing, care of mouth/teeth, toileting, grooming, dressing, etc )  - Assess/evaluate cause of self-care deficits   - Assess range of motion  - Assess patient's mobility  - Assess patient's need for assistive devices and provide as appropriate  - Encourage maximum independence but intervene and supervise when necessary  - Involve family in performance of ADLs  - Assess for home care needs following discharge   - Consider OT consult to assist with ADL evaluation and planning for discharge  - Provide patient education as appropriate  Outcome: Progressing  Goal: Maintain proper alignment of affected body part  Description  INTERVENTIONS:  - Support, maintain and protect limb and body alignment  - Provide patient/ family with appropriate education  Outcome: Progressing

## 2020-07-01 LAB
GLUCOSE SERPL-MCNC: 124 MG/DL (ref 65–140)
GLUCOSE SERPL-MCNC: 160 MG/DL (ref 65–140)
GLUCOSE SERPL-MCNC: 164 MG/DL (ref 65–140)
GLUCOSE SERPL-MCNC: 171 MG/DL (ref 65–140)
INR PPP: 2.17 (ref 0.84–1.19)
PROTHROMBIN TIME: 23.3 SECONDS (ref 11.6–14.5)

## 2020-07-01 PROCEDURE — 99232 SBSQ HOSP IP/OBS MODERATE 35: CPT | Performed by: NURSE PRACTITIONER

## 2020-07-01 PROCEDURE — 82948 REAGENT STRIP/BLOOD GLUCOSE: CPT

## 2020-07-01 PROCEDURE — 97530 THERAPEUTIC ACTIVITIES: CPT

## 2020-07-01 PROCEDURE — 97110 THERAPEUTIC EXERCISES: CPT

## 2020-07-01 PROCEDURE — 97116 GAIT TRAINING THERAPY: CPT

## 2020-07-01 PROCEDURE — 97130 THER IVNTJ EA ADDL 15 MIN: CPT

## 2020-07-01 PROCEDURE — 97535 SELF CARE MNGMENT TRAINING: CPT

## 2020-07-01 PROCEDURE — 99232 SBSQ HOSP IP/OBS MODERATE 35: CPT | Performed by: INTERNAL MEDICINE

## 2020-07-01 PROCEDURE — 85610 PROTHROMBIN TIME: CPT | Performed by: NURSE PRACTITIONER

## 2020-07-01 PROCEDURE — 97129 THER IVNTJ 1ST 15 MIN: CPT

## 2020-07-01 RX ORDER — WARFARIN SODIUM 5 MG/1
5 TABLET ORAL
Status: DISCONTINUED | OUTPATIENT
Start: 2020-07-01 | End: 2020-07-06 | Stop reason: HOSPADM

## 2020-07-01 RX ORDER — DOCUSATE SODIUM 100 MG/1
100 CAPSULE, LIQUID FILLED ORAL 2 TIMES DAILY
Status: DISCONTINUED | OUTPATIENT
Start: 2020-07-01 | End: 2020-07-06 | Stop reason: HOSPADM

## 2020-07-01 RX ADMIN — MENTHOL, METHYL SALICYLATE: 10; 15 CREAM TOPICAL at 21:45

## 2020-07-01 RX ADMIN — SERTRALINE HYDROCHLORIDE 25 MG: 25 TABLET ORAL at 08:08

## 2020-07-01 RX ADMIN — OXYCODONE HYDROCHLORIDE 5 MG: 5 TABLET ORAL at 04:46

## 2020-07-01 RX ADMIN — DICYCLOMINE HYDROCHLORIDE 20 MG: 20 TABLET ORAL at 17:40

## 2020-07-01 RX ADMIN — METOPROLOL TARTRATE 12.5 MG: 25 TABLET ORAL at 21:33

## 2020-07-01 RX ADMIN — METFORMIN HYDROCHLORIDE 500 MG: 500 TABLET ORAL at 08:08

## 2020-07-01 RX ADMIN — GABAPENTIN 100 MG: 100 CAPSULE ORAL at 14:37

## 2020-07-01 RX ADMIN — ACETAMINOPHEN 650 MG: 325 TABLET ORAL at 14:37

## 2020-07-01 RX ADMIN — INSULIN LISPRO 1 UNITS: 100 INJECTION, SOLUTION INTRAVENOUS; SUBCUTANEOUS at 12:06

## 2020-07-01 RX ADMIN — LISINOPRIL 10 MG: 10 TABLET ORAL at 08:08

## 2020-07-01 RX ADMIN — METFORMIN HYDROCHLORIDE 500 MG: 500 TABLET ORAL at 16:49

## 2020-07-01 RX ADMIN — PANTOPRAZOLE SODIUM 40 MG: 40 TABLET, DELAYED RELEASE ORAL at 05:00

## 2020-07-01 RX ADMIN — INSULIN LISPRO 1 UNITS: 100 INJECTION, SOLUTION INTRAVENOUS; SUBCUTANEOUS at 21:48

## 2020-07-01 RX ADMIN — GABAPENTIN 200 MG: 100 CAPSULE ORAL at 21:33

## 2020-07-01 RX ADMIN — METHOCARBAMOL 750 MG: 750 TABLET, FILM COATED ORAL at 14:37

## 2020-07-01 RX ADMIN — MENTHOL, METHYL SALICYLATE: 10; 15 CREAM TOPICAL at 08:09

## 2020-07-01 RX ADMIN — LEVOTHYROXINE SODIUM 125 MCG: 125 TABLET ORAL at 05:00

## 2020-07-01 RX ADMIN — ACETAMINOPHEN 650 MG: 325 TABLET ORAL at 21:33

## 2020-07-01 RX ADMIN — DOCUSATE SODIUM 100 MG: 100 CAPSULE, LIQUID FILLED ORAL at 12:07

## 2020-07-01 RX ADMIN — WARFARIN SODIUM 5 MG: 5 TABLET ORAL at 17:40

## 2020-07-01 RX ADMIN — ATORVASTATIN CALCIUM 40 MG: 40 TABLET, FILM COATED ORAL at 16:49

## 2020-07-01 RX ADMIN — ACETAMINOPHEN 650 MG: 325 TABLET ORAL at 05:00

## 2020-07-01 RX ADMIN — DICYCLOMINE HYDROCHLORIDE 20 MG: 20 TABLET ORAL at 12:06

## 2020-07-01 RX ADMIN — INSULIN LISPRO 1 UNITS: 100 INJECTION, SOLUTION INTRAVENOUS; SUBCUTANEOUS at 08:07

## 2020-07-01 RX ADMIN — AMLODIPINE BESYLATE 5 MG: 5 TABLET ORAL at 08:08

## 2020-07-01 RX ADMIN — DICYCLOMINE HYDROCHLORIDE 20 MG: 20 TABLET ORAL at 05:00

## 2020-07-01 RX ADMIN — LIDOCAINE 1 PATCH: 50 PATCH CUTANEOUS at 08:09

## 2020-07-01 RX ADMIN — GABAPENTIN 100 MG: 100 CAPSULE ORAL at 08:08

## 2020-07-01 RX ADMIN — METOPROLOL TARTRATE 12.5 MG: 25 TABLET ORAL at 08:08

## 2020-07-01 RX ADMIN — OXYCODONE HYDROCHLORIDE 5 MG: 5 TABLET ORAL at 21:34

## 2020-07-01 NOTE — ASSESSMENT & PLAN NOTE
PT/OT, pain management per primary team   S/p L4-S1 laminectomy on 6/15  Spine precautions/LSO brace  F/u with Dr Bernard Landin upon discharge

## 2020-07-01 NOTE — PROGRESS NOTES
07/01/20 0900   Pain Assessment   Pain Assessment Tool 0-10   Pain Score No Pain  (pt verbalized no pain at this time)   Restrictions/Precautions   Precautions Cognitive; Fall Risk;Spinal precautions;Pain;Supervision on toilet/commode   Weight Bearing Restrictions No   ROM Restrictions Yes  (spinal precautions)   Braces or Orthoses LSO   Comprehension   Comprehension (FIM) 4 - Understands basic info/conversation 75-90% of time   Expression   Expression (FIM) 4 - Expresses basic info/needs 75-90% of time   Social Interaction   Social Interaction (FIM) 5 - Requires redirection but less than 10% of the time  Problem Solving   Problem solving (FIM) 3 - Solves basic problmes 50-74% of time   Memory   Memory (FIM) 3 - Recognizes, recalls/performs 50-74%   Speech/Language/Cognition Assessmetn   Treatment Assessment See below for session note  SLP Therapy Minutes   SLP Time In 0900   SLP Time Out 1025   SLP Total Time (minutes) 85   SLP Mode of treatment - Individual (minutes) 85   SLP Mode of treatment - Concurrent (minutes) 0   SLP Mode of treatment - Group (minutes) 0   SLP Mode of treatment - Co-treat (minutes) 0   SLP Mode of Treatment - Total time(minutes) 85 minutes   SLP Cumulative Minutes 145   Therapy Time missed   Time missed? No     Cognitive Linguistic Session Note: Pt participated in skilled ST session focusing on higher level cognitive linguistic skills as pt was previously independent for all IADLs prior to admission  Session focused primarily on money/finance management  Targeting basic money management, pt presented w/ two written amounts/denominations of coins and instructed to add together to determine totals  Pt demo significant difficulty w/ task, requiring max assist  Utilizing real coins, pt added together small amounts of change (under $1) to calculate totals w/ 6/10 accuracy, requiring verbal cues to assist w/ problem solving   Similarly, when SLP providing a specific number (e g ,  45 cents), pt demo ability to create these amounts using real coins in 4/10 trials, again requiring assist to improve accuracy of task  Throughout basic money management tasks, pt demonstrating deficits in working memory and processing as she required repetition of target amounts 3-6 times for each trial and significantly increased time for processing  Pt reported that after her admission to [facility] around this past Thanksgiving, she had difficulty w/ check writing after she returned home where her daughter then monitored her check writing, however, states that now this has improved and she no longer hand difficulty  Presented w/ a mock bill and blank check, pt accurately completed only the date section where she wrote her name in "pay to the order of," did not sign check and wrote incorrect amount in numerical and written areas for payment (this number was not present on statement)  Pt requiring moderate cues and direct verbal instructions to re-attempt and accurately complete task  In second trial, pt improved in ability to fill out a check where she accurately recorded the date, "pay to the order of" and written/number payment sections but did not complete signature portion, requiring direct verbal cues  Lastly, when presented w/ mock bill on initial trial, pt independently and accurately answered 3/10 comprehension questions  Errors appeared due to deficits in attention, working memory and processing where when SLP read questions aloud to pt, pt demo improvement in accuracy of responses  When completing final trial, pt presented w/ a new mock bill where she was unable to answer comprehension questions in full due to cognitive fatigue  Of those which were answered, pt answered 3/8 questions accurately  Based on current skills, pt is recommended to have assistance for all money and finance management tasks   Pt will benefit from continued skilled ST services at this time to further assess skills and to maximize functional cognitive linguistic skills

## 2020-07-01 NOTE — ASSESSMENT & PLAN NOTE
Vitals:    07/01/20 0744   BP: 121/56   Pulse: 77   Resp: 18   Temp: 97 7 °F (36 5 °C)   SpO2: 95%       BP remains stable  Continue amlodipine 5 mg daily,Lopressor 12 5 mg BID,  Lisinopril to 10 mg daily   Monitor and adjust as needed

## 2020-07-01 NOTE — ASSESSMENT & PLAN NOTE
Lab Results   Component Value Date    HGBA1C 6 4 (H) 05/12/2020       Recent Labs     06/30/20  1107 06/30/20  1615 06/30/20 2043 07/01/20  0618   POCGLU 159* 161* 209* 160*       Blood Sugar Average: Last 72 hrs:  (P) 209 [id number]     Continue Metformin 500 mg BID with SSI  Monitor kidney function, remains stable  6/30- creatinine 0 70  BMP Thursday

## 2020-07-01 NOTE — ASSESSMENT & PLAN NOTE
Per hospital records, patient noted to have subcentimeter lymph node in the right axilla with fat stranding on imaging  Due to history of breast CA, radiology report clinical correlation was recommended  Patient was evaluated by Dr Thierno Silverio in acute care who noted this to be an asymptomatic finding and recommended no further workup at this time other than routine follow up  with PCP & continued regular mammograms for her history of  breast CA  Follow up with PCP on discharge

## 2020-07-01 NOTE — PROGRESS NOTES
07/01/20 0830   Pain Assessment   Pain Assessment Tool 0-10   Pain Score 7   Pain Location/Orientation Orientation: Left; Location: Knee   Pain Onset/Description Onset: Ongoing   Effect of Pain on Daily Activities limtis funcitonal transfers   Hospital Pain Intervention(s) Repositioned;Rest;Cold applied  (cold applie dto L hip)   Multiple Pain Sites No   Restrictions/Precautions   Precautions Cognitive; Fall Risk;Spinal precautions;Pain   Weight Bearing Restrictions No   ROM Restrictions Yes  (spinal precautions)   Braces or Orthoses LSO   Sit to Stand   Type of Assistance Needed Supervision; Adaptive equipment   Amount of Physical Assistance Provided No physical assistance   Comment CS with RW   Sit to Stand CARE Score 4   Bed-Chair Transfer   Type of Assistance Needed Supervision   Amount of Physical Assistance Provided No physical assistance   Comment CS with RW and inc time 2* to pain   Chair/Bed-to-Chair Transfer CARE Score 4   Transfer Bed/Chair/Wheelchair   Positioning Concerns Other  (spinal precautions)   Limitations Noted In Balance; Endurance;UE Strength;LE Strength   Adaptive Equipment Roller Walker   Therapeutic Excerise-Strength   UE Strength Yes   Right Upper Extremity- Strength   R Shoulder Flexion; Extension;Horizontal ABduction   R Elbow Elbow flexion;Elbow extension   R Position Seated   Equipment Dowel  (3lb)   R Weight/Reps/Sets 2x15   RUE Strength Comment pt engaged in BUE strengthenign with 3lb dowel 2x15 in janay vailable shoulde rplanes as tolerated/permitted for inc functional strength to improve SPT   Left Upper Extremity-Strength   L Shoulder Flexion; Extension;Horizontal ABduction   L Elbow Elbow flexion;Elbow extension   L Position Seated   Equipment Dowel  (3lb)   L Weights/Reps/Sets 2x15   LUE Strength Comment pt engaged in BUE strengthenign with 3lb dowel 2x15 in janay vailable shoulde rplanes as tolerated/permitted for inc functional strength to improve SPT   Cognition   Overall Cognitive Status Impaired   Arousal/Participation Alert; Cooperative   Attention Attends with cues to redirect   Orientation Level Oriented to person;Oriented to place;Oriented to time   Memory Decreased recall of precautions   Following Commands Follows one step commands with increased time or repetition   Additional Activities   Additional Activities Other (Comment)  (dynamic balance)   Additional Activities Comments therapist engaged pt in dynamic balance with ball toss x2 to challenge BECCA< endurance, strength, coord and weight shifitng ot inc standing tolerance and reahcing out of BECCA for inc participation in ADL tasks   Activity Tolerance   Activity Tolerance Patient tolerated treatment well   Assessment   Treatment Assessment Pt engaged in brief 30mins of skilled OT services with foucs on BUE strength and dynamic balance  Pt observed with inc improvement in funcitonal transfer to CS with RW with dec c/o pain  Pt will have 24/7 supervision at [facility] of dc  Pt s dtr Heike Bass to attend FT on 7/2 to observe pts current function  Pt can cont to benefit from skilled OT services with focus on dynamic balance, BUE strength, item retrieval with walker basket/tray to inc functional performance and dec caregiver burden  Prognosis Fair   Problem List Decreased strength;Decreased range of motion; Impaired balance;Decreased mobility; Decreased cognition; Impaired judgement;Decreased safety awareness;Orthopedic restrictions   Barriers to Discharge Decreased caregiver support   Barriers to Discharge Comments pain mgnmtn at times   Plan   Treatment/Interventions ADL retraining;Functional transfer training; Therapeutic exercise; Endurance training;Patient/family training; Compensatory technique education   Progress Progressing toward goals   Recommendation   OT Discharge Recommendation Return to previous environment with social support  (home OT)   OT Therapy Minutes   OT Time In 0830   OT Time Out 0900   OT Total Time (minutes) 30 OT Mode of treatment - Individual (minutes) 30   OT Mode of treatment - Concurrent (minutes) 0   OT Mode of treatment - Group (minutes) 0   OT Mode of treatment - Co-treat (minutes) 0   OT Mode of Treatment - Total time(minutes) 30 minutes   OT Cumulative Minutes 645   Therapy Time missed   Time missed?  No

## 2020-07-01 NOTE — ASSESSMENT & PLAN NOTE
Non-occlusive LLE DVT (popliteal vein) on 6/16 imaging, unchanged on repeat imaging 6/26  Patient had been on Eliquis prior to recent spinal surgery  Per surgery, patient not cleared to resume Eliquis  Dr Carolynn Decker does not want patient on Eliquis for at least 3 weeks post-op  Decision made prior to recent hospital discharge to start Coumadin with INR goal of 2 0-2 5  (when goal reached, heparin could be discontinued)  INR 2 17 7/1; 2 23 6/28  Coumadin increased to 5 mg daily  INR tomorrow  Patient will need clearance by spine surgery to be switched back to Eliquis

## 2020-07-01 NOTE — PROGRESS NOTES
07/01/20 1230   Pain Assessment   Pain Assessment Tool 0-10   Pain Score 7   Pain Location/Orientation Orientation: Left; Location: Leg;Location: Knee  (back of knee/leg)   Pain Radiating Towards buttocks   Pain Onset/Description Onset: Ongoing   Patient's Stated Pain Goal No pain   Restrictions/Precautions   Precautions Spinal precautions; Fall Risk   Weight Bearing Restrictions No   ROM Restrictions Yes  (spinal precautions)   Braces or Orthoses LSO   Tub/Shower Transfer   Limitations Noted In Balance; Safety;UE Strength;LE Strength; Endurance;ROM   Adaptive Equipment Grab Bars;Seat with Back   Assessed Shower  (dry transfer)   Findings Pt performed sidestepping into shower stall w/ simulated lip ~2"  Pt observed to attempt forward approach, reaching for back of shower chair for support, w/ therapist educating pt on positioning for sidestepping and hand placement on grab bars to increase safety  Pt requiring CGA for side stepping iwth vc   Upper Body Dressing   Type of Assistance Needed Physical assistance   Amount of Physical Assistance Provided 50%-74%   Comment Pt performed UB dressing seated in chair, reporting desire to change before starting therapy session  Therapist providing A for LSO brace only   Upper Body Dressing CARE Score 2   Lower Body Dressing   Type of Assistance Needed Incidental touching   Amount of Physical Assistance Provided No physical assistance   Comment Pt performed in stance at RW, doffing shorts to mid thigh, finishing undressing seated in recliner  Therapist providing Krystian for support/balance while in stance  Lower Body Dressing CARE Score 4   Picking Up Object   Type of Assistance Needed Incidental touching   Amount of Physical Assistance Provided No physical assistance   Comment Pt CGA for suppot/balance for cone- w/ grabber activity to trial RW tray for fuctional transfer  Therapist educating pt several times to get closer to cones on floor before reaching   Pt not observed to carryover therapist education at this time, reporting "I don't listen"  Picking Up Object CARE Score 4   Dressing/Undressing Clothing   Remove UB Clothes Pullover Shirt   Don UB Clothes   (nightgown)   Remove LB Clothes Shorts   Limitations Noted In Balance; Coordination; Endurance; Safety;Strength;Timeliness   Positioning Standing;Supported Sit   Sit to Stand   Type of Assistance Needed Supervision   Amount of Physical Assistance Provided No physical assistance   Comment Pt standing @RW from recliner therapist providing CS for safety  Sit to Stand CARE Score 4   Bed-Chair Transfer   Type of Assistance Needed Incidental touching   Amount of Physical Assistance Provided No physical assistance   Comment pt transfering to recliner from RW w  CGA for balance/safety due to increased pain   Chair/Bed-to-Chair Transfer CARE Score 4   Transfer Bed/Chair/Wheelchair   Positioning Concerns Cognition   Limitations Noted In Balance; Endurance;UE Strength;LE Strength   Adaptive Equipment Roller Walker   Cognition   Overall Cognitive Status Impaired   Arousal/Participation Alert; Responsive; Cooperative   Attention Attends with cues to redirect   Orientation Level Oriented X4   Memory Decreased short term memory   Following Commands Follows one step commands with increased time or repetition   Activity Tolerance   Activity Tolerance Patient tolerated treatment well   Assessment   Treatment Assessment Pt participated in 60 minute OT session with focus on self care and balance/endurance during functional mobility to increase functional performance of ADLs/IADLs, improve independence and decrease caregiver burden  Son Nupur Coker present for beginning of session  During sidestepping into shower simulation, son reporting prior to pt stay at [facility], pt would leave RW and use cane to ambulate into bathroom  Therapist providing pt education on using RW for all ambulation for increased safety with pt verbalizing understanding   During activity using RW tray, therapist educated pt to position body next to pick-up object to increase BECCA and safety 3 times, w/ pt not observed to make changes to reach  Howvever pt reporting "I like this  I can use this to transport my coffee"  Pt educated on imprtance of having live-in daughter Arpita Bangura) present to help with all ADLs post discharge, with pt verbalizing unserstanding  Pt continues to present with safety issues with regard to transfers, balance, and lack of activity tolerance  Pt would benefit from continued skilled OT with focus on ADLs, IADLs, balance and building activity tolerance  OT Family training done with: Leisa Gardner to attend FT on 7/2   Prognosis Good   Problem List Decreased strength;Decreased range of motion;Decreased endurance; Impaired balance;Decreased mobility; Decreased cognition;Decreased safety awareness; Impaired judgement;Orthopedic restrictions   Barriers to Discharge Decreased caregiver support   Plan   Treatment/Interventions ADL retraining;Functional transfer training; Therapeutic exercise; Endurance training;Patient/family training;Bed mobility; Compensatory technique education   Progress Progressing toward goals   Recommendation   OT Discharge Recommendation Return to previous environment with social support  (home OT)   Equipment Recommended   (walker tray)   OT Equipment ordered therapist to speak with dtr Peg   OT Therapy Minutes   OT Time In 1230   OT Time Out 1330   OT Total Time (minutes) 60   OT Mode of treatment - Individual (minutes) 60   OT Mode of treatment - Concurrent (minutes) 0   OT Mode of treatment - Group (minutes) 0   OT Mode of treatment - Co-treat (minutes) 0   OT Mode of Treatment - Total time(minutes) 60 minutes   OT Cumulative Minutes 705   Therapy Time missed   Time missed?  No

## 2020-07-01 NOTE — ASSESSMENT & PLAN NOTE
Per most recent hospitalization, K+ as low as 2 2 (felt due to poor p o  Intake)  6/22 K+ 3 5  Replaced with 40 mEq at that time  6/23 K+ 4 0  6/25 - 3 8  6/29 5 1 - specimen hemolized   6/30 4 2  BMP Thursday

## 2020-07-01 NOTE — ASSESSMENT & PLAN NOTE
Found on CTA during most recent admission  General surgery saw patient in consultation and recommended OP f/u  Continue Protonix 40 mg daily  Monitor for recurrence of esophageal spasm episodes - continues to deny symptoms

## 2020-07-01 NOTE — ASSESSMENT & PLAN NOTE
Continue Bentyl  LBM 6/28 - multiple at which time bowel meds were switched to as needed  Patient has not been drinking prune juice on daily basis  She will have prune juice for lunch and will add Colace BID back to daily schedule  Continue to monitor

## 2020-07-01 NOTE — PLAN OF CARE
Problem: Prexisting or High Potential for Compromised Skin Integrity  Goal: Skin integrity is maintained or improved  Description  INTERVENTIONS:  - Identify patients at risk for skin breakdown  - Assess and monitor skin integrity  - Assess and monitor nutrition and hydration status  - Monitor labs   - Assess for incontinence   - Turn and reposition patient  - Assist with mobility/ambulation  - Relieve pressure over bony prominences  - Avoid friction and shearing  - Provide appropriate hygiene as needed including keeping skin clean and dry  - Evaluate need for skin moisturizer/barrier cream  - Collaborate with interdisciplinary team   - Patient/family teaching  - Consider wound care consult   Outcome: Progressing     Problem: Potential for Falls  Goal: Patient will remain free of falls  Description  INTERVENTIONS:  - Assess patient frequently for physical needs  -  Identify cognitive and physical deficits and behaviors that affect risk of falls    -  [place] fall precautions as indicated by assessment   - Educate patient/family on patient safety including physical limitations  - Instruct patient to call for assistance with activity based on assessment  - Modify environment to reduce risk of injury  - Consider OT/PT consult to assist with strengthening/mobility  Outcome: Progressing     Problem: PAIN - ADULT  Goal: Verbalizes/displays adequate comfort level or baseline comfort level  Description  Interventions:  - Encourage patient to monitor pain and request assistance  - Assess pain using appropriate pain scale  - Administer analgesics based on type and severity of pain and evaluate response  - Implement non-pharmacological measures as appropriate and evaluate response  - Consider cultural and social influences on pain and pain management  - Notify physician/advanced practitioner if interventions unsuccessful or patient reports new pain  Outcome: Progressing     Problem: SAFETY ADULT  Goal: Maintain or return to baseline ADL function  Description  INTERVENTIONS:  -  Assess patient's ability to carry out ADLs; assess patient's baseline for ADL function and identify physical deficits which impact ability to perform ADLs (bathing, care of mouth/teeth, toileting, grooming, dressing, etc )  - Assess/evaluate cause of self-care deficits   - Assess range of motion  - Assess patient's mobility; develop plan if impaired  - Assess patient's need for assistive devices and provide as appropriate  - Encourage maximum independence but intervene and supervise when necessary  - Involve family in performance of ADLs  - Assess for home care needs following discharge   - Consider OT consult to assist with ADL evaluation and planning for discharge  - Provide patient education as appropriate  Outcome: Progressing     Problem: SKIN/TISSUE INTEGRITY - ADULT  Goal: Incision(s), wounds(s) or drain site(s) healing without S/S of infection  Description  INTERVENTIONS  - Assess and document risk factors for skin impairment   - Assess and document dressing, incision, wound bed, drain sites and surrounding tissue  - Consider nutrition services referral as needed  - Oral mucous membranes remain intact  - Provide patient/ family education  Outcome: Progressing

## 2020-07-01 NOTE — PROGRESS NOTES
07/01/20 1430   Pain Assessment   Pain Assessment Tool 0-10   Pain Score 8   Pain Location/Orientation Orientation: Left; Location: Buttocks; Location: Back   Pain Onset/Description Onset: Ongoing   Effect of Pain on Daily Activities RN aware, meds given   Restrictions/Precautions   Precautions Supervision on toilet/commode;Spinal precautions;Pain; Fall Risk   Braces or Orthoses LSO   Cognition   Overall Cognitive Status Impaired   Arousal/Participation Alert; Responsive; Cooperative   Attention Attends with cues to redirect   Orientation Level Oriented X4   Memory Decreased short term memory   Following Commands Follows one step commands with increased time or repetition   Subjective   Subjective Lets just get this over with so I can go home  Sit to Stand   Type of Assistance Needed Supervision   Sit to Stand CARE Score 4   Bed-Chair Transfer   Type of Assistance Needed Supervision   Chair/Bed-to-Chair Transfer CARE Score 4   Transfer Bed/Chair/Wheelchair   Limitations Noted In Balance;Confidence;Pain Management;UE Strength;LE Strength   Adaptive Equipment Roller Walker   Stand Pivot Supervision  (DS)   Sit to Stand Supervision  (DS)   Stand to [employer] Transfer   Reason if not Attempted Environmental limitations   Car Transfer CARE Score 10   Walk 10 Feet   Type of Assistance Needed Supervision   Amount of Physical Assistance Provided No physical assistance   Comment DS with RW   Walk 10 Feet CARE Score 4   Walk 50 Feet with Two Turns   Type of Assistance Needed Incidental touching   Amount of Physical Assistance Provided No physical assistance   Walk 50 Feet with Two Turns CARE Score 4   Walk 150 Feet   Type of Assistance Needed Incidental touching   Amount of Physical Assistance Provided No physical assistance   Comment CGA with RW  Walk 150 Feet CARE Score 4   Ambulation   Does the patient walk? 2   Yes   Primary Mode of Locomotion Prior to Admission Walk   Distance Walked (feet) 100 ft  (150, 1x50ft functionally  )   Assist Device Roller Walker   Gait Pattern Inconsistant [place]; Step through; Improper weight shift   Limitations Noted In Balance; Endurance; Safety;Speed;Strength;Swing   Provided Assistance with: Balance   Walk Assist Level Distant Supervision;Close Supervision;Contact Guard   Findings DS for short distance of 10-12ft  CGA for longer distances with turns  Wheelchair mobility   Does the patient use a wheelchair? 0  No   Therapeutic Interventions   Strengthening Seated B LE TE: APs on 4in block, yellow tband abd, ball squeeze x30 each  1 5# LAQ and seated march 2x10 each  Flexibility manual stretch B DF/HS seated 5x10 sec each  Balance functional amb around room with RW for x6 item retreival     Modalities CP applied to L buttock post session in room  Assessment   Treatment Assessment Pt engaged in 60 min skilled PT intervention withcont focus on household mobility and B LE strengthening  Pt c/o 8/10 pain, RN aware and pain meds given at beginning of session  Pt needs inc time for acitivity and to problem solve tasks  States she will just lay in bed when she goes home  Education provided for importance of mobility and allison walking is one of the best tasks for her  Pt verablizes understanding however remains limited by pain and lack of motivation  Pt able to inc distance to 150ft at end of session, with x2 stand rest breaks  Plan for FT with daughter tomorrow, Mellissa Moss, who lives with pt  Need for review of bed mobility, LSOmgmt, precautions and transfers/gait with RW  Anticipate pt to DC home with 247 family support and home PT, will need RW and BSC  Problem List Decreased strength;Decreased range of motion;Decreased endurance; Impaired balance;Decreased mobility; Decreased cognition;Decreased safety awareness; Impaired judgement;Orthopedic restrictions   Barriers to Discharge None   Barriers to Discharge Comments pt mgmt, cog, safety, dec motivation      PT Barriers   Physical Impairment Decreased strength;Decreased endurance; Impaired balance;Decreased mobility; Decreased safety awareness;Orthopedic restrictions;Pain   Functional Limitation Car transfers; Ramp negotiation;Stair negotiation;Standing;Transfers; Walking   Plan   Treatment/Interventions Functional transfer training;LE strengthening/ROM; Elevations; Endurance training;Gait training   Progress Progressing toward goals   Recommendation   PT Discharge Recommendation Home with skilled therapy  (home PT)   Equipment Recommended Walker   PT Therapy Minutes   PT Time In 1430   PT Time Out 1530   PT Total Time (minutes) 60   PT Mode of treatment - Individual (minutes) 60   PT Mode of treatment - Concurrent (minutes) 0   PT Mode of treatment - Group (minutes) 0   PT Mode of treatment - Co-treat (minutes) 0   PT Mode of Treatment - Total time(minutes) 60 minutes   PT Cumulative Minutes 810   Therapy Time missed   Time missed?  No

## 2020-07-01 NOTE — PROGRESS NOTES
PM&R Progress Note:    ASSESSMENT:  27-year-old female with hypertension, diabetes, CKD, lumbar stenosis now at [facility] due to lumbar stenosis s/p L4-S1 lami-fusion by Dr Babs Dominguez on 6/15  Stable and progressing    PLAN:    Rehabilitation   Continue current rehabilitation plan of care to maximize function   Estimated Discharge: TBD, RETEAM    Pain   Improving in lumbar spine - continue current regimen    DVT prophylaxis   Coumadin and SCDs      Bladder plan   Continent   Urinary frequency and incomplete voiding sensation reported; UCx negative, PVRs < 200cc   Last UCx with mixed contaminants     Bowel plan   Continent    Leukocytosis  Assessment & Plan  - with reported urinary frequency, incomplete voiding  - PVRs 174, 162  - UCx mixed contaminants, continue to monitor for signs of UTI     - dopplers with chronic left DVT  - CXR negative    Abnormal CAT scan  Assessment & Plan  Abnormalities on CT of 11/2019 including but not limited to:    - diverticulosis  - L renal cyst  - B/L non-obstructing renal stones     - OP FU     Lymph node disorder  Assessment & Plan  - noted to have subcentimeter lymph node in the rt axilla with fat stranding on imaging in the setting of h/o breast CA therefore per radiology report clinical correlation was recommended and patient was evaluated by Dr Jimmy Pablo in acute care who noted this to be an asymptomatic finding and recommended no further SMITH at this time other than routine FU with PCP & continued regular mammograms for her h/o breast CA     IBS (irritable bowel syndrome)  Assessment & Plan  - on home bentyl 20 mg q6     Mood disorder (HCC)  Assessment & Plan  - on home zoloft 25 mg qd     Neuropathic pain  Assessment & Plan  - in setting of DM  - continue gabapentin 100/100/200mg, uptitrate as needed     HTN (hypertension)  Assessment & Plan  - at home on lopressor, norvasc, & lisinopril - continue   - monitor for orthostatic hypotension   - IM managing     DM (diabetes mellitus) ([facility] [address] )  Assessment & Plan  - on home metformin 500 mg BID with meals & ISS  - IM managing       Hypokalemia  Assessment & Plan  - periodic BMP  - IM managing     Chronic deep vein thrombosis (DVT) of left popliteal vein (HCC)  Assessment & Plan  - non-occlusive LLE DVT of the popliteal vein per imaging on 6/16/20, however similar finding on imaging going back to at least 6/2007  - patient was previously on eliquis, but per Dr Linda Terrazas would hold eliquis for 3 weeks post-op   - continue coumadin   - trend INR    Arrhythmia  Assessment & Plan  - h/o AVNRT s/p ablation in 11/2019  - h/o a-flutter (chronically on [facility] due to h/o DVT)  - follows with Dr David Quezada of [facility] cards     Hiatal hernia with gastroesophageal reflux  Assessment & Plan  - therapeutic substitution for home omeprazole 40 mg qd     Hyperlipidemia  Assessment & Plan  - on home lipitor 40 mg qpm     Hypothyroidism  Assessment & Plan  - on home levothyroxine 125 mcg qd   - TSH on 5/12/20 WNL     * Spinal stenosis of lumbar region  Assessment & Plan  - s/p L4-S1 lami-fusion by Dr Linda Terrazas on 6/15  - spine precautions/LSO brace  - OP FU with Dr Sarmad Anguiano  Labs, medications, and imaging personally reviewed  SUBJECTIVE:  Minimal back pain, reports pain bilateral posterior ankles  States lidocaine patch does not seem to help  Has not had a bowel movement in 2 days and is going to drink prune juice which she feels works for her  ROS:  No fever, chills, chest pain, SOB, cough, nausea, vomiting, diarrhea, + constipation, no abdominal pain, dysuria, bowel/bladder incontinence, new weakness or changes in sensation  Complete ROS obtained and otherwise negative          OBJECTIVE:   /56 (BP Location: Right arm)   Pulse 77   Temp 97 7 °F (36 5 °C) (Tympanic)   Resp 18   Ht 5' 1" (1 549 m)   Wt 79 kg (174 lb 3 2 oz)   SpO2 95%   BMI 32 91 kg/m²     GEN: NAD, sitting in her chair  Head: NCAT  Eyes: EOMI  Throat: MMM  Pulm: non-labored   CV: RRR  Abd: non-distended   Ext: no pedal edema bilaterally, distal extremities warm and well perfused  Psych: normal affect, no agitation  Skin: no observable rashes  Neuro:  A+Ox3, fluent speech, follows commands   Moving all extremities spontaneously     Lab Results   Component Value Date    WBC 11 80 (H) 06/29/2020    HGB 11 8 (L) 06/29/2020    HCT 35 3 (L) 06/29/2020    MCV 93 06/29/2020     (H) 06/29/2020     Lab Results   Component Value Date    SODIUM 138 06/30/2020    K 4 2 06/30/2020     06/30/2020    CO2 29 06/30/2020    BUN 14 06/30/2020    CREATININE 0 70 06/30/2020    GLUC 150 (H) 06/30/2020    CALCIUM 9 7 06/30/2020     Lab Results   Component Value Date    INR 2 17 (H) 07/01/2020    INR 2 23 (H) 06/28/2020    INR 1 86 (H) 06/26/2020    PROTIME 23 3 (H) 07/01/2020    PROTIME 23 8 (H) 06/28/2020    PROTIME 20 6 (H) 06/26/2020           Current Facility-Administered Medications:     acetaminophen (TYLENOL) tablet 650 mg, 650 mg, Oral, Q8H [address], Rosamaria Mena MD, 650 mg at 07/01/20 0500    amLODIPine (NORVASC) tablet 5 mg, 5 mg, Oral, Daily, HUNTER Kuhn, 5 mg at 07/01/20 0808    atorvastatin (LIPITOR) tablet 40 mg, 40 mg, Oral, Daily With Melinda Royal MD, 40 mg at 06/30/20 1624    dicyclomine (BENTYL) tablet 20 mg, 20 mg, Oral, Q6H, Bony Julian MD, 20 mg at 07/01/20 0500    docusate sodium (COLACE) capsule 100 mg, 100 mg, Oral, BID, HUNTER Kuhn    gabapentin (NEURONTIN) capsule 100 mg, 100 mg, Oral, BID, Rosamaria Mena MD, 100 mg at 07/01/20 0808    gabapentin (NEURONTIN) capsule 200 mg, 200 mg, Oral, HS, Rosamaria Mena MD, 200 mg at 06/30/20 2107    insulin lispro (HumaLOG) 100 units/mL subcutaneous injection 1-5 Units, 1-5 Units, Subcutaneous, TID AC, 1 Units at 07/01/20 0807 **AND** Fingerstick Glucose (POCT), , , TID AC, Bony Julian MD    insulin lispro (HumaLOG) 100 units/mL subcutaneous injection 1-5 Units, 1-5 Units, Subcutaneous, HS, Karina Christina MD, 1 Units at 06/30/20 2108    levothyroxine tablet 125 mcg, 125 mcg, Oral, Early Morning, Karnia Christina MD, 125 mcg at 07/01/20 0500    lidocaine (LIDODERM) 5 % patch 1 patch, 1 patch, Topical, Daily, Orquidea Puri MD, 1 patch at 07/01/20 0809    lisinopril (ZESTRIL) tablet 10 mg, 10 mg, Oral, Daily, HUNTER Kuhn, 10 mg at 07/01/20 0808    menthol-methyl salicylate (BENGAY) 17-01 % cream, , Apply externally, HS, Tad Mclean MD, 1 application at 96/94/80 2108    menthol-methyl salicylate (BENGAY) 62-13 % cream, , Apply externally, TID PRN, Tad Mclean MD    metFORMIN (GLUCOPHAGE) tablet 500 mg, 500 mg, Oral, BID With Meals, Karina Christina MD, 500 mg at 07/01/20 0808    methocarbamol (ROBAXIN) tablet 750 mg, 750 mg, Oral, Q6H PRN, Karina Christina MD, 750 mg at 06/30/20 0047    metoprolol tartrate (LOPRESSOR) partial tablet 12 5 mg, 12 5 mg, Oral, Q12H MELISSA, HUNTER Kuhn, 12 5 mg at 07/01/20 0808    oxyCODONE (ROXICODONE) IR tablet 2 5 mg, 2 5 mg, Oral, Q4H PRN, Karina Christina MD, 2 5 mg at 06/28/20 1107    oxyCODONE (ROXICODONE) IR tablet 5 mg, 5 mg, Oral, Q4H PRN, Karina Christina MD, 5 mg at 07/01/20 0446    pantoprazole (PROTONIX) EC tablet 40 mg, 40 mg, Oral, Early Morning, Karina Christina MD, 40 mg at 07/01/20 0500    polyethylene glycol (MIRALAX) packet 17 g, 17 g, Oral, Daily PRN, Karina Christina MD    senna-docusate sodium (SENOKOT S) 8 6-50 mg per tablet 2 tablet, 2 tablet, Oral, HS PRN, HUNTER Feliciano    sertraline (ZOLOFT) tablet 25 mg, 25 mg, Oral, Daily, Karina Christina MD, 25 mg at 07/01/20 0808    warfarin (COUMADIN) tablet 5 mg, 5 mg, Oral, Daily (warfarin), HUNTER Valdes    Past Medical History:   Diagnosis Date    Ambulates with cane     Anxiety     Arthritis     Back pain     Bowel trouble     Cancer ([facility] [address] )     left breast- left mastectomy- chemo    Chronic pain disorder     Coronary artery disease     2 stents    Depression     Diabetes mellitus (HCC)     NIDDM    Disease of thyroid gland     hypo    DVT (deep venous thrombosis) (HCC)     left leg    Full dentures     H/O breast implant     left    Headache     History of transfusion     no adverse reaction    Hyperlipidemia     Hypertension     Irregular heart beat     Afib    Localized swelling of both lower legs     Multiple falls     Neuropathy     Right knee pain     Shortness of breath     on exertion    Spinal stenosis, lumbar     Uses walker     Wears glasses     Wears glasses        Patient Active Problem List    Diagnosis Date Noted    Leukocytosis 06/26/2020    Hypothyroidism 06/21/2020    Hyperlipidemia 06/21/2020    Hiatal hernia with gastroesophageal reflux 06/21/2020    Arrhythmia 06/21/2020    Chronic deep vein thrombosis (DVT) of left popliteal vein (HCC) 06/21/2020    Hypokalemia 06/21/2020    [facility] 06/21/2020    HTN (hypertension) 06/21/2020    Neuropathic pain 06/21/2020    Mood disorder ([facility] ) 06/21/2020    IBS (irritable bowel syndrome) 06/21/2020    Lymph node disorder 06/21/2020    Abnormal CAT scan 06/21/2020    Spinal stenosis of lumbar region 10/01/2019          HUNTER Hobson

## 2020-07-01 NOTE — PCC SPEECH THERAPY
Pt is currently being followed for cognitive linguistic therapy where pt where pt found to be presenting with slower/delayed processing, decreased attention, decreased problem solving, delayed initiation of tasks and decreased working memory on initial evaluation  Pt also presenting with deficits in executive functions and visuospatial skills  Therapy sessions have been initiated where pt continues to present with deficits in working memory, [facility] recall, processing, problem solving and safety  As pt was previously functioning at an independent/mod I level but is currently demonstrating overall cognitive linguistic deficits, pt is recommended for skilled ST services during acute rehab stay to further assess and maximize functional and higher level cognitive linguistic skills in order to improve level of independence and safety  Of note, as per team, recommending pt to have 24/hr supervision following discharge from unit  Family training planned for 7/2/2020  3

## 2020-07-01 NOTE — ASSESSMENT & PLAN NOTE
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

## 2020-07-01 NOTE — PROGRESS NOTES
Progress Note - Tariq Jansen 1940, 78 y o  female MRN: [id number]    Unit/Bed#: -01 Encounter: [id number]    Primary Care Provider: Abraham Pedro DO   Date and time admitted to hospital: 6/21/2020 11:39 AM        Leukocytosis  Assessment & Plan  Patient with continued but improving elevation of WBC to 11 8 (6/29) 13 90 (6/26); 12 60 (6/25); 9 30 (6/23)  Patient remains afebrile  CXR negative  UA positive for blood and leuk estrase (25)  Urine culture negative -  "  10,000-19,000 cfu/ml     Mixed Contaminants X4     CBC tomorrow  Lymph node disorder  Assessment & Plan  Per hospital records, patient noted to have subcentimeter lymph node in the right axilla with fat stranding on imaging  Due to history of breast CA, radiology report clinical correlation was recommended  Patient was evaluated by Dr Mervat Thompson in acute care who noted this to be an asymptomatic finding and recommended no further workup at this time other than routine follow up  with PCP & continued regular mammograms for her history of  breast CA  Follow up with PCP on discharge  IBS (irritable bowel syndrome)  Assessment & Plan  Continue Bentyl  LBM 6/28 - multiple at which time bowel meds were switched to as needed  Patient has not been drinking prune juice on daily basis  She will have prune juice for lunch and will add Colace BID back to daily schedule  Continue to monitor  Mood disorder (HCC)  Assessment & Plan  Sertraline 25 mg daily   Monitor moods    Neuropathic pain  Assessment & Plan  Patient with DM  Continues on neurontin 100 mg BID and 200 q h s   Per primary team        HTN (hypertension)  Assessment & Plan  Vitals:    07/01/20 0744   BP: 121/56   Pulse: 77   Resp: 18   Temp: 97 7 °F (36 5 °C)   SpO2: 95%       BP remains stable  Continue amlodipine 5 mg daily,Lopressor 12 5 mg BID,  Lisinopril to 10 mg daily   Monitor and adjust as needed     DM (diabetes mellitus) ([facility] [address] )  Assessment & Plan  Lab Results   Component Value Date    HGBA1C 6 4 (H) 05/12/2020       Recent Labs     06/30/20  1107 06/30/20  1615 06/30/20  2043 07/01/20  0618   POCGLU 159* 161* 209* 160*       Blood Sugar Average: Last 72 hrs:  (P) 505 [id number]     Continue Metformin 500 mg BID with SSI  Monitor kidney function, remains stable  6/30- creatinine 0 70  BMP Thursday  Hypokalemia  Assessment & Plan  Per most recent hospitalization, K+ as low as 2 2 (felt due to poor p o  Intake)  6/22 K+ 3 5  Replaced with 40 mEq at that time  6/23 K+ 4 0  6/25 - 3 8  6/29 5 1 - specimen hemolized   6/30 4 2  BMP Thursday  Chronic deep vein thrombosis (DVT) of left popliteal vein (HCC)  Assessment & Plan  Non-occlusive LLE DVT (popliteal vein) on 6/16 imaging, unchanged on repeat imaging 6/26  Patient had been on Eliquis prior to recent spinal surgery  Per surgery, patient not cleared to resume Eliquis  Dr Thierno Berry does not want patient on Eliquis for at least 3 weeks post-op  Decision made prior to recent hospital discharge to start Coumadin with INR goal of 2 0-2 5  (when goal reached, heparin could be discontinued)  INR 2 17 7/1; 2 23 6/28  Coumadin increased to 5 mg daily  INR tomorrow  Patient will need clearance by spine surgery to be switched back to Eliquis  Arrhythmia  Assessment & Plan  Follows with [facility]  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 (Dr Jessica Perez)  History of a-flutter (patient does not remember this dx) - patient is on chronic AC due to DVT (previously Eliquis, now coumadin)  Regular rate and rhythm on exam today  Hiatal hernia with gastroesophageal reflux  Assessment & Plan  Found on CTA during most recent admission  General surgery saw patient in consultation and recommended OP f/u  Continue Protonix 40 mg daily  Monitor for recurrence of esophageal spasm episodes - continues to deny symptoms         Hyperlipidemia  Assessment & Plan  Most recent lipid profile (2020) - total cholesterol 209, triglycerides 145, HDL 64,   Continue atorvastatin 40 mg daily     Hypothyroidism  Assessment & Plan  Most recent TSH () was 2 23  Continue levothyroxine 125mcg (as per home dose) as prescribed    * Spinal stenosis of lumbar region  Assessment & Plan  PT/OT, pain management per primary team   S/p L4-S1 laminectomy on 6/15  Spine precautions/LSO brace  F/u with Dr Abena Spencer upon discharge  VTE Pharmacologic Prophylaxis:   Pharmacologic: Warfarin (Coumadin)    Current Length of Stay: 10 day(s)    Current Patient Status: Inpatient Rehab     Discharge Plan: As per treatment team     Code Status: Level 1 - Full Code    Subjective:   No overnight events  Patient states she slept "ok"  She is being seen in the therapy gym, working with speech therapy  She denies new issues/concerns  Her last BM was  at which time her bowel meds were changed to as needed  She states she has not been drinking prune juice daily but will request prune juice at lunch today  Objective:     Vitals:   Temp (24hrs), Av 6 °F (36 4 °C), Min:97 5 °F (36 4 °C), Max:97 7 °F (36 5 °C)    Temp:  [97 5 °F (36 4 °C)-97 7 °F (36 5 °C)] 97 7 °F (36 5 °C)  HR:  [77-80] 77  Resp:  [18] 18  BP: (103-151)/(56-69) 121/56  SpO2:  [95 %-97 %] 95 %  Body mass index is 32 91 kg/m²  Review of Systems   Constitutional: Negative for chills, fatigue and fever  Respiratory: Negative for cough, chest tightness and shortness of breath  Cardiovascular: Negative for chest pain, palpitations and leg swelling  Gastrointestinal: Positive for constipation  Negative for abdominal distention, abdominal pain, diarrhea and nausea  Genitourinary: Negative for difficulty urinating, dysuria and frequency  Musculoskeletal: Positive for arthralgias, back pain and gait problem  Negative for joint swelling and myalgias  Skin: Positive for wound     Neurological: Negative for dizziness, light-headedness and headaches  Hematological: Negative for adenopathy  Does not bruise/bleed easily  Psychiatric/Behavioral: Negative for confusion and dysphoric mood  The patient is not nervous/anxious  Input and Output Summary (last 24 hours): Intake/Output Summary (Last 24 hours) at 7/1/2020 1049  Last data filed at 7/1/2020 0830  Gross per 24 hour   Intake 480 ml   Output 250 ml   Net 230 ml       Physical Exam:     Physical Exam   Constitutional: She is oriented to person, place, and time  She appears well-developed and well-nourished  Cardiovascular: Normal rate, regular rhythm, normal heart sounds and intact distal pulses  Pulmonary/Chest: Effort normal and breath sounds normal    Abdominal: Soft  Bowel sounds are normal  She exhibits no distension  There is no tenderness  Musculoskeletal: She exhibits tenderness (lower back)  She exhibits no edema  Neurological: She is alert and oriented to person, place, and time  Skin: Skin is warm and dry  Psychiatric: She has a normal mood and affect   Her behavior is normal        Additional Data:     Labs:    Results from last 7 days   Lab Units 06/29/20  1333 06/26/20  0557   WBC Thousand/uL 11 80* 13 90*   HEMOGLOBIN g/dL 11 8* 12 4   HEMATOCRIT % 35 3* 36 6   PLATELETS Thousands/uL 470* 446   BANDS PCT %  --  1   NEUTROS PCT % 73*  --    LYMPHS PCT % 17*  --    LYMPHO PCT %  --  6*   MONOS PCT % 7  --    MONO PCT %  --  4   EOS PCT % 1 1     Results from last 7 days   Lab Units 06/30/20  0439   SODIUM mmol/L 138   POTASSIUM mmol/L 4 2   CHLORIDE mmol/L 100   CO2 mmol/L 29   BUN mg/dL 14   CREATININE mg/dL 0 70   ANION GAP mmol/L 9   CALCIUM mg/dL 9 7   GLUCOSE RANDOM mg/dL 150*     Results from last 7 days   Lab Units 07/01/20  0516   INR  2 17*     Results from last 7 days   Lab Units 07/01/20  0618 06/30/20  2043 06/30/20  1615 06/30/20  1107 06/30/20  8649 06/29/20  2046 06/29/20  1647 06/29/20  1127 06/29/20  6921 06/28/20  2034 06/28/20  1603 06/28/20  1118   POC GLUCOSE mg/dl 160* 209* 161* 159* 129 170* 153* 151* 133 110 120 178*               Labs reviewed    Imaging:    Imaging reviewed    Recent Cultures (last 7 days):     Results from last 7 days   Lab Units 06/26/20  1318   URINE CULTURE  10,000-19,000 cfu/ml        Last 24 Hours Medication List:     Current Facility-Administered Medications:  acetaminophen 650 mg Oral Q8H Vannesa Chen MD   amLODIPine 5 mg Oral Daily HUNTER Kuhn   atorvastatin 40 mg Oral Daily With MD Krystin   dicyclomine 20 mg Oral Q6H Darrell Tolliver, MD   docusate sodium 100 mg Oral BID HUNTER Kuhn   gabapentin 100 mg Oral BID Willian Sotomayor MD   gabapentin 200 mg Oral HS Willian Sotomayor MD   insulin lispro 1-5 Units Subcutaneous TID Guera Chua MD   insulin lispro 1-5 Units Subcutaneous HS Darrell Tolliver MD   levothyroxine 125 mcg Oral Early Morning Lum MD Unruly   lidocaine 1 patch Topical Daily Ling Rocha MD   lisinopril 10 mg Oral Daily HUNTER Vargas   menthol-methyl salicylate  Apply externally HS Willian Sotomayor MD   menthol-methyl salicylate  Apply externally TID PRN Willian Sotomayor MD   metFORMIN 500 mg Oral BID With Meals Darrell Tolliver, MD   methocarbamol 750 mg Oral Q6H PRN Lum Unruly, MD   metoprolol tartrate 12 5 mg Oral Q12H [address] HUNTER Kuhn   oxyCODONE 2 5 mg Oral Q4H PRN Lum Unruly, MD   oxyCODONE 5 mg Oral Q4H PRN Darrell Tolliver, MD   pantoprazole 40 mg Oral Early Morning Lum MD Unruly   polyethylene glycol 17 g Oral Daily PRN Lum Unruly, MD   senna-docusate sodium 2 tablet Oral HS PRN HUNTER Feliciano   sertraline 25 mg Oral Daily Darrell Tolliver MD   warfarin 5 mg Oral Daily (warfarin) HUNTER DamonModal software was used to dictate this note  It may contain errors with dictating incorrect words or incorrect spelling  Please contact the provider directly with any questions

## 2020-07-01 NOTE — OCCUPATIONAL THERAPY NOTE
Therapist contacted pt sdtr Peg ([phone]) (10mins) to educate on pts dc plan  Therapist recommend dtr for pt to have 24/7 supervision at [facility] of dc 2* to pts cog deficits and dec carryover with safety with RW with dtr verbalizing understanding and reporting between herself, sister and brother have already establish a plan for who will be with pt  In addition, dtr reports she has purchased shower chair with back and therapist has emailed dtr ([email or web address]) information on RW tray  Therapist to provide pt with hip kit next OT session per dtr request  Peg aware sister Paul Gardner will be attending FT on 7/2  Dtr receptive to all OT recommendations and reporting pt will have 24/7 supervision/A at time of dc

## 2020-07-01 NOTE — ASSESSMENT & PLAN NOTE
Patient with continued but improving elevation of WBC to 11 8 (6/29) 13 90 (6/26); 12 60 (6/25); 9 30 (6/23)  Patient remains afebrile  CXR negative  UA positive for blood and leuk estrase (25)  Urine culture negative -  "  10,000-19,000 cfu/ml     Mixed Contaminants X4     CBC tomorrow

## 2020-07-02 ENCOUNTER — PATIENT OUTREACH (OUTPATIENT)
Dept: CASE MANAGEMENT | Facility: OTHER | Age: 80
End: 2020-07-02

## 2020-07-02 LAB
ANION GAP SERPL CALCULATED.3IONS-SCNC: 9 MMOL/L (ref 5–14)
BASOPHILS # BLD AUTO: 0.1 THOUSANDS/ΜL (ref 0–0.1)
BASOPHILS NFR BLD AUTO: 1 % (ref 0–1)
BUN SERPL-MCNC: 15 MG/DL (ref 5–25)
CALCIUM SERPL-MCNC: 9.9 MG/DL (ref 8.4–10.2)
CHLORIDE SERPL-SCNC: 100 MMOL/L (ref 97–108)
CO2 SERPL-SCNC: 28 MMOL/L (ref 22–30)
CREAT SERPL-MCNC: 0.65 MG/DL (ref 0.6–1.2)
EOSINOPHIL # BLD AUTO: 0.1 THOUSAND/ΜL (ref 0–0.4)
EOSINOPHIL NFR BLD AUTO: 2 % (ref 0–6)
ERYTHROCYTE [DISTWIDTH] IN BLOOD BY AUTOMATED COUNT: 14 %
GFR SERPL CREATININE-BSD FRML MDRD: 83 ML/MIN/1.73SQ M
GLUCOSE SERPL-MCNC: 118 MG/DL (ref 65–140)
GLUCOSE SERPL-MCNC: 127 MG/DL (ref 65–140)
GLUCOSE SERPL-MCNC: 129 MG/DL (ref 70–99)
GLUCOSE SERPL-MCNC: 132 MG/DL (ref 65–140)
GLUCOSE SERPL-MCNC: 161 MG/DL (ref 65–140)
HCT VFR BLD AUTO: 35 % (ref 36–46)
HGB BLD-MCNC: 11.7 G/DL (ref 12–16)
INR PPP: 2.23 (ref 0.84–1.19)
LYMPHOCYTES # BLD AUTO: 2.5 THOUSANDS/ΜL (ref 0.5–4)
LYMPHOCYTES NFR BLD AUTO: 29 % (ref 25–45)
MCH RBC QN AUTO: 30.4 PG (ref 26–34)
MCHC RBC AUTO-ENTMCNC: 33.3 G/DL (ref 31–36)
MCV RBC AUTO: 91 FL (ref 80–100)
MONOCYTES # BLD AUTO: 0.7 THOUSAND/ΜL (ref 0.2–0.9)
MONOCYTES NFR BLD AUTO: 9 % (ref 1–10)
NEUTROPHILS # BLD AUTO: 5.2 THOUSANDS/ΜL (ref 1.8–7.8)
NEUTS SEG NFR BLD AUTO: 60 % (ref 45–65)
PLATELET # BLD AUTO: 521 THOUSANDS/UL (ref 150–450)
PMV BLD AUTO: 8.6 FL (ref 8.9–12.7)
POTASSIUM SERPL-SCNC: 4.1 MMOL/L (ref 3.6–5)
PROTHROMBIN TIME: 23.8 SECONDS (ref 11.6–14.5)
RBC # BLD AUTO: 3.84 MILLION/UL (ref 4–5.2)
SODIUM SERPL-SCNC: 137 MMOL/L (ref 137–147)
WBC # BLD AUTO: 8.6 THOUSAND/UL (ref 4.5–11)

## 2020-07-02 PROCEDURE — 97130 THER IVNTJ EA ADDL 15 MIN: CPT

## 2020-07-02 PROCEDURE — 97535 SELF CARE MNGMENT TRAINING: CPT

## 2020-07-02 PROCEDURE — 97116 GAIT TRAINING THERAPY: CPT

## 2020-07-02 PROCEDURE — 97110 THERAPEUTIC EXERCISES: CPT

## 2020-07-02 PROCEDURE — 82948 REAGENT STRIP/BLOOD GLUCOSE: CPT

## 2020-07-02 PROCEDURE — 97129 THER IVNTJ 1ST 15 MIN: CPT

## 2020-07-02 PROCEDURE — 97530 THERAPEUTIC ACTIVITIES: CPT

## 2020-07-02 PROCEDURE — 85610 PROTHROMBIN TIME: CPT | Performed by: NURSE PRACTITIONER

## 2020-07-02 PROCEDURE — 85025 COMPLETE CBC W/AUTO DIFF WBC: CPT | Performed by: NURSE PRACTITIONER

## 2020-07-02 PROCEDURE — 80048 BASIC METABOLIC PNL TOTAL CA: CPT | Performed by: NURSE PRACTITIONER

## 2020-07-02 PROCEDURE — 99233 SBSQ HOSP IP/OBS HIGH 50: CPT | Performed by: PHYSICAL MEDICINE & REHABILITATION

## 2020-07-02 PROCEDURE — 99232 SBSQ HOSP IP/OBS MODERATE 35: CPT | Performed by: INTERNAL MEDICINE

## 2020-07-02 RX ADMIN — METOPROLOL TARTRATE 12.5 MG: 25 TABLET ORAL at 08:08

## 2020-07-02 RX ADMIN — ATORVASTATIN CALCIUM 40 MG: 40 TABLET, FILM COATED ORAL at 17:04

## 2020-07-02 RX ADMIN — METFORMIN HYDROCHLORIDE 500 MG: 500 TABLET ORAL at 08:08

## 2020-07-02 RX ADMIN — SERTRALINE HYDROCHLORIDE 25 MG: 25 TABLET ORAL at 08:08

## 2020-07-02 RX ADMIN — DICYCLOMINE HYDROCHLORIDE 20 MG: 20 TABLET ORAL at 05:25

## 2020-07-02 RX ADMIN — LIDOCAINE 1 PATCH: 50 PATCH CUTANEOUS at 08:09

## 2020-07-02 RX ADMIN — LISINOPRIL 10 MG: 10 TABLET ORAL at 08:08

## 2020-07-02 RX ADMIN — GABAPENTIN 100 MG: 100 CAPSULE ORAL at 14:18

## 2020-07-02 RX ADMIN — WARFARIN SODIUM 5 MG: 5 TABLET ORAL at 17:04

## 2020-07-02 RX ADMIN — OXYCODONE HYDROCHLORIDE 5 MG: 5 TABLET ORAL at 10:27

## 2020-07-02 RX ADMIN — GABAPENTIN 100 MG: 100 CAPSULE ORAL at 08:08

## 2020-07-02 RX ADMIN — GABAPENTIN 200 MG: 100 CAPSULE ORAL at 21:02

## 2020-07-02 RX ADMIN — PANTOPRAZOLE SODIUM 40 MG: 40 TABLET, DELAYED RELEASE ORAL at 05:24

## 2020-07-02 RX ADMIN — MENTHOL, METHYL SALICYLATE: 10; 15 CREAM TOPICAL at 21:03

## 2020-07-02 RX ADMIN — OXYCODONE HYDROCHLORIDE 5 MG: 5 TABLET ORAL at 20:10

## 2020-07-02 RX ADMIN — ACETAMINOPHEN 650 MG: 325 TABLET ORAL at 05:24

## 2020-07-02 RX ADMIN — DICYCLOMINE HYDROCHLORIDE 20 MG: 20 TABLET ORAL at 11:56

## 2020-07-02 RX ADMIN — OXYCODONE HYDROCHLORIDE 5 MG: 5 TABLET ORAL at 01:51

## 2020-07-02 RX ADMIN — METOPROLOL TARTRATE 12.5 MG: 25 TABLET ORAL at 21:02

## 2020-07-02 RX ADMIN — DICYCLOMINE HYDROCHLORIDE 20 MG: 20 TABLET ORAL at 17:04

## 2020-07-02 RX ADMIN — METFORMIN HYDROCHLORIDE 500 MG: 500 TABLET ORAL at 17:04

## 2020-07-02 RX ADMIN — ACETAMINOPHEN 650 MG: 325 TABLET ORAL at 21:02

## 2020-07-02 RX ADMIN — AMLODIPINE BESYLATE 5 MG: 5 TABLET ORAL at 08:09

## 2020-07-02 RX ADMIN — LEVOTHYROXINE SODIUM 125 MCG: 125 TABLET ORAL at 05:24

## 2020-07-02 RX ADMIN — DICYCLOMINE HYDROCHLORIDE 20 MG: 20 TABLET ORAL at 01:08

## 2020-07-02 RX ADMIN — ACETAMINOPHEN 650 MG: 325 TABLET ORAL at 14:18

## 2020-07-02 RX ADMIN — INSULIN LISPRO 1 UNITS: 100 INJECTION, SOLUTION INTRAVENOUS; SUBCUTANEOUS at 17:04

## 2020-07-02 NOTE — TEAM CONFERENCE
Acute RehabilitationTeam Conference Note  Date: 7/2/2020   Time: 12:33 PM       Patient Name:  Jessica Lundy Record Number: [id number]   YOB: 1940  Sex: Female          Room/Bed:  [facility]-01  Payor Info:  Payor: MEDICARE / Plan: MEDICARE A AND B / Product Type: Medicare A & B Fee for Service /      Admitting Diagnosis: Lumbar spinal stenosis [M48 [id number]]   Admit Date/Time:  6/21/2020 11:39 AM  Admission Comments: No comment available     Primary Diagnosis:  Spinal stenosis of lumbar region  Principal Problem: Spinal stenosis of lumbar region    Patient Active Problem List    Diagnosis Date Noted    Leukocytosis 06/26/2020    Hypothyroidism 06/21/2020    Hyperlipidemia 06/21/2020    Hiatal hernia with gastroesophageal reflux 06/21/2020    Arrhythmia 06/21/2020    Chronic deep vein thrombosis (DVT) of left popliteal vein ([place] [address] ) 06/21/2020    Hypokalemia 06/21/2020    DM (diabetes mellitus) ([place] [address] ) 06/21/2020    HTN (hypertension) 06/21/2020    Neuropathic pain 06/21/2020    Mood disorder ([place] [address] ) 06/21/2020    IBS (irritable bowel syndrome) 06/21/2020    Lymph node disorder 06/21/2020    Abnormal CAT scan 06/21/2020    Spinal stenosis of lumbar region 10/01/2019       Physical Therapy:    Weight Bearing Status: Full Weight Bearing  Transfers: Incidental Touching  Bed Mobility: Minimal Assistance  Amulation Distance (ft): 150 feet  Ambulation: Incidental Touching  Assistive Device for Ambulation: Roller Walker  Number of Stairs: 1  Assistive Device for Stairs: Bilateral [employer]  Stair Assistance: Minimal Assistance  Ramp: Incidental Touching  Assistive Device for Ramp: Roller Walker  Discharge Recommendations: Home with:  [address] with[de-identified] 24 Hour Supervision, Family Support, Home Physical Therapy    6 24 2020    Pt is an 78 y o   Female who was admitted to [address] [place] [address]  on 6/21/2020 with dx of lumbar spinal stenosis, undergoing elective surgery on 6/15/2020 for L4/5, L5/S1 laminectomy, L4/5 PSF, and L4-5 instrumentation  Pts daughter lives with her however currently they are getting along well at this time  [facility] with ramp entry or [address] onto patio  At baseline pt use of SPC or rollator, I ADLs  Pt limited by pain tolerance/control  Better when pre medicated  C/o radiating pain into L LE, as prior to surgery  Pt amb up to 150ft with RW, CGA/min A with main focus on functional household distances  Pt demonstrates CG to min A for transfers  Pt making slowed progress towards LTGs  Will need brace don/doffing education  Plan for DC home at mod I level with RW and home PT services  Will need Rw and BSC  Anticipate additional skilled PT to maximize functional I      7-1-20  Pt is making progress in therapy but limited by pain/ fatigue/ cognition  She is not always showing initiation and and not showing comprehension of education explained  Due to dec balance and hip weakness she can present with mild LOB during turns and chair approach which she needs someone provided supervision /CG at times when up moving around  She initially had MI goals but due to cognition and safety awareness and dec balance she will need 24hr supervision which the family can provide  We will need to perform family training with the 1 daughter to review bed mobility, functional amb, back precautions and guarding techniques- set up for Thursday 7/2 at 10am      Occupational Therapy:  Eating: Supervision(S/U A)  Grooming: Supervision(S/U A)  Bathing: Supervision  Bathing: Supervision  Upper Body Dressing:  Moderate Assistance(ModA 2* donning/doffing LSO)  Lower Body Dressing: Incidental Touching  Toileting: Incidental Touching  Tub/Shower Transfer: Incidental Touching  Toilet Transfer: Incidental Touching  Cognition: Exceptions to WNL  Cognition: Decreased Safety, Decreased Memory  Orientation: Person, Place, Time, Situation  Discharge Recommendations: Home with:  [address] with[de-identified] 24 Hour Supervision, Home Occupational Therapy       ADL: Pt cont tor eq A for don/doff LSO brace which family will A with, Remy/CGA with use of LHAE  TRANSFER: Remy/CGA with RWsolo inc time 2* to pain  D/C PLAN: 24/7 supervision at time of dc 2* to mod cog deficits which dtrs and son to A with IADL  Dtr [facility] to attend FT on 7/2  Dtr Peg has been present during previous OT sessions and has been educated  Dc date to be set in team this week    Pt barriers include pain, dec functional strength, dec functional reach, dec activity, dec standing balance impacting participation in ADL/IADL's  Therapist has discussed POC with pt and areas of focus with pt verbalizing understanding  Speech Therapy:  Mode of Communication: Verbal  Cognition: Exceptions to WNL  Cognition: Decreased Memory, Decreased Executive Functions, Decreased Attention, Decreased Safety  Orientation: Person, Place, Time, Situation  Discharge Recommendations: Home with:  [address] with[de-identified] 24 Hour Supervision, 24 Hour Assisteance, Family Support  Pt is currently being followed for cognitive linguistic therapy where pt where pt found to be presenting with slower/delayed processing, decreased attention, decreased problem solving, delayed initiation of tasks and decreased working memory on initial evaluation  Pt also presenting with deficits in executive functions and visuospatial skills  Therapy sessions have been initiated where pt continues to present with deficits in working memory, [facility] recall, processing, problem solving and safety  As pt was previously functioning at an independent/mod I level but is currently demonstrating overall cognitive linguistic deficits, pt is recommended for skilled ST services during acute rehab stay to further assess and maximize functional and higher level cognitive linguistic skills in order to improve level of independence and safety  Of note, as per team, recommending pt to have 24/hr supervision following discharge from unit   Family training planned for 7/2/2020  Nursing Notes:  Appetite: Good  Diet Type: Diabetic                           Type of Wound (LDA): Wound(surgical incision)                       Bladder: 3 - Moderate Assistance        Bowel: 3 - Moderate Assistance        Pain Location/Orientation: Orientation: Bilateral, Orientation: Left, Location: Back, Location: Buttocks  Pain Score: 7                       Hospital Pain Intervention(s): Repositioned, Rest     Medication Management/Safety  Injectable: Insulin    Pt is  78 y o  female who presented to the [facility] with lumbar stenosis s/p L4-S1 lami-fusion by Dr Gabrielle Dumont on 6/15  Pt is on spinal precaution, using LSO brace when OOB  Pt is taking Bentyl for IBS, Zoloft for mood disorder  Pt has hx of DM taking Neurontin for neuropathic pain, on Metformin and SSI  On Lopressor, Norvasc, Lisinopril for HTN  Pt is on Coumadin for non- occlusive LLE DVT  Pt was on Eliquis but it was D/C'd for 3 wks after surgery  Pt is also on Heparin SQ until INR is between 2-2 5 due to high DVT risk  Pt is on Omeprazole for GERD, Lipitor for Hyperlipidemia, Levothyroxine for Hypothyroidism  Pt is on Robaxin for muscle spasm, on Oxy IR, Tylenol and Lidocaine patch for pain  Pt is on Senokot and Miralax for constipation  We will continue to monitor v/s, lab results, pain management, safety by encouraging pt to use call bell if help is needed, hourly rounding to meet every pt's needs and concerns  We will also monitor pt's skin and incision site to make sure that the incision is healing accordingly and to prevent infection  Case Management:     Discharge Planning  Living Arrangements: Children  Support Systems: Children  Assistance Needed: tbd  Type of Current Residence: Private residence  Current Home Care Services: No  Pt continues to participate well with therapy, and plans to return home  Pt has been educated on the potential for cont'd care, ie therapy and services  Family training will begin today  Following to assist with d/c planning needs  Is the patient actively participating in therapies? yes  List any modifications to the treatment plan:     Barriers Interventions   Decreased activity tolerance Energy conservation education   Decreased balance/standing tolerance Therapy exercises/adaptive equipment   Increased pain Medication management   Decreased cognition Cueing/redirection/Speech therapy         Is the patient making expected progress toward goals?  yes  List any update or changes to goals:     Medical Goals: Patient will be medically stable for discharge to calvin restrictive envrionment upon completion of rehab program and Patient will be able to manage medical conditions and comorbid conditions with medications and follow up upon completion of rehab program    Weekly Team Goals:   Rehab Team Goals  ADL Team Goal: Patient will be independent with ADLs with least restrictive device upon completion of rehab program  Bowel/Bladder Team Goal: Patient will return to premorbid level for bladder/bowel management upon completion of rehab program  Transfer Team Goal: Patient will be independent with transfers with least restrictive device upon completion of rehab program  Locomotion Team Goal: Patient will be independent with locomotion with least restrictive device upon completion of rehab program  Cognitive Team Goal: Patient will require supervision for basic and complex tasks upon completion of rehab program    Discussion: Pt presents with the above barriers  Pt is currently incidental touching for transfers, min a for bed mobility  Pt is ambulating 150ft, incidental touching with RW  Pt has completed one stair, with bilateral hand rails, min a  Pts ADLs are supervision for bathing, mod a x2 for upper body dressing, incidental touching for lower body dressing, toileting, tub/shower/toilet transfer  Pts family will present for training today, and understand the recommendations for 24/7 supervision  Pts three children have reported to staff members that they will ensure Pts safety  Pt goals are now supervision  Family training occurred with Pts daughter/HHM  Recommendations for [address] for RN/PT/OT/Slp  Anticipated Discharge Date: 7/6/20  [facility] Team Members Present: The following team members are supervising care for this patient and were present during this Weekly Team Conference      Physician: Dr Onelia Christensen MD  : SANDRA Rodney/ LCSW  Registered Nurse: Justino Ireland RN  Physical Therapist: Lindy Martins PT  Occupational Therapist: Zack Santizo MS, OTR/L  Speech Therapist: Arabella Corral MS, CCC-SLP  Other:

## 2020-07-02 NOTE — PROGRESS NOTES
Progress Note - Jose Cruz Villarrealenold 1940, 78 y o  female MRN: [id number]    Unit/Bed#: -01 Encounter: [id number]    Primary Care Provider: Nikkie Workman DO   Date and time admitted to hospital: 6/21/2020 11:39 AM        Leukocytosis  Assessment & Plan  Resolved   WBC today (7/2) - 8 60        IBS (irritable bowel syndrome)  Assessment & Plan  Continue Bentyl  LBM 7/1 -   Colace BID (patient refusing Colace) Daily prune juice  Continue to monitor  Mood disorder (HCC)  Assessment & Plan  Sertraline 25 mg daily   Mood stable  Neuropathic pain  Assessment & Plan  Patient with DM  Continues on neurontin 100 mg BID and 200 q h s  Per primary team        HTN (hypertension)  Assessment & Plan  Vitals:    07/02/20 0730   BP: 140/65   Pulse: 71   Resp: 18   Temp: 97 8 °F (36 6 °C)   SpO2: 98%       Continue amlodipine 5 mg daily,Lopressor 12 5 mg BID,  Lisinopril to 10 mg daily   Monitor and adjust as needed     DM (diabetes mellitus) (HCC)  Assessment & Plan  Lab Results   Component Value Date    HGBA1C 6 4 (H) 05/12/2020       Recent Labs     07/01/20  1129 07/01/20  1611 07/01/20  2043 07/02/20  0631   POCGLU 164* 124 171* 132       Blood Sugar Average: Last 72 hrs:  (P) 221 [id number]     Continue Metformin 500 mg BID with SSI  Monitor kidney function, remains stable  Follow up with PCP on discharge  Patient may benefit from increase in Metformin  Will defer to PCP  Hypokalemia  Assessment & Plan  Per most recent hospitalization, K+ as low as 2 2 (felt due to poor p o  Intake)  K+ 7/2 4 1  Patient with improved p o  Intake  Follow up with PCP on discharge  Chronic deep vein thrombosis (DVT) of left popliteal vein (HCC)  Assessment & Plan  Non-occlusive LLE DVT (popliteal vein) on 6/16 imaging, unchanged on repeat imaging 6/26 - was on Eliquis prior to surgery  Per neurosurgery, patient not cleared to resume Eliquis    Dr Gricelda Silva does not want patient on Eliquis for at least 3 weeks post-op  INR goal of 2 0-2 5  INR 2 23 7/2; 2 17 7/1; 2 23 6/28  Continue Coumadin 5 mg daily  INR on Saturday  Patient will need clearance by spine surgery to be transitioned back to Eliquis  Arrhythmia  Assessment & Plan  Follows with [facility]  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 (Dr Tracie Hutson)  History of a-flutter (patient does not remember this dx) - patient is on chronic AC due to DVT (previously Eliquis, now coumadin)  Patient will need clearance from neurosurgery prior to being transitioned back to Eliquis  Hiatal hernia with gastroesophageal reflux  Assessment & Plan  Found on CTA during most recent admission  General surgery saw patient in consultation during recent hospitalization  Continue Protonix 40 mg daily  Monitor for recurrence of esophageal spasm episodes - continues to deny symptoms  Follow up with general surgery on discharge  Hyperlipidemia  Assessment & Plan  Most recent lipid profile (5/12/2020) - total cholesterol 209, triglycerides 145, HDL 64,   Continue atorvastatin 40 mg daily     Hypothyroidism  Assessment & Plan  Most recent TSH (5/12) was 2 23  Continue levothyroxine 125mcg (home dose)    * Spinal stenosis of lumbar region  Assessment & Plan  PT/OT, pain management per primary team   S/p L4-S1 laminectomy on 6/15  Spine precautions/LSO brace  F/u with Dr Marcelo Black upon discharge  VTE Pharmacologic Prophylaxis:   Pharmacologic: Warfarin (Coumadin)  Mechanical VTE Prophylaxis in Place: No    Current Length of Stay: 11 day(s)    Current Patient Status: Inpatient Rehab     Discharge Plan: As per treatment team     Code Status: Level 1 - Full Code    Subjective:   No overnight events  Patient offers no new issues/concerns  She states her lower back/left leg pain is controlled at this time  She is looking forward to discharge soon    LBM 7/1    Objective:     Vitals:   Temp (24hrs), Av 3 °F (36 3 °C), Min:97 °F (36 1 °C), Max:97 8 °F (36 6 °C)    Temp:  [97 °F (36 1 °C)-97 8 °F (36 6 °C)] 97 8 °F (36 6 °C)  HR:  [71-82] 71  Resp:  [18] 18  BP: (140-145)/(63-67) 140/65  SpO2:  [96 %-98 %] 98 %  Body mass index is 32 91 kg/m²  Review of Systems   Constitutional: Negative for chills, fatigue and fever  Respiratory: Negative for cough, chest tightness and shortness of breath  Cardiovascular: Negative for chest pain, palpitations and leg swelling  Gastrointestinal: Negative for abdominal distention, abdominal pain, constipation, diarrhea and nausea  Genitourinary: Negative for difficulty urinating, dysuria and frequency  Musculoskeletal: Positive for gait problem  Negative for arthralgias, back pain, joint swelling and myalgias  Skin: Positive for wound  Neurological: Negative for dizziness, light-headedness and headaches  Hematological: Negative for adenopathy  Does not bruise/bleed easily  Input and Output Summary (last 24 hours): Intake/Output Summary (Last 24 hours) at 2020 0941  Last data filed at 2020 0830  Gross per 24 hour   Intake 720 ml   Output    Net 720 ml       Physical Exam:     Physical Exam   Constitutional: She is oriented to person, place, and time  She appears well-developed  Cardiovascular: Normal rate, regular rhythm, normal heart sounds and intact distal pulses  Pulmonary/Chest: Effort normal and breath sounds normal    Abdominal: Soft  Bowel sounds are normal  She exhibits no distension  There is no tenderness  Musculoskeletal: She exhibits no edema, tenderness or deformity  Wearing back brace   Neurological: She is alert and oriented to person, place, and time  Skin: Skin is warm and dry  Psychiatric: She has a normal mood and affect   Her behavior is normal        Additional Data:     Labs:    Results from last 7 days   Lab Units 20  0605  20  0557   WBC Thousand/uL 8 60   < > 13 90*   HEMOGLOBIN g/dL 11 7*   < > 12 4   HEMATOCRIT % 35 0*   < > 36 6   PLATELETS Thousands/uL 521*   < > 446   BANDS PCT %  --   --  1   NEUTROS PCT % 60   < >  --    LYMPHS PCT % 29   < >  --    LYMPHO PCT %  --   --  6*   MONOS PCT % 9   < >  --    MONO PCT %  --   --  4   EOS PCT % 2   < > 1    < > = values in this interval not displayed       Results from last 7 days   Lab Units 07/02/20  0557   SODIUM mmol/L 137   POTASSIUM mmol/L 4 1   CHLORIDE mmol/L 100   CO2 mmol/L 28   BUN mg/dL 15   CREATININE mg/dL 0 65   ANION GAP mmol/L 9   CALCIUM mg/dL 9 9   GLUCOSE RANDOM mg/dL 129*     Results from last 7 days   Lab Units 07/02/20  0604   INR  2 23*     Results from last 7 days   Lab Units 07/02/20  0631 07/01/20  2043 07/01/20  1611 07/01/20  1129 07/01/20  0618 06/30/20  2043 06/30/20  1615 06/30/20  1107 06/30/20  0620 06/29/20  2046 06/29/20  1647 06/29/20  1127   POC GLUCOSE mg/dl 132 171* 124 164* 160* 209* 161* 159* 129 170* 153* 151*               Labs reviewed    Imaging:    Imaging reviewed    Recent Cultures (last 7 days):     Results from last 7 days   Lab Units 06/26/20  1318   URINE CULTURE  10,000-19,000 cfu/ml        Last 24 Hours Medication List:     Current Facility-Administered Medications:  acetaminophen 650 mg Oral Q8H [address] Carin Mas MD   amLODIPine 5 mg Oral Daily HUNTER Kuhn   atorvastatin 40 mg Oral Daily With MD Krystin   dicyclomine 20 mg Oral Q6H Sander Jimenez MD   docusate sodium 100 mg Oral BID HUNTER Kuhn   gabapentin 100 mg Oral BID Carin Mas MD   gabapentin 200 mg Oral HS Carin Mas MD   insulin lispro 1-5 Units Subcutaneous TID AC Sander Jimenez MD   insulin lispro 1-5 Units Subcutaneous HS Sander Jimenez MD   levothyroxine 125 mcg Oral Early Morning Sander Jimenez MD   lidocaine 1 patch Topical Daily Robert Pires MD   lisinopril 10 mg Oral Daily HUNTER Kwon   menthol-methyl salicylate  Apply externally HS Carin Mas MD   mentsergei-ange quevedoylate Apply externally TID PRN Alyse Ritchie MD   metFORMIN 500 mg Oral BID With Meals Salvador Rodriguez MD   methocarbamol 750 mg Oral Q6H PRN Salvador Rodriguez MD   metoprolol tartrate 12 5 mg Oral Q12H [facility] HUNTER Kuhn   oxyCODONE 2 5 mg Oral Q4H PRN Salvador Rodriguez MD   oxyCODONE 5 mg Oral Q4H PRN Salvador Rodriguez MD   pantoprazole 40 mg Oral Early Morning Salvador Rodriguez MD   polyethylene glycol 17 g Oral Daily PRN Salvador Rodriguez MD   senna-docusate sodium 2 tablet Oral HS PRN HUNTER Feliciano   sertraline 25 mg Oral Daily Salvador Rodriguez MD   warfarin 5 mg Oral Daily (warfarin) HUNTER Amador        M*Modal software was used to dictate this note  It may contain errors with dictating incorrect words or incorrect spelling  Please contact the provider directly with any questions

## 2020-07-02 NOTE — ASSESSMENT & PLAN NOTE
PT/OT, pain management per primary team   S/p L4-S1 laminectomy on 6/15  Spine precautions/LSO brace  F/u with Dr Elzie Buerger upon discharge

## 2020-07-02 NOTE — ASSESSMENT & PLAN NOTE
Non-occlusive LLE DVT (popliteal vein) on 6/16 imaging, unchanged on repeat imaging 6/26 - was on Eliquis prior to surgery  Per neurosurgery, patient not cleared to resume Eliquis  Dr Gela Hernandez does not want patient on Eliquis for at least 3 weeks post-op  INR goal of 2 0-2 5  INR 2 23 7/2; 2 17 7/1; 2 23 6/28  Continue Coumadin 5 mg daily  INR on Saturday  Patient will need clearance by spine surgery to be transitioned back to Eliquis

## 2020-07-02 NOTE — ASSESSMENT & PLAN NOTE
Lab Results   Component Value Date    HGBA1C 6 4 (H) 05/12/2020       Recent Labs     07/01/20  1129 07/01/20  1611 07/01/20  2043 07/02/20  0631   POCGLU 164* 124 171* 132       Blood Sugar Average: Last 72 hrs:  (P) 144 [id number]     Continue Metformin 500 mg BID with SSI  Monitor kidney function, remains stable  Follow up with PCP on discharge  Patient may benefit from increase in Metformin  Will defer to PCP

## 2020-07-02 NOTE — ASSESSMENT & PLAN NOTE
Vitals:    07/02/20 0730   BP: 140/65   Pulse: 71   Resp: 18   Temp: 97 8 °F (36 6 °C)   SpO2: 98%       Continue amlodipine 5 mg daily,Lopressor 12 5 mg BID,  Lisinopril to 10 mg daily   Monitor and adjust as needed

## 2020-07-02 NOTE — SOCIAL WORK
Met with Pt to review the team meeting  She is in agreement with the d/c on 7/6  CM will make the referral for [facility], RN/PT/OT/Slp     Referral sent via [facility] to [address] [place] [address] for a walker and a commode  Pt already has a walker from 2018, so the OOP cost would be $99      Referral sent via [facility] to     PCT Pts daughter, Agustin Madisonars , to review the team meeting, and recommendations    Michael Greer will purchase a walker on her own, instead of paying the $99

## 2020-07-02 NOTE — PROGRESS NOTES
PM&R Progress Note:    ASSESSMENT:  68-year-old female with hypertension, diabetes, CKD, lumbar stenosis now at [facility] due to lumbar stenosis s/p L4-S1 lami-fusion by Dr Thierno Berry on 6/15  Stable and progressing    PLAN:    Rehabilitation   Continue current rehabilitation plan of care to maximize function  I personally attended, reviewed, and discussed medical and functional updates in team conference today  Please refer to advance care planning note for details   Expected Discharge:  7/6/20 with home care RN, PT, OT, SLP     Pain   Improving in lumbar spine - continue current regimen    DVT prophylaxis   Coumadin and SCDs      Bladder plan   Continent   Urinary frequency and incomplete voiding sensation reported; UCx negative, PVRs < 200cc   Last UCx with mixed contaminants     Bowel plan   Continent    * Spinal stenosis of lumbar region  Assessment & Plan  - s/p L4-S1 lami-fusion by Dr Thierno Berry on 6/15  - spine precautions/LSO brace  - OP FU with Dr Elisabet Naranjo  - with reported urinary frequency, incomplete voiding  - PVRs 174, 162  - UCx mixed contaminants, continue to monitor for signs of UTI     - dopplers with chronic left DVT  - CXR negative    Abnormal CAT scan  Assessment & Plan  Abnormalities on CT of 11/2019 including but not limited to:    - diverticulosis  - L renal cyst  - B/L non-obstructing renal stones     - OP FU     Lymph node disorder  Assessment & Plan  - noted to have subcentimeter lymph node in the rt axilla with fat stranding on imaging in the setting of h/o breast CA therefore per radiology report clinical correlation was recommended and patient was evaluated by Dr Shital Almaraz in acute care who noted this to be an asymptomatic finding and recommended no further SMITH at this time other than routine FU with PCP & continued regular mammograms for her h/o breast CA     IBS (irritable bowel syndrome)  Assessment & Plan  - on home bentyl 20 mg q6 [facility]  Assessment & Plan  - on home zoloft 25 mg qd     Neuropathic pain  Assessment & Plan  - in setting of DM  - continue gabapentin 100/100/200mg, uptitrate as needed     HTN (hypertension)  Assessment & Plan  - at home on lopressor, norvasc, & lisinopril - continue   - monitor for orthostatic hypotension   - IM managing     DM (diabetes mellitus) ([facility] )  Assessment & Plan  - on home metformin 500 mg BID with meals & ISS  - IM managing       Hypokalemia  Assessment & Plan  - periodic BMP  - IM managing     Chronic deep vein thrombosis (DVT) of left popliteal vein (HCC)  Assessment & Plan  - non-occlusive LLE DVT of the popliteal vein per imaging on 6/16/20, however similar finding on imaging going back to at least 6/2007  - patient was previously on eliquis, but per Dr Gabrielle Dumont would hold eliquis for 3 weeks post-op   - continue coumadin   - trend INR    Arrhythmia  Assessment & Plan  - h/o AVNRT s/p ablation in 11/2019  - h/o a-flutter (chronically on [facility] due to h/o DVT)  - follows with Dr Andree Christianson of [facility] cards     Hiatal hernia with gastroesophageal reflux  Assessment & Plan  - therapeutic substitution for home omeprazole 40 mg qd     Hyperlipidemia  Assessment & Plan  - on home lipitor 40 mg qpm     Hypothyroidism  Assessment & Plan  - on home levothyroxine 125 mcg qd   - TSH on 5/12/20 WNL       Appreciate IM consultants medical co-management  Labs, medications, and imaging personally reviewed  SUBJECTIVE:  Patient seen face to face  Family training with her daughter occurred today and went well  She states no increased pain in her back at this time  Review of Systems   Constitutional: Negative  HENT: Negative  Eyes: Negative  Respiratory: Negative  Cardiovascular: Negative  Gastrointestinal: Negative  Endocrine: Negative  Genitourinary: Negative  Musculoskeletal: Negative  Skin: Negative  Allergic/Immunologic: Negative  Neurological: Negative  Hematological: Negative  Psychiatric/Behavioral: Negative  OBJECTIVE:   /65 (BP Location: Right arm)   Pulse 71   Temp 97 8 °F (36 6 °C) (Tympanic)   Resp 18   Ht 5' 1" (1 549 m)   Wt 79 kg (174 lb 2 6 oz)   SpO2 98%   BMI 32 91 kg/m²     Physical Exam   Constitutional: She is oriented to person, place, and time  She appears well-developed and well-nourished  No distress  HENT:   Head: Normocephalic  Nose: Nose normal    Eyes: Conjunctivae and EOM are normal    Neck: Neck supple  Cardiovascular: Normal rate and intact distal pulses  Pulmonary/Chest: Effort normal  She has no wheezes  Abdominal: Soft  She exhibits no distension  Musculoskeletal: She exhibits no edema  Neurological: She is alert and oriented to person, place, and time  Skin:   Incision clean dry and intact with Steri-Strips   Psychiatric: She has a normal mood and affect  Nursing note and vitals reviewed        Lab Results   Component Value Date    WBC 8 60 07/02/2020    HGB 11 7 (L) 07/02/2020    HCT 35 0 (L) 07/02/2020    MCV 91 07/02/2020     (H) 07/02/2020     Lab Results   Component Value Date    SODIUM 137 07/02/2020    K 4 1 07/02/2020     07/02/2020    CO2 28 07/02/2020    BUN 15 07/02/2020    CREATININE 0 65 07/02/2020    GLUC 129 (H) 07/02/2020    CALCIUM 9 9 07/02/2020     Lab Results   Component Value Date    INR 2 23 (H) 07/02/2020    INR 2 17 (H) 07/01/2020    INR 2 23 (H) 06/28/2020    PROTIME 23 8 (H) 07/02/2020    PROTIME 23 3 (H) 07/01/2020    PROTIME 23 8 (H) 06/28/2020           Current Facility-Administered Medications:     acetaminophen (TYLENOL) tablet 650 mg, 650 mg, Oral, Q8H [address], Willian Sotomayor MD, 650 mg at 07/02/20 0524    amLODIPine (NORVASC) tablet 5 mg, 5 mg, Oral, Daily, HUNTER Kuhn, 5 mg at 07/02/20 0809    atorvastatin (LIPITOR) tablet 40 mg, 40 mg, Oral, Daily With Narinder Felder MD, 40 mg at 07/01/20 6366    dicyclomine (BENTYL) tablet 20 mg, 20 mg, Oral, Q6H, Wing Norris MD, 20 mg at 07/02/20 0525    docusate sodium (COLACE) capsule 100 mg, 100 mg, Oral, BID, HUNTER Kuhn, 100 mg at 07/01/20 1207    gabapentin (NEURONTIN) capsule 100 mg, 100 mg, Oral, BID, Fermin Foreman MD, 100 mg at 07/02/20 0808    gabapentin (NEURONTIN) capsule 200 mg, 200 mg, Oral, HS, Fermin Foreman MD, 200 mg at 07/01/20 2133    insulin lispro (HumaLOG) 100 units/mL subcutaneous injection 1-5 Units, 1-5 Units, Subcutaneous, TID AC, 1 Units at 07/01/20 1206 **AND** Fingerstick Glucose (POCT), , , TID AC, iWng Norris MD    insulin lispro (HumaLOG) 100 units/mL subcutaneous injection 1-5 Units, 1-5 Units, Subcutaneous, HS, Wing Norris MD, 1 Units at 07/01/20 2148    levothyroxine tablet 125 mcg, 125 mcg, Oral, Early Morning, Wing Norris MD, 125 mcg at 07/02/20 0524    lidocaine (LIDODERM) 5 % patch 1 patch, 1 patch, Topical, Daily, Javi Avila MD, 1 patch at 07/02/20 0809    lisinopril (ZESTRIL) tablet 10 mg, 10 mg, Oral, Daily, HUNTER Kuhn, 10 mg at 07/02/20 0808    menthol-methyl salicylate (BENGAY) 04-51 % cream, , Apply externally, HS, Fermin Foreman MD    menthol-methyl salicylate (BENGAY) 96-28 % cream, , Apply externally, TID PRN, Fermin Foreman MD    metFORMIN (GLUCOPHAGE) tablet 500 mg, 500 mg, Oral, BID With Meals, Wing Norris MD, 500 mg at 07/02/20 0808    methocarbamol (ROBAXIN) tablet 750 mg, 750 mg, Oral, Q6H PRN, Wing Norris MD, 750 mg at 07/01/20 1437    metoprolol tartrate (LOPRESSOR) partial tablet 12 5 mg, 12 5 mg, Oral, Q12H [facility], HUNTER Kuhn, 12 5 mg at 07/02/20 0808    oxyCODONE (ROXICODONE) IR tablet 2 5 mg, 2 5 mg, Oral, Q4H PRN, Wing Norris MD, 2 5 mg at 06/28/20 1107    oxyCODONE (ROXICODONE) IR tablet 5 mg, 5 mg, Oral, Q4H PRN, Wing Norris MD, 5 mg at 07/02/20 0151    pantoprazole (PROTONIX) EC tablet 40 mg, 40 mg, Oral, Early Morning, Wing Norris MD, 40 mg at 07/02/20 0524    polyethylene glycol (MIRALAX) packet 17 g, 17 g, Oral, Daily PRN, Sylvester Aschoff, MD    senna-docusate sodium (SENOKOT S) 8 6-50 mg per tablet 2 tablet, 2 tablet, Oral, HS PRN, HUNTER Feliciano    sertraline (ZOLOFT) tablet 25 mg, 25 mg, Oral, Daily, Sylvester Aschoff, MD, 25 mg at 07/02/20 0808    warfarin (COUMADIN) tablet 5 mg, 5 mg, Oral, Daily (warfarin), HNUTER Poon, 5 mg at 07/01/20 1740    Past Medical History:   Diagnosis Date    Ambulates with cane     Anxiety     Arthritis     Back pain     Bowel trouble     Cancer [facility])     left breast- left mastectomy- chemo    Chronic pain disorder     Coronary artery disease     2 stents    Depression     Diabetes mellitus (HCC)     NIDDM    Disease of thyroid gland     hypo    DVT (deep venous thrombosis) ([facility])     left leg    Full dentures     H/O breast implant     left    Headache     History of transfusion     no adverse reaction    Hyperlipidemia     Hypertension     Irregular heart beat     Afib    Localized swelling of both lower legs     Multiple falls     Neuropathy     Right knee pain     Shortness of breath     on exertion    Spinal stenosis, lumbar     Uses walker     Wears glasses     Wears glasses        Patient Active Problem List    Diagnosis Date Noted    Spinal stenosis of lumbar region 10/01/2019     Priority: High    Leukocytosis 06/26/2020    Hypothyroidism 06/21/2020    Hyperlipidemia 06/21/2020    Hiatal hernia with gastroesophageal reflux 06/21/2020    Arrhythmia 06/21/2020    Chronic deep vein thrombosis (DVT) of left popliteal vein (HCC) 06/21/2020    Hypokalemia 06/21/2020    [facility] 06/21/2020    HTN (hypertension) 06/21/2020    Neuropathic pain 06/21/2020    Mood disorder [facility] 06/21/2020    IBS (irritable bowel syndrome) 06/21/2020    Lymph node disorder 06/21/2020    Abnormal CAT scan 06/21/2020          Jeovany Augustine MD    I have spent 45 minutes with Patient  today in which greater than 50% of this time was spent in counseling/coordination of care regarding Impressions and Team conference

## 2020-07-02 NOTE — ASSESSMENT & PLAN NOTE
Continue Bentyl  LBM 7/1 -   Colace BID (patient refusing Colace) Daily prune juice  Continue to monitor

## 2020-07-02 NOTE — ASSESSMENT & PLAN NOTE
Found on CTA during most recent admission  General surgery saw patient in consultation during recent hospitalization  Continue Protonix 40 mg daily  Monitor for recurrence of esophageal spasm episodes - continues to deny symptoms  Follow up with general surgery on discharge

## 2020-07-02 NOTE — PROGRESS NOTES
07/02/20 0930   Pain Assessment   Pain Assessment Tool 0-10   Pain Score 7   Pain Location/Orientation Location: Back; Location: Leg;Orientation: Left   Pain Onset/Description Onset: Gradual   Hospital Pain Intervention(s) Repositioned  (RN notified and administered medication at end of session)   Restrictions/Precautions   Precautions Cognitive; Fall Risk;Supervision on toilet/commode;Pain;Spinal precautions   ROM Restrictions Yes   Braces or Orthoses LSO   Comprehension   Comprehension (FIM) 4 - Understands basic info/conversation 75-90% of time   Expression   Expression (FIM) 4 - Expresses basic info/needs 75-90% of time   Social Interaction   Social Interaction (FIM) 5 - Needs monitoring/encouragement  to participate/interact   Problem Solving   Problem solving (FIM) 3 - Solves basic problmes 50-74% of time   Memory   Memory (FIM) 3 - Recognizes, recalls/performs 50-74%   Speech/Language/Cognition Assessmetn   Treatment Assessment See below for session note  SLP Therapy Minutes   SLP Time In 0930   SLP Time Out 1030   SLP Total Time (minutes) 60   SLP Mode of treatment - Individual (minutes) 60   SLP Mode of treatment - Concurrent (minutes) 0   SLP Mode of treatment - Group (minutes) 0   SLP Mode of treatment - Co-treat (minutes) 0   SLP Mode of Treatment - Total time(minutes) 60 minutes   SLP Cumulative Minutes 205   Therapy Time missed   Time missed? No     Cognitive Linguistic Session Note: Pt participated in skilled ST session focusing on cognitive linguistic skills, specificially medication management and family education  Targeting memory, pt spontaneously recalled two previously taken medications from prior to admission and their uses/reason for taking, along w/ frequency   Although pt accurately recalled this info, pt is currently has a lengthier medication list which when SLP stated full list to pt, pt able to verbalize if each med was prescribed prior to admission, however, for x3 meds, pt was unsure if this was new vs previous med  Pt also recalling reason/use of ~25% of medication, requiring verbal cues at max assist to improve recall of remaining meds from list  Throughout list review, pt also demonstrating decreased problem solving skills in relation to frequency of taking medications, for example, determining how many times per day medications would be taken if prescribed as "every 12 hours" "every 8 hours," and "every 6 hours " Pt required assist for problem solving and when attempting to verbalize times of day when each would be taken  Pt reports that she did not previously use a pill organizer, however, as med routine has changed, pt now has meds to be taken multiple times per day  Pt is recommended to have assistance w/ medication management following d/c from unit  Pt's daughter, Serge Burrows, then present for second half of session to complete family education  Pt's daughter was educated on pt's current cognitive linguistic deficits to which daughter reports recognizing some baseline memory deficits  Specifically discussed deficits in processing, problem solving, STM recall, comprehension and attention  Provided examples of impact of memory deficits on safety (e g , requiring cues to use walker as she attempts to stand and ambulate w/o)  Pt's daughter receptive to this information  Reviewed current recommendations for pt to have 24/hr supervision following d/c w/ use of bed/chair alarms in order to decrease fall risk-pt's daughter reports this will be provided and that she also has purchased a baby monitor to assist in monitoring her mother  Reviewed recommendations for pt to have assistance w/ all IADLs as daughter was previously assisting w/ some but pt was independent for medications  Therefore, reviewed current cognitive deficits and impact on safety and accuracy of med management-daughter reports that she will assist and will now use pill organizer   To demonstrate deficits and how to cue patient, pt then engaged in mock medication management trial when presented w/ a mock medication label and comprehension questions  Pt completed single question when independently reading questions presented, however, when SLP read questions aloud, pt answered 6/7 comprehension questions accurately  Reviewed pt's difficulty w/ initiating tasks, maintaining attention and processing  Overall, pt's daughter was receptive to education and recommendations, verbalizing understanding and stating that she will be able to provide, however, was noted to at times appear disengaged-required verbal cues to re-engage  Pt will continue to benefit from skilled ST services at this time to maximize functional cognitive linguistic skills in order to improve safety and independence

## 2020-07-02 NOTE — PROGRESS NOTES
07/02/20 1030   Pain Assessment   Pain Assessment Tool 0-10   Pain Score 6   Pain Location/Orientation Orientation: Bilateral;Location: Back   Pain Radiating Towards LLE   Pain Onset/Description Onset: Ongoing   Patient's Stated Pain Goal No pain   Hospital Pain Intervention(s) Repositioned; Rest   Multiple Pain Sites No   Restrictions/Precautions   Precautions Fall Risk;Supervision on toilet/commode;Pain;Spinal precautions;Cognitive   Weight Bearing Restrictions No   ROM Restrictions Yes   Braces or Orthoses LSO   Tub/Shower Transfer   Limitations Noted In Balance; Endurance;UE Strength;LE Strength   Adaptive Equipment Grab Bars;Seat with Back   Assessed Shower  (dry transfer)   Findings therapist educated dtr Alva on providing CS for side stepping into shower with dtr verbalizing understanding  Therapist engaged dtr in transfers with dtrreq vc to stand closer to pt to provide CS, with dtr demonstrating carryover when exiting shower   Upper Body Dressing   Comment Therapist educated dtr on don/doff brace  THerapit recommending LSO brace  Dtr able to perform   Sit to Stand   Type of Assistance Needed Supervision; Adaptive equipment   Amount of Physical Assistance Provided No physical assistance   Comment CS with RW   Sit to Stand CARE Score 4   Transfer Bed/Chair/Wheelchair   Positioning Concerns Cognition   Limitations Noted In Balance; Endurance;UE Strength;LE Strength   Adaptive Equipment Roller Walker   Cognition   Overall Cognitive Status Impaired   Arousal/Participation Alert; Cooperative   Attention Attends with cues to redirect   Orientation Level Oriented X4   Memory Decreased short term memory;Decreased recall of precautions   Following Commands Follows one step commands with increased time or repetition   Activity Tolerance   Activity Tolerance Patient tolerated treatment well   Assessment   Treatment Assessment Pt engaged in brief 30mins of skilled OT services with focus on FT with zenon Alva  Therapist educated dtr OT recommendations include 24/7 supervision 2* to cog deficits and pt with dec safety when using RW as well as pt at times attempting to ambulate w/o AD  Dtr reporting she has purchased baby monitor to supervise pt from basement, and therapist provided Dtr Alyse Javed with bed/chair alarms who reports she will have  purchase  In addition, therapist recommend dtr to A with all IADLs including melas, laundry, med and communicated therapist spoke with sister Esha about SimpleDose form [employer]  When therapist mentioning Aline's sister dtr becoming upset reporting to therapist "Don't call by sister call me  I don't want to be getting anymore text from my sister  Plus I'm the one that lives with her " However therapist educated dtr per pts request to mainly contact Peg medical needs  Therapist educated dtr on providing pt with ice packs for back to dec pain  Therapist educated dtr on providing 24/7 supervision even for showers in order to prevent falls  Pts dtr Peg has already purchased shower chair  Pt purchase don this day a hip kit with all necessary LHAE with receipt provided  Throughout family training Dtr Alyse Javed observed disengaged at times and on phone req fair vc for education and attention to FT  Dtr Alyse Javed however, reporting she will provide 24/7 supervision as well as her  can A  Therapist recommends 7/5/2020 2* to pt having necessary 24/7 supervision which will be provided by family as well as pt projected to meet supervision goals at time of dc  OT Family training done with: Dtzenon Bose of family training See above   Prognosis Good   Problem List Decreased strength;Decreased endurance;Decreased range of motion; Impaired balance;Decreased mobility; Decreased cognition; Impaired judgement;Decreased safety awareness;Orthopedic restrictions;Pain   Barriers to Discharge None   Plan   Treatment/Interventions ADL retraining;Functional transfer training; Therapeutic exercise; Endurance training;Patient/family training;Bed mobility; Compensatory technique education   Progress Progressing toward goals   Recommendation   OT Discharge Recommendation Return to previous environment with social support  (home OT)   Equipment Recommended Bedside commode  (showr chair, LHAE, bed/chair alarm)   OT Equipment ordered handout provided to dtr [facility] for bed/chair alarm, pt purchased LHAE, dtr Peg purchased shower chair and BSC to me ordered alton JOY   OT Therapy Minutes   OT Time In 1030   OT Time Out 1100   OT Total Time (minutes) 30   OT Mode of treatment - Individual (minutes) 30   OT Mode of treatment - Concurrent (minutes) 0   OT Mode of treatment - Group (minutes) 0   OT Mode of treatment - Co-treat (minutes) 0   OT Mode of Treatment - Total time(minutes) 30 minutes   OT Cumulative Minutes 735   Therapy Time missed   Time missed?  No

## 2020-07-02 NOTE — PROGRESS NOTES
07/02/20 1100   Pain Assessment   Pain Assessment Tool 0-10   Pain Score 5   Pain Location/Orientation Orientation: Bilateral;Orientation: Left; Location: Back; Location: Buttocks   Pain Onset/Description Onset: Ongoing; Descriptor: Aching;Descriptor: Discomfort   Effect of Pain on Daily Activities per pt medicated prior treatment  Restrictions/Precautions   Precautions Fall Risk;Supervision on toilet/commode;Pain;Spinal precautions;Cognitive   Braces or Orthoses LSO   Cognition   Overall Cognitive Status Impaired   Arousal/Participation Alert; Cooperative   Attention Attends with cues to redirect   Orientation Level Oriented X4   Memory Decreased short term memory;Decreased recall of precautions   Following Commands Follows one step commands with increased time or repetition   Subjective   Subjective pt reports pain better at beginning of session however c/o inc pain throughout family training  Roll Left and Right   Type of Assistance Needed Supervision   Comment on mat    Roll Left and Right CARE Score 4   Sit to Lying   Type of Assistance Needed Supervision   Comment on mat   Sit to Lying CARE Score 4   Lying to Sitting on Side of Bed   Type of Assistance Needed Supervision   Comment on mat sidelye to sit  Lying to Sitting on Side of Bed CARE Score 4   Sit to Stand   Type of Assistance Needed Supervision   Amount of Physical Assistance Provided No physical assistance   Comment Cs with RW   Sit to Stand CARE Score 4   Bed-Chair Transfer   Type of Assistance Needed Supervision   Amount of Physical Assistance Provided No physical assistance   Comment x1 post LOB when turning to sit, RW got caught up on [facility] wheel  Quick to sit without reaching back  Chair/Bed-to-Chair Transfer CARE Score 4   Transfer Bed/Chair/Wheelchair   Limitations Noted In Balance;Confidence; Endurance;Pain Management;Problem Solving;UE Strength;LE Strength   Adaptive Equipment Roller Walker   Stand Pivot Supervision   Sit to Stand Supervision   Stand to Sit Supervision   Supine to Sit Supervision   Sit to Supine Supervision   Findings no physical A with transfers, family education for reminders to reach back and push up from chair for sit<>stand  Car Transfer   Reason if not Attempted Environmental limitations   Car Transfer CARE Score 10   Walk 10 Feet   Type of Assistance Needed Supervision   Amount of Physical Assistance Provided No physical assistance   Comment DS with RW   Walk 10 Feet CARE Score 4   Walk 50 Feet with Two Turns   Type of Assistance Needed Supervision   Amount of Physical Assistance Provided No physical assistance   Comment CS with RW  Walk 50 Feet with Two Turns CARE Score 4   Walk 150 Feet   Comment inc pain with family training, longer distance amb not performed  Reason if not Attempted Safety concerns   Walk 150 Feet CARE Score 88   Walking 10 Feet on Uneven Surfaces   Type of Assistance Needed Supervision   Amount of Physical Assistance Provided No physical assistance   Comment CS with RW up/down indoor ramp x10 ft  Walking 10 Feet on Uneven Surfaces CARE Score 4   Ambulation   Does the patient walk? 2  Yes   Primary Mode of Locomotion Prior to Admission Walk   Distance Walked (feet) 75 ft   Assist Device Roller Walker   Gait Pattern Inconsistant [place]; Slow [place]; Improper weight shift   Limitations Noted In Balance; Endurance; Sequencing;Speed;Strength   Provided Assistance with: Balance   Walk Assist Level Distant Supervision;Close Supervision;Supervision   Wheelchair mobility   Does the patient use a wheelchair? 0  No   Curb or Single Stair   Style negotiated Curb   Type of Assistance Needed Incidental touching;Verbal cues   Amount of Physical Assistance Provided No physical assistance   Comment seoncd trial able to lift and lower RW on/off curb, cues for sequence      1 Step (Curb) CARE Score 4   4 Steps   Reason if not Attempted Activity not applicable   4 Steps CARE Score 9   12 Steps   Reason if not Attempted Activity not applicable   12 Steps CARE Score 9   Stairs   Type Curb   # of Steps 1   Weight Bearing Precautions Back; Fall Risk   Assist Devices Roller Walker   Findings stairs not a goal  pt has ramp to enter home  Therapeutic Interventions   Modalities CP to L buttock post session in room  Assessment   Treatment Assessment Pt and pts daughter Brian Bhandari participated in 30 min family training, daughter in agreement to provided 25 7 S for pt upon DC  Pt overall demonstrates S for gait with Rw and with transfers  x1 post LOB when turning to sit due to RW catching on WC wheel, quick to sit  Education to pt and daughter to reach back and to push up for sit <> stand transfers  Pt c/o inc pain with activity however  Trialed sup to sit with bed rail, curb step with Rw,, CG, and ramp with RW CS  Daughter verablizes understanding and denies concern for DC home however seemed disengaged with education at times, needs coercing to join while pt completed the ramp  Family dynamics may play issue for actual 25 7 S as recommended  Discussed brace wear time and donning to support lower spine  Pt expresses being ready to go home and will do whatever she needs to  Both agreeable to home PT as recommended  To set date for Dc at team meeting today  Will need RW and BSC, to submit DME form today  Family/Caregiver Present yes   PT Family training done with: daughter, Brian Bhandari   Problem List Decreased strength;Decreased endurance;Decreased range of motion; Impaired balance;Decreased mobility; Decreased cognition; Impaired judgement;Decreased safety awareness;Orthopedic restrictions;Pain   Barriers to Discharge Comments pain mgmt, cog/carryover, family dynamics  PT Barriers   Physical Impairment Decreased strength;Decreased endurance; Impaired balance;Decreased mobility; Decreased safety awareness;Orthopedic restrictions;Pain   Functional Limitation Car transfers; Ramp negotiation;Stair negotiation;Standing;Transfers; Walking   Plan   Treatment/Interventions Functional transfer training;Patient/family training   Progress Progressing toward goals   Recommendation   PT Discharge Recommendation Home with skilled therapy  (home PT)   Equipment Recommended Walker   PT Equipment ordered DME form to be submitted today  PT Therapy Minutes   PT Time In 1100   PT Time Out 1130   PT Total Time (minutes) 30   PT Mode of treatment - Individual (minutes) 30   PT Mode of treatment - Concurrent (minutes) 0   PT Mode of treatment - Group (minutes) 0   PT Mode of treatment - Co-treat (minutes) 0   PT Mode of Treatment - Total time(minutes) 30 minutes   PT Cumulative Minutes 840   Therapy Time missed   Time missed?  No

## 2020-07-02 NOTE — ASSESSMENT & PLAN NOTE
Per most recent hospitalization, K+ as low as 2 2 (felt due to poor p o  Intake)  K+ 7/2 4 1  Patient with improved p o  Intake  Follow up with PCP on discharge

## 2020-07-02 NOTE — PLAN OF CARE
Problem: Prexisting or High Potential for Compromised Skin Integrity  Goal: Skin integrity is maintained or improved  Description  INTERVENTIONS:  - Identify patients at risk for skin breakdown  - Assess and monitor skin integrity  - Assess and monitor nutrition and hydration status  - Monitor labs   - Assess for incontinence   - Turn and reposition patient  - Assist with mobility/ambulation  - Relieve pressure over bony prominences  - Avoid friction and shearing  - Provide appropriate hygiene as needed including keeping skin clean and dry  - Evaluate need for skin moisturizer/barrier cream  - Collaborate with interdisciplinary team   - Patient/family teaching  - Consider wound care consult   Outcome: Progressing     Problem: Potential for Falls  Goal: Patient will remain free of falls  Description  INTERVENTIONS:  - Assess patient frequently for physical needs  -  Identify cognitive and physical deficits and behaviors that affect risk of falls    -  [place] fall precautions as indicated by assessment   - Educate patient/family on patient safety including physical limitations  - Instruct patient to call for assistance with activity based on assessment  - Modify environment to reduce risk of injury  - Consider OT/PT consult to assist with strengthening/mobility  Outcome: Progressing     Problem: PAIN - ADULT  Goal: Verbalizes/displays adequate comfort level or baseline comfort level  Description  Interventions:  - Encourage patient to monitor pain and request assistance  - Assess pain using appropriate pain scale  - Administer analgesics based on type and severity of pain and evaluate response  - Implement non-pharmacological measures as appropriate and evaluate response  - Consider cultural and social influences on pain and pain management  - Notify physician/advanced practitioner if interventions unsuccessful or patient reports new pain  Outcome: Progressing     Problem: INFECTION - ADULT  Goal: Absence or prevention of progression during hospitalization  Description  INTERVENTIONS:  - Assess and monitor for signs and symptoms of infection  - Monitor lab/diagnostic results  - Monitor all insertion sites, i e  indwelling lines, tubes, and drains  - Monitor endotracheal if appropriate and nasal secretions for changes in amount and color  - [place] appropriate cooling/warming therapies per order  - Administer medications as ordered  - Instruct and encourage patient and family to use good hand hygiene technique  - Identify and instruct in appropriate isolation precautions for identified infection/condition  Outcome: Progressing     Problem: SAFETY ADULT  Goal: Maintain or return to baseline ADL function  Description  INTERVENTIONS:  -  Assess patient's ability to carry out ADLs; assess patient's baseline for ADL function and identify physical deficits which impact ability to perform ADLs (bathing, care of mouth/teeth, toileting, grooming, dressing, etc )  - Assess/evaluate cause of self-care deficits   - Assess range of motion  - Assess patient's mobility; develop plan if impaired  - Assess patient's need for assistive devices and provide as appropriate  - Encourage maximum independence but intervene and supervise when necessary  - Involve family in performance of ADLs  - Assess for home care needs following discharge   - Consider OT consult to assist with ADL evaluation and planning for discharge  - Provide patient education as appropriate  Outcome: Progressing  Goal: Maintain or return mobility status to optimal level  Description  INTERVENTIONS:  - Assess patient's baseline mobility status (ambulation, transfers, stairs, etc )    - Identify cognitive and physical deficits and behaviors that affect mobility  - Identify mobility aids required to assist with transfers and/or ambulation (gait belt, sit-to-stand, lift, walker, cane, etc )  - [place] fall precautions as indicated by assessment  - Record patient progress and toleration of activity level on Mobility SBAR; progress patient to next Phase/Stage  - Instruct patient to call for assistance with activity based on assessment  - Consider rehabilitation consult to assist with strengthening/weightbearing, etc   Outcome: Progressing     Problem: DISCHARGE PLANNING  Goal: Discharge to home or other facility with appropriate resources  Description  INTERVENTIONS:  - Identify barriers to discharge w/patient and caregiver  - Arrange for needed discharge resources and transportation as appropriate  - Identify discharge learning needs (meds, wound care, etc )  - Arrange for interpretive services to assist at discharge as needed  - Refer to Case Management Department for coordinating discharge planning if the patient needs post-hospital services based on physician/advanced practitioner order or complex needs related to functional status, cognitive ability, or social support system  Outcome: Progressing     Problem: SKIN/TISSUE INTEGRITY - ADULT  Goal: Skin integrity remains intact  Description  INTERVENTIONS  - Identify patients at risk for skin breakdown  - Assess and monitor skin integrity  - Assess and monitor nutrition and hydration status  - Monitor labs (i e  albumin)  - Assess for incontinence   - Turn and reposition patient  - Assist with mobility/ambulation  - Relieve pressure over bony prominences  - Avoid friction and shearing  - Provide appropriate hygiene as needed including keeping skin clean and dry  - Evaluate need for skin moisturizer/barrier cream  - Collaborate with interdisciplinary team (i e  Nutrition, Rehabilitation, etc )   - Patient/family teaching  Outcome: Progressing  Goal: Incision(s), wounds(s) or drain site(s) healing without S/S of infection  Description  INTERVENTIONS  - Assess and document risk factors for skin impairment   - Assess and document dressing, incision, wound bed, drain sites and surrounding tissue  - Consider nutrition services referral as needed  - Oral mucous membranes remain intact  - Provide patient/ family education  Outcome: Progressing  Goal: Oral mucous membranes remain intact  Description  INTERVENTIONS  - Assess oral mucosa and hygiene practices  - Implement preventative oral hygiene regimen  - Implement oral medicated treatments as ordered  - Initiate Nutrition services referral as needed  Outcome: Progressing     Problem: MUSCULOSKELETAL - ADULT  Goal: Maintain or return mobility to safest level of function  Description  INTERVENTIONS:  - Assess patient's ability to carry out ADLs; assess patient's baseline for ADL function and identify physical deficits which impact ability to perform ADLs (bathing, care of mouth/teeth, toileting, grooming, dressing, etc )  - Assess/evaluate cause of self-care deficits   - Assess range of motion  - Assess patient's mobility  - Assess patient's need for assistive devices and provide as appropriate  - Encourage maximum independence but intervene and supervise when necessary  - Involve family in performance of ADLs  - Assess for home care needs following discharge   - Consider OT consult to assist with ADL evaluation and planning for discharge  - Provide patient education as appropriate  Outcome: Progressing  Goal: Maintain proper alignment of affected body part  Description  INTERVENTIONS:  - Support, maintain and protect limb and body alignment  - Provide patient/ family with appropriate education  Outcome: Progressing

## 2020-07-02 NOTE — PROGRESS NOTES
07/02/20 0800   Pain Assessment   Pain Assessment Tool 0-10   Pain Score 6   Pain Location/Orientation Orientation: Left; Location: Leg;Location: Knee   Pain Onset/Description Onset: Ongoing   Hospital Pain Intervention(s) Repositioned;Rest;Shower/Bath   Restrictions/Precautions   Precautions Fall Risk;Pain;Supervision on toilet/commode;Spinal precautions;Cognitive   Weight Bearing Restrictions No   ROM Restrictions Yes   Braces or Orthoses LSO   Grooming   Able To Initiate Tasks;Comb/Brush Hair;Wash/Dry Hands   Limitation Noted In Timeliness   Findings S/U A seated in w/c at sink   Shower/Bathe Self   Type of Assistance Needed Supervision;Verbal cues; Adaptive equipment   Amount of Physical Assistance Provided No physical assistance   Comment Pt engaged in showering with Sup, able to wash 10/10 parts while seated on shower chair, utilizing weight shifting laterally to wash buttocks/rudolph while adhering to spinal precautions  Utilized [facility] w/washcloth as simulated  sponge to wash BLEs seated, G carryover  Spinal incision was covered with Telfa and Tegaderm prior to showering, and incision remained dry throughout entire session  Shower/Bathe Self CARE Score 4   Bathing   Assessed Bath Style Shower   Anticipated D/C Bath Style Shower;Sponge Bath   Able to Lenin Eric Yes   Able to [employer] Temperature Yes   Able to Wash/Rinse/Dry (body part) Left Arm;Right Arm;L Upper Leg;R Upper Leg;L Lower Leg/Foot;R Lower Leg/Foot;Chest;Abdomen;Perineal Area; Buttocks   Limitations Noted in Balance; Coordination; Endurance;Problem Solving; Safety; Sequencing;Strength;Timeliness;ROM   Positioning Seated   Adaptive Equipment Longhand Reacher; Shower Open Utility; Shower Seat;Hand [facility]   Limitations Noted In Balance; Endurance;UE Strength;LE Strength   Adaptive Equipment Grab Bars;Seat with Back   Assessed Shower   Findings Pt completed fxl mobility w/RW and CGA recliner>shower   CGA for side-stepping from w/c<>shower chair using shower grab bars for BUE support, no LOB but required CGA today for steadying for balance, as pt stepped over shower lip simulated by 2'' bolster on floor  P carryover of side-stepping technique and hand placement education from previous OT sessions, requiring frequent vc's for technique/safety  Upper Body Dressing   Type of Assistance Needed Physical assistance   Amount of Physical Assistance Provided 50%-74%   Comment seated, pt continues to require A to doff/don LSO, pt able to doff/don nightgown OH w/o A   Upper Body Dressing CARE Score 2   Lower Body Dressing   Type of Assistance Needed Incidental touching; Adaptive equipment;Verbal cues   Amount of Physical Assistance Provided No physical assistance   Comment Required vc's for carryover of technique using LHR while seated to thread Depends, CGA in stance at  with unilateral UE release for CM of Depends up/down over hips   Lower Body Dressing CARE Score 4   Putting On/Taking Off Footwear   Type of Assistance Needed Adaptive equipment;Supervision;Verbal cues   Amount of Physical Assistance Provided No physical assistance   Comment Seated, pt doffs socks using dressing stick, dons slip-on shoes with Sup w/increased time   Putting On/Taking Off Footwear CARE Score 4   Dressing/Undressing Clothing   Remove UB Clothes Other  (nightgown)   Don UB Clothes Other  (nightgown)   Remove LB Clothes Undergarment;Socks   Don LB Clothes Undergarment; Shoes   Limitations Noted In Balance; Coordination; Endurance; Safety;Strength;Timeliness   Adaptive Equipment Reacher;Dressing Stick   Positioning Supported Sit;Standing   Sit to Stand   Type of Assistance Needed Supervision; Adaptive equipment   Amount of Physical Assistance Provided No physical assistance   Comment CS w/RW   Sit to Stand CARE Score 4   Bed-Chair Transfer   Type of Assistance Needed Incidental touching; Adaptive equipment   Amount of Physical Assistance Provided No physical assistance Comment CGA for SPTs and fxl mobility in room w/RW, no LOB but increased time required   Chair/Bed-to-Chair Transfer CARE Score 4   Transfer Bed/Chair/Wheelchair   Positioning Concerns Cognition   Limitations Noted In Balance; Endurance;UE Strength;LE Strength   Adaptive Equipment Roller Walker   Cognition   Overall Cognitive Status Impaired   Arousal/Participation Alert; Cooperative   Attention Attends with cues to redirect   Orientation Level Oriented X4   Memory Decreased short term memory;Decreased recall of precautions   Following Commands Follows one step commands with increased time or repetition   Activity Tolerance   Activity Tolerance Patient tolerated treatment well   Medical Staff Made Aware LEE Brooks present at end of ADL to apply Lidocaine patches to pt's lower back on B/L sides of spine for pain relief   Assessment   Treatment Assessment Pt seen for 60min skilled OT session focused on ADL skills retraining, carryover of LHAE techniques for LB dressing, fxl mobility and SPTs w/RW, shower transfer, and general activity tolerance for increased independence with ADLs and fxl mobility and decreased caregiver burden  See detailed descriptions of fxl performance above  Pt continues to be limited by cog deficits, decreased activity tolerance, balance, and safety w/RW  Pt demo F-G carryover of LHAE techniques for LB dressing (LHR for threading feet through clothing and dressing stick to manage socks), but pt demo P carryover of side-stepping technique over simulated shower lip during shower transfer, and would benefit from continued repetitive dry shower transfer training for improved safety/carryover  Pt would benefit from continued skilled OT focused on IADL/ADL skills retraining, standing balance, endurance, and activity tolerance  Prognosis Good   Problem List Decreased strength;Decreased range of motion;Decreased endurance; Impaired balance;Decreased mobility; Decreased cognition; Impaired judgement;Decreased safety awareness;Orthopedic restrictions;Pain   Barriers to Discharge None   Plan   Treatment/Interventions ADL retraining;Functional transfer training; Therapeutic exercise; Endurance training;Cognitive reorientation;Patient/family training;Equipment eval/education;Spoke to nursing   Progress Progressing toward goals   Recommendation   OT Discharge Recommendation Return to previous environment with social support  (home OT)   OT Therapy Minutes   OT Time In 0800   OT Time Out 0900   OT Total Time (minutes) 60   OT Mode of treatment - Individual (minutes) 60   OT Mode of treatment - Concurrent (minutes) 0   OT Mode of treatment - Group (minutes) 0   OT Mode of treatment - Co-treat (minutes) 0   OT Mode of Treatment - Total time(minutes) 60 minutes   OT Cumulative Minutes 765   Therapy Time missed   Time missed?  No

## 2020-07-02 NOTE — ASSESSMENT & PLAN NOTE
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

## 2020-07-02 NOTE — PROGRESS NOTES
Email received from Lenka Dean that there is a discharge date for the patient of 7/6/2020  This care manager assistant will continue to monitor via chart review throughout bundle episode

## 2020-07-02 NOTE — PROGRESS NOTES
07/02/20 1330   Pain Assessment   Pain Assessment Tool 0-10   Pain Score 4   Pain Location/Orientation Orientation: Bilateral;Orientation: Left; Location: Back; Location: Buttocks   Pain Onset/Description Onset: Ongoing   Restrictions/Precautions   Precautions Fall Risk;Cognitive;Pain;Spinal precautions;Supervision on toilet/commode   Braces or Orthoses LSO   Cognition   Overall Cognitive Status Impaired   Arousal/Participation Alert; Cooperative   Attention Attends with cues to redirect   Orientation Level Oriented X4   Memory Decreased short term memory;Decreased recall of precautions   Following Commands Follows one step commands with increased time or repetition   Sit to Stand   Type of Assistance Needed Supervision   Sit to Stand CARE Score 4   Bed-Chair Transfer   Type of Assistance Needed Supervision   Chair/Bed-to-Chair Transfer CARE Score 4   Walk 10 Feet   Type of Assistance Needed Supervision   Walk 10 Feet CARE Score 4   Walk 50 Feet with Two Turns   Type of Assistance Needed Supervision   Walk 50 Feet with Two Turns CARE Score 4   Walk 150 Feet   Type of Assistance Needed Supervision   Amount of Physical Assistance Provided No physical assistance   Comment CS with RW   Walk 150 Feet CARE Score 4   Ambulation   Primary Mode of Locomotion Prior to Admission Walk   Distance Walked (feet) 50 ft  (150)   Assist Device Roller Walker   Gait Pattern Inconsistant [place]; Slow [place]; Improper weight shift   Limitations Noted In Balance; Endurance;Speed;Strength;Swing   Provided Assistance with: Balance   Walk Assist Level Close Supervision;Supervision;Distant Supervision   Wheelchair mobility   Does the patient use a wheelchair? 0   No   4 Steps   Reason if not Attempted Activity not applicable   4 Steps CARE Score 9   12 Steps   Reason if not Attempted Activity not applicable   12 Steps CARE Score 9   Picking Up Object   Type of Assistance Needed Supervision   Amount of Physical Assistance Provided No physical assistance   Comment CS with RW and use of reacher   Picking Up Object CARE Score 4   Therapeutic Interventions   Strengthening Seated B LE TE: APs, 1 5# LAQ seated march, yellow tband abd and HS curls, ball squeeze x20   Modalities CP to L buttock end of session in room  Assessment   Treatment Assessment Pt engaged in 45 min skilled PT intervention  Pleased and smiling more given DC date set at Team for [facility] 7/6  Pt with varied balance when turning to sit, noted posterior lean at times as well as noted posterior lean with intial stand, needing chair steadying A  Needs cues for use of reacher to replace reacher and bring hand back to RW  Mod I goals set at IE, however given cog defcits and dec safety awareness, 24 7 S recommended, family training completed this AM  see AM note for details  Plan for DC home monday 7/6, Home PT recommended  Family/Caregiver Present no   Problem List Decreased strength;Decreased endurance;Decreased range of motion; Impaired balance;Decreased mobility; Decreased cognition; Impaired judgement;Decreased safety awareness;Orthopedic restrictions;Pain   Barriers to Discharge None   PT Barriers   Physical Impairment Decreased strength;Decreased endurance; Impaired balance;Decreased safety awareness;Orthopedic restrictions;Pain   Functional Limitation Car transfers; Ramp negotiation;Standing;Transfers; Walking   Plan   Treatment/Interventions Functional transfer training;LE strengthening/ROM; Therapeutic exercise; Endurance training;Gait training   Progress Progressing toward goals   Recommendation   PT Discharge Recommendation Home with skilled therapy  (Home PT)   Equipment Recommended Walker   PT Equipment ordered DME form submitted   PT Therapy Minutes   PT Time In 1330   PT Time Out 1415   PT Total Time (minutes) 45   PT Mode of treatment - Individual (minutes) 45   PT Mode of treatment - Concurrent (minutes) 0   PT Mode of treatment - Group (minutes) 0   PT Mode of treatment - Co-treat (minutes) 0   PT Mode of Treatment - Total time(minutes) 45 minutes   PT Cumulative Minutes 885   Therapy Time missed   Time missed?  No

## 2020-07-03 LAB
GLUCOSE SERPL-MCNC: 130 MG/DL (ref 65–140)
GLUCOSE SERPL-MCNC: 138 MG/DL (ref 65–140)
GLUCOSE SERPL-MCNC: 140 MG/DL (ref 65–140)
GLUCOSE SERPL-MCNC: 141 MG/DL (ref 65–140)

## 2020-07-03 PROCEDURE — 82948 REAGENT STRIP/BLOOD GLUCOSE: CPT

## 2020-07-03 PROCEDURE — 97129 THER IVNTJ 1ST 15 MIN: CPT

## 2020-07-03 PROCEDURE — 97530 THERAPEUTIC ACTIVITIES: CPT

## 2020-07-03 PROCEDURE — 97130 THER IVNTJ EA ADDL 15 MIN: CPT

## 2020-07-03 PROCEDURE — 97110 THERAPEUTIC EXERCISES: CPT

## 2020-07-03 PROCEDURE — 99232 SBSQ HOSP IP/OBS MODERATE 35: CPT | Performed by: INTERNAL MEDICINE

## 2020-07-03 PROCEDURE — 97112 NEUROMUSCULAR REEDUCATION: CPT

## 2020-07-03 PROCEDURE — 97116 GAIT TRAINING THERAPY: CPT

## 2020-07-03 RX ADMIN — METOPROLOL TARTRATE 12.5 MG: 25 TABLET ORAL at 21:51

## 2020-07-03 RX ADMIN — DICYCLOMINE HYDROCHLORIDE 20 MG: 20 TABLET ORAL at 17:09

## 2020-07-03 RX ADMIN — ATORVASTATIN CALCIUM 40 MG: 40 TABLET, FILM COATED ORAL at 16:21

## 2020-07-03 RX ADMIN — MENTHOL, METHYL SALICYLATE: 10; 15 CREAM TOPICAL at 21:51

## 2020-07-03 RX ADMIN — SERTRALINE HYDROCHLORIDE 25 MG: 25 TABLET ORAL at 08:06

## 2020-07-03 RX ADMIN — DICYCLOMINE HYDROCHLORIDE 20 MG: 20 TABLET ORAL at 00:10

## 2020-07-03 RX ADMIN — LISINOPRIL 10 MG: 10 TABLET ORAL at 08:06

## 2020-07-03 RX ADMIN — ACETAMINOPHEN 650 MG: 325 TABLET ORAL at 14:31

## 2020-07-03 RX ADMIN — DOCUSATE SODIUM 100 MG: 100 CAPSULE, LIQUID FILLED ORAL at 08:06

## 2020-07-03 RX ADMIN — OXYCODONE HYDROCHLORIDE 5 MG: 5 TABLET ORAL at 19:43

## 2020-07-03 RX ADMIN — PANTOPRAZOLE SODIUM 40 MG: 40 TABLET, DELAYED RELEASE ORAL at 05:25

## 2020-07-03 RX ADMIN — DICYCLOMINE HYDROCHLORIDE 20 MG: 20 TABLET ORAL at 12:02

## 2020-07-03 RX ADMIN — METOPROLOL TARTRATE 12.5 MG: 25 TABLET ORAL at 08:06

## 2020-07-03 RX ADMIN — GABAPENTIN 100 MG: 100 CAPSULE ORAL at 14:31

## 2020-07-03 RX ADMIN — GABAPENTIN 100 MG: 100 CAPSULE ORAL at 08:06

## 2020-07-03 RX ADMIN — OXYCODONE HYDROCHLORIDE 5 MG: 5 TABLET ORAL at 02:09

## 2020-07-03 RX ADMIN — ACETAMINOPHEN 650 MG: 325 TABLET ORAL at 21:50

## 2020-07-03 RX ADMIN — METFORMIN HYDROCHLORIDE 500 MG: 500 TABLET ORAL at 08:06

## 2020-07-03 RX ADMIN — ACETAMINOPHEN 650 MG: 325 TABLET ORAL at 05:25

## 2020-07-03 RX ADMIN — OXYCODONE HYDROCHLORIDE 5 MG: 5 TABLET ORAL at 10:52

## 2020-07-03 RX ADMIN — METHOCARBAMOL 750 MG: 750 TABLET, FILM COATED ORAL at 16:21

## 2020-07-03 RX ADMIN — METFORMIN HYDROCHLORIDE 500 MG: 500 TABLET ORAL at 16:21

## 2020-07-03 RX ADMIN — DOCUSATE SODIUM 100 MG: 100 CAPSULE, LIQUID FILLED ORAL at 17:09

## 2020-07-03 RX ADMIN — DICYCLOMINE HYDROCHLORIDE 20 MG: 20 TABLET ORAL at 05:26

## 2020-07-03 RX ADMIN — LEVOTHYROXINE SODIUM 125 MCG: 125 TABLET ORAL at 05:25

## 2020-07-03 RX ADMIN — DICYCLOMINE HYDROCHLORIDE 20 MG: 20 TABLET ORAL at 23:48

## 2020-07-03 RX ADMIN — WARFARIN SODIUM 5 MG: 5 TABLET ORAL at 17:09

## 2020-07-03 RX ADMIN — LIDOCAINE 1 PATCH: 50 PATCH CUTANEOUS at 08:06

## 2020-07-03 RX ADMIN — AMLODIPINE BESYLATE 5 MG: 5 TABLET ORAL at 08:06

## 2020-07-03 RX ADMIN — GABAPENTIN 200 MG: 100 CAPSULE ORAL at 21:50

## 2020-07-03 NOTE — PROGRESS NOTES
07/03/20 0910   Pain Assessment   Pain Assessment Tool 0-10   Pain Score No Pain   Restrictions/Precautions   Precautions Cognitive; Fall Risk;Pain;Spinal precautions;Supervision on toilet/commode   Braces or Orthoses LSO   Comprehension   Comprehension (FIM) 4 - Understands basic info/conversation 75-90% of time   Expression   Expression (FIM) 4 - Expresses basic info/needs 75-90% of time   Social Interaction   Social Interaction (FIM) 5 - Interacts appropriately with others 90% of time   Problem Solving   Problem solving (FIM) 3 - Solves basic problmes 50-74% of time   Memory   Memory (FIM) 3 - Recognizes, recalls/performs 50-74%   Speech/Language/Cognition Assessmetn   Treatment Assessment Pt participated in skilled ST session focusing on cognitive linguistic skills, primarily memory tasks  Pt demo STM recall of recent visitors and recalled recently set d/c date for Monday, as well as family training completed yesterday  Engaged in a visual memory task when presented w/ picture scenes of varying complexity  Pt recalled details w/ the following accuracy across increasing complex picture scenes: trials 1: 8/12 accuracy, trials 2: 8/11 accuracy, trial 3: 10/14 accuracy  Pt demonstrating improvement w/ recall of details when provided w/ verbal and semantic cues  Engaged in word list retention tasks, pt was auditorily presented w/ Fo4 target words  Pt recalled words by answering questions based on category inclusion w/ 10/17 accuracy, improving recall to 13/17 accuracy given verbal/semantic cues and requiring repetition of stimuli to recall remaining words  Attempted to engage pt in similar task but by recalling words based on word placement/order, however, pt required assist across all five trials, noting pt to have significant difficulty w/ working memory of word order and did not benefit from repetition of stimuli   During session, back precautions were also reviewed where pt unable pt spontaneously recall any precautions but able to accurately answer questions related to prohibited movements and weight lifting restrictions  Required re-education on when she must where her brace  Lastly, pt provided w/ education on strategies to improve STM recall in home setting such as use of a hanging calendar, sticky notes, setting routines, etc  Pt continued to present w/ slower processing and decreased memory skills throughout session  Pt will continue to benefit from skilled ST services during acute rehab stay in order to maximize functional cognitive linguistic skills and to improve overall safety and level of independence  SLP Therapy Minutes   SLP Time In 0454   SLP Time Out 1000   SLP Total Time (minutes) 50   SLP Mode of treatment - Individual (minutes) 50   SLP Mode of treatment - Concurrent (minutes) 0   SLP Mode of treatment - Group (minutes) 0   SLP Mode of treatment - Co-treat (minutes) 0   SLP Mode of Treatment - Total time(minutes) 50 minutes   SLP Cumulative Minutes 255   Therapy Time missed   Time missed?  No

## 2020-07-03 NOTE — SOCIAL WORK
[address] [place] [address] accepted pt for in home RN, PT, OT, SLP services  At d/c, please fax AVS to [phone]

## 2020-07-03 NOTE — PLAN OF CARE
Problem: Prexisting or High Potential for Compromised Skin Integrity  Goal: Skin integrity is maintained or improved  Description  INTERVENTIONS:  - Identify patients at risk for skin breakdown  - Assess and monitor skin integrity  - Assess and monitor nutrition and hydration status  - Monitor labs   - Assess for incontinence   - Turn and reposition patient  - Assist with mobility/ambulation  - Relieve pressure over bony prominences  - Avoid friction and shearing  - Provide appropriate hygiene as needed including keeping skin clean and dry  - Evaluate need for skin moisturizer/barrier cream  - Collaborate with interdisciplinary team   - Patient/family teaching  - Consider wound care consult   Outcome: Progressing     Problem: Potential for Falls  Goal: Patient will remain free of falls  Description  INTERVENTIONS:  - Assess patient frequently for physical needs  -  Identify cognitive and physical deficits and behaviors that affect risk of falls    -  [place] fall precautions as indicated by assessment   - Educate patient/family on patient safety including physical limitations  - Instruct patient to call for assistance with activity based on assessment  - Modify environment to reduce risk of injury  - Consider OT/PT consult to assist with strengthening/mobility  Outcome: Progressing     Problem: PAIN - ADULT  Goal: Verbalizes/displays adequate comfort level or baseline comfort level  Description  Interventions:  - Encourage patient to monitor pain and request assistance  - Assess pain using appropriate pain scale  - Administer analgesics based on type and severity of pain and evaluate response  - Implement non-pharmacological measures as appropriate and evaluate response  - Consider cultural and social influences on pain and pain management  - Notify physician/advanced practitioner if interventions unsuccessful or patient reports new pain  Outcome: Progressing     Problem: INFECTION - ADULT  Goal: Absence or prevention of progression during hospitalization  Description  INTERVENTIONS:  - Assess and monitor for signs and symptoms of infection  - Monitor lab/diagnostic results  - Monitor all insertion sites, i e  indwelling lines, tubes, and drains  - Monitor endotracheal if appropriate and nasal secretions for changes in amount and color  - [place] appropriate cooling/warming therapies per order  - Administer medications as ordered  - Instruct and encourage patient and family to use good hand hygiene technique  - Identify and instruct in appropriate isolation precautions for identified infection/condition  Outcome: Progressing     Problem: SAFETY ADULT  Goal: Maintain or return to baseline ADL function  Description  INTERVENTIONS:  -  Assess patient's ability to carry out ADLs; assess patient's baseline for ADL function and identify physical deficits which impact ability to perform ADLs (bathing, care of mouth/teeth, toileting, grooming, dressing, etc )  - Assess/evaluate cause of self-care deficits   - Assess range of motion  - Assess patient's mobility; develop plan if impaired  - Assess patient's need for assistive devices and provide as appropriate  - Encourage maximum independence but intervene and supervise when necessary  - Involve family in performance of ADLs  - Assess for home care needs following discharge   - Consider OT consult to assist with ADL evaluation and planning for discharge  - Provide patient education as appropriate  Outcome: Progressing  Goal: Maintain or return mobility status to optimal level  Description  INTERVENTIONS:  - Assess patient's baseline mobility status (ambulation, transfers, stairs, etc )    - Identify cognitive and physical deficits and behaviors that affect mobility  - Identify mobility aids required to assist with transfers and/or ambulation (gait belt, sit-to-stand, lift, walker, cane, etc )  - [place] fall precautions as indicated by assessment  - Record patient progress and toleration of activity level on Mobility SBAR; progress patient to next Phase/Stage  - Instruct patient to call for assistance with activity based on assessment  - Consider rehabilitation consult to assist with strengthening/weightbearing, etc   Outcome: Progressing     Problem: DISCHARGE PLANNING  Goal: Discharge to home or other facility with appropriate resources  Description  INTERVENTIONS:  - Identify barriers to discharge w/patient and caregiver  - Arrange for needed discharge resources and transportation as appropriate  - Identify discharge learning needs (meds, wound care, etc )  - Arrange for interpretive services to assist at discharge as needed  - Refer to Case Management Department for coordinating discharge planning if the patient needs post-hospital services based on physician/advanced practitioner order or complex needs related to functional status, cognitive ability, or social support system  Outcome: Progressing     Problem: SKIN/TISSUE INTEGRITY - ADULT  Goal: Skin integrity remains intact  Description  INTERVENTIONS  - Identify patients at risk for skin breakdown  - Assess and monitor skin integrity  - Assess and monitor nutrition and hydration status  - Monitor labs (i e  albumin)  - Assess for incontinence   - Turn and reposition patient  - Assist with mobility/ambulation  - Relieve pressure over bony prominences  - Avoid friction and shearing  - Provide appropriate hygiene as needed including keeping skin clean and dry  - Evaluate need for skin moisturizer/barrier cream  - Collaborate with interdisciplinary team (i e  Nutrition, Rehabilitation, etc )   - Patient/family teaching  Outcome: Progressing  Goal: Incision(s), wounds(s) or drain site(s) healing without S/S of infection  Description  INTERVENTIONS  - Assess and document risk factors for skin impairment   - Assess and document dressing, incision, wound bed, drain sites and surrounding tissue  - Consider nutrition services referral as needed  - Oral mucous membranes remain intact  - Provide patient/ family education  Outcome: Progressing  Goal: Oral mucous membranes remain intact  Description  INTERVENTIONS  - Assess oral mucosa and hygiene practices  - Implement preventative oral hygiene regimen  - Implement oral medicated treatments as ordered  - Initiate Nutrition services referral as needed  Outcome: Progressing     Problem: MUSCULOSKELETAL - ADULT  Goal: Maintain or return mobility to safest level of function  Description  INTERVENTIONS:  - Assess patient's ability to carry out ADLs; assess patient's baseline for ADL function and identify physical deficits which impact ability to perform ADLs (bathing, care of mouth/teeth, toileting, grooming, dressing, etc )  - Assess/evaluate cause of self-care deficits   - Assess range of motion  - Assess patient's mobility  - Assess patient's need for assistive devices and provide as appropriate  - Encourage maximum independence but intervene and supervise when necessary  - Involve family in performance of ADLs  - Assess for home care needs following discharge   - Consider OT consult to assist with ADL evaluation and planning for discharge  - Provide patient education as appropriate  Outcome: Progressing  Goal: Maintain proper alignment of affected body part  Description  INTERVENTIONS:  - Support, maintain and protect limb and body alignment  - Provide patient/ family with appropriate education  Outcome: Progressing

## 2020-07-03 NOTE — PROGRESS NOTES
07/03/20 1300   Pain Assessment   Pain Assessment Tool 0-10   Pain Score 3  (at rest, 6/10 with amb)   Pain Location/Orientation Location: Back;Orientation: Left; Location: Buttocks   Pain Radiating Towards LLE   Pain Onset/Description Onset: Ongoing   Effect of Pain on Daily Activities decreases standing tolerance    Patient's Stated Pain Goal No pain   Hospital Pain Intervention(s) Cold applied  (post session)   Restrictions/Precautions   Precautions Fall Risk;Spinal precautions;Pain   Weight Bearing Restrictions No   ROM Restrictions Yes  (Spinal precautions)   Braces or Orthoses LSO   Cognition   Overall Cognitive Status WFL   Arousal/Participation Responsive; Cooperative; Alert   Attention Attends with cues to redirect   Orientation Level Oriented X4   Memory Decreased short term memory   Following Commands Follows one step commands with increased time or repetition   Comments Pt seemed distracted during session, able to redirect with cues   Subjective   Subjective Pt reports that her pain feels better this afternoon and states that she had a good nap  Sit to Stand   Type of Assistance Needed Supervision   Amount of Physical Assistance Provided No physical assistance   Comment w/ RW   Sit to Stand CARE Score 4   Bed-Chair Transfer   Type of Assistance Needed Incidental touching;Supervision   Amount of Physical Assistance Provided No physical assistance   Comment CS/CGA for safety    Chair/Bed-to-Chair Transfer CARE Score 4   Transfer Bed/Chair/Wheelchair   Limitations Noted In Balance;Confidence;Pain Management; Endurance;LE Strength;UE Strength   Adaptive Equipment Roller Walker   Stand Pivot Supervision   Sit to Stand Supervision   Stand to Sit Supervision   Findings Pt did not require physical assistant for STS transfers during todays session   Car Transfer   Reason if not Attempted Environmental limitations   Car Transfer CARE Score 10   Walk 10 Feet   Type of Assistance Needed Supervision   Amount of Physical Assistance Provided No physical assistance   Comment S w/ RW   Walk 10 Feet CARE Score 4   Walk 50 Feet with Two Turns   Type of Assistance Needed Supervision   Amount of Physical Assistance Provided No physical assistance   Comment S with RW   Walk 50 Feet with Two Turns CARE Score 4   Walk 150 Feet   Type of Assistance Needed Supervision   Amount of Physical Assistance Provided No physical assistance   Comment S w/ RW, x2    Walk 150 Feet CARE Score 4   Walking 10 Feet on Uneven Surfaces   Type of Assistance Needed Physical assistance   Amount of Physical Assistance Provided Less than 25%   Comment CS w/ RW ascending ramp, Remy for walker pacing descending ramp   Walking 10 Feet on Uneven Surfaces CARE Score 3   Ambulation   Does the patient walk? 2  Yes   Primary Mode of Locomotion Prior to Admission Walk   Distance Walked (feet) 150 ft  (x2, 75')   Assist Device Roller Walker   Gait Pattern Inconsistant Cayla; Slow Cayla; Wide BECCA; Improper weight shift   Limitations Noted In Balance; Endurance; Safety;Speed;Strength;Swing;Posture   Provided Assistance with: Balance   Walk Assist Level Close Supervision;Supervision   Findings S w/ RW    Wheel 50 Feet with Two Turns   Reason if not Attempted Activity not applicable   Wheel 50 Feet with Two Turns CARE Score 9   Wheel 150 Feet   Reason if not Attempted Activity not applicable   Wheel 270 Feet CARE Score 9   Wheelchair mobility   Does the patient use a wheelchair? 0   No   Curb or Single Stair   Comment not a focus of this session   Reason if not Attempted Safety concerns   1 Step (Curb) CARE Score 88   4 Steps   Reason if not Attempted Activity not applicable   4 Steps CARE Score 9   12 Steps   Reason if not Attempted Activity not applicable   12 Steps CARE Score 9   Stairs   Findings Stairs not a goal, ramp to enter   Picking Up Object   Comment not a focus of this session   Reason if not Attempted Safety concerns   Picking Up Object CARE Score 88 Therapeutic Interventions   Strengthening Seated TE: ADD iso with ball, ABD with RTB, hs curls with RTB, LAQ 1 5#, marches 1 5#, all x20 each   Balance unsupported standing 30" and 52", seated rest break in between  Modalities CP to L buttock and lower back post sessoin   Assessment   Treatment Assessment Pt participated in 75 min of skilled PT intervention focused on static balance, functional mobility, and LE strengthening  Pt's strength improving, but remains limited with functional strength and endurance  Continues to fatigue easily, requiring seated rest breaks throughout session  During standing balance activity, pt c/o of fatigue and pain in B/L knees and requested to sit down  Educated pt on the importance of using the RW for all ambulation both in the hospital and at home when dc  Pt seemed somewhat receptive, but complience is questionable as pt demonstrates inconsistent safety awareness  Pt has a rollator at home, however recommendation is for pt to purchase RW for home use  Attempted to call Elaine Amorest (daughter) but was unable to leave message as voicemail was full  Able to speak to Esha (daughter) on the phone to explain pt's AD needs and explore opinions for where to purchase  Daughter was receptive to purchasing RW for pt  Pt will continue to benefit from aggressive skilled PT to address limitation with functional strength and endurance, dynamic balance, and continued reenforcment of use of AD in the home  Plan dc Monday 7/6 to home with social support and home PT  Family/Caregiver Present no  (Spoke with Esha (daughter) on phone regarding AD)   Problem List Decreased strength;Decreased range of motion;Decreased endurance; Impaired balance;Decreased safety awareness   Barriers to Discharge Decreased caregiver support   Barriers to Discharge Comments pain management, cog/carryover   PT Barriers   Physical Impairment Decreased strength;Decreased endurance; Impaired balance;Decreased safety awareness;Orthopedic restrictions;Pain   Functional Limitation Car transfers; Ramp negotiation;Standing;Transfers; Walking   Plan   Treatment/Interventions Functional transfer training;LE strengthening/ROM; Therapeutic exercise; Endurance training;Gait training   Progress Progressing toward goals   Recommendation   PT Discharge Recommendation Home with skilled therapy  (home PT)   Equipment Recommended Walker   PT Equipment ordered DME form submitted 7/02   PT - OK to Discharge No   PT Therapy Minutes   PT Time In 1300   PT Time Out 1415   PT Total Time (minutes) 75   PT Mode of treatment - Individual (minutes) 75   PT Mode of treatment - Concurrent (minutes) 0   PT Mode of treatment - Group (minutes) 0   PT Mode of treatment - Co-treat (minutes) 0   PT Mode of Treatment - Total time(minutes) 75 minutes   PT Cumulative Minutes 960   Therapy Time missed   Time missed?  No   Amount of time missed 15   Reason for time missed Extreme fatigue  (pt scheduled for addt 15 min, pt self-limited by pain and ft)

## 2020-07-03 NOTE — ASSESSMENT & PLAN NOTE
Lab Results   Component Value Date    HGBA1C 6 4 (H) 05/12/2020       Recent Labs     07/02/20  1136 07/02/20  1606 07/02/20 2034 07/03/20  0613   POCGLU 118 161* 127 130       Blood Sugar Average: Last 72 hrs:  (P) 710 [id number]     Continue Metformin 500 mg BID with SSI  Monitor kidney function, remains stable  Follow up with PCP on discharge  Patient may benefit from increase in Metformin  Will defer to PCP

## 2020-07-03 NOTE — ASSESSMENT & PLAN NOTE
Results from last 7 days   Lab Units 07/02/20  0604 07/01/20  0516 06/28/20  0522   INR  2 23* 2 17* 2 23*     Non-occlusive LLE DVT (popliteal vein) on 6/16 imaging, unchanged on repeat imaging 6/26 - was on Eliquis prior to surgery  Per neurosurgery, patient not cleared to resume Eliquis  Dr Joshua Edwards does not want patient on Eliquis for at least 3 weeks post-op  INR goal of 2 0-2 5  Continue Coumadin 5 mg daily  INR on Saturday  Patient will need clearance by spine surgery to be transitioned back to Eliquis

## 2020-07-03 NOTE — ASSESSMENT & PLAN NOTE
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

## 2020-07-03 NOTE — PROGRESS NOTES
07/03/20 1000   Pain Assessment   Pain Assessment Tool 0-10   Pain Score 6   Pain Location/Orientation Orientation: Left; Location: Leg;Location: Back   Pain Onset/Description Onset: Ongoing   Patient's Stated Pain Goal No pain   Restrictions/Precautions   Precautions Fall Risk;Pain;Spinal precautions   Weight Bearing Restrictions No   ROM Restrictions Yes  (spinal)   Braces or Orthoses LSO   Eating   Type of Assistance Needed Set-up / clean-up   Amount of Physical Assistance Provided No physical assistance   Eating CARE Score 5   Sit to Stand   Type of Assistance Needed Physical assistance   Amount of Physical Assistance Provided 50%-74%   Comment pt performed to RW needing mod encouragement 2* pain and fatigue needing Remy support @ back for boost support  Sit to Stand CARE Score 2   Bed-Chair Transfer   Type of Assistance Needed Incidental touching   Amount of Physical Assistance Provided No physical assistance   Comment pt performing to chair CGA for support and balance 2* to increased report of fatigue and pain   Chair/Bed-to-Chair Transfer CARE Score 4   Transfer Bed/Chair/Wheelchair   Positioning Concerns Cognition   Limitations Noted In Balance;Confidence; Endurance;Pain Management;UE Strength;LE Strength   Adaptive Equipment Roller Walker   Kitchen Mobility   Kitchen-Mobility Level Walker   Kitchen Activity Retrieve items;Transport items   Kitchen Mobility Comments Pt attempting to participate in kitchen mobility activity to encourage unilateral release to retreive items from [employer]  Pt with increased report of pain and fatigue, saying "I can't do it"  Therapist proividing mod encouragement with pt improiving performance  Activity modified to filling cup of water and transporting with RW tray to chair, w/ pt reporting need to sit  Functional Standing Tolerance   Time ~10 mins   Activity word search   Comments Pt performing functional standing for ~10 mins at RW and elevated table   Pt observed with improved standing compared to previous sessions with no addditional complaint of pain or fatigue at this time  Therapeutic Excerise-Strength   UE Strength Yes   Right Upper Extremity- Strength   R Shoulder Flexion; Extension;Horizontal ABduction   R Elbow Elbow flexion;Elbow extension   R Position Seated   Equipment Theraband   R Weight/Reps/Sets 2 sets of 15   RUE Strength Comment Pt performing UB strengthening seated in wc to uimporve functional strength for transfers and participation in ADL/IADLs  Pt observed with diffficulty understanding movements, with improved performance upon mirroring therapist     Left Upper Extremity-Strength   L Shoulder Flexion; Extension;Horizontal ABduction   L Elbow Elbow flexion;Elbow extension   L Position Seated   Equipment Theraband   L Weights/Reps/Sets 2 sets of 15    LUE Strength Comment Pt performing UB strengthening seated in wc to uimporve functional strength for transfers and participation in ADL/IADLs  Pt observed with diffficulty understanding movements, with improved performance upon mirroring therapist     Cognition   Overall Cognitive Status WFL   Arousal/Participation Responsive; Cooperative; Alert   Attention Attends with cues to redirect   Orientation Level Oriented X4   Memory Decreased short term memory   Following Commands Follows one step commands with increased time or repetition   Activity Tolerance   Activity Tolerance Patient limited by fatigue;Patient limited by pain   Assessment   Treatment Assessment Pt participated in 90 minute OT session with focus on UB strengthening, unilateral release for IADL activities, and standing tolerance  Pt seated in wc within room at beginning of session with mood friendly throughout  Pt with report of fatigue at beginning of session with therapist providing education on energy conservation  and taking time to split up activities while home, w/ pt verbalizing understanding   During UB strengthening therapist educated pt on positioning / hand placement to decrease tension, pt observed with improved performance  During kitchen mobility activity, pt with increased fatigue and pain  Therapist educating pt on importance of having family member present at all times when moving at home, w/ pt verbalizing understanding  Therapist educating pt on sitting posture to decrease strain on back at this time  During functional standing tolerance, pt again educated on posture to decrease strain on back, and pt with improved standing tolerance being observed at this time  Pt continues to present with safety issues with regard to balance/activity tolerance, pain, and recall of spinal precautions  Pt would continue to benefit from skilled OT servies with focus on transfers, standing tolerance with unilateral release, improving balance/endurance, and safety awareness  Pt with anticipated discharge date of 07/06/2020 to home with recommended home OT  Prognosis Good   Problem List Decreased strength;Decreased range of motion;Decreased endurance; Impaired balance;Decreased safety awareness   Barriers to Discharge Decreased caregiver support   Plan   Treatment/Interventions ADL retraining;Functional transfer training; Therapeutic exercise; Endurance training;Patient/family training; Compensatory technique education   Progress Progressing toward goals   Recommendation   OT Discharge Recommendation Return to previous environment with social support  (home OT)   OT Therapy Minutes   OT Time In 1000   OT Time Out 1130   OT Total Time (minutes) 90   OT Mode of treatment - Individual (minutes) 90   OT Mode of treatment - Concurrent (minutes) 0   OT Mode of treatment - Group (minutes) 0   OT Mode of treatment - Co-treat (minutes) 0   OT Mode of Treatment - Total time(minutes) 90 minutes   OT Cumulative Minutes 885   Therapy Time missed   Time missed?  No

## 2020-07-03 NOTE — ASSESSMENT & PLAN NOTE
Per hospital records, patient noted to have subcentimeter lymph node in the right axilla with fat stranding on imaging  Due to history of breast CA, radiology report clinical correlation was recommended  Patient was evaluated by Dr Nadira Concepcion in acute care who noted this to be an asymptomatic finding and recommended no further workup at this time other than routine follow up  with PCP & continued regular mammograms for her history of  breast CA  Follow up with PCP on discharge

## 2020-07-03 NOTE — PROGRESS NOTES
Progress Note - Jame Weir 1940, 78 y o  female MRN: [id number]    Unit/Bed#: -01 Encounter: [id number]    Primary Care Provider: Mervat Corley DO   Date and time admitted to hospital: 6/21/2020 11:39 AM        Leukocytosis  Assessment & Plan  Resolved   WBC today (7/2) - 8 60        Abnormal CAT scan  Assessment & Plan  CTA performed on 6/18 revealed "fat stranding noted in the right axillary/chest wall region, of uncertain significance, recommend clinical correlation"  Follow up with PCP on  discharge         Lymph node disorder  Assessment & Plan  Per hospital records, patient noted to have subcentimeter lymph node in the right axilla with fat stranding on imaging  Due to history of breast CA, radiology report clinical correlation was recommended  Patient was evaluated by Dr Jennifer Villegas in acute care who noted this to be an asymptomatic finding and recommended no further workup at this time other than routine follow up  with PCP & continued regular mammograms for her history of  breast CA  Follow up with PCP on discharge  IBS (irritable bowel syndrome)  Assessment & Plan  Continue Bentyl  LBM 7/1 -   Colace BID (patient refusing Colace) Daily prune juice  Continue to monitor  Mood disorder (HCC)  Assessment & Plan  Sertraline 25 mg daily   Mood stable  Neuropathic pain  Assessment & Plan  Patient with DM  Continues on neurontin 100 mg BID and 200 q h s   Per primary team        HTN (hypertension)  Assessment & Plan  Vitals:    07/03/20 0715   BP: 118/59   Pulse: 73   Resp: 18   Temp: 97 7 °F (36 5 °C)   SpO2: 96%       Continue amlodipine 5 mg daily,Lopressor 12 5 mg BID,  Lisinopril to 10 mg daily   Monitor and adjust as needed     DM (diabetes mellitus) (HCC)  Assessment & Plan  Lab Results   Component Value Date    HGBA1C 6 4 (H) 05/12/2020       Recent Labs     07/02/20  1136 07/02/20  1606 07/02/20  2034 07/03/20  0613   POCGLU 118 161* 127 130       Blood Sugar Average: Last 72 hrs:  (P) 204 [id number]     Continue Metformin 500 mg BID with SSI  Monitor kidney function, remains stable  Follow up with PCP on discharge  Patient may benefit from increase in Metformin  Will defer to PCP  Hypokalemia  Assessment & Plan  Per most recent hospitalization, K+ as low as 2 2 (felt due to poor p o  Intake)  K+ 7/2 4 1  Patient with improved p o  Intake  Follow up with PCP on discharge  Chronic deep vein thrombosis (DVT) of left popliteal vein (HCC)  Assessment & Plan  Results from last 7 days   Lab Units 07/02/20  0604 07/01/20  0516 06/28/20  0522   INR  2 23* 2 17* 2 23*     Non-occlusive LLE DVT (popliteal vein) on 6/16 imaging, unchanged on repeat imaging 6/26 - was on Eliquis prior to surgery  Per neurosurgery, patient not cleared to resume Eliquis  Dr Alma Mclean does not want patient on Eliquis for at least 3 weeks post-op  INR goal of 2 0-2 5  Continue Coumadin 5 mg daily  INR on Saturday  Patient will need clearance by spine surgery to be transitioned back to Eliquis  Arrhythmia  Assessment & Plan  Follows with [facility]  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 (Dr Josiah Olivas)  History of a-flutter (patient does not remember this dx) - patient is on chronic AC due to DVT (previously Eliquis, now coumadin)  Patient will need clearance from neurosurgery prior to being transitioned back to Eliquis  Hiatal hernia with gastroesophageal reflux  Assessment & Plan  Found on CTA during most recent admission  General surgery saw patient in consultation during recent hospitalization  Continue Protonix 40 mg daily  Monitor for recurrence of esophageal spasm episodes - continues to deny symptoms  Follow up with general surgery on discharge          Hyperlipidemia  Assessment & Plan  Most recent lipid profile (5/12/2020) - total cholesterol 209, triglycerides 145, HDL 64,   Continue atorvastatin 40 mg daily     Hypothyroidism  Assessment & Plan  Most recent TSH () was 2 23  Continue levothyroxine 125mcg (home dose)    * Spinal stenosis of lumbar region  Assessment & Plan  PT/OT, pain management per primary team   S/p L4-S1 laminectomy on 6/15  Spine precautions/LSO brace  F/u with Dr Chris Ortiz upon discharge  VTE Pharmacologic Prophylaxis:   Pharmacologic: Warfarin (Coumadin)  Mechanical VTE Prophylaxis in Place: No    Current Length of Stay: 12 day(s)    Current Patient Status: Inpatient Rehab     Discharge Plan: As per treatment team     Code Status: Level 1 - Full Code    Subjective:   Nursing reports no overnight events  Patient denies any new concerns  Patient reports that he back/leg pain is currently  Well controlled  Patient LBM , denies N/V and abdominal pain  Denies chest pain and SOB  Objective:     Vitals:   Temp (24hrs), Av 7 °F (36 5 °C), Min:97 5 °F (36 4 °C), Max:97 8 °F (36 6 °C)    Temp:  [97 5 °F (36 4 °C)-97 8 °F (36 6 °C)] 97 7 °F (36 5 °C)  HR:  [73-76] 73  Resp:  [18] 18  BP: (118-133)/(58-64) 118/59  SpO2:  [95 %-96 %] 96 %  Body mass index is 32 28 kg/m²  Review of Systems   Constitutional: Negative for activity change, appetite change, chills, diaphoresis and fever  HENT: Negative for postnasal drip and sore throat  Eyes: Negative for pain, discharge, itching and visual disturbance  Respiratory: Negative for cough, chest tightness, shortness of breath and wheezing  Cardiovascular: Negative for chest pain, palpitations and leg swelling  Gastrointestinal: Negative for abdominal pain, constipation, diarrhea, nausea and vomiting  Genitourinary: Negative for difficulty urinating, dysuria and urgency  Musculoskeletal: Positive for gait problem  Negative for arthralgias, back pain and neck pain  Skin: Negative for rash and wound  Neurological: Negative for dizziness, weakness, numbness and headaches          Input and Output Summary (last 24 hours): Intake/Output Summary (Last 24 hours) at 7/3/2020 1117  Last data filed at 7/2/2020 2012  Gross per 24 hour   Intake 300 ml   Output 200 ml   Net 100 ml       Physical Exam:     Physical Exam   Constitutional: She is oriented to person, place, and time  She appears well-developed and well-nourished  No distress  HENT:   Head: Normocephalic and atraumatic  Right Ear: External ear normal    Left Ear: External ear normal    Nose: Nose normal    Mouth/Throat: Oropharynx is clear and moist  No oropharyngeal exudate  Eyes: Pupils are equal, round, and reactive to light  Conjunctivae and EOM are normal  Right eye exhibits no discharge  Left eye exhibits no discharge  Neck: Normal range of motion  Neck supple  No thyromegaly present  Cardiovascular: Normal rate, regular rhythm, normal heart sounds and intact distal pulses  Exam reveals no gallop and no friction rub  No murmur heard  Pulmonary/Chest: Effort normal and breath sounds normal  No stridor  No respiratory distress  She has no wheezes  She has no rales  Abdominal: Soft  Bowel sounds are normal  She exhibits no distension  There is no tenderness  Musculoskeletal:   Wearing back brace   Lymphadenopathy:     She has no cervical adenopathy  Neurological: She is alert and oriented to person, place, and time  Skin: Skin is warm and dry  No rash noted  She is not diaphoretic  No erythema  Psychiatric: She has a normal mood and affect   Her behavior is normal  Judgment and thought content normal        Additional Data:     Labs:    Results from last 7 days   Lab Units 07/02/20  0605   WBC Thousand/uL 8 60   HEMOGLOBIN g/dL 11 7*   HEMATOCRIT % 35 0*   PLATELETS Thousands/uL 521*   NEUTROS PCT % 60   LYMPHS PCT % 29   MONOS PCT % 9   EOS PCT % 2     Results from last 7 days   Lab Units 07/02/20  0557   SODIUM mmol/L 137   POTASSIUM mmol/L 4 1   CHLORIDE mmol/L 100   CO2 mmol/L 28   BUN mg/dL 15   CREATININE mg/dL 0 65   ANION GAP mmol/L 9 CALCIUM mg/dL 9 9   GLUCOSE RANDOM mg/dL 129*     Results from last 7 days   Lab Units 07/02/20  0604   INR  2 23*     Results from last 7 days   Lab Units 07/03/20  0613 07/02/20  2034 07/02/20  1606 07/02/20  1136 07/02/20  0631 07/01/20  2043 07/01/20  1611 07/01/20  1129 07/01/20  0618 06/30/20  2043 06/30/20  1615 06/30/20  1107   POC GLUCOSE mg/dl 130 127 161* 118 132 171* 124 164* 160* 209* 161* 159*               Labs reviewed    Imaging:    Imaging reviewed    Recent Cultures (last 7 days):     Results from last 7 days   Lab Units 06/26/20  1318   URINE CULTURE  10,000-19,000 cfu/ml        Last 24 Hours Medication List:     Current Facility-Administered Medications:  acetaminophen 650 mg Oral Q8H Louise Reza MD   amLODIPine 5 mg Oral Daily HUNTER Kuhn   atorvastatin 40 mg Oral Daily With Refugioholly Hernandez MD   dicyclomine 20 mg Oral Q6H Yani Mcgregor MD   docusate sodium 100 mg Oral BID HUNTER Kuhn   gabapentin 100 mg Oral BID Ralph Ewing MD   gabapentin 200 mg Oral HS Ralph Ewing MD   insulin lispro 1-5 Units Subcutaneous TID AC Yani Mcgregor MD   insulin lispro 1-5 Units Subcutaneous HS Yani Mcgregor MD   levothyroxine 125 mcg Oral Early Morning Yani Mcgregor MD   lidocaine 1 patch Topical Daily Sophia Hills MD   lisinopril 10 mg Oral Daily HUNTER Ca   menthol-methyl salicylate  Apply externally HS Ralph Ewing MD   menthol-methyl salicylate  Apply externally TID PRN Ralph Ewing MD   metFORMIN 500 mg Oral BID With Meals Yani Mcgregor MD   methocarbamol 750 mg Oral Q6H PRN Yani Mcgregor MD   metoprolol tartrate 12 5 mg Oral Q12H [facility] HUNTER Kuhn   oxyCODONE 2 5 mg Oral Q4H PRN Yani Mcgregor MD   oxyCODONE 5 mg Oral Q4H PRN Yani Mcgregor MD   pantoprazole 40 mg Oral Early Morning Yani Mcgregor MD   polyethylene glycol 17 g Oral Daily PRN Yani Mcgregor MD   senna-docusate sodium 2 tablet Oral HS PRN HUNTER Patel   sertraline 25 mg Oral Daily Alexis Chester MD   warfarin 5 mg Oral Daily (warfarin) HUNTER Jose        M*Modal software was used to dictate this note  It may contain errors with dictating incorrect words or incorrect spelling  Please contact the provider directly with any questions

## 2020-07-03 NOTE — ASSESSMENT & PLAN NOTE
Vitals:    07/03/20 0715   BP: 118/59   Pulse: 73   Resp: 18   Temp: 97 7 °F (36 5 °C)   SpO2: 96%       Continue amlodipine 5 mg daily,Lopressor 12 5 mg BID,  Lisinopril to 10 mg daily   Monitor and adjust as needed

## 2020-07-04 LAB
GLUCOSE SERPL-MCNC: 129 MG/DL (ref 65–140)
GLUCOSE SERPL-MCNC: 137 MG/DL (ref 65–140)
GLUCOSE SERPL-MCNC: 152 MG/DL (ref 65–140)
GLUCOSE SERPL-MCNC: 162 MG/DL (ref 65–140)
INR PPP: 2.85 (ref 0.84–1.19)
PROTHROMBIN TIME: 28.9 SECONDS (ref 11.6–14.5)

## 2020-07-04 PROCEDURE — 97116 GAIT TRAINING THERAPY: CPT

## 2020-07-04 PROCEDURE — 99232 SBSQ HOSP IP/OBS MODERATE 35: CPT | Performed by: INTERNAL MEDICINE

## 2020-07-04 PROCEDURE — 82948 REAGENT STRIP/BLOOD GLUCOSE: CPT

## 2020-07-04 PROCEDURE — 97110 THERAPEUTIC EXERCISES: CPT

## 2020-07-04 PROCEDURE — 97530 THERAPEUTIC ACTIVITIES: CPT

## 2020-07-04 PROCEDURE — 85610 PROTHROMBIN TIME: CPT | Performed by: NURSE PRACTITIONER

## 2020-07-04 PROCEDURE — 97112 NEUROMUSCULAR REEDUCATION: CPT

## 2020-07-04 RX ORDER — POLYETHYLENE GLYCOL 3350 17 G/17G
17 POWDER, FOR SOLUTION ORAL ONCE
Status: DISCONTINUED | OUTPATIENT
Start: 2020-07-04 | End: 2020-07-06 | Stop reason: HOSPADM

## 2020-07-04 RX ADMIN — SERTRALINE HYDROCHLORIDE 25 MG: 25 TABLET ORAL at 08:43

## 2020-07-04 RX ADMIN — METOPROLOL TARTRATE 12.5 MG: 25 TABLET ORAL at 21:42

## 2020-07-04 RX ADMIN — ACETAMINOPHEN 650 MG: 325 TABLET ORAL at 05:55

## 2020-07-04 RX ADMIN — OXYCODONE HYDROCHLORIDE 5 MG: 5 TABLET ORAL at 21:42

## 2020-07-04 RX ADMIN — DOCUSATE SODIUM 100 MG: 100 CAPSULE, LIQUID FILLED ORAL at 08:43

## 2020-07-04 RX ADMIN — LEVOTHYROXINE SODIUM 125 MCG: 125 TABLET ORAL at 05:55

## 2020-07-04 RX ADMIN — GABAPENTIN 200 MG: 100 CAPSULE ORAL at 21:41

## 2020-07-04 RX ADMIN — METFORMIN HYDROCHLORIDE 500 MG: 500 TABLET ORAL at 16:26

## 2020-07-04 RX ADMIN — ACETAMINOPHEN 650 MG: 325 TABLET ORAL at 15:05

## 2020-07-04 RX ADMIN — GABAPENTIN 100 MG: 100 CAPSULE ORAL at 08:43

## 2020-07-04 RX ADMIN — LISINOPRIL 10 MG: 10 TABLET ORAL at 08:44

## 2020-07-04 RX ADMIN — DICYCLOMINE HYDROCHLORIDE 20 MG: 20 TABLET ORAL at 11:55

## 2020-07-04 RX ADMIN — DICYCLOMINE HYDROCHLORIDE 20 MG: 20 TABLET ORAL at 17:10

## 2020-07-04 RX ADMIN — GABAPENTIN 100 MG: 100 CAPSULE ORAL at 15:05

## 2020-07-04 RX ADMIN — INSULIN LISPRO 1 UNITS: 100 INJECTION, SOLUTION INTRAVENOUS; SUBCUTANEOUS at 11:56

## 2020-07-04 RX ADMIN — METHOCARBAMOL 750 MG: 750 TABLET, FILM COATED ORAL at 06:12

## 2020-07-04 RX ADMIN — LIDOCAINE 1 PATCH: 50 PATCH CUTANEOUS at 08:44

## 2020-07-04 RX ADMIN — DICYCLOMINE HYDROCHLORIDE 20 MG: 20 TABLET ORAL at 05:55

## 2020-07-04 RX ADMIN — ACETAMINOPHEN 650 MG: 325 TABLET ORAL at 21:41

## 2020-07-04 RX ADMIN — METOPROLOL TARTRATE 12.5 MG: 25 TABLET ORAL at 08:43

## 2020-07-04 RX ADMIN — PANTOPRAZOLE SODIUM 40 MG: 40 TABLET, DELAYED RELEASE ORAL at 05:55

## 2020-07-04 RX ADMIN — METHOCARBAMOL 750 MG: 750 TABLET, FILM COATED ORAL at 17:10

## 2020-07-04 RX ADMIN — ATORVASTATIN CALCIUM 40 MG: 40 TABLET, FILM COATED ORAL at 16:26

## 2020-07-04 RX ADMIN — WARFARIN SODIUM 5 MG: 5 TABLET ORAL at 17:10

## 2020-07-04 RX ADMIN — METFORMIN HYDROCHLORIDE 500 MG: 500 TABLET ORAL at 08:43

## 2020-07-04 RX ADMIN — OXYCODONE HYDROCHLORIDE 5 MG: 5 TABLET ORAL at 02:12

## 2020-07-04 RX ADMIN — INSULIN LISPRO 1 UNITS: 100 INJECTION, SOLUTION INTRAVENOUS; SUBCUTANEOUS at 16:26

## 2020-07-04 RX ADMIN — AMLODIPINE BESYLATE 5 MG: 5 TABLET ORAL at 08:44

## 2020-07-04 RX ADMIN — MENTHOL, METHYL SALICYLATE 1 APPLICATION: 10; 15 CREAM TOPICAL at 21:43

## 2020-07-04 NOTE — PROGRESS NOTES
Progress Note - Andrew Parsons 1940, 78 y o  female MRN: [id number]    Unit/Bed#: -01 Encounter: [id number]    Primary Care Provider: Joseline Panda DO   Date and time admitted to hospital: 6/21/2020 11:39 AM        Leukocytosis  Assessment & Plan  Resolved   WBC today (7/2) - 8 60        Abnormal CAT scan  Assessment & Plan  CTA performed on 6/18 revealed "fat stranding noted in the right axillary/chest wall region, of uncertain significance, recommend clinical correlation"  Follow up with PCP on  discharge         Lymph node disorder  Assessment & Plan  Per hospital records, patient noted to have subcentimeter lymph node in the right axilla with fat stranding on imaging  Due to history of breast CA, radiology report clinical correlation was recommended  Patient was evaluated by Dr Jimmy Pablo in acute care who noted this to be an asymptomatic finding and recommended no further workup at this time other than routine follow up  with PCP & continued regular mammograms for her history of  breast CA  Follow up with PCP on discharge  IBS (irritable bowel syndrome)  Assessment & Plan  Continue Bentyl  LBM 7/1 -   Colace BID (patient refusing Colace) Daily prune juice  Continue to monitor  Mood disorder (HCC)  Assessment & Plan  Sertraline 25 mg daily   Mood stable  Neuropathic pain  Assessment & Plan  Patient with DM  Continues on neurontin 100 mg BID and 200 q h s   Per primary team        HTN (hypertension)  Assessment & Plan  Vitals:    07/04/20 0841   BP: 141/61   Pulse: 78   Resp: 18   Temp: 97 9 °F (36 6 °C)   SpO2: 96%       Continue amlodipine 5 mg daily,Lopressor 12 5 mg BID,  Lisinopril to 10 mg daily   Monitor and adjust as needed     DM (diabetes mellitus) (HCC)  Assessment & Plan  Lab Results   Component Value Date    HGBA1C 6 4 (H) 05/12/2020       Recent Labs     07/03/20  1132 07/03/20  1634 07/03/20  2047 07/04/20  0640   POCGLU 140 138 141* 129       Blood Sugar Average: Last 72 hrs:  (P) 833 [id number]     Continue Metformin 500 mg BID with SSI  Monitor kidney function, remains stable  Follow up with PCP on discharge  Patient may benefit from increase in Metformin  Will defer to PCP  Hypokalemia  Assessment & Plan  Per most recent hospitalization, K+ as low as 2 2 (felt due to poor p o  Intake)  K+ 7/2 4 1  Patient with improved p o  Intake  Follow up with PCP on discharge  Chronic deep vein thrombosis (DVT) of left popliteal vein (HCC)  Assessment & Plan  Results from last 7 days   Lab Units 07/04/20  0554 07/02/20  0604 07/01/20  0516   INR  2 85* 2 23* 2 17*     Non-occlusive LLE DVT (popliteal vein) on 6/16 imaging, unchanged on repeat imaging 6/26 - was on Eliquis prior to surgery  Per neurosurgery, patient not cleared to resume Eliquis  Dr Salazar Rangel does not want patient on Eliquis for at least 3 weeks post-op  INR goal of 2 0-2 5  Continue Coumadin 5 mg daily  INR on Monday  Patient will need clearance by spine surgery to be transitioned back to Eliquis  Arrhythmia  Assessment & Plan  Follows with [facility]  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 (Dr Myra Poon)  History of a-flutter (patient does not remember this dx) - patient is on chronic AC due to DVT (previously Eliquis, now coumadin)  Patient will need clearance from neurosurgery prior to being transitioned back to Eliquis  Hiatal hernia with gastroesophageal reflux  Assessment & Plan  Found on CTA during most recent admission  General surgery saw patient in consultation during recent hospitalization  Continue Protonix 40 mg daily  Monitor for recurrence of esophageal spasm episodes - continues to deny symptoms  Follow up with general surgery on discharge          Hyperlipidemia  Assessment & Plan  Most recent lipid profile (5/12/2020) - total cholesterol 209, triglycerides 145, HDL 64,   Continue atorvastatin 40 mg daily     Hypothyroidism  Assessment & Plan  Most recent TSH () was 2 23  Continue levothyroxine 125mcg (home dose)    * Spinal stenosis of lumbar region  Assessment & Plan  PT/OT, pain management per primary team   S/p L4-S1 laminectomy on 6/15  Spine precautions/LSO brace  F/u with Dr Melissa Lam upon discharge  VTE Pharmacologic Prophylaxis:   Pharmacologic: Warfarin (Coumadin)  Mechanical VTE Prophylaxis in Place: No    Current Length of Stay: 13 day(s)    Current Patient Status: Inpatient Rehab     Discharge Plan: As per treatment team     Code Status: Level 1 - Full Code    Subjective:   Nursing reports no overnight events  Patient denies any new concerns  Patient is anxious to get home  Patient reports that he back/leg pain is currently well controlled, she reports that she slept well last evening  Patient LBM 7/ (will advise patient and nursing that patient should have Miralax today), denies N/V and abdominal pain  Denies chest pain and SOB    Objective:     Vitals:   Temp (24hrs), Av 5 °F (36 4 °C), Min:97 2 °F (36 2 °C), Max:97 9 °F (36 6 °C)    Temp:  [97 2 °F (36 2 °C)-97 9 °F (36 6 °C)] 97 9 °F (36 6 °C)  HR:  [73-85] 78  Resp:  [18] 18  BP: (135-157)/(58-65) 141/61  SpO2:  [96 %] 96 %  Body mass index is 32 84 kg/m²  Review of Systems   Constitutional: Negative for activity change, appetite change, chills, diaphoresis and fever  Eyes: Negative for pain, discharge, itching and visual disturbance  Respiratory: Negative for cough, chest tightness, shortness of breath and wheezing  Cardiovascular: Negative for chest pain, palpitations and leg swelling  Gastrointestinal: Negative for abdominal pain, constipation, diarrhea, nausea and vomiting  Endocrine: Negative for polydipsia, polyphagia and polyuria  Genitourinary: Negative for difficulty urinating, dysuria and urgency  Musculoskeletal: Positive for gait problem  Negative for arthralgias, back pain and neck pain  Skin: Negative for rash and wound  Neurological: Negative for dizziness, weakness, numbness and headaches  Input and Output Summary (last 24 hours): Intake/Output Summary (Last 24 hours) at 7/4/2020 1029  Last data filed at 7/3/2020 1801  Gross per 24 hour   Intake 380 ml   Output    Net 380 ml       Physical Exam:     Physical Exam   Constitutional: She is oriented to person, place, and time  She appears well-developed and well-nourished  No distress  HENT:   Head: Normocephalic and atraumatic  Right Ear: External ear normal    Left Ear: External ear normal    Mouth/Throat: No oropharyngeal exudate  Eyes: Pupils are equal, round, and reactive to light  Conjunctivae and EOM are normal  Right eye exhibits no discharge  Left eye exhibits no discharge  Neck: Normal range of motion  Neck supple  No thyromegaly present  Cardiovascular: Normal rate, regular rhythm, normal heart sounds and intact distal pulses  Exam reveals no gallop and no friction rub  No murmur heard  Pulmonary/Chest: Effort normal and breath sounds normal  No stridor  No respiratory distress  She has no wheezes  She has no rales  Abdominal: Soft  Bowel sounds are normal  She exhibits no distension  There is no tenderness  Lymphadenopathy:     She has no cervical adenopathy  Neurological: She is alert and oriented to person, place, and time  Wearing back brace   Skin: Skin is warm and dry  No rash noted  She is not diaphoretic  No erythema  Psychiatric: She has a normal mood and affect   Her behavior is normal  Judgment and thought content normal        Additional Data:     Labs:    Results from last 7 days   Lab Units 07/02/20  0605   WBC Thousand/uL 8 60   HEMOGLOBIN g/dL 11 7*   HEMATOCRIT % 35 0*   PLATELETS Thousands/uL 521*   NEUTROS PCT % 60   LYMPHS PCT % 29   MONOS PCT % 9   EOS PCT % 2     Results from last 7 days   Lab Units 07/02/20  0557   SODIUM mmol/L 137   POTASSIUM mmol/L 4 1   CHLORIDE mmol/L 100 CO2 mmol/L 28   BUN mg/dL 15   CREATININE mg/dL 0 65   ANION GAP mmol/L 9   CALCIUM mg/dL 9 9   GLUCOSE RANDOM mg/dL 129*     Results from last 7 days   Lab Units 07/04/20  0554   INR  2 85*     Results from last 7 days   Lab Units 07/04/20  0640 07/03/20  2047 07/03/20  1634 07/03/20  1132 07/03/20  9477 07/02/20  2034 07/02/20  1606 07/02/20  1136 07/02/20  0631 07/01/20  2043 07/01/20  1611 07/01/20  1129   POC GLUCOSE mg/dl 129 141* 138 140 130 127 161* 118 132 171* 124 164*               Labs reviewed    Imaging:    Imaging reviewed    Recent Cultures (last 7 days):           Last 24 Hours Medication List:     Current Facility-Administered Medications:  acetaminophen 650 mg Oral Q8H Robinson Enriquez MD   amLODIPine 5 mg Oral Daily HUNTER Kuhn   atorvastatin 40 mg Oral Daily With MD Krystin   dicyclomine 20 mg Oral Q6H Bettye Bellamy MD   docusate sodium 100 mg Oral BID HUNTER Kuhn   gabapentin 100 mg Oral BID Stacey Pettit MD   gabapentin 200 mg Oral HS Stacey Pettit MD   insulin lispro 1-5 Units Subcutaneous TID AC Bettye Bellamy MD   insulin lispro 1-5 Units Subcutaneous HS Bettye Bellamy MD   levothyroxine 125 mcg Oral Early Morning Bettye Bellamy MD   lidocaine 1 patch Topical Daily Elizabeth Davison MD   lisinopril 10 mg Oral Daily HUNTER Hein   menthol-methyl salicylate  Apply externally HS Stacey Pettit MD   menthol-methyl salicylate  Apply externally TID PRN Stacey Pettit MD   metFORMIN 500 mg Oral BID With Meals Bettye Bellamy MD   methocarbamol 750 mg Oral Q6H PRN Bettye Bellamy MD   metoprolol tartrate 12 5 mg Oral Q12H [address] HUNTER Kuhn   oxyCODONE 2 5 mg Oral Q4H PRN Bettye Bellamy MD   oxyCODONE 5 mg Oral Q4H PRN Bettye Bellamy MD   pantoprazole 40 mg Oral Early Morning Bettye Bellamy MD   polyethylene glycol 17 g Oral Daily PRN Bettye Bellamy MD   polyethylene glycol 17 g Oral [employer] HUNTER Kong   senna-docusate sodium 2 tablet Oral HS PRN HUNTER Patel   sertraline 25 mg Oral Daily Yani Mcgregor MD   warfarin 5 mg Oral Daily (warfarin) HUNTER Rosado        M*Modal software was used to dictate this note  It may contain errors with dictating incorrect words or incorrect spelling  Please contact the provider directly with any questions

## 2020-07-04 NOTE — ASSESSMENT & PLAN NOTE
Lab Results   Component Value Date    HGBA1C 6 4 (H) 05/12/2020       Recent Labs     07/03/20  1132 07/03/20  1634 07/03/20  2047 07/04/20  0640   POCGLU 140 138 141* 129       Blood Sugar Average: Last 72 hrs:  (P) 723 [id number]     Continue Metformin 500 mg BID with SSI  Monitor kidney function, remains stable  Follow up with PCP on discharge  Patient may benefit from increase in Metformin  Will defer to PCP

## 2020-07-04 NOTE — ASSESSMENT & PLAN NOTE
PT/OT, pain management per primary team   S/p L4-S1 laminectomy on 6/15  Spine precautions/LSO brace  F/u with Dr Thierno Berry upon discharge

## 2020-07-04 NOTE — ASSESSMENT & PLAN NOTE
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

## 2020-07-04 NOTE — ASSESSMENT & PLAN NOTE
Vitals:    07/04/20 0841   BP: 141/61   Pulse: 78   Resp: 18   Temp: 97 9 °F (36 6 °C)   SpO2: 96%       Continue amlodipine 5 mg daily,Lopressor 12 5 mg BID,  Lisinopril to 10 mg daily   Monitor and adjust as needed

## 2020-07-04 NOTE — PROGRESS NOTES
07/04/20 1030   Pain Assessment   Pain Assessment Tool 0-10   Pain Score 4   Pain Location/Orientation Orientation: Left; Location: Leg   Pain Onset/Description Onset: Ongoing   Restrictions/Precautions   Precautions Fall Risk;Spinal precautions;Pain   Weight Bearing Restrictions No   ROM Restrictions Yes   Braces or Orthoses LSO   Cognition   Overall Cognitive Status WFL   Arousal/Participation Responsive; Cooperative   Attention Attends with cues to redirect   Orientation Level Oriented X4   Memory Decreased short term memory   Following Commands Follows one step commands with increased time or repetition   Subjective   Subjective Patient agreeable for PT today  Sit to Stand   Type of Assistance Needed Supervision   Amount of Physical Assistance Provided No physical assistance   Comment with RW   Sit to Stand CARE Score 4   Transfer Bed/Chair/Wheelchair   Limitations Noted In Balance;Confidence   Adaptive Equipment Roller Walker   Stand Pivot Supervision   Sit to Stand Supervision   Stand to Sit Supervision   Supine to Sit Supervision   Sit to Supine Supervision   Car Transfer Supervision   Walk 10 Feet   Type of Assistance Needed Supervision   Amount of Physical Assistance Provided No physical assistance   Comment supervision with RW   Walk 10 Feet CARE Score 4   Walk 50 Feet with Two Turns   Type of Assistance Needed Supervision   Amount of Physical Assistance Provided No physical assistance   Comment supervision with RW   Walk 50 Feet with Two Turns CARE Score 4   Walk 150 Feet   Type of Assistance Needed Supervision   Amount of Physical Assistance Provided No physical assistance   Comment supervision with RW   Walk 150 Feet CARE Score 4   Ambulation   Does the patient walk? 2  Yes   Primary Mode of Locomotion Prior to Admission Walk   Distance Walked (feet) 150 ft   Assist Device Roller Walker   Gait Pattern Inconsistant Cayla; Slow Cayla; Wide BECCA; Improper weight shift   Limitations Noted In Balance Provided Assistance with: Balance   Walk Assist Level Close Supervision   Findings S with RW   Wheelchair mobility   Does the patient use a wheelchair? 0  No   Therapeutic Interventions   Strengthening Sit to stands- 20 reps; Hip Adduction- 20 reps, 3 second holds; hip abduction yellow theraband 20 reps 3 second holds; Seated heel and toe raises- 20 reps, 3 second holds; Standing heel and toe raises- 10 reps, 3 second holds   Flexibility Seated manual stretching of gastroc and hamstrings- 10 minutes bilaterally    Balance seated at edge of mat for core strength- 5 minutes; seated ball toss gentle- 15 minutes   Neuromuscular Re-Education Feet apart eyes open 10 sets of 10 seconds   Other Ambulation and transfers per objective   Equipment Use   NuStep 10 minutes bilateral UE and LE to improve cardiovascular endurance and LE strength   Assessment   Treatment Assessment Patient tolerated session well, session focused on strengthening and balance today  Patient continues to present with LE weakness and quad weakness with functional mobility  Patient endurance challenged in LEs, requiring rest breaks, as well as haptic touch for balance interventions  Patient fatigued post session  Patient requires skilled PT in order to improve her functional strength and mobility post surgery  Family/Caregiver Present no   PT Family training done with: no   Problem List Decreased strength;Decreased range of motion;Decreased endurance; Impaired balance;Decreased safety awareness   Barriers to Discharge Decreased caregiver support   PT Barriers   Physical Impairment Decreased strength;Decreased endurance; Impaired balance;Decreased safety awareness;Orthopedic restrictions;Pain   Functional Limitation Car transfers; Ramp negotiation;Standing;Transfers; Walking   Plan   Treatment/Interventions Functional transfer training;LE strengthening/ROM; Therapeutic exercise; Endurance training;Gait training   Progress Progressing toward goals   PT Therapy Minutes   PT Time In 1030   PT Time Out 1130   PT Total Time (minutes) 60   PT Mode of treatment - Individual (minutes) 60   PT Mode of treatment - Concurrent (minutes) 0   PT Mode of treatment - Group (minutes) 0   PT Mode of treatment - Co-treat (minutes) 0   PT Mode of Treatment - Total time(minutes) 60 minutes   PT Cumulative Minutes 1020   Therapy Time missed   Time missed?  No

## 2020-07-04 NOTE — PLAN OF CARE
Problem: Prexisting or High Potential for Compromised Skin Integrity  Goal: Skin integrity is maintained or improved  Description  INTERVENTIONS:  - Identify patients at risk for skin breakdown  - Assess and monitor skin integrity  - Assess and monitor nutrition and hydration status  - Monitor labs   - Assess for incontinence   - Turn and reposition patient  - Assist with mobility/ambulation  - Relieve pressure over bony prominences  - Avoid friction and shearing  - Provide appropriate hygiene as needed including keeping skin clean and dry  - Evaluate need for skin moisturizer/barrier cream  - Collaborate with interdisciplinary team   - Patient/family teaching  - Consider wound care consult   Outcome: Progressing     Problem: Potential for Falls  Goal: Patient will remain free of falls  Description  INTERVENTIONS:  - Assess patient frequently for physical needs  -  Identify cognitive and physical deficits and behaviors that affect risk of falls    -  [place] fall precautions as indicated by assessment   - Educate patient/family on patient safety including physical limitations  - Instruct patient to call for assistance with activity based on assessment  - Modify environment to reduce risk of injury  - Consider OT/PT consult to assist with strengthening/mobility  Outcome: Progressing     Problem: PAIN - ADULT  Goal: Verbalizes/displays adequate comfort level or baseline comfort level  Description  Interventions:  - Encourage patient to monitor pain and request assistance  - Assess pain using appropriate pain scale  - Administer analgesics based on type and severity of pain and evaluate response  - Implement non-pharmacological measures as appropriate and evaluate response  - Consider cultural and social influences on pain and pain management  - Notify physician/advanced practitioner if interventions unsuccessful or patient reports new pain  Outcome: Progressing     Problem: INFECTION - ADULT  Goal: Absence or prevention of progression during hospitalization  Description  INTERVENTIONS:  - Assess and monitor for signs and symptoms of infection  - Monitor lab/diagnostic results  - Monitor all insertion sites, i e  indwelling lines, tubes, and drains  - Monitor endotracheal if appropriate and nasal secretions for changes in amount and color  - [place] appropriate cooling/warming therapies per order  - Administer medications as ordered  - Instruct and encourage patient and family to use good hand hygiene technique  - Identify and instruct in appropriate isolation precautions for identified infection/condition  Outcome: Progressing     Problem: SAFETY ADULT  Goal: Maintain or return to baseline ADL function  Description  INTERVENTIONS:  -  Assess patient's ability to carry out ADLs; assess patient's baseline for ADL function and identify physical deficits which impact ability to perform ADLs (bathing, care of mouth/teeth, toileting, grooming, dressing, etc )  - Assess/evaluate cause of self-care deficits   - Assess range of motion  - Assess patient's mobility; develop plan if impaired  - Assess patient's need for assistive devices and provide as appropriate  - Encourage maximum independence but intervene and supervise when necessary  - Involve family in performance of ADLs  - Assess for home care needs following discharge   - Consider OT consult to assist with ADL evaluation and planning for discharge  - Provide patient education as appropriate  Outcome: Progressing  Goal: Maintain or return mobility status to optimal level  Description  INTERVENTIONS:  - Assess patient's baseline mobility status (ambulation, transfers, stairs, etc )    - Identify cognitive and physical deficits and behaviors that affect mobility  - Identify mobility aids required to assist with transfers and/or ambulation (gait belt, sit-to-stand, lift, walker, cane, etc )  - [place] fall precautions as indicated by assessment  - Record patient progress and toleration of activity level on Mobility SBAR; progress patient to next Phase/Stage  - Instruct patient to call for assistance with activity based on assessment  - Consider rehabilitation consult to assist with strengthening/weightbearing, etc   Outcome: Progressing     Problem: DISCHARGE PLANNING  Goal: Discharge to home or other facility with appropriate resources  Description  INTERVENTIONS:  - Identify barriers to discharge w/patient and caregiver  - Arrange for needed discharge resources and transportation as appropriate  - Identify discharge learning needs (meds, wound care, etc )  - Arrange for interpretive services to assist at discharge as needed  - Refer to Case Management Department for coordinating discharge planning if the patient needs post-hospital services based on physician/advanced practitioner order or complex needs related to functional status, cognitive ability, or social support system  Outcome: Progressing     Problem: SKIN/TISSUE INTEGRITY - ADULT  Goal: Skin integrity remains intact  Description  INTERVENTIONS  - Identify patients at risk for skin breakdown  - Assess and monitor skin integrity  - Assess and monitor nutrition and hydration status  - Monitor labs (i e  albumin)  - Assess for incontinence   - Turn and reposition patient  - Assist with mobility/ambulation  - Relieve pressure over bony prominences  - Avoid friction and shearing  - Provide appropriate hygiene as needed including keeping skin clean and dry  - Evaluate need for skin moisturizer/barrier cream  - Collaborate with interdisciplinary team (i e  Nutrition, Rehabilitation, etc )   - Patient/family teaching  Outcome: Progressing  Goal: Incision(s), wounds(s) or drain site(s) healing without S/S of infection  Description  INTERVENTIONS  - Assess and document risk factors for skin impairment   - Assess and document dressing, incision, wound bed, drain sites and surrounding tissue  - Consider nutrition services referral as needed  - Oral mucous membranes remain intact  - Provide patient/ family education  Outcome: Progressing  Goal: Oral mucous membranes remain intact  Description  INTERVENTIONS  - Assess oral mucosa and hygiene practices  - Implement preventative oral hygiene regimen  - Implement oral medicated treatments as ordered  - Initiate Nutrition services referral as needed  Outcome: Progressing     Problem: MUSCULOSKELETAL - ADULT  Goal: Maintain or return mobility to safest level of function  Description  INTERVENTIONS:  - Assess patient's ability to carry out ADLs; assess patient's baseline for ADL function and identify physical deficits which impact ability to perform ADLs (bathing, care of mouth/teeth, toileting, grooming, dressing, etc )  - Assess/evaluate cause of self-care deficits   - Assess range of motion  - Assess patient's mobility  - Assess patient's need for assistive devices and provide as appropriate  - Encourage maximum independence but intervene and supervise when necessary  - Involve family in performance of ADLs  - Assess for home care needs following discharge   - Consider OT consult to assist with ADL evaluation and planning for discharge  - Provide patient education as appropriate  Outcome: Progressing  Goal: Maintain proper alignment of affected body part  Description  INTERVENTIONS:  - Support, maintain and protect limb and body alignment  - Provide patient/ family with appropriate education  Outcome: Progressing

## 2020-07-04 NOTE — PLAN OF CARE
Problem: Potential for Falls  Goal: Patient will remain free of falls  Description  INTERVENTIONS:  - Assess patient frequently for physical needs  -  Identify cognitive and physical deficits and behaviors that affect risk of falls    -  [place] fall precautions as indicated by assessment   - Educate patient/family on patient safety including physical limitations  - Instruct patient to call for assistance with activity based on assessment  - Modify environment to reduce risk of injury  - Consider OT/PT consult to assist with strengthening/mobility  Outcome: Progressing     Problem: PAIN - ADULT  Goal: Verbalizes/displays adequate comfort level or baseline comfort level  Description  Interventions:  - Encourage patient to monitor pain and request assistance  - Assess pain using appropriate pain scale  - Administer analgesics based on type and severity of pain and evaluate response  - Implement non-pharmacological measures as appropriate and evaluate response  - Consider cultural and social influences on pain and pain management  - Notify physician/advanced practitioner if interventions unsuccessful or patient reports new pain  Outcome: Progressing     Problem: SAFETY ADULT  Goal: Maintain or return to baseline ADL function  Description  INTERVENTIONS:  -  Assess patient's ability to carry out ADLs; assess patient's baseline for ADL function and identify physical deficits which impact ability to perform ADLs (bathing, care of mouth/teeth, toileting, grooming, dressing, etc )  - Assess/evaluate cause of self-care deficits   - Assess range of motion  - Assess patient's mobility; develop plan if impaired  - Assess patient's need for assistive devices and provide as appropriate  - Encourage maximum independence but intervene and supervise when necessary  - Involve family in performance of ADLs  - Assess for home care needs following discharge   - Consider OT consult to assist with ADL evaluation and planning for discharge  - Provide patient education as appropriate  Outcome: Progressing

## 2020-07-05 LAB
GLUCOSE SERPL-MCNC: 104 MG/DL (ref 65–140)
GLUCOSE SERPL-MCNC: 135 MG/DL (ref 65–140)
GLUCOSE SERPL-MCNC: 150 MG/DL (ref 65–140)
GLUCOSE SERPL-MCNC: 166 MG/DL (ref 65–140)

## 2020-07-05 PROCEDURE — 97110 THERAPEUTIC EXERCISES: CPT

## 2020-07-05 PROCEDURE — 82948 REAGENT STRIP/BLOOD GLUCOSE: CPT

## 2020-07-05 PROCEDURE — 97116 GAIT TRAINING THERAPY: CPT

## 2020-07-05 PROCEDURE — 99232 SBSQ HOSP IP/OBS MODERATE 35: CPT | Performed by: NURSE PRACTITIONER

## 2020-07-05 PROCEDURE — 97535 SELF CARE MNGMENT TRAINING: CPT

## 2020-07-05 PROCEDURE — 97530 THERAPEUTIC ACTIVITIES: CPT

## 2020-07-05 RX ADMIN — AMLODIPINE BESYLATE 5 MG: 5 TABLET ORAL at 09:26

## 2020-07-05 RX ADMIN — ACETAMINOPHEN 650 MG: 325 TABLET ORAL at 21:59

## 2020-07-05 RX ADMIN — ATORVASTATIN CALCIUM 40 MG: 40 TABLET, FILM COATED ORAL at 17:09

## 2020-07-05 RX ADMIN — PANTOPRAZOLE SODIUM 40 MG: 40 TABLET, DELAYED RELEASE ORAL at 05:38

## 2020-07-05 RX ADMIN — DICYCLOMINE HYDROCHLORIDE 20 MG: 20 TABLET ORAL at 05:37

## 2020-07-05 RX ADMIN — LISINOPRIL 10 MG: 10 TABLET ORAL at 09:26

## 2020-07-05 RX ADMIN — METHOCARBAMOL 750 MG: 750 TABLET, FILM COATED ORAL at 23:19

## 2020-07-05 RX ADMIN — OXYCODONE HYDROCHLORIDE 5 MG: 5 TABLET ORAL at 11:52

## 2020-07-05 RX ADMIN — LEVOTHYROXINE SODIUM 125 MCG: 125 TABLET ORAL at 05:37

## 2020-07-05 RX ADMIN — GABAPENTIN 100 MG: 100 CAPSULE ORAL at 15:27

## 2020-07-05 RX ADMIN — INSULIN LISPRO 1 UNITS: 100 INJECTION, SOLUTION INTRAVENOUS; SUBCUTANEOUS at 22:00

## 2020-07-05 RX ADMIN — ACETAMINOPHEN 650 MG: 325 TABLET ORAL at 05:38

## 2020-07-05 RX ADMIN — METOPROLOL TARTRATE 12.5 MG: 25 TABLET ORAL at 21:59

## 2020-07-05 RX ADMIN — WARFARIN SODIUM 5 MG: 5 TABLET ORAL at 17:09

## 2020-07-05 RX ADMIN — OXYCODONE HYDROCHLORIDE 5 MG: 5 TABLET ORAL at 05:38

## 2020-07-05 RX ADMIN — LIDOCAINE 1 PATCH: 50 PATCH CUTANEOUS at 09:27

## 2020-07-05 RX ADMIN — GABAPENTIN 100 MG: 100 CAPSULE ORAL at 09:26

## 2020-07-05 RX ADMIN — MENTHOL, METHYL SALICYLATE 1 APPLICATION: 10; 15 CREAM TOPICAL at 22:00

## 2020-07-05 RX ADMIN — OXYCODONE HYDROCHLORIDE 5 MG: 5 TABLET ORAL at 20:04

## 2020-07-05 RX ADMIN — ACETAMINOPHEN 650 MG: 325 TABLET ORAL at 15:27

## 2020-07-05 RX ADMIN — METOPROLOL TARTRATE 12.5 MG: 25 TABLET ORAL at 09:26

## 2020-07-05 RX ADMIN — GABAPENTIN 200 MG: 100 CAPSULE ORAL at 21:59

## 2020-07-05 RX ADMIN — DICYCLOMINE HYDROCHLORIDE 20 MG: 20 TABLET ORAL at 00:06

## 2020-07-05 RX ADMIN — METHOCARBAMOL 750 MG: 750 TABLET, FILM COATED ORAL at 03:50

## 2020-07-05 RX ADMIN — METFORMIN HYDROCHLORIDE 500 MG: 500 TABLET ORAL at 17:09

## 2020-07-05 RX ADMIN — DICYCLOMINE HYDROCHLORIDE 20 MG: 20 TABLET ORAL at 11:51

## 2020-07-05 RX ADMIN — METFORMIN HYDROCHLORIDE 500 MG: 500 TABLET ORAL at 09:26

## 2020-07-05 RX ADMIN — SERTRALINE HYDROCHLORIDE 25 MG: 25 TABLET ORAL at 09:26

## 2020-07-05 RX ADMIN — DICYCLOMINE HYDROCHLORIDE 20 MG: 20 TABLET ORAL at 23:17

## 2020-07-05 RX ADMIN — INSULIN LISPRO 1 UNITS: 100 INJECTION, SOLUTION INTRAVENOUS; SUBCUTANEOUS at 11:51

## 2020-07-05 RX ADMIN — DICYCLOMINE HYDROCHLORIDE 20 MG: 20 TABLET ORAL at 17:09

## 2020-07-05 NOTE — PROGRESS NOTES
Progress Note - Earlyne Patten 1940, 78 y o  female MRN: [id number]    Unit/Bed#: -01 Encounter: [id number]    Primary Care Provider: Rupert Branch DO   Date and time admitted to hospital: 6/21/2020 11:39 AM        Abnormal CAT scan  Assessment & Plan  CTA performed on 6/18 revealed "fat stranding noted in the right axillary/chest wall region, of uncertain significance, recommend clinical correlation"  Follow up with PCP on  discharge         Lymph node disorder  Assessment & Plan  Per hospital records, patient noted to have subcentimeter lymph node in the right axilla with fat stranding on imaging  Due to history of breast CA, radiology report clinical correlation was recommended  Patient was evaluated by Dr Serge Atkins in acute care who noted this to be an asymptomatic finding and recommended no further workup at this time other than routine follow up  with PCP & continued regular mammograms for her history of  breast CA  Follow up with PCP on discharge  Mood disorder (HCC)  Assessment & Plan  Sertraline 25 mg daily   Mood stable  Neuropathic pain  Assessment & Plan  Patient with DM  Continues on neurontin 100 mg BID and 200 q h s  Per primary team        HTN (hypertension)  Assessment & Plan  Vitals:    07/05/20 0926   BP: 126/63   Pulse:    Resp:    Temp:    SpO2:        Continue amlodipine 5 mg daily,Lopressor 12 5 mg BID,  Lisinopril to 10 mg daily   Monitor and adjust as needed     DM (diabetes mellitus) ([facility] [address] )  Assessment & Plan  Lab Results   Component Value Date    HGBA1C 6 4 (H) 05/12/2020       Recent Labs     07/04/20  1604 07/04/20  2028 07/05/20  0628 07/05/20  1109   POCGLU 152* 137 135 166*       Blood Sugar Average: Last 72 hrs:  (P) 140 [id number]     Continue Metformin 500 mg BID with SSI  Monitor kidney function, remains stable  Follow up with PCP on discharge  Patient may benefit from increase in Metformin  Will defer to PCP             Hypokalemia  Assessment & Plan  Per most recent hospitalization, K+ as low as 2 2 (felt due to poor p o  Intake)  K+ 7/2 4 1  Patient with improved p o  Intake  Follow up with PCP on discharge  Chronic deep vein thrombosis (DVT) of left popliteal vein (HCC)  Assessment & Plan  Results from last 7 days   Lab Units 07/04/20  0554 07/02/20  0604 07/01/20  0516   INR  2 85* 2 23* 2 17*     Non-occlusive LLE DVT (popliteal vein) on 6/16 imaging, unchanged on repeat imaging 6/26 - was on Eliquis prior to surgery  Per neurosurgery, patient not cleared to resume Eliquis  Dr Gela Hernandez does not want patient on Eliquis for at least 3 weeks post-op  INR goal of 2 0-2 5  Continue Coumadin 5 mg daily  INR on Monday  Patient will need clearance by spine surgery to be transitioned back to Eliquis  Arrhythmia  Assessment & Plan  Follows with [facility]  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 (Dr Alexia Kathleen)  History of a-flutter (patient does not remember this dx) - patient is on chronic AC due to DVT (previously Eliquis, now coumadin)  Patient will need clearance from neurosurgery prior to being transitioned back to Eliquis  Hiatal hernia with gastroesophageal reflux  Assessment & Plan  Found on CTA during most recent admission  General surgery saw patient in consultation during recent hospitalization  Continue Protonix 40 mg daily  Monitor for recurrence of esophageal spasm episodes - continues to deny symptoms  Follow up with general surgery on discharge          Hyperlipidemia  Assessment & Plan  Most recent lipid profile (5/12/2020) - total cholesterol 209, triglycerides 145, HDL 64,   Continue atorvastatin 40 mg daily     Hypothyroidism  Assessment & Plan  Most recent TSH (5/12) was 2 23  Continue levothyroxine 125mcg (home dose)    * Spinal stenosis of lumbar region  Assessment & Plan  PT/OT, pain management per primary team   S/p L4-S1 laminectomy on 6/15  Spine precautions/LSO brace  F/u with Dr Elias Hanson upon discharge  VTE Pharmacologic Prophylaxis:   Pharmacologic: Warfarin (Coumadin)  Mechanical VTE Prophylaxis in Place: No    Current Length of Stay: 14 day(s)    Current Patient Status: Inpatient Rehab     Discharge Plan: As per treatment team     Code Status: Level 1 - Full Code    Subjective:   Nursing reports no overnight events  Patient denies any new concerns  Patient reports that he back/leg pain is currently well controlled, she reports that she slept well last evening  Patient LBM 7/5  Denies chest pain and SOB    Objective:     Vitals:   Temp (24hrs), Av 9 °F (36 6 °C), Min:97 6 °F (36 4 °C), Max:98 2 °F (36 8 °C)    Temp:  [97 6 °F (36 4 °C)-98 2 °F (36 8 °C)] 97 6 °F (36 4 °C)  HR:  [77] 77  Resp:  [18] 18  BP: (109-126)/(57-63) 126/63  SpO2:  [95 %] 95 %  Body mass index is 32 95 kg/m²  Review of Systems   Constitutional: Negative for activity change, appetite change, chills, diaphoresis and fever  HENT: Negative for congestion, ear discharge, ear pain, postnasal drip, rhinorrhea, sinus pressure, sinus pain and sore throat  Eyes: Negative for pain, discharge, itching and visual disturbance  Respiratory: Negative for cough, chest tightness, shortness of breath and wheezing  Cardiovascular: Negative for chest pain, palpitations and leg swelling  Gastrointestinal: Negative for abdominal pain, constipation, diarrhea, nausea and vomiting  Endocrine: Negative for polydipsia, polyphagia and polyuria  Genitourinary: Negative for difficulty urinating, dysuria and urgency  Musculoskeletal: Positive for gait problem  Negative for arthralgias, back pain and neck pain  Skin: Negative for rash and wound  Neurological: Negative for dizziness, weakness, numbness and headaches  Input and Output Summary (last 24 hours):        Intake/Output Summary (Last 24 hours) at 2020 1130  Last data filed at 2020 0926  Gross per 24 hour   Intake 960 ml   Output    Net 960 ml       Physical Exam:     Physical Exam   Constitutional: She is oriented to person, place, and time  She appears well-developed and well-nourished  No distress  HENT:   Head: Normocephalic and atraumatic  Right Ear: External ear normal    Left Ear: External ear normal    Nose: Nose normal    Mouth/Throat: Oropharynx is clear and moist  No oropharyngeal exudate  Eyes: Pupils are equal, round, and reactive to light  Conjunctivae and EOM are normal  Right eye exhibits no discharge  Left eye exhibits no discharge  Neck: Normal range of motion  Neck supple  No thyromegaly present  Cardiovascular: Normal rate, regular rhythm, normal heart sounds and intact distal pulses  Exam reveals no gallop and no friction rub  No murmur heard  Pulmonary/Chest: Effort normal and breath sounds normal  No stridor  No respiratory distress  She has no wheezes  She has no rales  Abdominal: Soft  Bowel sounds are normal  She exhibits no distension  There is no tenderness  Lymphadenopathy:     She has no cervical adenopathy  Neurological: She is alert and oriented to person, place, and time  Skin: Skin is warm and dry  No rash noted  She is not diaphoretic  No erythema  Psychiatric: She has a normal mood and affect   Her behavior is normal  Judgment and thought content normal        Additional Data:     Labs:    Results from last 7 days   Lab Units 07/02/20  0605   WBC Thousand/uL 8 60   HEMOGLOBIN g/dL 11 7*   HEMATOCRIT % 35 0*   PLATELETS Thousands/uL 521*   NEUTROS PCT % 60   LYMPHS PCT % 29   MONOS PCT % 9   EOS PCT % 2     Results from last 7 days   Lab Units 07/02/20  0557   SODIUM mmol/L 137   POTASSIUM mmol/L 4 1   CHLORIDE mmol/L 100   CO2 mmol/L 28   BUN mg/dL 15   CREATININE mg/dL 0 65   ANION GAP mmol/L 9   CALCIUM mg/dL 9 9   GLUCOSE RANDOM mg/dL 129*     Results from last 7 days   Lab Units 07/04/20  0554   INR  2 85*     Results from last 7 days   Lab Units 07/05/20  1109 07/05/20  0628 07/04/20  2028 07/04/20  1604 07/04/20  1131 07/04/20  0640 07/03/20  2047 07/03/20  1634 07/03/20  1132 07/03/20  0613 07/02/20  2034 07/02/20  1606   POC GLUCOSE mg/dl 166* 135 137 152* 162* 129 141* 138 140 130 127 161*               Labs reviewed    Imaging:    Imaging reviewed    Recent Cultures (last 7 days):           Last 24 Hours Medication List:     Current Facility-Administered Medications:  acetaminophen 650 mg Oral Q8H Milagros Son MD   amLODIPine 5 mg Oral Daily HUNTER Kuhn   atorvastatin 40 mg Oral Daily With Peg MD Sin   dicyclomine 20 mg Oral Q6H Salvador Rodriguez MD   docusate sodium 100 mg Oral BID HUNTER Kuhn   gabapentin 100 mg Oral BID Alyse Ritchie MD   gabapentin 200 mg Oral HS Alyse Ritchie MD   insulin lispro 1-5 Units Subcutaneous TID AC Salvador Rodriguez MD   insulin lispro 1-5 Units Subcutaneous HS Salvador Rodriguez MD   levothyroxine 125 mcg Oral Early Morning Salvador Rodriguez MD   lidocaine 1 patch Topical Daily Willy Nicholson MD   lisinopril 10 mg Oral Daily HUNTER Vann   menthol-methyl salicylate  Apply externally HS Alyse Ritchie MD   menthol-methyl salicylate  Apply externally TID PRN Alyse Ritchie MD   metFORMIN 500 mg Oral BID With Meals Salvador Rodriguez MD   methocarbamol 750 mg Oral Q6H PRN Salvador Rodriguez MD   metoprolol tartrate 12 5 mg Oral Q12H [address] HUNTER Kuhn   oxyCODONE 2 5 mg Oral Q4H PRN Salvador Rodriguez MD   oxyCODONE 5 mg Oral Q4H PRN Salvador Rodriguez MD   pantoprazole 40 mg Oral Early Morning Salvador Rodriguez MD   polyethylene glycol 17 g Oral Daily PRN Salvador Rodriguez MD   polyethylene glycol 17 g Oral Once HUNTER Harrell   senna-docusate sodium 2 tablet Oral HS PRN HUNTER Feliciano   sertraline 25 mg Oral Daily Salvador Rodriguez MD   warfarin 5 mg Oral Daily (warfarin) [facility] [place] [facility] HUNTER [place]        M*Modal software was used to dictate this note    It may contain errors with dictating incorrect words or incorrect spelling  Please contact the provider directly with any questions

## 2020-07-05 NOTE — PROGRESS NOTES
07/05/20 0700   Pain Assessment   Pain Assessment Tool 0-10   Pain Score 6   Pain Location/Orientation Orientation: Left; Location: Leg   Pain Onset/Description Onset: Ongoing   Effect of Pain on Daily Activities no effects during session  Patient's Stated Pain Goal No pain   Hospital Pain Intervention(s) Rest;Repositioned   Restrictions/Precautions   Precautions Fall Risk;Cognitive;Spinal precautions   Weight Bearing Restrictions No   ROM Restrictions Yes   Braces or Orthoses LSO  (b/l knee high teds)   Oral Hygiene   Type of Assistance Needed Supervision   Amount of Physical Assistance Provided No physical assistance   Comment Pt seated at sink to perform oral hyg routine  Oral Hygiene CARE Score 4   Shower/Bathe Self   Type of Assistance Needed Supervision; Adaptive equipment   Amount of Physical Assistance Provided No physical assistance   Comment Pt seated to perform shower routine bathing 10/10 parts  Lower back incision covered w/ ABD pad and Tegaderm for shower  Pt performed lateral wt shift side to side to bathe rudolph/bottuck area  Utilized LH sponge to bathe BLE's  EDWARDS discussed having daughter present for shower for increased safety  Pt receptive to recommendation, stating "My daughter already know's to be with me "   Shower/Bathe Self CARE Score 4   Tub/Shower Transfer   Limitations Noted In Balance; Coordination; Endurance   Adaptive Equipment Grab Bars;Seat with Back   Assessed Shower   Findings CS w/ RW and grab bar to side step over simulated 2" lip  Upper Body Dressing   Type of Assistance Needed Supervision   Amount of Physical Assistance Provided No physical assistance   Comment Pt seated on shower seat to thread BUE's into shirt and don overhead  Pt able to position LSO and correctly fasten velcro straps  Upper Body Dressing CARE Score 4   Lower Body Dressing   Type of Assistance Needed Supervision; Adaptive equipment;Verbal cues   Amount of Physical Assistance Provided No physical assistance   Comment Pt seated to thread BLE's into pull up utilizing [address] reacher to maintain spinal prec's  Vc's for technique  Pt draped pants to floor level to thread BLE's while maintaining spinal prec's  Pt in stance at RW to perform CM up over hips  No LOB during task  Lower Body Dressing CARE Score 4   Putting On/Taking Off Footwear   Type of Assistance Needed Physical assistance; Adaptive equipment   Amount of Physical Assistance Provided 75% or more   Comment Pt seated req TA to don b/l knee high teds  Pt able to position B feet into slip on shoes utilizing LH shoe horn to A w/ fully donning B shoes  Pt reports her daughter will be able to A w/ donning nini's upon d/c home  Putting On/Taking Off Footwear CARE Score 2   Lying to Sitting on Side of Bed   Type of Assistance Needed Supervision   Amount of Physical Assistance Provided No physical assistance   Lying to Sitting on Side of Bed CARE Score 4   Sit to Stand   Type of Assistance Needed Supervision   Amount of Physical Assistance Provided No physical assistance   Comment w/ RW and LSO donned   Sit to Stand CARE Score 4   Bed-Chair Transfer   Type of Assistance Needed Supervision   Amount of Physical Assistance Provided No physical assistance   Comment CS w/ RW   Chair/Bed-to-Chair Transfer CARE Score 4   Toileting Hygiene   Type of Assistance Needed Supervision   Amount of Physical Assistance Provided No physical assistance   Comment CS w/ RW to perform CM of pull up up/down and perform rudolph hyg  Toileting Hygiene CARE Score 4   Toilet Transfer   Type of Assistance Needed Supervision   Amount of Physical Assistance Provided No physical assistance   Comment CS w/ RW   Toilet Transfer CARE Score 4   Therapeutic Excerise-Strength   UE Strength Yes   Right Upper Extremity- Strength   R Shoulder Flexion;ABduction;Horizontal ABduction; Extension   R Elbow Elbow flexion;Elbow extension   R Position Seated   Equipment Theraband  (Yellow)   R Weight/Reps/Sets 2 x 15   RUE Strength Comment EDWARDS reviewed BUE HEP utilizing yellow theraband  Pt req demo from [facility] prior to engaging in each plane  Pt receptive to performing HEP 1-2 x's daily upon D/C home  Focus of Therex to maintain BUE strength for participation in functional ADL xfer's  Left Upper Extremity-Strength   L Shoulder Flexion;ABduction;Horizontal ABduction; Extension   L Elbow Elbow flexion;Elbow extension   L Position Seated   Equipment Theraband  (yellow)   L Weights/Reps/Sets 2 x 15   LUE Strength Comment See above  Cognition   Overall Cognitive Status WFL   Arousal/Participation Responsive; Cooperative   Attention Attends with cues to redirect   Orientation Level Oriented X4   Memory Decreased short term memory   Following Commands Follows one step commands with increased time or repetition   Activity Tolerance   Activity Tolerance Patient tolerated treatment well   Assessment   Treatment Assessment Pt seen x 90 min skilled OT Tx session focusing on ADL Retraining, ADL xfer's, and BUE HEP  See above for further Tx details  Pt plan to D/C home on 7 6 20 w/ 24 hour S from family and Home OT  Pt is functioning at a S level for ADL performance and xfer's  Will need A  from daughter to manage nini's upon D/C home  Pt's daughter participate din family training in previous OT session  OTR provided pt's daughter w/ handout's on purchasing bed/chair alarms to decrease risk of fall  Pt's daughter receptive to recommendation  DME include's RW, shower seat w/ back, BSC, and all LHAE  OTR provided pt w/ BUE HEP and yellow theraband to maintain UB strength upon D/C home  Pt's daughter receptive to providing 24 hour S upon D/C home  Prognosis Good   Problem List Decreased strength;Decreased range of motion;Decreased endurance; Impaired balance;Decreased mobility; Decreased safety awareness;Orthopedic restrictions;Pain   Recommendation   OT Discharge Recommendation Return to previous environment with social support  (home OT)   OT Therapy Minutes   OT Time In 0700   OT Time Out 0830   OT Total Time (minutes) 90   OT Mode of treatment - Individual (minutes) 90   OT Mode of treatment - Concurrent (minutes) 0   OT Mode of treatment - Group (minutes) 0   OT Mode of treatment - Co-treat (minutes) 0   OT Mode of Treatment - Total time(minutes) 90 minutes   OT Cumulative Minutes 975   Therapy Time missed   Time missed?  No     Barthel Index Score - (d/c)  Feeding- 10/10  Bathing- 5/5 (S level)  Grooming- 5/5 (S level)  Dressing- 10/10 (S level)  Bowels- 10/10  Bladder- 10/10  Toilet use- 10/10 (S level)  Transfers-15/15 (S level)  Mobility- 10/15 (CS level)  Stairs- n/a pt has ramp to enter home    Total- 90/100  (0-20 total dependence) (21-60 severe dependence) (61-90 moderate dependence) (91-99 slight dependence) (100 independent)    Barthel Index Score (IE)  Feeding- 10/10  Bathing- 0/5  Grooming- 5/5  Dressing- 5/10  Bowels- 10/10  Bladder- 10/10  Toilet use- 5/10  Transfers-5/15  Mobility- 5/15  Stairs- n/a pt has ramp to enter home     Total- 55/90  (0-20 total dependence) (21-60 severe dependence) (61-90 moderate dependence) (91-99 slight dependence) (100 independent)

## 2020-07-05 NOTE — ASSESSMENT & PLAN NOTE
Lab Results   Component Value Date    HGBA1C 6 4 (H) 05/12/2020       Recent Labs     07/04/20  1604 07/04/20 2028 07/05/20  0628 07/05/20  1109   POCGLU 152* 137 135 166*       Blood Sugar Average: Last 72 hrs:  (P) 140 [id number]     Continue Metformin 500 mg BID with SSI  Monitor kidney function, remains stable  Follow up with PCP on discharge  Patient may benefit from increase in Metformin  Will defer to PCP

## 2020-07-05 NOTE — ASSESSMENT & PLAN NOTE
Per hospital records, patient noted to have subcentimeter lymph node in the right axilla with fat stranding on imaging  Due to history of breast CA, radiology report clinical correlation was recommended  Patient was evaluated by Dr Jimenez Fuller in acute care who noted this to be an asymptomatic finding and recommended no further workup at this time other than routine follow up  with PCP & continued regular mammograms for her history of  breast CA  Follow up with PCP on discharge

## 2020-07-05 NOTE — PLAN OF CARE
Problem: Prexisting or High Potential for Compromised Skin Integrity  Goal: Skin integrity is maintained or improved  Description  INTERVENTIONS:  - Identify patients at risk for skin breakdown  - Assess and monitor skin integrity  - Assess and monitor nutrition and hydration status  - Monitor labs   - Assess for incontinence   - Turn and reposition patient  - Assist with mobility/ambulation  - Relieve pressure over bony prominences  - Avoid friction and shearing  - Provide appropriate hygiene as needed including keeping skin clean and dry  - Evaluate need for skin moisturizer/barrier cream  - Collaborate with interdisciplinary team   - Patient/family teaching  - Consider wound care consult   Outcome: Progressing     Problem: Potential for Falls  Goal: Patient will remain free of falls  Description  INTERVENTIONS:  - Assess patient frequently for physical needs  -  Identify cognitive and physical deficits and behaviors that affect risk of falls    -  [place] fall precautions as indicated by assessment   - Educate patient/family on patient safety including physical limitations  - Instruct patient to call for assistance with activity based on assessment  - Modify environment to reduce risk of injury  - Consider OT/PT consult to assist with strengthening/mobility  Outcome: Progressing     Problem: PAIN - ADULT  Goal: Verbalizes/displays adequate comfort level or baseline comfort level  Description  Interventions:  - Encourage patient to monitor pain and request assistance  - Assess pain using appropriate pain scale  - Administer analgesics based on type and severity of pain and evaluate response  - Implement non-pharmacological measures as appropriate and evaluate response  - Consider cultural and social influences on pain and pain management  - Notify physician/advanced practitioner if interventions unsuccessful or patient reports new pain  Outcome: Progressing     Problem: INFECTION - ADULT  Goal: Absence or prevention of progression during hospitalization  Description  INTERVENTIONS:  - Assess and monitor for signs and symptoms of infection  - Monitor lab/diagnostic results  - Monitor all insertion sites, i e  indwelling lines, tubes, and drains  - Monitor endotracheal if appropriate and nasal secretions for changes in amount and color  - [place] appropriate cooling/warming therapies per order  - Administer medications as ordered  - Instruct and encourage patient and family to use good hand hygiene technique  - Identify and instruct in appropriate isolation precautions for identified infection/condition  Outcome: Progressing     Problem: SAFETY ADULT  Goal: Maintain or return to baseline ADL function  Description  INTERVENTIONS:  -  Assess patient's ability to carry out ADLs; assess patient's baseline for ADL function and identify physical deficits which impact ability to perform ADLs (bathing, care of mouth/teeth, toileting, grooming, dressing, etc )  - Assess/evaluate cause of self-care deficits   - Assess range of motion  - Assess patient's mobility; develop plan if impaired  - Assess patient's need for assistive devices and provide as appropriate  - Encourage maximum independence but intervene and supervise when necessary  - Involve family in performance of ADLs  - Assess for home care needs following discharge   - Consider OT consult to assist with ADL evaluation and planning for discharge  - Provide patient education as appropriate  Outcome: Progressing  Goal: Maintain or return mobility status to optimal level  Description  INTERVENTIONS:  - Assess patient's baseline mobility status (ambulation, transfers, stairs, etc )    - Identify cognitive and physical deficits and behaviors that affect mobility  - Identify mobility aids required to assist with transfers and/or ambulation (gait belt, sit-to-stand, lift, walker, cane, etc )  - [place] fall precautions as indicated by assessment  - Record patient progress and toleration of activity level on Mobility SBAR; progress patient to next Phase/Stage  - Instruct patient to call for assistance with activity based on assessment  - Consider rehabilitation consult to assist with strengthening/weightbearing, etc   Outcome: Progressing     Problem: DISCHARGE PLANNING  Goal: Discharge to home or other facility with appropriate resources  Description  INTERVENTIONS:  - Identify barriers to discharge w/patient and caregiver  - Arrange for needed discharge resources and transportation as appropriate  - Identify discharge learning needs (meds, wound care, etc )  - Arrange for interpretive services to assist at discharge as needed  - Refer to Case Management Department for coordinating discharge planning if the patient needs post-hospital services based on physician/advanced practitioner order or complex needs related to functional status, cognitive ability, or social support system  Outcome: Progressing     Problem: SKIN/TISSUE INTEGRITY - ADULT  Goal: Skin integrity remains intact  Description  INTERVENTIONS  - Identify patients at risk for skin breakdown  - Assess and monitor skin integrity  - Assess and monitor nutrition and hydration status  - Monitor labs (i e  albumin)  - Assess for incontinence   - Turn and reposition patient  - Assist with mobility/ambulation  - Relieve pressure over bony prominences  - Avoid friction and shearing  - Provide appropriate hygiene as needed including keeping skin clean and dry  - Evaluate need for skin moisturizer/barrier cream  - Collaborate with interdisciplinary team (i e  Nutrition, Rehabilitation, etc )   - Patient/family teaching  Outcome: Progressing  Goal: Incision(s), wounds(s) or drain site(s) healing without S/S of infection  Description  INTERVENTIONS  - Assess and document risk factors for skin impairment   - Assess and document dressing, incision, wound bed, drain sites and surrounding tissue  - Consider nutrition services referral as needed  - Oral mucous membranes remain intact  - Provide patient/ family education  Outcome: Progressing  Goal: Oral mucous membranes remain intact  Description  INTERVENTIONS  - Assess oral mucosa and hygiene practices  - Implement preventative oral hygiene regimen  - Implement oral medicated treatments as ordered  - Initiate Nutrition services referral as needed  Outcome: Progressing     Problem: MUSCULOSKELETAL - ADULT  Goal: Maintain or return mobility to safest level of function  Description  INTERVENTIONS:  - Assess patient's ability to carry out ADLs; assess patient's baseline for ADL function and identify physical deficits which impact ability to perform ADLs (bathing, care of mouth/teeth, toileting, grooming, dressing, etc )  - Assess/evaluate cause of self-care deficits   - Assess range of motion  - Assess patient's mobility  - Assess patient's need for assistive devices and provide as appropriate  - Encourage maximum independence but intervene and supervise when necessary  - Involve family in performance of ADLs  - Assess for home care needs following discharge   - Consider OT consult to assist with ADL evaluation and planning for discharge  - Provide patient education as appropriate  Outcome: Progressing  Goal: Maintain proper alignment of affected body part  Description  INTERVENTIONS:  - Support, maintain and protect limb and body alignment  - Provide patient/ family with appropriate education  Outcome: Progressing

## 2020-07-05 NOTE — ASSESSMENT & PLAN NOTE
Vitals:    07/05/20 0926   BP: 126/63   Pulse:    Resp:    Temp:    SpO2:        Continue amlodipine 5 mg daily,Lopressor 12 5 mg BID,  Lisinopril to 10 mg daily   Monitor and adjust as needed

## 2020-07-05 NOTE — PROGRESS NOTES
07/05/20 1230   Pain Assessment   Pain Assessment Tool 0-10   Pain Score 5   Pain Location/Orientation Orientation: Left; Location: Back; Location: Leg   Pain Onset/Description Onset: Ongoing; Descriptor: Aching;Descriptor: Cramping   Effect of Pain on Daily Activities inc pain with sup<>sit  Restrictions/Precautions   Precautions Fall Risk;Cognitive;Spinal precautions;Supervision on toilet/commode   Braces or Orthoses LSO   Cognition   Overall Cognitive Status Impaired   Arousal/Participation Responsive; Cooperative   Attention Attends with cues to redirect   Orientation Level Oriented X4   Memory Decreased short term memory   Following Commands Follows one step commands with increased time or repetition   Subjective   Subjective pt agrees to PT  Denies concern for DC home  Roll Left and Right   Type of Assistance Needed Supervision   Amount of Physical Assistance Provided No physical assistance   Roll Left and Right CARE Score 4   Sit to Lying   Type of Assistance Needed Supervision   Amount of Physical Assistance Provided No physical assistance   Sit to Lying CARE Score 4   Lying to Sitting on Side of Bed   Type of Assistance Needed Supervision   Amount of Physical Assistance Provided No physical assistance   Comment CS   Lying to Sitting on Side of Bed CARE Score 4   Sit to Stand   Type of Assistance Needed Supervision   Amount of Physical Assistance Provided No physical assistance   Sit to Stand CARE Score 4   Bed-Chair Transfer   Type of Assistance Needed Supervision   Amount of Physical Assistance Provided No physical assistance   Chair/Bed-to-Chair Transfer CARE Score 4   Car Transfer   Comment TBA for DC home next day  Reason if not Attempted Environmental limitations   Car Transfer CARE Score 10   Walk 10 Feet   Type of Assistance Needed Supervision   Comment DS with RW      Walk 10 Feet CARE Score 4   Walk 50 Feet with Two Turns   Type of Assistance Needed Supervision   Walk 50 Feet with Two Turns CARE Score 4   Walk 150 Feet   Type of Assistance Needed Supervision   Comment CS with RW   Walk 150 Feet CARE Score 4   Walking 10 Feet on Uneven Surfaces   Type of Assistance Needed Supervision   Amount of Physical Assistance Provided No physical assistance   Comment very CS to maximize safety  Walking 10 Feet on Uneven Surfaces CARE Score 4   Ambulation   Does the patient walk? 2  Yes   Primary Mode of Locomotion Prior to Admission Walk   Distance Walked (feet) 150 ft  (x50, 2x15ft)   Assist Device Roller Walker   Gait Pattern Inconsistant Cayla; Slow [place]la; Wide BECCA; Improper weight shift   Limitations Noted In Balance;Speed;Strength;Swing   Provided Assistance with: Balance   Walk Assist Level Supervision;Close Supervision   Wheelchair mobility   Does the patient use a wheelchair? 0  No   Curb or Single Stair   Style negotiated Curb   Type of Assistance Needed Supervision   Amount of Physical Assistance Provided No physical assistance   Comment CS with RW post demo  1 Step (Curb) CARE Score 4   4 Steps   Reason if not Attempted Activity not applicable   4 Steps CARE Score 9   12 Steps   Reason if not Attempted Activity not applicable   12 Steps CARE Score 9   Stairs   Type Curb   # of Steps 1   Weight Bearing Precautions Back; Fall Risk   Assist Devices nContact Surgical   Findings CS post demo with Rw on curb step  Picking Up Object   Type of Assistance Needed Supervision   Amount of Physical Assistance Provided No physical assistance   Comment reacher   Picking Up Object CARE Score 4   Therapeutic Interventions   Strengthening Seated B LE TE HEP handout education and review: APs, LAq, hip flexion, yellow abd, pillow squeeze, glute sets x20 each  Standing at sink with B UE support: hip abd, HS curls, march  x10 each  Balance item pickup with reacher x6  Modalities CP to L buttock and Low back upon return to room at end of session      Assessment   Treatment Assessment Pt participated in 90 min skilled PT intervention  HEP handout provided and reivewed for B LE seated and simple standing TE  Written instructions to complete 1-2x per day 3-5 days per week  Using sink for support for standing Te and having chair behind or very close by in case pt needs to sit  Writen instrucitons to walk with walker every hour  Daughter Peg present for most of session, reivewed importance of RW use and spinal precautions  Pt demonstrating S level for all gait and transfers, limited by inc pain with mobility, gets relief with cold pack  Does have cold packs at home  Post op xrays reivewed with pt and daughter  Peg confirmed RW was ordered and to be delivered today or tomorrow  NEREIDA to pickup pt for DC, small sedan, will assess car transfer when ready for DC  Verbally reviewed and demo'd to pt by PT at [facility]  Pt feels thats how she was doing it before  Family confirmed [address] as well  Plan for DC home with family support in AM, recommend home PT  Family/Caregiver Present yes   PT Family training done with: Daughter Peg   Problem List Decreased strength;Decreased range of motion;Decreased endurance; Impaired balance;Decreased mobility; Decreased safety awareness;Orthopedic restrictions;Pain   Barriers to Discharge Decreased caregiver support   Barriers to Discharge Comments will have [address] per family  PT Barriers   Physical Impairment Decreased strength;Decreased endurance; Impaired balance;Decreased safety awareness;Orthopedic restrictions;Pain   Functional Limitation Car transfers; Ramp negotiation;Standing;Transfers; Walking   Plan   Treatment/Interventions Therapeutic exercise; Endurance training;Gait training;Patient/family training;Functional transfer training;LE strengthening/ROM; Elevations   Progress Progressing toward goals   Recommendation   PT Discharge Recommendation Home with skilled therapy  (home PT)   Equipment Recommended Walker   PT Equipment ordered Family purcahsed RW  BSC to be delivered      PT Therapy Minutes PT Time In 1230   PT Time Out 1400   PT Total Time (minutes) 90   PT Mode of treatment - Individual (minutes) 90   PT Mode of treatment - Concurrent (minutes) 0   PT Mode of treatment - Group (minutes) 0   PT Mode of treatment - Co-treat (minutes) 0   PT Mode of Treatment - Total time(minutes) 90 minutes   PT Cumulative Minutes 1110   Therapy Time missed   Time missed?  No

## 2020-07-05 NOTE — ASSESSMENT & PLAN NOTE
PT/OT, pain management per primary team   S/p L4-S1 laminectomy on 6/15  Spine precautions/LSO brace  F/u with Dr Lennie Chamberlain upon discharge

## 2020-07-05 NOTE — ASSESSMENT & PLAN NOTE
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

## 2020-07-05 NOTE — PLAN OF CARE
Problem: Prexisting or High Potential for Compromised Skin Integrity  Goal: Skin integrity is maintained or improved  Description  INTERVENTIONS:  - Identify patients at risk for skin breakdown  - Assess and monitor skin integrity  - Assess and monitor nutrition and hydration status  - Monitor labs   - Assess for incontinence   - Turn and reposition patient  - Assist with mobility/ambulation  - Relieve pressure over bony prominences  - Avoid friction and shearing  - Provide appropriate hygiene as needed including keeping skin clean and dry  - Evaluate need for skin moisturizer/barrier cream  - Collaborate with interdisciplinary team   - Patient/family teaching  - Consider wound care consult   Outcome: Progressing     Problem: Potential for Falls  Goal: Patient will remain free of falls  Description  INTERVENTIONS:  - Assess patient frequently for physical needs  -  Identify cognitive and physical deficits and behaviors that affect risk of falls    -  [place] fall precautions as indicated by assessment   - Educate patient/family on patient safety including physical limitations  - Instruct patient to call for assistance with activity based on assessment  - Modify environment to reduce risk of injury  - Consider OT/PT consult to assist with strengthening/mobility  Outcome: Progressing     Problem: PAIN - ADULT  Goal: Verbalizes/displays adequate comfort level or baseline comfort level  Description  Interventions:  - Encourage patient to monitor pain and request assistance  - Assess pain using appropriate pain scale  - Administer analgesics based on type and severity of pain and evaluate response  - Implement non-pharmacological measures as appropriate and evaluate response  - Consider cultural and social influences on pain and pain management  - Notify physician/advanced practitioner if interventions unsuccessful or patient reports new pain  Outcome: Progressing     Problem: INFECTION - ADULT  Goal: Absence or prevention of progression during hospitalization  Description  INTERVENTIONS:  - Assess and monitor for signs and symptoms of infection  - Monitor lab/diagnostic results  - Monitor all insertion sites, i e  indwelling lines, tubes, and drains  - Monitor endotracheal if appropriate and nasal secretions for changes in amount and color  - [place] appropriate cooling/warming therapies per order  - Administer medications as ordered  - Instruct and encourage patient and family to use good hand hygiene technique  - Identify and instruct in appropriate isolation precautions for identified infection/condition  Outcome: Progressing     Problem: SAFETY ADULT  Goal: Maintain or return to baseline ADL function  Description  INTERVENTIONS:  -  Assess patient's ability to carry out ADLs; assess patient's baseline for ADL function and identify physical deficits which impact ability to perform ADLs (bathing, care of mouth/teeth, toileting, grooming, dressing, etc )  - Assess/evaluate cause of self-care deficits   - Assess range of motion  - Assess patient's mobility; develop plan if impaired  - Assess patient's need for assistive devices and provide as appropriate  - Encourage maximum independence but intervene and supervise when necessary  - Involve family in performance of ADLs  - Assess for home care needs following discharge   - Consider OT consult to assist with ADL evaluation and planning for discharge  - Provide patient education as appropriate  Outcome: Progressing  Goal: Maintain or return mobility status to optimal level  Description  INTERVENTIONS:  - Assess patient's baseline mobility status (ambulation, transfers, stairs, etc )    - Identify cognitive and physical deficits and behaviors that affect mobility  - Identify mobility aids required to assist with transfers and/or ambulation (gait belt, sit-to-stand, lift, walker, cane, etc )  - [place] fall precautions as indicated by assessment  - Record patient progress and toleration of activity level on Mobility SBAR; progress patient to next Phase/Stage  - Instruct patient to call for assistance with activity based on assessment  - Consider rehabilitation consult to assist with strengthening/weightbearing, etc   Outcome: Progressing     Problem: DISCHARGE PLANNING  Goal: Discharge to home or other facility with appropriate resources  Description  INTERVENTIONS:  - Identify barriers to discharge w/patient and caregiver  - Arrange for needed discharge resources and transportation as appropriate  - Identify discharge learning needs (meds, wound care, etc )  - Arrange for interpretive services to assist at discharge as needed  - Refer to Case Management Department for coordinating discharge planning if the patient needs post-hospital services based on physician/advanced practitioner order or complex needs related to functional status, cognitive ability, or social support system  Outcome: Progressing     Problem: SKIN/TISSUE INTEGRITY - ADULT  Goal: Skin integrity remains intact  Description  INTERVENTIONS  - Identify patients at risk for skin breakdown  - Assess and monitor skin integrity  - Assess and monitor nutrition and hydration status  - Monitor labs (i e  albumin)  - Assess for incontinence   - Turn and reposition patient  - Assist with mobility/ambulation  - Relieve pressure over bony prominences  - Avoid friction and shearing  - Provide appropriate hygiene as needed including keeping skin clean and dry  - Evaluate need for skin moisturizer/barrier cream  - Collaborate with interdisciplinary team (i e  Nutrition, Rehabilitation, etc )   - Patient/family teaching  Outcome: Progressing  Goal: Incision(s), wounds(s) or drain site(s) healing without S/S of infection  Description  INTERVENTIONS  - Assess and document risk factors for skin impairment   - Assess and document dressing, incision, wound bed, drain sites and surrounding tissue  - Consider nutrition services referral as needed  - Oral mucous membranes remain intact  - Provide patient/ family education  Outcome: Progressing  Goal: Oral mucous membranes remain intact  Description  INTERVENTIONS  - Assess oral mucosa and hygiene practices  - Implement preventative oral hygiene regimen  - Implement oral medicated treatments as ordered  - Initiate Nutrition services referral as needed  Outcome: Progressing     Problem: MUSCULOSKELETAL - ADULT  Goal: Maintain or return mobility to safest level of function  Description  INTERVENTIONS:  - Assess patient's ability to carry out ADLs; assess patient's baseline for ADL function and identify physical deficits which impact ability to perform ADLs (bathing, care of mouth/teeth, toileting, grooming, dressing, etc )  - Assess/evaluate cause of self-care deficits   - Assess range of motion  - Assess patient's mobility  - Assess patient's need for assistive devices and provide as appropriate  - Encourage maximum independence but intervene and supervise when necessary  - Involve family in performance of ADLs  - Assess for home care needs following discharge   - Consider OT consult to assist with ADL evaluation and planning for discharge  - Provide patient education as appropriate  Outcome: Progressing  Goal: Maintain proper alignment of affected body part  Description  INTERVENTIONS:  - Support, maintain and protect limb and body alignment  - Provide patient/ family with appropriate education  Outcome: Progressing

## 2020-07-06 ENCOUNTER — PATIENT OUTREACH (OUTPATIENT)
Dept: CASE MANAGEMENT | Facility: OTHER | Age: 80
End: 2020-07-06

## 2020-07-06 VITALS
HEIGHT: 61 IN | HEART RATE: 67 BPM | BODY MASS INDEX: 33.17 KG/M2 | TEMPERATURE: 97.3 F | SYSTOLIC BLOOD PRESSURE: 129 MMHG | DIASTOLIC BLOOD PRESSURE: 59 MMHG | RESPIRATION RATE: 18 BRPM | WEIGHT: 175.71 LBS | OXYGEN SATURATION: 94 %

## 2020-07-06 LAB
ANION GAP SERPL CALCULATED.3IONS-SCNC: 7 MMOL/L (ref 5–14)
BASOPHILS # BLD AUTO: 0 THOUSANDS/ΜL (ref 0–0.1)
BASOPHILS NFR BLD AUTO: 1 % (ref 0–1)
BUN SERPL-MCNC: 18 MG/DL (ref 5–25)
CALCIUM SERPL-MCNC: 9.4 MG/DL (ref 8.4–10.2)
CHLORIDE SERPL-SCNC: 102 MMOL/L (ref 97–108)
CO2 SERPL-SCNC: 28 MMOL/L (ref 22–30)
CREAT SERPL-MCNC: 0.64 MG/DL (ref 0.6–1.2)
EOSINOPHIL # BLD AUTO: 0.1 THOUSAND/ΜL (ref 0–0.4)
EOSINOPHIL NFR BLD AUTO: 2 % (ref 0–6)
ERYTHROCYTE [DISTWIDTH] IN BLOOD BY AUTOMATED COUNT: 13.6 %
GFR SERPL CREATININE-BSD FRML MDRD: 83 ML/MIN/1.73SQ M
GLUCOSE P FAST SERPL-MCNC: 120 MG/DL (ref 70–99)
GLUCOSE SERPL-MCNC: 120 MG/DL (ref 70–99)
GLUCOSE SERPL-MCNC: 126 MG/DL (ref 65–140)
HCT VFR BLD AUTO: 33.4 % (ref 36–46)
HGB BLD-MCNC: 11.1 G/DL (ref 12–16)
INR PPP: 3.42 (ref 0.84–1.19)
LYMPHOCYTES # BLD AUTO: 2.2 THOUSANDS/ΜL (ref 0.5–4)
LYMPHOCYTES NFR BLD AUTO: 33 % (ref 25–45)
MCH RBC QN AUTO: 31.2 PG (ref 26–34)
MCHC RBC AUTO-ENTMCNC: 33.4 G/DL (ref 31–36)
MCV RBC AUTO: 93 FL (ref 80–100)
MONOCYTES # BLD AUTO: 0.8 THOUSAND/ΜL (ref 0.2–0.9)
MONOCYTES NFR BLD AUTO: 11 % (ref 1–10)
NEUTROPHILS # BLD AUTO: 3.6 THOUSANDS/ΜL (ref 1.8–7.8)
NEUTS SEG NFR BLD AUTO: 54 % (ref 45–65)
PLATELET # BLD AUTO: 393 THOUSANDS/UL (ref 150–450)
PMV BLD AUTO: 8.3 FL (ref 8.9–12.7)
POTASSIUM SERPL-SCNC: 3.8 MMOL/L (ref 3.6–5)
PROTHROMBIN TIME: 33.4 SECONDS (ref 11.6–14.5)
RBC # BLD AUTO: 3.57 MILLION/UL (ref 4–5.2)
SODIUM SERPL-SCNC: 137 MMOL/L (ref 137–147)
WBC # BLD AUTO: 6.8 THOUSAND/UL (ref 4.5–11)

## 2020-07-06 PROCEDURE — 80048 BASIC METABOLIC PNL TOTAL CA: CPT | Performed by: NURSE PRACTITIONER

## 2020-07-06 PROCEDURE — 82948 REAGENT STRIP/BLOOD GLUCOSE: CPT

## 2020-07-06 PROCEDURE — 85025 COMPLETE CBC W/AUTO DIFF WBC: CPT | Performed by: NURSE PRACTITIONER

## 2020-07-06 PROCEDURE — 85610 PROTHROMBIN TIME: CPT | Performed by: NURSE PRACTITIONER

## 2020-07-06 PROCEDURE — 99232 SBSQ HOSP IP/OBS MODERATE 35: CPT | Performed by: FAMILY MEDICINE

## 2020-07-06 PROCEDURE — 99239 HOSP IP/OBS DSCHRG MGMT >30: CPT

## 2020-07-06 RX ORDER — METOPROLOL SUCCINATE 25 MG/1
25 TABLET, EXTENDED RELEASE ORAL DAILY
Qty: 30 TABLET | Refills: 0 | Status: SHIPPED | OUTPATIENT
Start: 2020-07-06

## 2020-07-06 RX ORDER — LISINOPRIL 10 MG/1
10 TABLET ORAL DAILY
Qty: 30 TABLET | Refills: 0 | Status: SHIPPED | OUTPATIENT
Start: 2020-07-07

## 2020-07-06 RX ORDER — OXYCODONE HYDROCHLORIDE 5 MG/1
5 TABLET ORAL 3 TIMES DAILY PRN
Qty: 21 TABLET | Refills: 0 | Status: SHIPPED | OUTPATIENT
Start: 2020-07-06 | End: 2021-09-10

## 2020-07-06 RX ORDER — OMEPRAZOLE 20 MG/1
40 CAPSULE, DELAYED RELEASE ORAL DAILY
COMMUNITY
End: 2021-09-10

## 2020-07-06 RX ORDER — ACETAMINOPHEN 500 MG
1000 TABLET ORAL 3 TIMES DAILY PRN
Refills: 0
Start: 2020-07-06

## 2020-07-06 RX ADMIN — PANTOPRAZOLE SODIUM 40 MG: 40 TABLET, DELAYED RELEASE ORAL at 06:18

## 2020-07-06 RX ADMIN — DICYCLOMINE HYDROCHLORIDE 20 MG: 20 TABLET ORAL at 06:17

## 2020-07-06 RX ADMIN — OXYCODONE HYDROCHLORIDE 5 MG: 5 TABLET ORAL at 11:24

## 2020-07-06 RX ADMIN — LEVOTHYROXINE SODIUM 125 MCG: 125 TABLET ORAL at 06:18

## 2020-07-06 RX ADMIN — METFORMIN HYDROCHLORIDE 500 MG: 500 TABLET ORAL at 09:03

## 2020-07-06 RX ADMIN — LIDOCAINE 1 PATCH: 50 PATCH CUTANEOUS at 09:03

## 2020-07-06 RX ADMIN — LISINOPRIL 10 MG: 10 TABLET ORAL at 09:03

## 2020-07-06 RX ADMIN — METOPROLOL TARTRATE 12.5 MG: 25 TABLET ORAL at 09:03

## 2020-07-06 RX ADMIN — OXYCODONE HYDROCHLORIDE 5 MG: 5 TABLET ORAL at 02:45

## 2020-07-06 RX ADMIN — ACETAMINOPHEN 650 MG: 325 TABLET ORAL at 06:17

## 2020-07-06 RX ADMIN — GABAPENTIN 100 MG: 100 CAPSULE ORAL at 09:03

## 2020-07-06 RX ADMIN — SERTRALINE HYDROCHLORIDE 25 MG: 25 TABLET ORAL at 09:03

## 2020-07-06 RX ADMIN — AMLODIPINE BESYLATE 5 MG: 5 TABLET ORAL at 09:03

## 2020-07-06 NOTE — NURSING NOTE
Discharge instructions gone over with meds and follow up appointments patient getting into car plopped into seat did tell her that she has to go in sideways like they taught her to do for the bed

## 2020-07-06 NOTE — ASSESSMENT & PLAN NOTE
Per most recent hospitalization, K+ as low as 2 2 (felt due to poor p o  Intake)  K+ 7/2 3 8  Patient with improved p o  Intake  Follow up with PCP on discharge

## 2020-07-06 NOTE — ASSESSMENT & PLAN NOTE
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

## 2020-07-06 NOTE — PLAN OF CARE
Problem: Prexisting or High Potential for Compromised Skin Integrity  Goal: Skin integrity is maintained or improved  Description  INTERVENTIONS:  - Identify patients at risk for skin breakdown  - Assess and monitor skin integrity  - Assess and monitor nutrition and hydration status  - Monitor labs   - Assess for incontinence   - Turn and reposition patient  - Assist with mobility/ambulation  - Relieve pressure over bony prominences  - Avoid friction and shearing  - Provide appropriate hygiene as needed including keeping skin clean and dry  - Evaluate need for skin moisturizer/barrier cream  - Collaborate with interdisciplinary team   - Patient/family teaching  - Consider wound care consult   Outcome: Progressing     Problem: Potential for Falls  Goal: Patient will remain free of falls  Description  INTERVENTIONS:  - Assess patient frequently for physical needs  -  Identify cognitive and physical deficits and behaviors that affect risk of falls    -  [place] fall precautions as indicated by assessment   - Educate patient/family on patient safety including physical limitations  - Instruct patient to call for assistance with activity based on assessment  - Modify environment to reduce risk of injury  - Consider OT/PT consult to assist with strengthening/mobility  Outcome: Progressing     Problem: PAIN - ADULT  Goal: Verbalizes/displays adequate comfort level or baseline comfort level  Description  Interventions:  - Encourage patient to monitor pain and request assistance  - Assess pain using appropriate pain scale  - Administer analgesics based on type and severity of pain and evaluate response  - Implement non-pharmacological measures as appropriate and evaluate response  - Consider cultural and social influences on pain and pain management  - Notify physician/advanced practitioner if interventions unsuccessful or patient reports new pain  Outcome: Progressing     Problem: INFECTION - ADULT  Goal: Absence or prevention of progression during hospitalization  Description  INTERVENTIONS:  - Assess and monitor for signs and symptoms of infection  - Monitor lab/diagnostic results  - Monitor all insertion sites, i e  indwelling lines, tubes, and drains  - Monitor endotracheal if appropriate and nasal secretions for changes in amount and color  - [place] appropriate cooling/warming therapies per order  - Administer medications as ordered  - Instruct and encourage patient and family to use good hand hygiene technique  - Identify and instruct in appropriate isolation precautions for identified infection/condition  Outcome: Progressing     Problem: SAFETY ADULT  Goal: Maintain or return to baseline ADL function  Description  INTERVENTIONS:  -  Assess patient's ability to carry out ADLs; assess patient's baseline for ADL function and identify physical deficits which impact ability to perform ADLs (bathing, care of mouth/teeth, toileting, grooming, dressing, etc )  - Assess/evaluate cause of self-care deficits   - Assess range of motion  - Assess patient's mobility; develop plan if impaired  - Assess patient's need for assistive devices and provide as appropriate  - Encourage maximum independence but intervene and supervise when necessary  - Involve family in performance of ADLs  - Assess for home care needs following discharge   - Consider OT consult to assist with ADL evaluation and planning for discharge  - Provide patient education as appropriate  Outcome: Progressing  Goal: Maintain or return mobility status to optimal level  Description  INTERVENTIONS:  - Assess patient's baseline mobility status (ambulation, transfers, stairs, etc )    - Identify cognitive and physical deficits and behaviors that affect mobility  - Identify mobility aids required to assist with transfers and/or ambulation (gait belt, sit-to-stand, lift, walker, cane, etc )  - [place] fall precautions as indicated by assessment  - Record patient progress and toleration of activity level on Mobility SBAR; progress patient to next Phase/Stage  - Instruct patient to call for assistance with activity based on assessment  - Consider rehabilitation consult to assist with strengthening/weightbearing, etc   Outcome: Progressing     Problem: DISCHARGE PLANNING  Goal: Discharge to home or other facility with appropriate resources  Description  INTERVENTIONS:  - Identify barriers to discharge w/patient and caregiver  - Arrange for needed discharge resources and transportation as appropriate  - Identify discharge learning needs (meds, wound care, etc )  - Arrange for interpretive services to assist at discharge as needed  - Refer to Case Management Department for coordinating discharge planning if the patient needs post-hospital services based on physician/advanced practitioner order or complex needs related to functional status, cognitive ability, or social support system  Outcome: Progressing     Problem: SKIN/TISSUE INTEGRITY - ADULT  Goal: Skin integrity remains intact  Description  INTERVENTIONS  - Identify patients at risk for skin breakdown  - Assess and monitor skin integrity  - Assess and monitor nutrition and hydration status  - Monitor labs (i e  albumin)  - Assess for incontinence   - Turn and reposition patient  - Assist with mobility/ambulation  - Relieve pressure over bony prominences  - Avoid friction and shearing  - Provide appropriate hygiene as needed including keeping skin clean and dry  - Evaluate need for skin moisturizer/barrier cream  - Collaborate with interdisciplinary team (i e  Nutrition, Rehabilitation, etc )   - Patient/family teaching  Outcome: Progressing  Goal: Incision(s), wounds(s) or drain site(s) healing without S/S of infection  Description  INTERVENTIONS  - Assess and document risk factors for skin impairment   - Assess and document dressing, incision, wound bed, drain sites and surrounding tissue  - Consider nutrition services referral as needed  - Oral mucous membranes remain intact  - Provide patient/ family education  Outcome: Progressing  Goal: Oral mucous membranes remain intact  Description  INTERVENTIONS  - Assess oral mucosa and hygiene practices  - Implement preventative oral hygiene regimen  - Implement oral medicated treatments as ordered  - Initiate Nutrition services referral as needed  Outcome: Progressing     Problem: MUSCULOSKELETAL - ADULT  Goal: Maintain or return mobility to safest level of function  Description  INTERVENTIONS:  - Assess patient's ability to carry out ADLs; assess patient's baseline for ADL function and identify physical deficits which impact ability to perform ADLs (bathing, care of mouth/teeth, toileting, grooming, dressing, etc )  - Assess/evaluate cause of self-care deficits   - Assess range of motion  - Assess patient's mobility  - Assess patient's need for assistive devices and provide as appropriate  - Encourage maximum independence but intervene and supervise when necessary  - Involve family in performance of ADLs  - Assess for home care needs following discharge   - Consider OT consult to assist with ADL evaluation and planning for discharge  - Provide patient education as appropriate  Outcome: Progressing  Goal: Maintain proper alignment of affected body part  Description  INTERVENTIONS:  - Support, maintain and protect limb and body alignment  - Provide patient/ family with appropriate education  Outcome: Progressing

## 2020-07-06 NOTE — ASSESSMENT & PLAN NOTE
Per hospital records, patient noted to have subcentimeter lymph node in the right axilla with fat stranding on imaging  Due to history of breast CA, radiology report clinical correlation was recommended  Patient was evaluated by Dr Ezequiel Hidalgo in acute care who noted this to be an asymptomatic finding and recommended no further workup at this time other than routine follow up  with PCP & continued regular mammograms for her history of  breast CA  Follow up with PCP on discharge

## 2020-07-06 NOTE — SOCIAL WORK
Team d/c summary:     Pt made great therapy progress and returned home with family and cont'd [facility] services through [address], PT, OT, SLP services  Pt received a commode from [facility] and her family was going to order a walker on their own  Pt's family was present for d/c and is aware of functional ability

## 2020-07-06 NOTE — PLAN OF CARE
Problem: Prexisting or High Potential for Compromised Skin Integrity  Goal: Skin integrity is maintained or improved  Description  INTERVENTIONS:  - Identify patients at risk for skin breakdown  - Assess and monitor skin integrity  - Assess and monitor nutrition and hydration status  - Monitor labs   - Assess for incontinence   - Turn and reposition patient  - Assist with mobility/ambulation  - Relieve pressure over bony prominences  - Avoid friction and shearing  - Provide appropriate hygiene as needed including keeping skin clean and dry  - Evaluate need for skin moisturizer/barrier cream  - Collaborate with interdisciplinary team   - Patient/family teaching  - Consider wound care consult   Outcome: Progressing     Problem: Potential for Falls  Goal: Patient will remain free of falls  Description  INTERVENTIONS:  - Assess patient frequently for physical needs  -  Identify cognitive and physical deficits and behaviors that affect risk of falls    -  [place] fall precautions as indicated by assessment   - Educate patient/family on patient safety including physical limitations  - Instruct patient to call for assistance with activity based on assessment  - Modify environment to reduce risk of injury  - Consider OT/PT consult to assist with strengthening/mobility  Outcome: Progressing     Problem: PAIN - ADULT  Goal: Verbalizes/displays adequate comfort level or baseline comfort level  Description  Interventions:  - Encourage patient to monitor pain and request assistance  - Assess pain using appropriate pain scale  - Administer analgesics based on type and severity of pain and evaluate response  - Implement non-pharmacological measures as appropriate and evaluate response  - Consider cultural and social influences on pain and pain management  - Notify physician/advanced practitioner if interventions unsuccessful or patient reports new pain  Outcome: Progressing     Problem: INFECTION - ADULT  Goal: Absence or prevention of progression during hospitalization  Description  INTERVENTIONS:  - Assess and monitor for signs and symptoms of infection  - Monitor lab/diagnostic results  - Monitor all insertion sites, i e  indwelling lines, tubes, and drains  - Monitor endotracheal if appropriate and nasal secretions for changes in amount and color  - [place] appropriate cooling/warming therapies per order  - Administer medications as ordered  - Instruct and encourage patient and family to use good hand hygiene technique  - Identify and instruct in appropriate isolation precautions for identified infection/condition  Outcome: Progressing     Problem: SAFETY ADULT  Goal: Maintain or return to baseline ADL function  Description  INTERVENTIONS:  -  Assess patient's ability to carry out ADLs; assess patient's baseline for ADL function and identify physical deficits which impact ability to perform ADLs (bathing, care of mouth/teeth, toileting, grooming, dressing, etc )  - Assess/evaluate cause of self-care deficits   - Assess range of motion  - Assess patient's mobility; develop plan if impaired  - Assess patient's need for assistive devices and provide as appropriate  - Encourage maximum independence but intervene and supervise when necessary  - Involve family in performance of ADLs  - Assess for home care needs following discharge   - Consider OT consult to assist with ADL evaluation and planning for discharge  - Provide patient education as appropriate  Outcome: Progressing  Goal: Maintain or return mobility status to optimal level  Description  INTERVENTIONS:  - Assess patient's baseline mobility status (ambulation, transfers, stairs, etc )    - Identify cognitive and physical deficits and behaviors that affect mobility  - Identify mobility aids required to assist with transfers and/or ambulation (gait belt, sit-to-stand, lift, walker, cane, etc )  - [place] fall precautions as indicated by assessment  - Record patient progress and toleration of activity level on Mobility SBAR; progress patient to next Phase/Stage  - Instruct patient to call for assistance with activity based on assessment  - Consider rehabilitation consult to assist with strengthening/weightbearing, etc   Outcome: Progressing     Problem: DISCHARGE PLANNING  Goal: Discharge to home or other facility with appropriate resources  Description  INTERVENTIONS:  - Identify barriers to discharge w/patient and caregiver  - Arrange for needed discharge resources and transportation as appropriate  - Identify discharge learning needs (meds, wound care, etc )  - Arrange for interpretive services to assist at discharge as needed  - Refer to Case Management Department for coordinating discharge planning if the patient needs post-hospital services based on physician/advanced practitioner order or complex needs related to functional status, cognitive ability, or social support system  Outcome: Progressing     Problem: SKIN/TISSUE INTEGRITY - ADULT  Goal: Skin integrity remains intact  Description  INTERVENTIONS  - Identify patients at risk for skin breakdown  - Assess and monitor skin integrity  - Assess and monitor nutrition and hydration status  - Monitor labs (i e  albumin)  - Assess for incontinence   - Turn and reposition patient  - Assist with mobility/ambulation  - Relieve pressure over bony prominences  - Avoid friction and shearing  - Provide appropriate hygiene as needed including keeping skin clean and dry  - Evaluate need for skin moisturizer/barrier cream  - Collaborate with interdisciplinary team (i e  Nutrition, Rehabilitation, etc )   - Patient/family teaching  Outcome: Progressing  Goal: Incision(s), wounds(s) or drain site(s) healing without S/S of infection  Description  INTERVENTIONS  - Assess and document risk factors for skin impairment   - Assess and document dressing, incision, wound bed, drain sites and surrounding tissue  - Consider nutrition services referral as needed  - Oral mucous membranes remain intact  - Provide patient/ family education  Outcome: Progressing  Goal: Oral mucous membranes remain intact  Description  INTERVENTIONS  - Assess oral mucosa and hygiene practices  - Implement preventative oral hygiene regimen  - Implement oral medicated treatments as ordered  - Initiate Nutrition services referral as needed  Outcome: Progressing     Problem: MUSCULOSKELETAL - ADULT  Goal: Maintain or return mobility to safest level of function  Description  INTERVENTIONS:  - Assess patient's ability to carry out ADLs; assess patient's baseline for ADL function and identify physical deficits which impact ability to perform ADLs (bathing, care of mouth/teeth, toileting, grooming, dressing, etc )  - Assess/evaluate cause of self-care deficits   - Assess range of motion  - Assess patient's mobility  - Assess patient's need for assistive devices and provide as appropriate  - Encourage maximum independence but intervene and supervise when necessary  - Involve family in performance of ADLs  - Assess for home care needs following discharge   - Consider OT consult to assist with ADL evaluation and planning for discharge  - Provide patient education as appropriate  Outcome: Progressing  Goal: Maintain proper alignment of affected body part  Description  INTERVENTIONS:  - Support, maintain and protect limb and body alignment  - Provide patient/ family with appropriate education  Outcome: Progressing

## 2020-07-06 NOTE — ASSESSMENT & PLAN NOTE
Vitals:    07/06/20 0700   BP: 129/59   Pulse: 67   Resp: 18   Temp: (!) 97 3 °F (36 3 °C)   SpO2: 94%       Continue amlodipine 5 mg daily,Lopressor 12 5 mg BID,  Lisinopril to 10 mg daily   Monitor and adjust as needed

## 2020-07-06 NOTE — ASSESSMENT & PLAN NOTE
PT/OT, pain management per primary team   S/p L4-S1 laminectomy on 6/15  Spine precautions/LSO brace  F/u with Dr Gela Hernandez upon discharge

## 2020-07-06 NOTE — ASSESSMENT & PLAN NOTE
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

## 2020-07-06 NOTE — ASSESSMENT & PLAN NOTE
Lab Results   Component Value Date    HGBA1C 6 4 (H) 05/12/2020       Recent Labs     07/05/20  1109 07/05/20  1606 07/05/20 2031 07/06/20  0617   POCGLU 166* 104 150* 126       Blood Sugar Average: Last 72 hrs:  (P) 342 [id number]     Continue Metformin 500 mg BID with SSI  Follow up with PCP on discharge  Patient may benefit from increase in Metformin  Will defer to PCP

## 2020-07-06 NOTE — PLAN OF CARE
Problem: Prexisting or High Potential for Compromised Skin Integrity  Goal: Skin integrity is maintained or improved  Description  INTERVENTIONS:  - Identify patients at risk for skin breakdown  - Assess and monitor skin integrity  - Assess and monitor nutrition and hydration status  - Monitor labs   - Assess for incontinence   - Turn and reposition patient  - Assist with mobility/ambulation  - Relieve pressure over bony prominences  - Avoid friction and shearing  - Provide appropriate hygiene as needed including keeping skin clean and dry  - Evaluate need for skin moisturizer/barrier cream  - Collaborate with interdisciplinary team   - Patient/family teaching  - Consider wound care consult   7/6/2020 1231 by Lauren Hdez RN  Outcome: Adequate for Discharge  7/6/2020 1039 by Lauren Hdez RN  Outcome: Progressing     Problem: Potential for Falls  Goal: Patient will remain free of falls  Description  INTERVENTIONS:  - Assess patient frequently for physical needs  -  Identify cognitive and physical deficits and behaviors that affect risk of falls    -  [place] fall precautions as indicated by assessment   - Educate patient/family on patient safety including physical limitations  - Instruct patient to call for assistance with activity based on assessment  - Modify environment to reduce risk of injury  - Consider OT/PT consult to assist with strengthening/mobility  7/6/2020 1231 by Lauren Hdez RN  Outcome: Adequate for Discharge  7/6/2020 1039 by Lauren Hdez RN  Outcome: Progressing     Problem: PAIN - ADULT  Goal: Verbalizes/displays adequate comfort level or baseline comfort level  Description  Interventions:  - Encourage patient to monitor pain and request assistance  - Assess pain using appropriate pain scale  - Administer analgesics based on type and severity of pain and evaluate response  - Implement non-pharmacological measures as appropriate and evaluate response  - Consider cultural and social influences on pain and pain management  - Notify physician/advanced practitioner if interventions unsuccessful or patient reports new pain  7/6/2020 1231 by Bishop Martin RN  Outcome: Adequate for Discharge  7/6/2020 1039 by Bishop Martin RN  Outcome: Progressing     Problem: INFECTION - ADULT  Goal: Absence or prevention of progression during hospitalization  Description  INTERVENTIONS:  - Assess and monitor for signs and symptoms of infection  - Monitor lab/diagnostic results  - Monitor all insertion sites, i e  indwelling lines, tubes, and drains  - Monitor endotracheal if appropriate and nasal secretions for changes in amount and color  - [place] appropriate cooling/warming therapies per order  - Administer medications as ordered  - Instruct and encourage patient and family to use good hand hygiene technique  - Identify and instruct in appropriate isolation precautions for identified infection/condition  7/6/2020 1231 by Bishop Martin RN  Outcome: Adequate for Discharge  7/6/2020 1039 by Bishop Martin RN  Outcome: Progressing     Problem: SAFETY ADULT  Goal: Maintain or return to baseline ADL function  Description  INTERVENTIONS:  -  Assess patient's ability to carry out ADLs; assess patient's baseline for ADL function and identify physical deficits which impact ability to perform ADLs (bathing, care of mouth/teeth, toileting, grooming, dressing, etc )  - Assess/evaluate cause of self-care deficits   - Assess range of motion  - Assess patient's mobility; develop plan if impaired  - Assess patient's need for assistive devices and provide as appropriate  - Encourage maximum independence but intervene and supervise when necessary  - Involve family in performance of ADLs  - Assess for home care needs following discharge   - Consider OT consult to assist with ADL evaluation and planning for discharge  - Provide patient education as appropriate  7/6/2020 1231 by Bishop Martin RN  Outcome: Adequate for Discharge  7/6/2020 1039 by Jayda Villegas RN  Outcome: Progressing  Goal: Maintain or return mobility status to optimal level  Description  INTERVENTIONS:  - Assess patient's baseline mobility status (ambulation, transfers, stairs, etc )    - Identify cognitive and physical deficits and behaviors that affect mobility  - Identify mobility aids required to assist with transfers and/or ambulation (gait belt, sit-to-stand, lift, walker, cane, etc )  - [place] fall precautions as indicated by assessment  - Record patient progress and toleration of activity level on Mobility SBAR; progress patient to next Phase/Stage  - Instruct patient to call for assistance with activity based on assessment  - Consider rehabilitation consult to assist with strengthening/weightbearing, etc   7/6/2020 1231 by Jayda Villegas RN  Outcome: Adequate for Discharge  7/6/2020 1039 by Jayda Villegas RN  Outcome: Progressing     Problem: DISCHARGE PLANNING  Goal: Discharge to home or other facility with appropriate resources  Description  INTERVENTIONS:  - Identify barriers to discharge w/patient and caregiver  - Arrange for needed discharge resources and transportation as appropriate  - Identify discharge learning needs (meds, wound care, etc )  - Arrange for interpretive services to assist at discharge as needed  - Refer to Case Management Department for coordinating discharge planning if the patient needs post-hospital services based on physician/advanced practitioner order or complex needs related to functional status, cognitive ability, or social support system  7/6/2020 1231 by Jayda Villegas RN  Outcome: Adequate for Discharge  7/6/2020 1039 by Jayda Villegas RN  Outcome: Progressing     Problem: SKIN/TISSUE INTEGRITY - ADULT  Goal: Skin integrity remains intact  Description  INTERVENTIONS  - Identify patients at risk for skin breakdown  - Assess and monitor skin integrity  - Assess and monitor nutrition and hydration status  - Monitor labs (i e  albumin)  - Assess for incontinence   - Turn and reposition patient  - Assist with mobility/ambulation  - Relieve pressure over bony prominences  - Avoid friction and shearing  - Provide appropriate hygiene as needed including keeping skin clean and dry  - Evaluate need for skin moisturizer/barrier cream  - Collaborate with interdisciplinary team (i e  Nutrition, Rehabilitation, etc )   - Patient/family teaching  7/6/2020 1231 by Kourtney Ruiz RN  Outcome: Adequate for Discharge  7/6/2020 1039 by Kourtney Ruiz RN  Outcome: Progressing  Goal: Incision(s), wounds(s) or drain site(s) healing without S/S of infection  Description  INTERVENTIONS  - Assess and document risk factors for skin impairment   - Assess and document dressing, incision, wound bed, drain sites and surrounding tissue  - Consider nutrition services referral as needed  - Oral mucous membranes remain intact  - Provide patient/ family education  7/6/2020 1231 by Kourtney Ruiz RN  Outcome: Adequate for Discharge  7/6/2020 1039 by Kourtney Ruiz RN  Outcome: Progressing  Goal: Oral mucous membranes remain intact  Description  INTERVENTIONS  - Assess oral mucosa and hygiene practices  - Implement preventative oral hygiene regimen  - Implement oral medicated treatments as ordered  - Initiate Nutrition services referral as needed  7/6/2020 1231 by Kourtney Ruiz RN  Outcome: Adequate for Discharge  7/6/2020 1039 by Kourtney Ruiz RN  Outcome: Progressing     Problem: MUSCULOSKELETAL - ADULT  Goal: Maintain or return mobility to safest level of function  Description  INTERVENTIONS:  - Assess patient's ability to carry out ADLs; assess patient's baseline for ADL function and identify physical deficits which impact ability to perform ADLs (bathing, care of mouth/teeth, toileting, grooming, dressing, etc )  - Assess/evaluate cause of self-care deficits   - Assess range of motion  - Assess patient's mobility  - Assess patient's need for assistive devices and provide as appropriate  - Encourage maximum independence but intervene and supervise when necessary  - Involve family in performance of ADLs  - Assess for home care needs following discharge   - Consider OT consult to assist with ADL evaluation and planning for discharge  - Provide patient education as appropriate  7/6/2020 1231 by Sergio Alfonso RN  Outcome: Adequate for Discharge  7/6/2020 1039 by Sergio Alfonso RN  Outcome: Progressing  Goal: Maintain proper alignment of affected body part  Description  INTERVENTIONS:  - Support, maintain and protect limb and body alignment  - Provide patient/ family with appropriate education  7/6/2020 1231 by Sergio Alfonso RN  Outcome: Adequate for Discharge  7/6/2020 1039 by Sergio Alfonso RN  Outcome: Progressing

## 2020-07-06 NOTE — DISCHARGE INSTRUCTIONS
DISCHARGE INSTRUCTIONS: Jovanny Alvarado 63 22    Bring these instructions with you to your Outpatient Physician appointments so they can order and follow-up any additional lab work or imaging recommended at time of discharge  If you have any questions or concerns regarding your acute rehabilitation stay including issues with medications, rehabilitation, and follow-up plan, please call:          [address] [place] [address] at [phone] or [phone]  Should you develop fevers, chills, new weakness, changes in sensation, difficulty speaking, facial weakness, confusion, shortness of breath, chest pain, or other concerning symptoms please call 911 and/or obtain transportation to nearest ER immediately  Should you develop worsening pain, swelling, or drainage notify your surgeon right away or obtain transportation to nearest ER for evaluation  PHYSICIANS to see:  Please see your doctors listed in the follow up providers section of your discharge paperwork, and take the discharge paperwork with you to your appointments  Home health has been ordered for you:  If [facility] is your provider their phone number is [phone]  If you are unable to get in touch with home health you may contact your  at [phone]  LAB WORK to recommended after discharge:  Home health should be able to draw you labs this week but if they are not Call [facility] to locate the closest open outpatient lab center where you can have your lab work drawn  Contact Lab services at [phone] if needed      **Obtain PT/INR lab Wednesday (Monitor recent warfarin level)  > When PT/INR lab is less than 2 00 you can resume Eliquis**  > Check with PCP and/or cardiologists to ensure PT/INR lab is less than 2 or to schedule additional testing    Excessive delay in labs and appropriate follow-up could potentially increase your risk of complications which could be severe and even life-threatening  It is you or your caregiver's responsibility to ensure these tests are ordered by your outpatient providers and followed up with accordingly  IMAGING to follow-up:  Follow-up imaging at discretion of your outpatient physicians to be determined at time of your appointments  Contact surgeon to schedule next x-ray and follow-up appointment  ADDITIONAL INCIDENTAL FINDINGS to follow-up:      Tiny lymph node with fat stranding in R arm pit - Per hospital records, you were noted to have subcentimeter lymph node in the right axilla with fat stranding on imaging  Due to history of breast CA, radiology report clinical correlation was recommended  Patient was evaluated by Dr Rajiv Colbert (general surgery) in acute care who noted this to be an asymptomatic finding and recommended no further workup at this time other than routine follow up  with PCP & continued regular mammograms for her history of  breast CA  Abnormalities on CT of 11/2019:    - diverticulosis  - Left kidney cyst  - Bilateral non-obstructing kidney stones     Discuss each of the above with PCP on follow-up appointment    SKIN CARE INSTRUCTIONS to follow:  Monitor incision(s) for increased redness, swelling, pain/tenderness, discharge/pus and promptly notify your surgeon should these develop  - Should you develop significant pain, swelling, or drainage obtain transportation to nearest emergency room for immediate evaluation if unable to reach your surgeon promptly   - Should you develop uncontrolled pain, fever, chills, sweats, changes in strength, sensation, or color of this area obtain transportation to nearest emergency room for immediate evaluation  Monitor skin for increased redness or breadown and promptly notify your physician should these develop    Be sure you stand and walk some every 1-2 hours during day    While seated or lying in bed shift positions and from side to side often  Can use barrier type cream such as Hydragaurd 1-2 times per day  Turn patient (yourself) every 2 hours  WEIGHTBEARING/ACTIVITY PRECAUTIONS to follow:  Weightbearing as tolerated  Follow post-surgical restrictions as outlined by your surgeon  Lumbar Spinal precautions  Wear spine brace as instructed by surgeon     Driving restrictions: You are recommended against driving until cleared by an outpatient physician  Work restrictions: You should NOT return to work (if working) until cleared by an outpatient physician  You should not operate heavy machinery (if applicable) until cleared by an outpatient physician  Alcohol restrictions: You are recommended to not drink alcohol at this time unless cleared by an outpatient physician  Drinking alcohol in your current functional condition can increase your risk of injury which could be severe  Drinking alcohol given your current health problems can lead to increased medical complications which could be severe  Combining alcohol with your current medications can increase your risk of injury which could be severe  MEDICATIONS:  Please see a full list of your medications outlined in the After Visit Summary that is attached to these Discharge Instructions  Please note changes may have been made to your medications please refer to your discharge paperwork for your current medications and take this list with you to all your doctors appointments for your doctors to review  Please do not resume a home medication unless the medication reconciliation sheet indicates to do so, please do not assume that a medication that you were given a prescription for is the same as a medication you have at home based on both medications having the same name as dosages and frequency may have changed        Unless specifically noted in your medication list provided to you in your discharge paper work do not resume prior vitamins, minerals, or supplements you may have been taking prior to your hospitalization unless instructed by an outpatient physician in the future  Blood thinners prescribed to optimize long and short term health and decrease risk of complications but with risks related to bleeding and other complications  YOU WERE RECENTLY PLACED ON WARFARIN AT RECOMMENDATION OF YOUR SPINE SURGEON, DR Jazzy Najera  YOU WERE RECOMMENDED TO CONTINUE WARFARIN FOR 3 WEEKS POST SURGERY WHICH IS NOW  YOU MOST RECENT PT/INR (WARFARIN) level was elevated to 3 42  YOU SHOULD NOT RESUME ELIQUIS UNTIL PT/INR is <2 00 per our INTERNAL MEDICINE team     YOU and YOUR FAMILY need to ENSURE to check PT/INR level until <2 00  [address] LAB Wednesday  If they are unable to  Arrange PT/INR lab draw through PCP or cardiology as an outpatient  ONCE PT/INR LESS THAN 2 00 YOU CAN RESUME PRIOR ELIQUIS PRESCRIPTION  Your primary care physician or cardiologist will need to manage your blood thinners after discharge from the hospital   Contact them within next business day to ensure appropriate follow-up management  These are strong anticoagulant (blood thinner)  It is being recommended for use by you (or your family) to decrease the risk of clotting which can be severely disabling and even life-threatening  Even when provided as recommended it can cause severely disabling and even life-threatening bleeding  Too much or too little blood thinners further increases your risk of this medication causing serious and even life-threatening complications such as severe bleeding, clots, strokes and death  With that said, at this time based on available evidence and consensus agreement, your physicians recommend you resume ELIQUIS once PT/INR <2 00 based on your overall risks and benefits in your specific medical situation        Sedating Medications with increased risk of complications:    Prior to this hospitalization you were taking methocarbamol and gabapentin as prescribed by your outpatient physicians for pain  During this hospital course we have continued this  This medication will need to be managed by your primary care physician after discharge  Follow-up with this provider as soon as possible to ensure appropriate use  Gabapentin has been used to help pain  You tolerated this medication adequately during your recent hospital stay  - Do not take with alcohol (or marijuana/cannabis) while on this medication as this can cause increased confusion, breathing problems, falls, and severe injury  Your goal will be to wean off of opiate medications and then onto acetaminophen only and then off of acetaminophen as well if possible  Oxycodone (opiate) pain medication has been used to help your acute pain  You tolerated this medication adequately during your recent hospital stay  You will be given a limited supply of opiate pain medications on discharge; should you require additional refills/medications, your PCP and/or surgeon may prescribe at his/her discretion  This can be quite helpful particularly for severe acute pain  They can increase your risk of falling, injury, and breathing difficulties which can be severe and even life-threatening  Note it is advisable to limit use of opiate pain medications as they can become habit forming and lead to addiction  MEDICAL MANAGEMENT AT HOME specific to you:    Diabetes - keep track of blood sugars as you were before     NSAID Warning:  Please avoid NSAID (including but not limited to advil, aleve, motrin, naproxen, ibuprofen, mobic, meloxicam, diclofenac etc) medications as NSAID medications may increase your risk of bleeding (which can be life-threatening)    Please note a summary of your hospital stay with relevant information for your doctors will try to be sent to them    Please confirm with your doctors at your follow up visits that they have received this summary and have them contact [id number] if they have not received them along with any other medical records they may require  [facility] Phone Number:  [phone]          Lumbar Spinal Fusion   WHAT YOU NEED TO KNOW:   Lumbar spinal fusion is a surgery to treat an injury or problems with your lumbar spine (lower back)  In lumbar spinal fusion, 2 or more vertebrae are joined together using bone grafts or implants, screws, and rods  This will help stabilize your back and may reduce pain  DISCHARGE INSTRUCTIONS:   · Medicines  can help relax your muscles, or decrease pain and swelling  You may also need medicine to prevent a bacterial infection  · Take your medicine as directed  Contact your healthcare provider if you think your medicine is not helping or if you have side effects  Tell him or her if you are allergic to any medicine  Keep a list of the medicines, vitamins, and herbs you take  Include the amounts, and when and why you take them  Bring the list or the pill bottles to follow-up visits  Carry your medicine list with you in case of an emergency  Follow up with your healthcare provider as directed:  Write down your questions so you remember to ask them during your visits  Wound care:  Care for your incision wound as directed  You may need to carefully wash the wound with soap and water  Dry the area and put on new, clean bandages as directed  Change your bandages when they get wet or dirty  Ask someone to help you if you cannot reach your incision wound  Take a shower with your wound away from the stream of water  Do not take a bath or swim until your wound heals  Self-care after lumbar spinal fusion:   · Rest as needed  Slowly return to your normal activities  Do not sit for long periods of time  Do not bend, twist, or lift heavy objects for at least 3 months, or as directed by your healthcare provider   Ask when it is safe for you to return to work or drive      · Reduce strain and pressure on your spine  Place a pillow under your knees when you lie on your back  Place a pillow between your knees when you lie on your side  When you sit, put your feet up on a footrest  If you need to bend over, bend at your knees, not your back  Do not lie on your stomach or with your legs flat  · Wear a brace as directed  Your healthcare provider may recommend a brace to support your back and help it heal     · Go to physical therapy as directed  A physical therapist will show you exercises to safely improve your strength and movement  These exercises can also help decrease pain  · Maintain a healthy weight  Ask your healthcare provider how much you should weigh  Ask him to help you create a weight loss plan if you are overweight  · Do not smoke  If you smoke, it is never too late to quit  Quitting can help you heal after surgery  Ask for information if you need help quitting  Contact your healthcare provider if:   · You have increased pain, redness, or swelling at your incision  · You have increased drainage or bleeding from your incision  · You have weakness, tingling, or pain in your leg  · You have questions or concerns about your condition or care  Seek care immediately or call 911 if:   · You have severe back or leg pain  · You cannot control your bladder or bowel movements  · You have a high fever with nausea and vomiting  · You have a stiff neck or a headache  · You are confused or have a seizure  · You have trouble breathing  © 2017 [address] [employer] Information is for End User's use only and may not be sold, redistributed or otherwise used for commercial purposes  All illustrations and images included in CareNotes® are the copyrighted property of [employer]  or Mahad Davis  The above information is an  only   It is not intended as medical advice for individual conditions or treatments  Talk to your doctor, nurse or pharmacist before following any medical regimen to see if it is safe and effective for you

## 2020-07-06 NOTE — PROGRESS NOTES
This Care manager assistant received ADT notification that the patient was discharged today 7/6/2020  The patient made good progress with her PT  She returned home with [facility] with RN, PT, OT, and SLP services  I have removed myself off of the care team,updated the BPCI form, a sent a in basket to the [facility] who will be following the patient, and updated the care coordination note

## 2020-07-06 NOTE — PROGRESS NOTES
Progress Note - Jerzy Davison 1940, 78 y o  female MRN: [id number]    Unit/Bed#: -01 Encounter: [id number]    Primary Care Provider: Debra White DO   Date and time admitted to hospital: 6/21/2020 11:39 AM        Leukocytosis  Assessment & Plan  Resolved   WBC today (7/6) - 6 80       Abnormal CAT scan  Assessment & Plan  CTA performed on 6/18 revealed "fat stranding noted in the right axillary/chest wall region, of uncertain significance, recommend clinical correlation"  Follow up with PCP on  discharge         Lymph node disorder  Assessment & Plan  Per hospital records, patient noted to have subcentimeter lymph node in the right axilla with fat stranding on imaging  Due to history of breast CA, radiology report clinical correlation was recommended  Patient was evaluated by Dr Kevon Andersen in acute care who noted this to be an asymptomatic finding and recommended no further workup at this time other than routine follow up  with PCP & continued regular mammograms for her history of  breast CA  Follow up with PCP on discharge  IBS (irritable bowel syndrome)  Assessment & Plan  Continue Bentyl  LBM 7/5       Mood disorder (HCC)  Assessment & Plan  Sertraline 25 mg daily   Mood stable  Neuropathic pain  Assessment & Plan  Patient with DM  Continues on neurontin 100 mg BID and 200 q h s         HTN (hypertension)  Assessment & Plan  Vitals:    07/06/20 0700   BP: 129/59   Pulse: 67   Resp: 18   Temp: (!) 97 3 °F (36 3 °C)   SpO2: 94%       Continue amlodipine 5 mg daily,Lopressor 12 5 mg BID,  Lisinopril to 10 mg daily   Monitor and adjust as needed     DM (diabetes mellitus) (HCC)  Assessment & Plan  Lab Results   Component Value Date    HGBA1C 6 4 (H) 05/12/2020       Recent Labs     07/05/20  1109 07/05/20  1606 07/05/20  2031 07/06/20  0617   POCGLU 166* 104 150* 126       Blood Sugar Average: Last 72 hrs:  (P) 182 [id number]     Continue Metformin 500 mg BID with SSI  Follow up with PCP on discharge  Patient may benefit from increase in Metformin  Will defer to PCP  Hypokalemia  Assessment & Plan  Per most recent hospitalization, K+ as low as 2 2 (felt due to poor p o  Intake)  K+ 7/2 3 8  Patient with improved p o  Intake  Follow up with PCP on discharge  Chronic deep vein thrombosis (DVT) of left popliteal vein [facility])  Assessment & Plan  Results from last 7 days   Lab Units 07/06/20  0531 07/04/20  0554 07/02/20  0604   INR  3 42* 2 85* 2 23*     Non-occlusive LLE DVT (popliteal vein) on 6/16 imaging, unchanged on repeat imaging 6/26 - was on Eliquis prior to surgery  Per neurosurgery, patient not cleared to resume Eliquis  Dr Darline Bennett does not want patient on Eliquis for at least 3 weeks post-op  INR goal of 2 0-2 5  INR today 3 42 - Coumadin held - INR on Wednesday  If =/< 2 0 she may restart Eliquis  Patient will need clearance by spine surgery to be transitioned back to Eliquis  Arrhythmia  Assessment & Plan  Follows with 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 (Dr Teresita Solomon)  History of a-flutter (patient does not remember this dx) - patient is on chronic AC due to DVT  She was on warfarin s/p surgery; will be transitioned to Eliquis on discharge  Hiatal hernia with gastroesophageal reflux  Assessment & Plan  Found on CTA during most recent admission  General surgery saw patient in consultation during recent hospitalization  Continue Protonix 40 mg daily  Monitor for recurrence of esophageal spasm episodes - continues to deny symptoms  Follow up with general surgery on discharge          Hyperlipidemia  Assessment & Plan  Most recent lipid profile (5/12/2020) - total cholesterol 209, triglycerides 145, HDL 64,   Continue atorvastatin 40 mg daily     Hypothyroidism  Assessment & Plan  Most recent TSH (5/12) was 2 23  Continue levothyroxine 125mcg (home dose)    * Spinal stenosis of lumbar region  Assessment & Plan  PT/OT, pain management per primary team   S/p L4-S1 laminectomy on 6/15  Spine precautions/LSO brace  F/u with Dr Austin Vásquez upon discharge  VTE Pharmacologic Prophylaxis:   Pharmacologic: Warfarin (Coumadin)  Mechanical VTE Prophylaxis in Place: Yes    Current Length of Stay: 15 day(s)    Current Patient Status: Inpatient Rehab     Discharge Plan:  Patient is medically cleared for discharge today  Her warfarin has been DC (elevated INR of 3 42 today)  Left message with Dr Collin Michel office (as well as tiger text) to inform that patient is 3 weeks post surgery and per DC summary, patient may restart Eliquis 3 weeks after surgery  Code Status: Level 1 - Full Code    Subjective:   No overnight events  Patient is happy to be going home today  She offers no new issues/concerns  We did discuss the discontinuation of warfarin with INR on Wednesday  If INR level is 2 0 or less, she may safely start Eliquis  Objective:     Vitals:   Temp (24hrs), Av 4 °F (36 3 °C), Min:97 1 °F (36 2 °C), Max:97 9 °F (36 6 °C)    Temp:  [97 1 °F (36 2 °C)-97 9 °F (36 6 °C)] 97 3 °F (36 3 °C)  HR:  [67-82] 67  Resp:  [18] 18  BP: (129-152)/(59-70) 129/59  SpO2:  [94 %-98 %] 94 %  Body mass index is 33 2 kg/m²  Review of Systems   Constitutional: Negative for chills, fatigue and fever  HENT: Negative for congestion, postnasal drip, rhinorrhea, sneezing and sore throat  Respiratory: Negative for cough, chest tightness and shortness of breath  Cardiovascular: Negative for chest pain, palpitations and leg swelling  Gastrointestinal: Negative for abdominal distention, abdominal pain, constipation, diarrhea and nausea  Genitourinary: Negative for difficulty urinating, dysuria and frequency  Musculoskeletal: Positive for back pain (improved) and gait problem  Negative for joint swelling and myalgias     Neurological: Negative for dizziness, light-headedness and headaches  Hematological: Negative for adenopathy  Does not bruise/bleed easily  Psychiatric/Behavioral: Negative for confusion and dysphoric mood  The patient is not nervous/anxious  Input and Output Summary (last 24 hours): Intake/Output Summary (Last 24 hours) at 7/6/2020 1206  Last data filed at 7/6/2020 0830  Gross per 24 hour   Intake 940 ml   Output 200 ml   Net 740 ml       Physical Exam:     Physical Exam   Constitutional: She is oriented to person, place, and time  She appears well-developed and well-nourished  HENT:   Head: Normocephalic and atraumatic  Right Ear: External ear normal    Left Ear: External ear normal    Nose: Nose normal    Mouth/Throat: Oropharynx is clear and moist    Eyes: Conjunctivae and EOM are normal    Neck: Normal range of motion  Neck supple  No thyromegaly present  Cardiovascular: Normal rate, regular rhythm, normal heart sounds and intact distal pulses  Pulmonary/Chest: Effort normal and breath sounds normal    Abdominal: Soft  Bowel sounds are normal  She exhibits no distension  There is no tenderness  Musculoskeletal: She exhibits no edema or deformity  Lymphadenopathy:     She has no cervical adenopathy  Neurological: She is alert and oriented to person, place, and time  Skin: Skin is warm and dry  Psychiatric: She has a normal mood and affect   Her behavior is normal        Additional Data:     Labs:    Results from last 7 days   Lab Units 07/06/20  0513   WBC Thousand/uL 6 80   HEMOGLOBIN g/dL 11 1*   HEMATOCRIT % 33 4*   PLATELETS Thousands/uL 393   NEUTROS PCT % 54   LYMPHS PCT % 33   MONOS PCT % 11*   EOS PCT % 2     Results from last 7 days   Lab Units 07/06/20  0531   SODIUM mmol/L 137   POTASSIUM mmol/L 3 8   CHLORIDE mmol/L 102   CO2 mmol/L 28   BUN mg/dL 18   CREATININE mg/dL 0 64   ANION GAP mmol/L 7   CALCIUM mg/dL 9 4   GLUCOSE RANDOM mg/dL 120*     Results from last 7 days   Lab Units 07/06/20  0531   INR  3 42*     Results from last 7 days   Lab Units 07/06/20  0617 07/05/20  2031 07/05/20  1606 07/05/20  1109 07/05/20  0628 07/04/20  2028 07/04/20  1604 07/04/20  1131 07/04/20  0640 07/03/20  2047 07/03/20  1634 07/03/20  1132   POC GLUCOSE mg/dl 126 150* 104 166* 135 137 152* 162* 129 141* 138 140               Labs reviewed    Imaging:    Imaging reviewed    Recent Cultures (last 7 days):           Last 24 Hours Medication List:     Current Facility-Administered Medications:  acetaminophen 650 mg Oral Q8H Naomi Spencer MD   amLODIPine 5 mg Oral Daily HUNTER Kuhn   atorvastatin 40 mg Oral Daily With MD Krystin   dicyclomine 20 mg Oral Q6H Tasha Beckman MD   docusate sodium 100 mg Oral BID HUNTER Kuhn   gabapentin 100 mg Oral BID Rolando Frances MD   gabapentin 200 mg Oral HS Rolando Frances MD   insulin lispro 1-5 Units Subcutaneous TID Mushtaq Loya MD   insulin lispro 1-5 Units Subcutaneous HS Tasha Beckman MD   levothyroxine 125 mcg Oral Early Morning Tasha Beckman MD   lidocaine 1 patch Topical Daily Raad Farnsworth MD   lisinopril 10 mg Oral Daily HUNTER Resendez   menthol-methyl salicylate  Apply externally HS Rolando Frances MD   menthol-methyl salicylate  Apply externally TID PRN Rolando Frances MD   metFORMIN 500 mg Oral BID With Meals Tasha Beckman MD   methocarbamol 750 mg Oral Q6H PRN Tasha Beckman MD   metoprolol tartrate 12 5 mg Oral Q12H [address] HUNTER Kuhn   oxyCODONE 2 5 mg Oral Q4H PRN Tasha Beckman MD   oxyCODONE 5 mg Oral Q4H PRN Tasha Beckman MD   pantoprazole 40 mg Oral Early Morning Tasha Beckman MD   polyethylene glycol 17 g Oral Daily PRN Tasha Beckman MD   polyethylene glycol 17 g Oral Once HUNTER Belle   senna-docusate sodium 2 tablet Oral HS PRN HUNTER Feliciano   sertraline 25 mg Oral Daily Tasha Beckman MD   warfarin 5 mg Oral Daily (warfarin) HUNTER Vegas        M*Modal software was used to dictate this note    It may contain errors with dictating incorrect words or incorrect spelling  Please contact the provider directly with any questions

## 2020-07-06 NOTE — SPEECH THERAPY NOTE
[facility] Speech Therapy Discharge Summary    Pt discharged home on 7/6/2020 with supervision and assistance from family  Pt was being followed for cognitive linguistic therapy where pt made limited progress towards goals, as pt was only followed briefly due to ST team consulted toward end of stay  At time of discharge, pt was functioning at supervision level for social interaction, min assist for expression and comprehension and mod assist for problem solving and memory skills  Pt completed the CLQT+ on initial evaluation with scores correlating to overall moderate cognitive linguistic deficits characterized by deficits in attention, memory, executive functions, language and visuospatial skills  During therapy sessions, pt continued to present with slower/delayed processing, decreased attention, decreased problem solving, delayed initiation of tasks, decreased STM recall, decreased working memory and overall safety  Family training was completed with pt's daughter, [facility], who pt lives with where she was educated on recommendations for pt to have 24/hour supervision following discharge form unit, along with assistance for all IADLs to include finances, medications, cooking, etc  Pt's daughter, [facility], reports that she was already providing assistance with some of these tasks but will now provide assistance with all, along with 24/hr supervision to ensure safety  She was also educated on recommendations for pt to have bed/chair alarms and OT provided her with a handout of where to purchase alarms  Pt will benefit from continued skilled ST services at this time to further target cognitive linguistic skills in order to improve safety and overall level of independence

## 2020-07-07 ENCOUNTER — PATIENT OUTREACH (OUTPATIENT)
Dept: CASE MANAGEMENT | Facility: OTHER | Age: 80
End: 2020-07-07

## 2020-07-07 PROBLEM — E87.6 HYPOKALEMIA: Status: RESOLVED | Noted: 2020-06-21 | Resolved: 2020-07-07

## 2020-07-07 PROBLEM — D72.829 LEUKOCYTOSIS: Status: RESOLVED | Noted: 2020-06-26 | Resolved: 2020-07-07

## 2020-07-07 NOTE — DISCHARGE SUMMARY
Discharge Summary - Paola Christopher 78 y o  female MRN: [id number]  Unit/Bed#: [facility] 263-01 Encounter: [id number]    Admission Date: 6/21/2020     Discharge Date:  07/06/2020    Rehabilitation/Etiologic Diagnosis:   Rehab Dx: other, ortho 8 9  Lumbar spinal stenosis s/p lumbar decompression and fusion id    Discharge Diagnoses:      Patient Active Problem List   Diagnosis    Spinal stenosis of lumbar region    Hypothyroidism    Hyperlipidemia    Hiatal hernia with gastroesophageal reflux    Arrhythmia    Chronic deep vein thrombosis (DVT) of left popliteal vein (HCC)    DM (diabetes mellitus) ([address] )    HTN (hypertension)    Neuropathic pain    Mood disorder (HCC)    IBS (irritable bowel syndrome)    Lymph node disorder    Abnormal CAT scan       Acute Rehabilitation Center Course:   (with significant findings, care, complications, treatment, and services provided):     Patient participated in a comprehensive interdisciplinary inpatient rehabilitation program which included involvment of MD, therapies (PT, OT), RN, CM/SW, dietary  She made  functional gains and was able to be advanced to a largely supervision level of assist and is considered adequately safe for discharge home with family/caregivers  Please see below for patient's hospital course and day to day management of medical needs in problem list format      Functional status on admission to [facility]:  Max assist bathing, dressing, moderate assist toileting and transfers  Ambulation total azael    Functional status on discharge from [facility]:  Ambulation 150 ft supervision, dressing, bathing supervision     * Spinal stenosis of lumbar region  Assessment & Plan  - s/p L4-S1 lami-fusion by Dr Gabrielle Dumont on 6/15  - spine precautions/LSO brace  - OP FU with Dr Gabrielle Dumont   - Pain adequately controlled  - [place] PDMP website checked and no concerning Rx's or Rx patterns noted    - D/C on:  - APAP TID PRN  - Oxy 5mg TID PRN > limited Rx provided   - VTE prophylaxis - was warfarin for 3 weeks > transition back to home Eliquis once INR<2      Abnormal CAT scan  Assessment & Plan  Abnormalities on CT of 11/2019 including but not limited to:    - diverticulosis  - L renal cyst  - B/L non-obstructing renal stones     - OP FU with PCP - discussed with pt/pt's family     Lymph node disorder  Assessment & Plan  - noted to have subcentimeter lymph node in the rt axilla with fat stranding on imaging in the setting of h/o breast CA therefore per radiology report clinical correlation was recommended and patient was evaluated by Dr Caro Alanis in acute care who noted this to be an asymptomatic finding and recommended no further SMITH at this time other than routine FU with PCP & continued regular mammograms for her h/o breast CA     IBS (irritable bowel syndrome)  Assessment & Plan  - on home bentyl 20 mg q6     Mood disorder (HCC)  Assessment & Plan  - on home zoloft 25 mg qd     Neuropathic pain  Assessment & Plan  - in setting of DM  - continue gabapentin 100/100/200mg, uptitrate as needed     HTN (hypertension)  Assessment & Plan  - BP med dosing adjusted per medicine during course   - D/C on:  Amlodipine 5mg qday  Lisinopril 10mg qday  MTP XL 25mg qday   - Follow-up with PCP     DM (diabetes mellitus) ([facility] [address] )  Assessment & Plan  - on home metformin 500 mg BID       Chronic deep vein thrombosis (DVT) of left popliteal vein (HCC)  Assessment & Plan  - non-occlusive LLE DVT of the popliteal vein per imaging on 6/16/20, however similar finding on imaging going back to at least 6/2007  - patient was previously on eliquis, but per Dr Hall Laughter would hold eliquis for 3 weeks post-op > now at 3 weeks with patient recently on warfarin per medicine dosing  - INR supratx at 3 42 on day of discharge - Monday   - Medicine recommends holding anticoagulation and repeating INR Wednesday - order placed for [facility] RN to draw  - If INR <2 00 pt ok to resume Eliquis Wednesday otherwise keep monitoring INR thru PCP and resume Eliquis when [employer] with PCP office nurse to follow-up prior to d/c   - Spoke with patient/family by phone and they understand plan       Arrhythmia  Assessment & Plan  - h/o AVNRT s/p ablation in 11/2019  - [facility]o [facility] [place] due to h/o DVT)  - follows with Dr Anamaria Cowan of [facility] cards     Hiatal hernia with gastroesophageal reflux  Assessment & Plan  -home PPi     Hyperlipidemia  Assessment & Plan  - on home lipitor 40 mg qpm     Hypothyroidism  Assessment & Plan  - on home levothyroxine 125 mcg qd   - TSH on 5/12/20 WNL     Leukocytosisresolved as of 7/7/2020  Assessment & Plan  - Resolved; pt afebrile   - UCx mixed contaminants  - dopplers with chronic left DVT  - CXR negative    Hypokalemiaresolved as of 7/7/2020  Assessment & Plan  Resolved           Disposition   Home with [facility]    Follow-up providers and other issues to be followed up after discharge through PCP and other specialists   PCP - follow-up PT/INR > resumption of Eliquis; incidental findings - diverticulosis, renal cyst, and b l non-obstructing renal stones   Ortho     - See AVS (After visit summary) with discharge instructions, discharge medications, and other details  Imaging (See above as well):  Xr Chest Pa & Lateral    Result Date: 6/26/2020  Impression: No acute cardiopulmonary disease  Workstation performed: [id number]       Pertinent Recent Labs (See Course above, EPIC EMR, and if needed request additional records):  Results for Miguel Kvng (MRN [id number]) as of 7/7/2020 10:07   Ref   Range 7/6/2020 05:13 7/6/2020 05:31   Sodium Latest Ref Range: 137 - 147 mmol/L  137   Potassium Latest Ref Range: 3 6 - 5 0 mmol/L  3 8   Chloride Latest Ref Range: 97 - 108 mmol/L  102   CO2 Latest Ref Range: 22 - 30 mmol/L  28   Anion Gap Latest Ref Range: 5 - 14 mmol/L  7   BUN Latest Ref Range: 5 - 25 mg/dL  18   Creatinine Latest Ref Range: 0 60 - 1 20 mg/dL  0 64   Glucose, Random Latest Ref Range: 70 - 99 mg/dL  120 (H)   GLUCOSE FASTING Latest Ref Range: 70 - 99 mg/dL  120 (H)   Calcium Latest Ref Range: 8 4 - 10 2 mg/dL  9 4   eGFR Latest Ref Range: >60 ml/min/1 73sq m  83   WBC Latest Ref Range: 4 50 - 11 00 Thousand/uL 6 80    Red Blood Cell Count Latest Ref Range: 4 00 - 5 20 Million/uL 3 57 (L)    Hemoglobin Latest Ref Range: 12 0 - 16 0 g/dL 11 1 (L)    HCT Latest Ref Range: 36 0 - 46 0 % 33 4 (L)    MCV Latest Ref Range: 80 - 100 fL 93    MCH Latest Ref Range: 26 0 - 34 0 pg 31 2    MCHC Latest Ref Range: 31 0 - 36 0 g/dL 33 4    RDW Latest Ref Range: <15 3 % 13 6    Platelet Count Latest Ref Range: 150 - 450 Thousands/uL 393        Procedures Performed During ARC Admission: None    Discharge Physical Examination:      Vitals:    07/06/20 0700   BP: 129/59   Pulse: 67   Resp: 18   Temp: (!) 97 3 °F (36 3 °C)   SpO2: 94%       GEN:  Sitting in NAD   HEENT/NECK: Moist membranes  CARDIAC: Regular rate rhythm, no murmers, no rubs, no gallops  LUNGS:  clear to auscultation, no wheezes, rales, or rhonchi  ABDOMEN: Soft, non-tender, non-distended, normal active bowel sounds  EXTREMITIES/SKIN:  no calf edema, no calf tenderness to palpation and Incision healing adequately  NEURO:   MENTAL STATUS:  Appropriate wakefulness and interaction   PSYCH:  Affect:  Euthymic     HPI:   Per Dr Keily Escamilla  "Lacy Floyd is a 78 y o  female who presented to the [employer] [facility] with lumbar stenosis s/p L4-S1 lami-fusion by Dr Rafael Valentino on 6/15  Please see below for further details        Subjective: patient without complaint currently      Review of Systems: A 10-point review of systems was performed  Negative except as listed above "    Condition at Discharge: good     Discharge instructions/Information to patient and family:   See after visit summary for information provided to patient and family        Provisions for Follow-Up Care:  See after visit summary for information related to follow-up care and any pertinent home health orders  Disposition: Home with Agus Nieves PT, RN     Planned Readmission:  No    Discharge Statement   Total time spent examining patient, counseling patient (or patient/family) on condition, medication, rehabilitation/medical plan, and coordinating care on day of discharge: at least 45 minutes  Greater than 50% of the total time was spent examining patient, answering all questions, directly discussing plan of care and post-discharge instructions with patient (or patient and family)  Additional time spent on coordinating care and other discharge activities  Discharge Medications:  See after visit summary for reconciled discharge medications provided to patient and family

## 2020-07-07 NOTE — OCCUPATIONAL THERAPY NOTE
OT DISCHARGE SUMMARY    Pt was discharged to home with dtr Fabio Rodgers support  Pt currently functioning at supervision level for ADL, supervision level for transfers  Pts set goals were to be MI at time of dc  However, due to pts mod cog deficits and dec safety during transfers,  therapist recommend 24/7 supervisionThe following DME was recommended BSC, LHAE, shower chair  Family training occurred with dtr Fabio Rodgers  Therapist educated dtr OT recommendations include 24/7 supervision 2* to cog deficits and pt with dec safety when using RW as well as pt at times attempting to ambulate w/o AD  Dtr reporting she has purchased baby monitor to supervise pt from basement, and therapist provided Dtr Blanca Lucho with bed/chair alarms who reports she will have  purchase  In addition, therapist recommend dtr to A with all IADLs including melas, laundry, med and communicated therapist spoke with sister Esha about SimpleDose form [employer]  When therapist mentioning Aline's sister dtr becoming upset reporting to therapist "Don't call by sister call me  I don't want to be getting anymore text from my sister  Plus I'm the one that lives with her " However therapist educated dtr per pts request to mainly contact Peg medical needs  Therapist educated dtr on providing pt with ice packs for back to dec pain  Therapist educated dtr on providing 24/7 supervision even for showers in order to prevent falls  Pts dtr Peg has already purchased shower chair  Pt purchase don this day a hip kit with all necessary LHAE with receipt provided  Throughout family training Dtr Blanca Yepez observed disengaged at times and on phone req fair vc for education and attention to FT  Dtr Blanca Lucho however, reporting she will provide 24/7 supervision as well as her  can A  Therapist recommends 7/5/2020 2* to pt having necessary 24/7 supervision which will be provided by family as well as pt projected to meet supervision goals at time of dc    OT recommended pt continue tor receive home OT services      Barthel Index Score - (d/c)  Feeding- 10/10  Bathing- 5/5 (S level)  Grooming- 5/5 (S level)  Dressing- 10/10 (S level)  Bowels- 10/10  Bladder- 10/10  Toilet use- 10/10 (S level)  Transfers-15/15 (S level)  Mobility- 10/15 (CS level)  Stairs- n/a pt has ramp to enter home     Total- 90/100  (0-20 total dependence) (21-60 severe dependence) (61-90 moderate dependence) (91-99 slight dependence) (100 independent)     Barthel Index Score (IE)  Feeding- 10/10  Bathing- 0/5  Grooming- 5/5  Dressing- 5/10  Bowels- 10/10  Bladder- 10/10  Toilet use- 5/10  Transfers-5/15  Mobility- 5/15  Stairs- n/a pt has ramp to enter home     Total- 55/90  (0-20 total dependence) (21-60 severe dependence) (61-90 moderate dependence) (91-99 slight dependence) (100 independent)         -Christen Flynn MS, OTR/L

## 2020-07-07 NOTE — PROGRESS NOTES
Maria De Jesus Barajas is managing at home with good family support following hospitalization and rehab stay s/p lumbar laminectomy and fusion  Her children are staying with her at her home  She wears back brace when out of bed and ambulates with walker  She will be receiving home care services of SN, PT, OT  She denies needing additional assistance at this time  She has a dressing over her incision which is clean and dry  No s/s of infection noted  She has pain in bilat LE more in L, taking tylenol and oxycodone with good relief  No issues with constipation  Decreased appetite reported  She monitors her blood sugar and it has been stable @ 135  No hypoglycemic episodes noted  She is taking all medications as prescribed  Follow up appointments scheduled  No transportation issues noted  I explained the bundle program and she is willing to participate  Provided her with my contact information, she is agreeable to continued outreach

## 2020-07-08 NOTE — PHYSICAL THERAPY NOTE
ARC  PT DC SUMMARY    Pt is an 78 y o  Female who was admitted to [address] [place] [address]  on 6/21/2020 with dx of lumbar spinal stenosis, undergoing elective surgery on 6/15/2020 for L4/5, L5/S1 laminectomy, L4/5 PSF, and L4-5 instrumentation  Pts daughter and son-in-law lives with pt  [place] with ramp entry or [address] onto patio  At baseline pt use of SPC or rollator, I ADLs  To wear LSO when OOB  Pt DCd home at S level for gait and transfers despite initial goals set for mod I as pt exhibited moderate cognitive deficits with dec carryover of safety with transfers and spinal precaution adherence  Often progress hindered by varied pain ata vs control  Education to ask for pain medications as needed  Daughter Yenifer Ojeda participated in family training however evident somewhat dec engagement in tasks and education, verbalized understanding of transfers and precautions having had back surgery herself  Advised pt to use Rw at all times and NOT rollator to maximize safety and reduce fall risk  Pt educated and provided with written HEP for seated LE TE  Daughter Peg notified and aware of need to purchase RW for  DC home  Family aware and in agreement to provide [address] for pt DC home, home PT recommended for followup

## 2020-07-09 ENCOUNTER — TELEPHONE (OUTPATIENT)
Dept: OTHER | Facility: HOSPITAL | Age: 80
End: 2020-07-09

## 2020-07-09 NOTE — TELEPHONE ENCOUNTER
PT/INR yesterday reportedly 1 9 per [facility]  They notified PCP yesterday  Spoke with patient today but was not sure results  Discussed with Carolyn James at PCP office and they confirmed they did receive the message  Notified patient that she could resume home Eliquis now that INR was less than 2  She reported understanding and was appreciative of the call

## 2020-07-21 ENCOUNTER — PATIENT OUTREACH (OUTPATIENT)
Dept: CASE MANAGEMENT | Facility: OTHER | Age: 80
End: 2020-07-21

## 2020-07-21 NOTE — PROGRESS NOTES
Anna Kim is managing well at home  She continues with home care services of SN and therapy but will be discharged soon  She wears back brace when out of bed and states her pain level is a 5/10  She is independent with ADL's  She lives with her daughter who assists as needed  Her daughter manages her medications and she takes all as prescribed  She has [employer] so no issues with cost   Her daughter transports her to appointments, she has f/u with PCP on Friday and sees surgeon on 8/14  Nutritional intake adequate, blood sugars are stable with no hypoglycemic episodes noted  She is ambulatory with walker  Incisions are open to air without s/s of infection  No additional assistance needed at this time  She is agreeable to continued outreach

## 2020-08-26 ENCOUNTER — PATIENT OUTREACH (OUTPATIENT)
Dept: CASE MANAGEMENT | Facility: OTHER | Age: 80
End: 2020-08-26

## 2020-08-26 NOTE — PROGRESS NOTES
Mine Rocha is managing well at home and continues to wear back brace  She was discharged from home care services  She is ambulatory with walker  She lives with her daughter and denies needing additional assistance at home  Nutritional intake adequate, blood sugars are less than 150  No hypoglycemic episodes  She denies back pain, but does have discomfort in knee, using icy hot with good relief  She is taking all medications as prescribed  Follow up appointments scheduled  She is agreeable to continued outreach

## 2020-09-17 ENCOUNTER — PATIENT OUTREACH (OUTPATIENT)
Dept: CASE MANAGEMENT | Facility: OTHER | Age: 80
End: 2020-09-17

## 2020-09-17 NOTE — PROGRESS NOTES
Citlalli Recinos is managing well at home and denies needing additional assistance  She is no longer wearing back brace and denies pain or discomfort  She will be starting outpatient PT  She is ambulatory with walker  Nutritional intake adequate, blood sugars are stable  She is taking all medications as prescribed  Follow up appointments scheduled  She continues with knee discomfort, using icy hot with good relief  No other issues or concerns at this time

## 2020-09-18 ENCOUNTER — PATIENT OUTREACH (OUTPATIENT)
Dept: CASE MANAGEMENT | Facility: OTHER | Age: 80
End: 2020-09-18

## 2020-09-21 ENCOUNTER — TRANSCRIBE ORDERS (OUTPATIENT)
Dept: PHYSICAL THERAPY | Facility: CLINIC | Age: 80
End: 2020-09-21

## 2020-09-21 ENCOUNTER — EVALUATION (OUTPATIENT)
Dept: PHYSICAL THERAPY | Facility: CLINIC | Age: 80
End: 2020-09-21
Payer: MEDICARE

## 2020-09-21 DIAGNOSIS — R26.89 BALANCE PROBLEM: ICD-10-CM

## 2020-09-21 DIAGNOSIS — M54.50 RIGHT-SIDED LOW BACK PAIN WITHOUT SCIATICA, UNSPECIFIED CHRONICITY: ICD-10-CM

## 2020-09-21 DIAGNOSIS — Z98.1 S/P LUMBAR FUSION: Primary | ICD-10-CM

## 2020-09-21 PROCEDURE — 97161 PT EVAL LOW COMPLEX 20 MIN: CPT | Performed by: PHYSICAL THERAPIST

## 2020-09-21 PROCEDURE — 97112 NEUROMUSCULAR REEDUCATION: CPT | Performed by: PHYSICAL THERAPIST

## 2020-09-21 NOTE — PROGRESS NOTES
PT Evaluation     Today's date: 2020  Patient name: Hari Traore  : 1940  MRN: [id number]  Referring provider: Carla Cordova MD  Dx:   Encounter Diagnosis     ICD-10-CM    1  S/P lumbar fusion  Z98 1    2  Right-sided low back pain without sciatica, unspecified chronicity  M54 5    3  Balance problem  R26 89                   Assessment  Assessment details: Hari Traore is a pleasant 78 y o  female presents s/p lumbar fusion on 6/15/2020  Lumbar ROM limited in rotation and provided graded stretching for these  R hip strength more limited than L but both with benefit from general restrengthening  Forward/leaning posture during gait and educated that using the walker for the time being will allow her a less stressful lumbar position and improve her safety as she has a significant hx of recent falls  She does have hx of neuropathy but plantar foot surface appears intact today with light touch  Will educate pt on safety with transitions and safe AD use (RW and SPC)  No further referral appears necessary at this time  Primary movement diagnosis of decreased NM control resulting in symptoms of falls and LBP and limiting her participation/performance in walking/chores  Primary impairments include:  1) decreased static balance in NBOS--> addressed with NMRE  2) decreased posterior chain length b/l--> addressed with stretching  3) decreased general LE power--> addressed with functional restrengthening    Skilled PT services appropriate to facilitate return to prior level of function with transition to home exercise program for independent management when appropriate      Impairments: abnormal muscle firing, abnormal muscle tone, abnormal or restricted ROM, impaired physical strength, lacks appropriate home exercise program and pain with function  Understanding of Dx/Px/POC: good   Prognosis: good    Goals  Patient will successfully transition to home exercise program   Patient will be able to manage symptoms independently  LTG:  Pt will increase foto to predicted within 8 weeks  2  Pt will safely ambulate with SPC on even surfaces with appropriate posture within 8 weeks  ST  Pt will have 50% reduction in worst pain within 4 weeks  2  Pt will perform tandem stance for >30 seconds without LOB within 4 weeks  Plan  Patient would benefit from: skilled PT  Referral necessary: No  Planned modality interventions: thermotherapy: hydrocollator packs  Planned therapy interventions: home exercise program, manual therapy, neuromuscular re-education, patient education, functional ROM exercises, strengthening, stretching, joint mobilization, graded activity, graded exercise, therapeutic exercise, body mechanics training, motor coordination training and activity modification  Frequency: 2x week  Duration in weeks: 12  Treatment plan discussed with: patient        Subjective Evaluation    History of Present Illness  Date of surgery: 6/15/2020  Mechanism of injury: elective surgery on 6/15/2020 for L4/5, L5/S1 laminectomy, L4/5 PSF    Per Mack Chavira RN  2020: [facility] [place] [facility] is managing well at home and denies needing additional assistance  She is no longer wearing back brace and denies pain or discomfort  She will be starting outpatient PT  She is ambulatory with walker  PT IE 2020:  Pt reports most pain is LB on the right side and no increase since discontinuing brace  She reports numbness in anterolateral R shin intermittently  She did have a partial knee replacement in the R knee in March but not limited now due to this  She reports showering and bed mobility as no issue currently  She reports falling multiple times in the last year and unable to specify who she loses balance  She did have a fall last month in the bathroom as she slipped getting into the shower  She presents in clinic with cane but says her family would be upset she didn't bring the walker   She would like to be able to do house work, take walks and do wash more easily  Pain  Current pain ratin  At best pain ratin  At worst pain ratin  Quality: dull ache    Treatments  Current treatment: physical therapy  Patient Goals  Patient goals for therapy: decreased pain, increased motion, increased strength and improved balance          Objective     Neurological Testing     Sensation     Lumbar   Left   Intact: light touch    Right   Intact: light touch    Active Range of Motion     Lumbar   Flexion:  Restriction level: minimal  Extension:  Restriction level: moderate  Left rotation:  Restriction level: moderate  Right rotation:  Restriction level: moderate    Strength/Myotome Testing     Left Hip   Planes of Motion   Flexion: 4  Abduction: 4-    Right Hip   Planes of Motion   Flexion: 4-  Abduction: 4-    Left Knee   Flexion: 4-  Extension: 4+    Right Knee   Flexion: 4-  Extension: 4+    Left Ankle/Foot   Dorsiflexion: 4  Plantar flexion: 4    Right Ankle/Foot   Dorsiflexion: 4  Plantar flexion: 4    Additional Strength Details  Hip rotation ROM b/l limited both directions    Ambulation     Comments   SPC: forward posture with L lateral lean   Decreased SL b/l and marginal foot clearance    Functional Assessment        Comments  FT head turns: >30sec but increased waver  FT EC: 20sec with increased waver  Tandem EO: increased waver 10sec  Tandem EC: unable to perform due to LOB    Sit to stand: increased UE support and mild LOB posterior              Precautions: Lumbar fusion 6/15/2020    Manuals                                                                 Neuro Re-Ed             PPT w/ab brace             Tandem balance                                                                              Ther Ex             Side steps at railing             bike             Toe taps at box             LTR             Seated hamstring stretch             Supine clamshells                                       Ther Activity             Mini squats at [facility]

## 2020-09-24 ENCOUNTER — OFFICE VISIT (OUTPATIENT)
Dept: PHYSICAL THERAPY | Facility: CLINIC | Age: 80
End: 2020-09-24
Payer: MEDICARE

## 2020-09-24 DIAGNOSIS — Z98.1 S/P LUMBAR FUSION: Primary | ICD-10-CM

## 2020-09-24 DIAGNOSIS — M54.50 RIGHT-SIDED LOW BACK PAIN WITHOUT SCIATICA, UNSPECIFIED CHRONICITY: ICD-10-CM

## 2020-09-24 DIAGNOSIS — R26.89 BALANCE PROBLEM: ICD-10-CM

## 2020-09-24 PROCEDURE — 97110 THERAPEUTIC EXERCISES: CPT | Performed by: PHYSICAL THERAPIST

## 2020-09-24 PROCEDURE — 97112 NEUROMUSCULAR REEDUCATION: CPT | Performed by: PHYSICAL THERAPIST

## 2020-09-24 NOTE — PROGRESS NOTES
Daily Note     Today's date: 2020  Patient name: Jame Weir  : 1940  MRN: [id number]  Referring provider: Cristel Long MD  Dx:   Encounter Diagnosis     ICD-10-CM    1  S/P lumbar fusion  Z98 1    2  Right-sided low back pain without sciatica, unspecified chronicity  M54 5    3  Balance problem  R26 89                   Subjective: Pt reports she is at a low pain level today and exercises were going okay at home  Objective: See treatment diary below      Assessment: Tolerated treatment well  Patient would benefit from continued PT  Noted quick fatigue with all aerobic activity  She required close supervision throughout with difficulty in safely transitioning from different positions  Required cuing throughout  No increase in pain  Continue to progress as tolerated  Plan: Continue per plan of care        Precautions: Lumbar fusion 6/15/2020    Manuals                                                                 Neuro Re-Ed             PPT w/ab brace 10x10:            Tandem balance 45sec                                                                             Ther Ex             Side steps at railing 3x10ft            bike 3'            Toe taps at box x10 ea            LTR             Seated hamstring stretch 1 5'ea            Seated clamshells 2x10 RTB            Standing heel raise 2x10            Standing hip abduction x10 ea            pball rollouts 5:x10 forward            Standing gastroc stretch 3x10" ea            Ther Activity             Mini squats at railing 1x10                         Gait Training                                       Modalities

## 2020-09-28 ENCOUNTER — OFFICE VISIT (OUTPATIENT)
Dept: PHYSICAL THERAPY | Facility: CLINIC | Age: 80
End: 2020-09-28
Payer: MEDICARE

## 2021-01-20 DIAGNOSIS — Z23 ENCOUNTER FOR IMMUNIZATION: ICD-10-CM

## 2021-09-10 ENCOUNTER — NURSING HOME VISIT (OUTPATIENT)
Dept: GERIATRICS | Facility: OTHER | Age: 81
End: 2021-09-10
Payer: COMMERCIAL

## 2021-09-10 DIAGNOSIS — E03.9 ACQUIRED HYPOTHYROIDISM: ICD-10-CM

## 2021-09-10 DIAGNOSIS — R65.21 SEPSIS DUE TO ESCHERICHIA COLI WITH ACUTE RENAL FAILURE AND SEPTIC SHOCK, UNSPECIFIED ACUTE RENAL FAILURE TYPE (HCC): Primary | ICD-10-CM

## 2021-09-10 DIAGNOSIS — N17.9 SEPSIS DUE TO ESCHERICHIA COLI WITH ACUTE RENAL FAILURE AND SEPTIC SHOCK, UNSPECIFIED ACUTE RENAL FAILURE TYPE (HCC): Primary | ICD-10-CM

## 2021-09-10 DIAGNOSIS — I82.532 CHRONIC DEEP VEIN THROMBOSIS (DVT) OF LEFT POPLITEAL VEIN (HCC): ICD-10-CM

## 2021-09-10 DIAGNOSIS — E11.65 TYPE 2 DIABETES MELLITUS WITH HYPERGLYCEMIA, WITHOUT LONG-TERM CURRENT USE OF INSULIN (HCC): ICD-10-CM

## 2021-09-10 DIAGNOSIS — I10 ESSENTIAL HYPERTENSION: ICD-10-CM

## 2021-09-10 DIAGNOSIS — I48.92 ATRIAL FLUTTER, UNSPECIFIED TYPE (HCC): ICD-10-CM

## 2021-09-10 DIAGNOSIS — R26.2 AMBULATORY DYSFUNCTION: ICD-10-CM

## 2021-09-10 DIAGNOSIS — A41.51 SEPSIS DUE TO ESCHERICHIA COLI WITH ACUTE RENAL FAILURE AND SEPTIC SHOCK, UNSPECIFIED ACUTE RENAL FAILURE TYPE (HCC): Primary | ICD-10-CM

## 2021-09-10 PROCEDURE — 99306 1ST NF CARE HIGH MDM 50: CPT | Performed by: FAMILY MEDICINE

## 2021-09-10 RX ORDER — TAMSULOSIN HYDROCHLORIDE 0.4 MG/1
0.4 CAPSULE ORAL DAILY PRN
COMMUNITY
Start: 2021-09-09 | End: 2021-10-09

## 2021-09-10 RX ORDER — SENNA PLUS 8.6 MG/1
8.6 TABLET ORAL
COMMUNITY
Start: 2021-09-09

## 2021-09-10 RX ORDER — OMEPRAZOLE 40 MG/1
1 CAPSULE, DELAYED RELEASE ORAL DAILY
COMMUNITY

## 2021-09-10 RX ORDER — PSEUDOEPHEDRINE HCL 30 MG
100 TABLET ORAL 2 TIMES DAILY
COMMUNITY
Start: 2021-09-09

## 2021-09-10 NOTE — ASSESSMENT & PLAN NOTE
2nd to obstructing right renal stone and hydronephrosis and UTI, on Keflex for one more day to finish 14 days course of treatment  Will continue with monitoring  Lab results reviewed stable

## 2021-09-10 NOTE — ASSESSMENT & PLAN NOTE
On Eliquis, H&H slightly at lower side today, HR controlled with Metoprolol  Will continue with monitoring

## 2021-09-10 NOTE — ASSESSMENT & PLAN NOTE
Lab Results   Component Value Date    HGBA1C 8 2 (H) 08/25/2021   on Metformin, will continue with monitoring  Ordered BS check BID

## 2021-09-10 NOTE — PROGRESS NOTES
[facility]  [address] [place] [address]  Facility: [facility]    NAME: Lizeth Leiva  AGE: 78 y o  SEX: female    DATE OF ENCOUNTER: 9/10/2021    Code status:  No CPR  D/W pt and she wants to be DNR/DNI    Assessment and Plan     Sepsis due to Escherichia coli ([address] )  2nd to obstructing right renal stone and hydronephrosis and UTI, on Keflex for one more day to finish 14 days course of treatment  Will continue with monitoring  Lab results reviewed stable  Ambulatory dysfunction  2nd to #1, and hospitalization and generalized weakness, needs NH care  Ordered PT/OT to improve gait, transfer, strength, ADLs  Chronic deep vein thrombosis (DVT) of left popliteal vein (HCC)  H&H slightly at lower side on today's lab  On ELiquis will continue with monitoring  Atrial flutter (HCC)  On Eliquis, H&H slightly at lower side today, HR controlled with Metoprolol  Will continue with monitoring  Type 2 diabetes mellitus with hyperglycemia, without long-term current use of insulin (HCC)    Lab Results   Component Value Date    HGBA1C 8 2 (H) 08/25/2021   on Metformin, will continue with monitoring  Ordered BS check BID  Hypothyroidism  On Levothyroxine, will continue with monitoring  HTN (hypertension)  BP stable with Lisinopril and Metoprolol  Lab today stable  Will continue with monitoring  All medications and routine orders were reviewed and updated as needed  Plan discussed with: Patient, and nursing staff     Chief Complaint     Pt has no current or specific complaint     History of Present Illness     Pt with [facility] and medical problem as this note who was admitted to [facility] on 8/28 with multiple falls and fever and was diagnosed with sepsis due to UTI 2nd to obstructing right kidney stone and hydronephrosis, pt had stent on 8/29 by urology  Pt also had TROY and her renal function was improved  Pt is now at [facility] since yesterday for STR   Pt was hypotensive in the hospital and was put on pressors, pt was started on keflex to complete 14 days treatment and her last dose would be tomorrow  HISTORY:  Past Medical History:   Diagnosis Date    Ambulates with cane     Anxiety     Arthritis     Back pain     Bowel trouble     Cancer (HCC)     left breast- left mastectomy- chemo    Chronic pain disorder     Coronary artery disease     2 stents    Depression     Diabetes mellitus (HCC)     NIDDM    Disease of thyroid gland     hypo    DVT (deep venous thrombosis) (HCC)     left leg    Full dentures     H/O breast implant     left    Headache     History of transfusion     no adverse reaction    Hyperlipidemia     Hypertension     Irregular heart beat     Afib    Localized swelling of both lower legs     Multiple falls     Neuropathy     Right knee pain     Shortness of breath     on exertion    Spinal stenosis, lumbar     Uses walker     Wears glasses     Wears glasses      Family History   Problem Relation Age of Onset    Heart disease Father     Heart disease Brother      Social History     Socioeconomic History    Marital status:      Spouse name: None    Number of children: None    Years of education: None    Highest education level: None   Occupational History    None   Tobacco Use    Smoking status: Never Smoker    Smokeless tobacco: Never Used   Vaping Use    Vaping Use: Never used   Substance and Sexual Activity    Alcohol use: Yes     Comment: wine    Drug use: Never    Sexual activity: Not Currently   Other Topics Concern    None   Social History Narrative    None     Social Determinants of Health     Financial Resource Strain:     Difficulty of Paying Living Expenses:    Food Insecurity:     Worried About Running Out of Food in the Last Year:     Ran Out of Food in the Last Year:    Transportation Needs:     Lack of Transportation (Medical):      Lack of Transportation (Non-Medical):    Physical Activity:     Days of Exercise per Week:     Minutes of Exercise per Session:    Stress:     Feeling of Stress :    Social Connections:     Frequency of Communication with Friends and Family:     Frequency of Social Gatherings with Friends and Family:     Attends Yazidism Services:     Active Member of Clubs or Organizations:     Attends Club or Organization Meetings:     Marital Status:    Intimate Partner Violence:     Fear of Current or Ex-Partner:     Emotionally Abused:     Physically Abused:     Sexually Abused: Allergies: Allergies   Allergen Reactions    Penicillins Hives       Review of Systems     Review of Systems   Constitutional: Negative  HENT: Negative  Eyes: Negative  Respiratory: Negative  Cardiovascular: Negative  Gastrointestinal: Negative  Endocrine: Negative  Genitourinary: Negative          :there might have been some blood in the urine, not sure!"   Musculoskeletal: Negative  Skin: Negative  Allergic/Immunologic: Negative  Neurological: Negative  Hematological: Negative  Psychiatric/Behavioral: Negative  All other systems reviewed and are negative  Medications and orders     All medications reviewed and updated in Nursing Home EMR  Objective     Vitals: wt:183 5Ibs     BP:132/60    HI:62    RR:18   Afebrile     Physical Exam  Vitals and nursing note reviewed  Constitutional:       General: She is not in acute distress  Appearance: Normal appearance  She is well-developed  She is obese  She is not ill-appearing, toxic-appearing or diaphoretic  HENT:      Head: Normocephalic and atraumatic  Right Ear: External ear normal       Left Ear: External ear normal       Nose: Nose normal  No congestion  Mouth/Throat:      Mouth: Mucous membranes are moist    Eyes:      General: No scleral icterus  Right eye: No discharge  Left eye: No discharge        Conjunctiva/sclera: Conjunctivae normal  Pupils: Pupils are equal, round, and reactive to light  Cardiovascular:      Rate and Rhythm: Normal rate and regular rhythm  Heart sounds: Normal heart sounds  No murmur heard  No friction rub  No gallop  Pulmonary:      Effort: Pulmonary effort is normal  No respiratory distress  Breath sounds: Normal breath sounds  No stridor  No wheezing, rhonchi or rales  Chest:      Chest wall: No tenderness  Abdominal:      General: Bowel sounds are normal  There is no distension  Palpations: Abdomen is soft  There is no mass  Tenderness: There is no abdominal tenderness  There is no guarding or rebound  Hernia: No hernia is present  Comments: Old surgical scar on mid abdomen    Genitourinary:     Comments: Deferred  Musculoskeletal:         General: No swelling, tenderness, deformity or signs of injury  Normal range of motion  Cervical back: Normal range of motion and neck supple  No rigidity or tenderness  Right lower leg: Edema present  Left lower leg: Edema present  Comments: Sitting in bed, legs down, moves extremities  Lymphadenopathy:      Cervical: No cervical adenopathy  Skin:     General: Skin is warm and dry  Coloration: Skin is not jaundiced or pale  Findings: No bruising, erythema, lesion or rash  Comments: Didn't examine sacral area  Old incision line on left breast, lower back and mid abdomen  Neurological:      Mental Status: She is alert and oriented to person, place, and time  Cranial Nerves: Cranial nerve deficit ([place]) present  Psychiatric:         Behavior: Behavior normal          Pertinent Laboratory/Diagnostic Studies: The following labs/studies were reviewed please see chart or hospital paperwork for details      Ref Range & Units Today    Hemoglobin 11 5 - 14 5 g/dL 10 8Low         Hematocrit 35 0 - 43 0 % 33 5Low         WBC 4 0 - 10 0 thou/cmm 9 7        RBC 3 70 - 4 70 mill/cmm 3 89        Platelet Count 140 - 350 thou/cmm 297        MPV 7 5 - 11 3 fL 10 5        MCV 80 - 100 fL 86        MCH 26 0 - 34 0 pg 27 9        MCHC 32 0 - 37 0 g/dL 32 4        RDW 12 0 - 16 0 % 15 8        Differential Type   AUTO        Absolute Neutrophils 1 8 - 7 8 thou/cmm 6 7        Absolute Lymphocytes 1 0 - 3 0 thou/cmm 2 0        Absolute Monocytes 0 3 - 1 0 thou/cmm 0 9        Absolute Eosinophils 0 0 - 0 5 thou/cmm 0 1        Absolute Basophils 0 0 - 0 1 thou/cmm 0 1        Neutrophils  % 69        Lymphocytes  % 20        Monocytes  % 9        Eosinophils  % 1        Basophils  % 1                 Ref Range & Units Today Comments    Glucose 65 - 99 mg/dL 177High           BUN 7 - 25 mg/dL 23          Creatinine 0 40 - 1 10 mg/dL 0 88          Sodium 135 - 145 mmol/L 139          Potassium 3 5 - 5 2 mmol/L 4 2          Chloride 100 - 109 mmol/L 105          Carbon Dioxide 23 - 31 mmol/L 25          Calcium 8 5 - 10 1 mg/dL 9 3          Alkaline Phosphatase 35 - 120 U/L 85          Albumin 3 5 - 4 8 g/dL 3 2Low           Bilirubin, Total 0 2 - 1 0 mg/dL 0 4                Ref Range & Units 4 d ago Comments   SARS Coronavirus 2 PCR Not Detected  Not Detected    STUDY: X-RAY CHEST     REASON FOR EXAM: Female, 78years year old, presenting with RESP FAILURE     TECHNIQUE: AP portable view               COMPARISON: 8/29/2021     FINDINGS:   Support devices: Right IJ central line terminates within SVC  Lungs: Lung volume is low   There are bibasilar opacities along with volume   loss  Pleura: There is no visible pneumothorax on either side  There is minimal   bilateral pleural effusion  Mediastinal structures: There is minimal cardiomegaly  Normal mediastinum  There   is minimal pulmonary edema  The thoracic aorta is tortuous  2DEcho  Left VentricleLeft ventricle is normal in size  There is mild   concentric hypertrophy  Systolic function is normal with an ejection   fraction of 60-65%   Wall motion is within normal limits  There is no   diastolic dysfunction and normal left atrial pressure    Aortic ValveThe aortic valve is trileaflet  The leaflets exhibit normal   excursion  There is mild sclerosis  There is mild regurgitation  There is   no evidence of aortic valve stenosis    Tricuspid ValveTricuspid valve structure is normal  There is mild   regurgitation  Velocity 280 cm/s: PASP 35 mmHg     Mitral ValveMitral valve structure is normal  There is mild   regurgitation          Alisson Kwan MD  9/10/2021 2:30 PM

## 2021-09-10 NOTE — ASSESSMENT & PLAN NOTE
2nd to #1, and hospitalization and generalized weakness, needs NH care  Ordered PT/OT to improve gait, transfer, strength, ADLs

## 2021-09-20 ENCOUNTER — NURSING HOME VISIT (OUTPATIENT)
Dept: GERIATRICS | Facility: OTHER | Age: 81
End: 2021-09-20
Payer: COMMERCIAL

## 2021-09-20 DIAGNOSIS — E11.65 TYPE 2 DIABETES MELLITUS WITH HYPERGLYCEMIA, WITHOUT LONG-TERM CURRENT USE OF INSULIN (HCC): ICD-10-CM

## 2021-09-20 DIAGNOSIS — I10 ESSENTIAL HYPERTENSION: ICD-10-CM

## 2021-09-20 DIAGNOSIS — R26.2 AMBULATORY DYSFUNCTION: Primary | ICD-10-CM

## 2021-09-20 DIAGNOSIS — R65.21 SEPSIS DUE TO ESCHERICHIA COLI WITH ACUTE RENAL FAILURE AND SEPTIC SHOCK, UNSPECIFIED ACUTE RENAL FAILURE TYPE (HCC): ICD-10-CM

## 2021-09-20 DIAGNOSIS — I82.532 CHRONIC DEEP VEIN THROMBOSIS (DVT) OF LEFT POPLITEAL VEIN (HCC): ICD-10-CM

## 2021-09-20 DIAGNOSIS — I48.92 ATRIAL FLUTTER, UNSPECIFIED TYPE (HCC): ICD-10-CM

## 2021-09-20 DIAGNOSIS — N17.9 SEPSIS DUE TO ESCHERICHIA COLI WITH ACUTE RENAL FAILURE AND SEPTIC SHOCK, UNSPECIFIED ACUTE RENAL FAILURE TYPE (HCC): ICD-10-CM

## 2021-09-20 DIAGNOSIS — A41.51 SEPSIS DUE TO ESCHERICHIA COLI WITH ACUTE RENAL FAILURE AND SEPTIC SHOCK, UNSPECIFIED ACUTE RENAL FAILURE TYPE (HCC): ICD-10-CM

## 2021-09-20 PROCEDURE — 99316 NF DSCHRG MGMT 30 MIN+: CPT | Performed by: FAMILY MEDICINE

## 2021-09-20 NOTE — ASSESSMENT & PLAN NOTE
Pt is improving with therapy, will be DC home with [facility],script for all meds written, [facility]  Pt will not need any DME and she has a walker at home  Will continue with therapy at home to improve gait, strength, transfer, ADLS and med management

## 2021-09-20 NOTE — ASSESSMENT & PLAN NOTE
HR controlled with Metoprolol  On Eliquis  Last CBC on 9/10 with slightly lower H&H, will need f/u with her PCP

## 2021-09-20 NOTE — PROGRESS NOTES
[facility]  [address]  [phone]  Facility: [place] [address]  Discharge Note          NAME: Anjum Hanley  AGE: 78 y o  SEX: female    DATE OF ENCOUNTER: 9/20/2021    Chief Complaint     Pt has no complaint     History of Present Illness     HPI    The following portions of the patient's history were reviewed and updated as appropriate (from facility chart and hospital records): allergies, current medications, past family history, past medical history, past social history, past surgical history and problem list   Pt was seen and examined for f/u on ambulatory dysfunction, DVT, sepsis, Aflutter,HTN and DM  Pt has been improving and will be discharged home today, she states that she is supposed to call her urologist on 9/28 to get direction on her next f/u appointment  BP stable  No sign or symptoms of any infection  BS recently better range  Pt denies any pain  HR controlled  Review of Systems     Review of Systems   Constitutional: Negative  HENT: Negative  Eyes: Negative  Respiratory: Negative  Cardiovascular: Negative  Gastrointestinal: Negative  Endocrine: Negative  Genitourinary: Negative  Musculoskeletal: Negative  Skin: Negative  Allergic/Immunologic: Negative  Neurological: Negative  Hematological: Negative  Psychiatric/Behavioral: Negative  All other systems reviewed and are negative        Active Problem List     Patient Active Problem List   Diagnosis    Spinal stenosis of lumbar region    Hypothyroidism    Hyperlipidemia    Hiatal hernia with gastroesophageal reflux    Arrhythmia    Chronic deep vein thrombosis (DVT) of left popliteal vein (HCC)    DM (diabetes mellitus) ([address] )    HTN (hypertension)    Neuropathic pain    Mood disorder (HCC)    IBS (irritable bowel syndrome)    Lymph node disorder    Abnormal CAT scan    Sepsis due to Escherichia coli ([address] )    Ambulatory dysfunction    Atrial flutter ([address] )  Type 2 diabetes mellitus with hyperglycemia, without long-term current use of insulin (HCC)       Objective     Vitals: wt:182  6Ibs         BP:122/66          Afebrile    BS log reviewed     Physical Exam  Vitals and nursing note reviewed  Constitutional:       General: She is not in acute distress  Appearance: Normal appearance  She is well-developed  She is not ill-appearing, toxic-appearing or diaphoretic  HENT:      Head: Normocephalic and atraumatic  Right Ear: External ear normal       Left Ear: External ear normal       Nose: Nose normal  No congestion or rhinorrhea  Mouth/Throat:      Comments: Missing teeth   Eyes:      General: No scleral icterus  Right eye: No discharge  Left eye: No discharge  Conjunctiva/sclera: Conjunctivae normal       Pupils: Pupils are equal, round, and reactive to light  Cardiovascular:      Rate and Rhythm: Normal rate  Rhythm irregular  Heart sounds: Normal heart sounds  No murmur heard  No friction rub  No gallop  Pulmonary:      Effort: Pulmonary effort is normal  No respiratory distress  Breath sounds: Normal breath sounds  No stridor  No wheezing, rhonchi or rales  Chest:      Chest wall: No tenderness  Abdominal:      General: Bowel sounds are normal  There is no distension  Palpations: Abdomen is soft  There is no mass  Tenderness: There is no abdominal tenderness  There is no guarding or rebound  Hernia: No hernia is present  Genitourinary:     Comments: Deferred  Musculoskeletal:         General: No swelling, tenderness, deformity or signs of injury  Normal range of motion  Cervical back: Normal range of motion and neck supple  No rigidity or tenderness  Right lower leg: Edema present  Left lower leg: Edema present  Lymphadenopathy:      Cervical: No cervical adenopathy  Skin:     General: Skin is warm and dry  Coloration: Skin is not jaundiced or pale        Findings: No bruising, erythema, lesion or rash  Comments: Didn't examine sacral area  Neurological:      Mental Status: She is alert and oriented to person, place, and time  Cranial Nerves: Cranial nerve deficit ([place]) present  Comments: Follows commands  Psychiatric:         Behavior: Behavior normal          Pertinent Laboratory/Diagnostic Studies: This month on 9/10: CBC, CMP    Current Medications   Medication list in facility chart was reviewed and necessary changes made  Assessment and Plan   Ambulatory dysfunction  Pt is improving with therapy, will be DC home with [facility],script for all meds written, referral to [facility] [address] made  Pt will not need any DME and she has a walker at home  Will continue with therapy at home to improve gait, strength, transfer, ADLS and med management  HTN (hypertension)  BP stable with Lisinopril and Metoprolol  Last lab on 9/10 and stable  Atrial flutter (HCC)  HR controlled with Metoprolol  On Eliquis  Last CBC on 9/10 with slightly lower H&H, will need f/u with her PCP  Type 2 diabetes mellitus with hyperglycemia, without long-term current use of insulin (HCC)    Lab Results   Component Value Date    HGBA1C 8 2 (H) 08/25/2021   stable with Metformin, pt states that she has glucometer at home  Instructed to monitor her BS and f/u with her PCP  Chronic deep vein thrombosis (DVT) of left popliteal vein (HCC)  On Eliquis  Sepsis due to Escherichia coli [facility])  Has completed antibiotic treatment, afebrile and asymptomatic  Will have f/u with urology  Stent in place  Total time spent was 38 mins with more than 50% of time spent on counseling, coordinating care and on discharge plan and discharge arrangement     Parker Dos Santos MD  9/20/20212:32 PM

## 2021-09-20 NOTE — ASSESSMENT & PLAN NOTE
Has completed antibiotic treatment, afebrile and asymptomatic  Will have f/u with urology  Stent in place

## 2022-04-19 NOTE — ASSESSMENT & PLAN NOTE
Non-occlusive LLE DVT (popliteal vein) on 6/16 imaging, unchanged on repeat imaging 6/26  Patient had been on Eliquis prior to recent spinal surgery  Per surgery, patient not cleared to resume Eliquis  Dr Chris Ortiz does not want patient on Eliquis for at least 3 weeks post-op  Decision made prior to recent hospital discharge to start Coumadin with INR goal of 2 0-2 5  (when goal reached, heparin may be discontinued  INR 6/26 1 86, 6/25 2 01; 1 86; 6/24 1 83; 6/23 1 60;  6/22 1 35  Will increase Coumadin from 2 mg to 4 mg with INR on Sunday  INR 2 23 6/28  No dosage change  Recheck Wednesday to ensure remaining within therapeutic range  Will discontinue Heparin at this time  Patient will need clearance by spine surgery to be switched back to Eliquis  Ear Wedge Repair Text: A wedge excision was completed by carrying down an excision through the full thickness of the ear and cartilage with an inward facing Burow's triangle. The wound was then closed in a layered fashion.

## 2024-12-16 NOTE — ASSESSMENT & PLAN NOTE
Continue amlodipine 5 mg daily and lisinopril 2 5 mg daily  Patient was on lopressor 25 mg daily   Will add lopressor 12 5 mg BID   Monitor and adjust as needed Mrs. Kimbrough called from [facility] and stated that Mrs. Maguire medication pantiprazole (PROTONIX) 40 MG tablet has been denied at the pharmacy because she stated that there is another medication that has been refilled for the same thing. Will please give her a call back at this number [phone]. Thanks

## (undated) DEVICE — DRESSING MEPILEX BORDER 4 X 8 IN

## (undated) DEVICE — INTENDED FOR TISSUE SEPARATION, AND OTHER PROCEDURES THAT REQUIRE A SHARP SURGICAL BLADE TO PUNCTURE OR CUT.: Brand: BARD-PARKER SAFETY BLADES SIZE 10, STERILE

## (undated) DEVICE — SPONGE LAP 18 X 4 IN STRL RFD

## (undated) DEVICE — PLUMEPEN PRO 10FT

## (undated) DEVICE — STERI DRAPE 1000 NON-STERILE ROLL

## (undated) DEVICE — NEURO PATTIES 1/2 X 1

## (undated) DEVICE — ADHESIVE SKIN HIGH VISCOSITY EXOFIN 1ML

## (undated) DEVICE — GLOVE INDICATOR PI UNDERGLOVE SZ 6.5 BLUE

## (undated) DEVICE — IV FLUSH NSS 10ML POSIFLUSH

## (undated) DEVICE — CHLORAPREP HI-LITE 26ML ORANGE

## (undated) DEVICE — PENCIL ELECTROSURG E-Z CLEAN -0035H

## (undated) DEVICE — TRAY FOLEY 16FR URIMETER SURESTEP

## (undated) DEVICE — GAUZE SPONGES,16 PLY: Brand: CURITY

## (undated) DEVICE — SYRINGE 10ML LL CONTROL TOP

## (undated) DEVICE — GLOVE INDICATOR PI UNDERGLOVE SZ 8 BLUE

## (undated) DEVICE — SUT VICRYL 1 CTX 36 IN J977H

## (undated) DEVICE — JACKSON TABLE FOAM POSITIONING KIT: Brand: CARDINAL HEALTH

## (undated) DEVICE — SNAP KOVER: Brand: UNBRANDED

## (undated) DEVICE — ELECTRODE BLADE MOD  E-Z CLEAN 6.5IN -0014M

## (undated) DEVICE — GLOVE SRG BIOGEL 8

## (undated) DEVICE — SUT VICRYL 1 CT-1 27 IN J261H

## (undated) DEVICE — NEEDLE HYPO 22G X 1-1/2 IN

## (undated) DEVICE — 2000CC GUARDIAN II: Brand: GUARDIAN

## (undated) DEVICE — SUT MONOCRYL 3-0 PS-2 27 IN Y427H

## (undated) DEVICE — DRAPE SHEET X-LG

## (undated) DEVICE — DRAPE LAPAROTOMY W/POUCHES

## (undated) DEVICE — HEMOSTATIC MATRIX SURGIFLO 8ML W/THROMBIN

## (undated) DEVICE — BETHLEHEM UNIVERSAL SPINE, KIT: Brand: CARDINAL HEALTH

## (undated) DEVICE — SCD SEQUENTIAL COMPRESSION COMFORT SLEEVE MEDIUM KNEE LENGTH: Brand: KENDALL SCD

## (undated) DEVICE — PETRI DISH STERILE

## (undated) DEVICE — 3M™ STERI-STRIP™ REINFORCED ADHESIVE SKIN CLOSURES, R1542, 1/4 IN X 1-1/2 IN (6 MM X 38 MM), 6 STRIPS/ENVELOPE: Brand: 3M™ STERI-STRIP™

## (undated) DEVICE — INTENDED FOR TISSUE SEPARATION, AND OTHER PROCEDURES THAT REQUIRE A SHARP SURGICAL BLADE TO PUNCTURE OR CUT.: Brand: BARD-PARKER ® CARBON RIB-BACK BLADES

## (undated) DEVICE — SURGIFOAM 8.5 X 12.5

## (undated) DEVICE — GLOVE SRG BIOGEL 6.5

## (undated) DEVICE — SUT VICRYL 2-0 CT-2 27 IN J269H

## (undated) DEVICE — DRAPE C-ARMOUR